# Patient Record
Sex: FEMALE | Race: OTHER | HISPANIC OR LATINO | Employment: FULL TIME | URBAN - METROPOLITAN AREA
[De-identification: names, ages, dates, MRNs, and addresses within clinical notes are randomized per-mention and may not be internally consistent; named-entity substitution may affect disease eponyms.]

---

## 2017-05-19 ENCOUNTER — LAB REQUISITION (OUTPATIENT)
Dept: LAB | Facility: HOSPITAL | Age: 22
End: 2017-05-19
Payer: COMMERCIAL

## 2017-05-19 ENCOUNTER — ALLSCRIPTS OFFICE VISIT (OUTPATIENT)
Dept: OTHER | Facility: OTHER | Age: 22
End: 2017-05-19

## 2017-05-19 DIAGNOSIS — Z11.3 ENCOUNTER FOR SCREENING FOR INFECTIONS WITH PREDOMINANTLY SEXUAL MODE OF TRANSMISSION: ICD-10-CM

## 2017-05-19 DIAGNOSIS — R30.9 PAINFUL MICTURITION: ICD-10-CM

## 2017-05-19 LAB
BILIRUB UR QL STRIP: NORMAL
CHLAMYDIA DNA CVX QL NAA+PROBE: NORMAL
CLARITY UR: NORMAL
COLOR UR: YELLOW
GLUCOSE (HISTORICAL): NORMAL
HGB UR QL STRIP.AUTO: NORMAL
KETONES UR STRIP-MCNC: NORMAL MG/DL
LEUKOCYTE ESTERASE UR QL STRIP: NORMAL
N GONORRHOEA DNA GENITAL QL NAA+PROBE: NORMAL
NITRITE UR QL STRIP: NORMAL
PH UR STRIP.AUTO: 6.5 [PH]
PROT UR STRIP-MCNC: NORMAL MG/DL
SP GR UR STRIP.AUTO: 1.01
UROBILINOGEN UR QL STRIP.AUTO: 0.2

## 2017-05-19 PROCEDURE — 87591 N.GONORRHOEAE DNA AMP PROB: CPT | Performed by: OBSTETRICS & GYNECOLOGY

## 2017-05-19 PROCEDURE — 87086 URINE CULTURE/COLONY COUNT: CPT | Performed by: OBSTETRICS & GYNECOLOGY

## 2017-05-19 PROCEDURE — 87491 CHLMYD TRACH DNA AMP PROBE: CPT | Performed by: OBSTETRICS & GYNECOLOGY

## 2017-05-20 LAB — BACTERIA UR CULT: NORMAL

## 2017-09-22 ENCOUNTER — HOSPITAL ENCOUNTER (EMERGENCY)
Facility: HOSPITAL | Age: 22
Discharge: HOME | End: 2017-09-22
Payer: COMMERCIAL

## 2017-09-22 VITALS
WEIGHT: 220 LBS | RESPIRATION RATE: 18 BRPM | SYSTOLIC BLOOD PRESSURE: 120 MMHG | OXYGEN SATURATION: 97 % | DIASTOLIC BLOOD PRESSURE: 83 MMHG | HEART RATE: 88 BPM | TEMPERATURE: 98.8 F | BODY MASS INDEX: 40.48 KG/M2 | HEIGHT: 62 IN

## 2017-09-22 DIAGNOSIS — R51.9 HEADACHE: Primary | ICD-10-CM

## 2017-09-22 PROCEDURE — 96372 THER/PROPH/DIAG INJ SC/IM: CPT

## 2017-09-22 PROCEDURE — 99283 EMERGENCY DEPT VISIT LOW MDM: CPT

## 2017-09-22 RX ORDER — DIPHENHYDRAMINE HCL 25 MG
25 TABLET ORAL ONCE
Status: COMPLETED | OUTPATIENT
Start: 2017-09-22 | End: 2017-09-22

## 2017-09-22 RX ORDER — KETOROLAC TROMETHAMINE 30 MG/ML
15 INJECTION, SOLUTION INTRAMUSCULAR; INTRAVENOUS ONCE
Status: COMPLETED | OUTPATIENT
Start: 2017-09-22 | End: 2017-09-22

## 2017-09-22 RX ORDER — METOCLOPRAMIDE 10 MG/1
10 TABLET ORAL ONCE
Status: COMPLETED | OUTPATIENT
Start: 2017-09-22 | End: 2017-09-22

## 2017-09-22 RX ORDER — ACETAMINOPHEN 325 MG/1
650 TABLET ORAL ONCE
Status: COMPLETED | OUTPATIENT
Start: 2017-09-22 | End: 2017-09-22

## 2017-09-22 RX ADMIN — METOCLOPRAMIDE 10 MG: 10 TABLET ORAL at 21:16

## 2017-09-22 RX ADMIN — ACETAMINOPHEN 650 MG: 325 TABLET, FILM COATED ORAL at 19:29

## 2017-09-22 RX ADMIN — KETOROLAC TROMETHAMINE 15 MG: 30 INJECTION, SOLUTION INTRAMUSCULAR at 21:17

## 2017-09-22 RX ADMIN — DIPHENHYDRAMINE HCL 25 MG: 25 TABLET ORAL at 21:16

## 2017-09-23 ENCOUNTER — HOSPITAL ENCOUNTER (EMERGENCY)
Facility: HOSPITAL | Age: 22
Discharge: HOME | End: 2017-09-23
Attending: EMERGENCY MEDICINE | Admitting: EMERGENCY MEDICINE
Payer: COMMERCIAL

## 2017-09-23 VITALS
OXYGEN SATURATION: 98 % | SYSTOLIC BLOOD PRESSURE: 132 MMHG | HEART RATE: 80 BPM | HEIGHT: 62 IN | WEIGHT: 220.02 LBS | RESPIRATION RATE: 18 BRPM | TEMPERATURE: 98.8 F | BODY MASS INDEX: 40.49 KG/M2 | DIASTOLIC BLOOD PRESSURE: 79 MMHG

## 2017-09-23 DIAGNOSIS — G43.909 MIGRAINE HEADACHE: Primary | ICD-10-CM

## 2017-09-23 LAB
BACTERIA UR QL AUTO: ABNORMAL /HPF
BILIRUB UR QL STRIP: NEGATIVE
CLARITY UR: CLEAR
COLOR UR: YELLOW
GLUCOSE UR STRIP-MCNC: NEGATIVE MG/DL
HGB UR QL STRIP.AUTO: NEGATIVE
HYALINE CASTS #/AREA URNS LPF: ABNORMAL /LPF
KETONES UR STRIP-MCNC: ABNORMAL MG/DL
LEUKOCYTE ESTERASE UR QL STRIP: ABNORMAL
NITRITE UR QL STRIP: NEGATIVE
NON-SQ EPI CELLS URNS QL MICRO: ABNORMAL /HPF
PH UR STRIP.AUTO: 6 [PH] (ref 4.5–8)
PROT UR STRIP-MCNC: ABNORMAL MG/DL
RBC #/AREA URNS AUTO: ABNORMAL /HPF
SP GR UR STRIP.AUTO: >=1.03 (ref 1–1.03)
UROBILINOGEN UR QL STRIP.AUTO: 0.2 E.U./DL
WBC #/AREA URNS AUTO: ABNORMAL /HPF

## 2017-09-23 PROCEDURE — 81001 URINALYSIS AUTO W/SCOPE: CPT

## 2017-09-23 PROCEDURE — 96375 TX/PRO/DX INJ NEW DRUG ADDON: CPT

## 2017-09-23 PROCEDURE — 99283 EMERGENCY DEPT VISIT LOW MDM: CPT

## 2017-09-23 PROCEDURE — 96365 THER/PROPH/DIAG IV INF INIT: CPT

## 2017-09-23 RX ORDER — KETOROLAC TROMETHAMINE 30 MG/ML
15 INJECTION, SOLUTION INTRAMUSCULAR; INTRAVENOUS ONCE
Status: COMPLETED | OUTPATIENT
Start: 2017-09-23 | End: 2017-09-23

## 2017-09-23 RX ORDER — MAGNESIUM SULFATE HEPTAHYDRATE 40 MG/ML
2 INJECTION, SOLUTION INTRAVENOUS ONCE
Status: COMPLETED | OUTPATIENT
Start: 2017-09-23 | End: 2017-09-23

## 2017-09-23 RX ORDER — IBUPROFEN 800 MG/1
800 TABLET ORAL 3 TIMES DAILY
Qty: 21 TABLET | Refills: 0 | Status: SHIPPED | OUTPATIENT
Start: 2017-09-23 | End: 2018-06-05 | Stop reason: ALTCHOICE

## 2017-09-23 RX ORDER — METOCLOPRAMIDE HYDROCHLORIDE 5 MG/ML
10 INJECTION INTRAMUSCULAR; INTRAVENOUS ONCE
Status: COMPLETED | OUTPATIENT
Start: 2017-09-23 | End: 2017-09-23

## 2017-09-23 RX ORDER — DIPHENHYDRAMINE HYDROCHLORIDE 50 MG/ML
25 INJECTION INTRAMUSCULAR; INTRAVENOUS ONCE
Status: COMPLETED | OUTPATIENT
Start: 2017-09-23 | End: 2017-09-23

## 2017-09-23 RX ADMIN — SODIUM CHLORIDE 1000 ML: 0.9 INJECTION, SOLUTION INTRAVENOUS at 18:37

## 2017-09-23 RX ADMIN — KETOROLAC TROMETHAMINE 15 MG: 30 INJECTION, SOLUTION INTRAMUSCULAR at 18:41

## 2017-09-23 RX ADMIN — METOCLOPRAMIDE 10 MG: 5 INJECTION, SOLUTION INTRAMUSCULAR; INTRAVENOUS at 18:42

## 2017-09-23 RX ADMIN — MAGNESIUM SULFATE HEPTAHYDRATE 2 G: 40 INJECTION, SOLUTION INTRAVENOUS at 18:46

## 2017-09-23 RX ADMIN — DIPHENHYDRAMINE HYDROCHLORIDE 25 MG: 50 INJECTION, SOLUTION INTRAMUSCULAR; INTRAVENOUS at 18:45

## 2017-09-29 ENCOUNTER — GENERIC CONVERSION - ENCOUNTER (OUTPATIENT)
Dept: OTHER | Facility: OTHER | Age: 22
End: 2017-09-29

## 2017-10-13 ENCOUNTER — GENERIC CONVERSION - ENCOUNTER (OUTPATIENT)
Dept: OTHER | Facility: OTHER | Age: 22
End: 2017-10-13

## 2018-01-11 NOTE — PROGRESS NOTES
Assessment    1  Encounter for preventive health examination (V70 0) (Z00 00)    Plan  Health Maintenance    · Follow-up PRN Evaluation and Treatment  Follow-up  Status: Complete  Done:  99EVJ7193   · Always use a seat belt and shoulder strap when riding or driving a motor vehicle ;  Status:Complete;   Done: 26DZQ7759   · Brush your teeth 3 times a day and floss at least once a day ; Status:Complete;   Done:  22NGT5548   · There are many ways to reduce your risk of catching or spreading a sexually transmitted  Infection ; Status:Complete;   Done: 34WXT0729   · There are ways to decrease your stress and improve your sense of well-being  We  encourage you to keep active and exercise regularly  Make time to take care of yourself  and participate in activities that you enjoy  Stay connected to friends and family that can  support and comfort you  If at any time you have thoughts of harming yourself or  someone else, contact us immediately ; Status:Active; Requested for:54Dak5719;    · We encourage all of our patients to exercise regularly  30 minutes of exercise or physical  activity five or more days a week is recommended for children and adults ;  Status:Complete;   Done: 44GQH0414   · We recommend regular contraceptive use to prevent an unplanned pregnancy ;  Status:Complete;   Done: 45JYK5241   · We recommend routine visits to a dentist ; Status:Complete;   Done: 46FLG2840   · We recommend that you follow these rules for gun safety ; Status:Complete;   Done:  44GHT1064    Discussion/Summary    Not seen by provider today  AHA completed with RN  Recently had baby and is connected to [facility] program  Has insurance - list of PCP providers given and encouraged to make appointment with PCP  Making good lifestyle choices at this time  Graduating in June  No further follow-up  Chief Complaint  Pt  presents for AHA completion and review   KRM,RN      History of Present Illness  Adolescent Health Assessment   Nutrition and Exercise   1  She eats breakfast 6-7 times during the week  2  She drinks 4-7 glasses of water daily  3  She does not drink sweetened beverages daily  rarely  4  She eats 1-2 servings of fruits and vegetables daily  5  She does not participate in physical activity daily  6  She has more than two hours of screen time daily  Mental Health   7  No  Did not experience high levels of stress AT SCHOOL in the past 30 days  8  No  Did not experience high levels of stress AT HOME in the past 30 days  10  Yes  In the past 12 months, she has been bullied on school property  boyfriend  11  No  She is not being bullied electronically  12  Yes  She is using social media  fb    13  No  In the past 12 months, she has not seriously considered suicide  14  No  In the past 12 months she has not made a suicide attempt  15  No  The patient has not ever intentionally hurt themselves  16  No  She has never been physically, sexually, or emotionally abused  Unintentional Injury   17  Yes  When she rides in a car, truck or [place], she always wears a seat belt  18  Yes  During the past 30 days, she always wore a helmet when she rode in a bike, motorcycle, minibike or ATV  19  No  During the past 30 days, she did not ride in a car or other vehicle driven by someone who had been drinking alcohol  20  No  She has not used alcohol and then driven a car/truck/van/motorcycle at any time during the past 30 days  Violence   21  No  She has not carried a weapon - such as a gun, knife or club - on at least one day within the past 30 days  - not on school property  22  No  She or someone she lives with does not have a gun, rifle or other firearm  23  No  She has not been in a physical fight one or more times within the past 12 months  24  No  She has never been in trouble with the police  25  Yes  She feels safe at school     26  No  She has not been hit, slapped, or physically hurt on purpose by a boyfriend/girlfriend in the past 12 months  Substance Abuse   27  No  In the past 30 days, she has not smoked cigarettes of any kind  29  No  During the past 30 days, she has not used chewing tobacco    30  No  She has not used any tobacco product (including snuff, cigars, cigarettes, electronic cigarettes, chew, SNUS, Hookah, Vapor) in her lifetime  31  No  In the past 30 days, she has not had at least one alcoholic drink  33  No  During the past 30 days, she did not binge drink  27  No  The patient has not used prescription medication (pills such as Xanax or Ritalin) that was not prescribed for them  34  No  She has not used alcohol or any illegal substance in the past 30 days  35  No  She has not used marijuana in the past 30 days  36  No  The patient has not used any form of cocaine in their lifetime  37  No  During the past 12 months, no one has offered, sold, or given her illegal drug(s) on school property  Reproductive Health   45  Yes  She has had sex  She has had one sexual partner, male  No  She did not use condoms the last time she was sexually active  39  No  She has not been tested for STDs  She tested negative  40  She does not know if she has had the HPV vaccine  41  Yes  She has been pregnant  2 mos  girl  42  No  She has never felt pressured to have sex when she did not want to    37  No  She does not think she may be basurto, lesbian, bisexual, transgender or question her sexuality  Extracurricular Activities: none   Future Plans and Goals: college next year for cooking   School: [employer]   Strengths were reviewed  Active Problems    1  BP check (V81 1) (Z01 30)   2  High risk teen pregnancy in second trimester (V23 89) (O09 892)   3  High risk teen pregnancy in third trimester (V23 89) (O09 893)   4  Infection of urinary tract during pregnancy in third trimester (646 63,599 0) (O23 43)   5  Insertion of Nexplanon (V25 5) (Z30 49)   6   Nausea/vomiting in pregnancy (643 90) (O21 9)   7  Pregnancy (V22 2) (Z33 1)   8  Routine eye exam (V72 0) (Z01 00)   9  Ultrasound for  screening for fetal growth restriction (V28 4) (Z36)    Past Medical History    1  Denied: History of medical problems    Surgical History    1  History of Cholecystectomy Laparoscopic   2  Denied: History of Previous Surgery - During Childhood    Family History  Mother    1  No pertinent family history   2  No pertinent family history  Father    3  Family history of   Sister    3  No pertinent family history  Brother    5  No pertinent family history  Maternal Grandmother    6  Family history of malignant neoplasm (V16 9) (Z80 9)    Social History    · Always uses seat belt   · High school student   · Household: Younger sister   · Lives with mother (single parent)   · Never a smoker   · Never smoker   · No alcohol use   · No drug use   · No tobacco/smoke exposure   · Primary language is Sinhala    Current Meds   1  Prenatal Vitamins 28-0 8 MG Oral Tablet; TAKE 1 TABLET DAILY; Therapy: 07QSB7287 to (Evaluate:31Csc7250)  Requested for: 90WPD5847; Last   Rx:12Nrp3291 Ordered    Allergies    1  No Known Drug Allergies    2  No Known Environmental Allergies   3  No Known Food Allergies    End of Encounter Meds    1  Prenatal Vitamins 28-0 8 MG Oral Tablet; TAKE 1 TABLET DAILY;    Therapy: 10MWQ4581 to (Evaluate:32Jyr1406)  Requested for: 86ADQ7169; Last   Rx:80Gsc0828 Ordered    Signatures   Electronically signed by : ROXANNE Hsu; May 26 2016 10:04AM EST                       (Author)

## 2018-01-12 NOTE — MISCELLANEOUS
Message  Message Free Text Note Form: Last time seen on the [facility] President advice Estrellita Whitley to make an appointment with her primary care doctor for a physical  Patient verbally agreed        Signatures   Electronically signed by : Raymundo Garcia, ; Jun 9 2016  1:31PM EST                       (Author)

## 2018-01-13 NOTE — MISCELLANEOUS
Reason For Visit  Reason For Visit Free Text Note Form: MET WITH PATIENT FOR FOLLOW  PATIENT REPORTED SHE IS DOING WELL  ATTENDING SCHOOL REGULARLY  NO CONCERN A THIS TIME  ENCOURAGED TO CONTACT SW AT ANY TIME NEEDED  Active Problems    1  High risk teen pregnancy in second trimester (V23 89) (O09 892)   2  Nausea/vomiting in pregnancy (643 90) (O21 9)    Current Meds   1  Ondansetron HCl - 4 MG Oral Tablet (Zofran); 1 tablet every 6 hours as needed for   nausea; Therapy: 15KND7850 to (Evaluate:50Xvn9639)  Requested for: 99XVQ9312; Last   Rx:2015 Ordered   2  Prenatal Vitamins 28-0 8 MG Oral Tablet; TAKE 1 TABLET DAILY; Therapy: 93OVT7119 to (Lonita Vulcan)  Requested for: 50CSP4489; Last   Rx:2015; Status: ACTIVE - Retrospective By Protocol Authorization Ordered    Allergies    1  No Known Drug Allergies    2  No Known Environmental Allergies   3   No Known Food Allergies    Future Appointments    Date/Time Provider Specialty Site   2016 03:00 PM  [facility], [address]   Electronically signed by : BENJAMIN Millan; 2016  5:26PM EST                       (Author)

## 2018-01-14 VITALS
BODY MASS INDEX: 31.52 KG/M2 | DIASTOLIC BLOOD PRESSURE: 76 MMHG | WEIGHT: 208 LBS | HEIGHT: 68 IN | SYSTOLIC BLOOD PRESSURE: 119 MMHG

## 2018-01-15 NOTE — MISCELLANEOUS
Reason For Visit  Reason For Visit Free Text Note Form: MET WITH PATIENT AND PROVIDER TO ASSIST WITH INTERPRETATION  PATIENT HAS NO CONCERN AT THIS TIME  Active Problems    1  High risk teen pregnancy in second trimester (V23 89) (O09 892)   2  High risk teen pregnancy in third trimester (V23 89) (O09 893)   3  Infection of urinary tract during pregnancy in third trimester (646 63,599 0) (O23 43)   4  Nausea/vomiting in pregnancy (643 90) (O21 9)   5  Ultrasound for  screening for fetal growth restriction (V28 4) (Z36)    Current Meds   1  Nitrofurantoin Monohyd Macro 100 MG Oral Capsule; TAKE 1 CAPSULE EVERY 12   HOURS DAILY; Therapy: 14BXU6235 to (Evaluate:2016); Last Rx:2016 Ordered   2  Ondansetron HCl - 4 MG Oral Tablet (Zofran); 1 tablet every 6 hours as needed for   nausea; Therapy: 29SCN5062 to (Evaluate:62Umv5218)  Requested for: 78TQZ9591; Last   Rx:2015 Ordered   3  Prenatal Vitamins 28-0 8 MG Oral Tablet; TAKE 1 TABLET DAILY; Therapy: 91WYP5885 to (Evaluate:59Oig0952)  Requested for: 20SPN2983; Last   Rx:2016 Ordered   4  Tdap (Adacel) (Tdap (Adacel)); Pt is post-28 wks; Therapy: 45JYX1460 to (Last Rx:2016) Ordered    Allergies    1  No Known Drug Allergies    2  No Known Environmental Allergies   3   No Known Food Allergies    Future Appointments    Date/Time Provider Specialty Site   2016 03:15 PM [address] [facility], Physician Schedule  [facility]  [address]     Signatures   Electronically signed by : BENJAMIN Mcallister; Mar 17 2016 11:25AM EST                       (Author)

## 2018-01-16 NOTE — PROGRESS NOTES
Assessment    1  Routine eye exam (V72 0) (Z01 00)    Plan  Health Maintenance    · Follow-up visit in 1 week Evaluation and Treatment  Follow-up  Status: Hold For -  Scheduling  Requested for: 25NMM2562  Routine eye exam    · SNELLEN VISION- POC; Status:Complete;   Done: 56XGJ0035    Discussion/Summary    Not seen by provider today  Follow-up in 1 week for AHA and 's physical       Chief Complaint  Initial visit to the [facility]  Requesting a 's PE  Interpretor - eGorge Mckenna, Family Liaison  Moved here from [address] in September  7 months pregnant  Receiving prenatal care at [facility] - no complications  Babies father lives here in [place]  Taking Zofran for nausea but no sure of how many mg per tab  Ins - just received [facility]  PCP - needs to be connected  Dental - none - will ref  Vision - last seen 2015 in [place]  MH - none  PHQ-9, Teen Screen, completed  Negative, Score 0, No Depression  Denies any thoughts of wanting to hurt herself in anyway  Needs to return to class  Needs to make an appt for her next prenatal appt for 17  Due to visit taking so long today, Malu Barron is ok with getting her 's PE next week  Will see provider in 1 week for PE and for HM/AHA  Past Medical History    1  Denied: History of medical problems    The active problems and past medical history were reviewed and updated today  Surgical History    1  Denied: History of Previous Surgery - During Childhood    The surgical history was reviewed and updated today  Family History    1  No pertinent family history    2  Family history of     3  No pertinent family history    4  No pertinent family history    The family history was reviewed and updated today  Social History    · Always uses seat belt   · High school student   · Household:  Younger sister   · Lives with mother (single parent)   · Never smoker   · No alcohol use   · No drug use   · No tobacco/smoke exposure   · Primary language is Indonesian  The social history was reviewed and updated today  Allergies    1  No Known Drug Allergies    2  No Known Environmental Allergies   3  No Known Food Allergies    Vitals  Signs [Data Includes: Current Encounter]   Recorded: 31FRU4779 99:36EV   Systolic: 213, LUE, Sitting  Diastolic: 68, LUE, Sitting  Height: 5 ft 8 in  2-20 Stature Percentile: 92 %  Weight: 179 lb 12 8 oz  2-20 Weight Percentile: 94 %  BMI Calculated: 27 34  BMI Percentile: 88 %  BSA Calculated: 1 95  LMP: Approx 43VYE7470  Pain Scale: 0    Results/Data  Encounter Results   PHQ-9 Adult Depression Screening 92KGS0599 10:19AM User, Ahs     Test Name Result Flag Reference   PHQ-9 Adult Depression Score 0     Q1: 0, Q2: 0, Q3: 0, Q4: 0, Q5: 0, Q6: 0, Q7: 0, Q8: 0, Q9: 0   PHQ-9 Adult Depression Screening Negative     PHQ-9 Difficulty Level Not difficult at all     PHQ-9 Severity No Depression         Procedure    Procedure: Visual Acuity Test    Indication: routine screening  Inforrmation supplied by Amanda henderson Snellen chart  Results: 20/20 in both eyes with corrective device, 20/20 in the right eye with corrective device, 20/20 in the left eye with corrective device normal in both eyes  Saw an eye dr  in [facility] 4/2015  Will need to find a local one for her annual exam when it is due  Color vision was reported by Amanda Belcher and the results were normal    Identified colors on Snellen chart  The patient was cooperative, but tolerated the procedure well  There were no complications        Future Appointments    Date/Time Provider Specialty Site   02/18/2016 08:30 AM [facility], [address]     Signatures   Electronically signed by : Rubi Josue RN; Feb 11 2016 10:21AM EST                       (Author)    Electronically signed by : ROXANNE Frye; Feb 11 2016 10:48AM EST                       (Author)

## 2018-01-18 NOTE — PROGRESS NOTES
Assessment    1  Encounter for preventive health examination (V70 0) (Z00 00)   2  Pregnancy (V22 2) (Z33 1)    Plan  Health Maintenance    · Follow-up visit in 1 month Evaluation and Treatment  Follow-up  Status: Hold For -  Scheduling  Requested for: 55GAU3308   · Brush your teeth 3 times a day and floss at least once a day ; Status:Complete;   Done:  93OKE4153   · Drink plenty of fluids ; Status:Complete;   Done: 64TJH9463   · There are ways to decrease your stress and improve your sense of well-being  We  encourage you to keep active and exercise regularly  Make time to take care of yourself  and participate in activities that you enjoy  Stay connected to friends and family that can  support and comfort you  If at any time you have thoughts of harming yourself or  someone else, contact us immediately ; Status:Active; Requested for:14Ocd8192;    · Vitamins can help you get daily requirements that your diet may not be giving you ;  Status:Complete;   Done: 55MVH3987   · We encourage you to begin to make lifestyle changes to help control your blood  pressure  These may include losing weight, increasing your activity level, limiting salt in  your diet, decreasing alcohol intake, and eating a diet low in fat and rich in fruits  and vegetables ; Status:Complete;   Done: 44DOO1557   · We recommend routine visits to a dentist ; Status:Complete;   Done: 60ZDK8452   · *[address] - [address] Physician Referral  Consult  Status: Hold For - Scheduling   Requested for: 58AHM3958  Care Summary provided  : Yes    Discussion/Summary    Pleasant, well-appearing 24year old here today for 's physical  Danish-speaking - utlized Naeem Faulkner for interpretation  PE remarkable only for pregnancy  's form filled out  Referred for Dental van  Has appointment with OB clinic in March  Follow-up with [facility] in 1 month for AHA completion  Chief Complaint  No complaints or concerns today        History of Present Illness  Here for 's physical  Is approximately 7 months pregnant - followed by [facility]  Next appointment is in March  Recently got insurance, and will be connecting with a PCP  Pregnancy going well - continues on Zofran as needed for nausea  No history of any illnesses  Review of Systems    Constitutional: Pregnant, but as noted in HPI  Eyes: No complaints of eye pain, no red eyes, no eyesight problems, no discharge, no dry eyes, no itching of eyes  ENT: no complaints of earache, no loss of hearing, no nose bleeds, no nasal discharge, no sore throat, no hoarseness  Cardiovascular: No complaints of slow heart rate, no fast heart rate, no chest pain, no palpitations, no leg claudication, no lower extremity edema  Respiratory: No complaints of shortness of breath, no wheezing, no cough, no SOB on exertion, no orthopnea, no PND  Genitourinary: No complaints of dysuria, no incontinence, no pelvic pain, no dysmenorrhea, no vaginal discharge or bleeding  Musculoskeletal: No complaints of arthralgias, no myalgias, no joint swelling or stiffness, no limb pain or swelling  Neurological: No complaints of headache, no confusion, no convulsions, no numbness, no dizziness or fainting, no tingling, no limb weakness, no difficulty walking  Psychiatric: Not suicidal, no sleep disturbance, no anxiety or depression, no change in personality, no emotional problems  Endocrine: No complaints of proptosis, no hot flashes, no muscle weakness, no deepening of the voice, no feelings of weakness  Hematologic/Lymphatic: No complaints of swollen glands, no swollen glands in the neck, does not bleed easily, does not bruise easily  Active Problems    1  Pregnancy (V22 2) (Z33 1)   2  Routine eye exam (V72 0) (Z01 00)    Past Medical History    1  Denied: History of medical problems    Surgical History    1  Denied: History of Previous Surgery - During Childhood    Family History    1   No pertinent family history    2  Family history of     3  No pertinent family history    4  No pertinent family history    Social History    · Always uses seat belt   · High school student   · Household: Younger sister   · Lives with mother (single parent)   · Never smoker   · No alcohol use   · No drug use   · No tobacco/smoke exposure   · Primary language is New Zealander    Allergies    1  No Known Drug Allergies    2  No Known Environmental Allergies   3  No Known Food Allergies    Physical Exam    Constitutional   General appearance: No acute distress, well appearing and well nourished  Pregnant  Eyes   Conjunctiva and lids: No swelling, erythema or discharge  Pupils and irises: Equal, round and reactive to light  Ears, Nose, Mouth, and Throat   External inspection of ears and nose: Normal     Otoscopic examination: Tympanic membranes translucent with normal light reflex  Canals patent without erythema  Nasal mucosa, septum, and turbinates: Normal without edema or erythema  Oropharynx: Normal with no erythema, edema, exudate or lesions  Pulmonary   Respiratory effort: No increased work of breathing or signs of respiratory distress  Auscultation of lungs: Clear to auscultation  Cardiovascular   Palpation of heart: Normal PMI, no thrills  Auscultation of heart: Normal rate and rhythm, normal S1 and S2, without murmurs  Examination of extremities for edema and/or varicosities: Normal     Abdomen   Abdomen: Non-tender, no masses  pregnant  Liver and spleen: No hepatomegaly or splenomegaly  Lymphatic   Palpation of lymph nodes in neck: No lymphadenopathy  Musculoskeletal   Gait and station: Normal     Skin   Skin and subcutaneous tissue: Normal without rashes or lesions  Neurologic   Cranial nerves: Cranial nerves 2-12 intact      Psychiatric   Orientation to person, place, and time: Normal     Mood and affect: Normal          Future Appointments    Date/Time Provider Specialty Site 03/17/2016 08:30 AM [facility], [address]     Signatures   Electronically signed by : ROXANNE Guajardo; Feb 18 2016 11:04AM EST                       (Author)

## 2018-01-22 VITALS
SYSTOLIC BLOOD PRESSURE: 110 MMHG | WEIGHT: 212.96 LBS | HEIGHT: 68 IN | HEART RATE: 70 BPM | BODY MASS INDEX: 32.28 KG/M2 | TEMPERATURE: 98.1 F | DIASTOLIC BLOOD PRESSURE: 68 MMHG

## 2018-05-24 ENCOUNTER — HOSPITAL ENCOUNTER (EMERGENCY)
Facility: HOSPITAL | Age: 23
Discharge: HOME | End: 2018-05-24
Attending: EMERGENCY MEDICINE | Admitting: EMERGENCY MEDICINE

## 2018-05-24 VITALS
WEIGHT: 212.96 LBS | OXYGEN SATURATION: 100 % | SYSTOLIC BLOOD PRESSURE: 110 MMHG | DIASTOLIC BLOOD PRESSURE: 68 MMHG | HEART RATE: 60 BPM | BODY MASS INDEX: 32.38 KG/M2 | TEMPERATURE: 98.6 F | RESPIRATION RATE: 18 BRPM

## 2018-05-24 DIAGNOSIS — G44.209 ACUTE NON INTRACTABLE TENSION-TYPE HEADACHE: Primary | ICD-10-CM

## 2018-05-24 LAB — EXT PREG TEST URINE: NEGATIVE

## 2018-05-24 PROCEDURE — 96375 TX/PRO/DX INJ NEW DRUG ADDON: CPT

## 2018-05-24 PROCEDURE — 99283 EMERGENCY DEPT VISIT LOW MDM: CPT

## 2018-05-24 PROCEDURE — 96374 THER/PROPH/DIAG INJ IV PUSH: CPT

## 2018-05-24 PROCEDURE — 96361 HYDRATE IV INFUSION ADD-ON: CPT

## 2018-05-24 PROCEDURE — 81025 URINE PREGNANCY TEST: CPT | Performed by: EMERGENCY MEDICINE

## 2018-05-24 RX ORDER — KETOROLAC TROMETHAMINE 30 MG/ML
15 INJECTION, SOLUTION INTRAMUSCULAR; INTRAVENOUS ONCE
Status: COMPLETED | OUTPATIENT
Start: 2018-05-24 | End: 2018-05-24

## 2018-05-24 RX ORDER — METOCLOPRAMIDE HYDROCHLORIDE 5 MG/ML
10 INJECTION INTRAMUSCULAR; INTRAVENOUS ONCE
Status: COMPLETED | OUTPATIENT
Start: 2018-05-24 | End: 2018-05-24

## 2018-05-24 RX ORDER — DIPHENHYDRAMINE HYDROCHLORIDE 50 MG/ML
25 INJECTION INTRAMUSCULAR; INTRAVENOUS ONCE
Status: COMPLETED | OUTPATIENT
Start: 2018-05-24 | End: 2018-05-24

## 2018-05-24 RX ADMIN — KETOROLAC TROMETHAMINE 15 MG: 30 INJECTION, SOLUTION INTRAMUSCULAR at 08:45

## 2018-05-24 RX ADMIN — DIPHENHYDRAMINE HYDROCHLORIDE 25 MG: 50 INJECTION, SOLUTION INTRAMUSCULAR; INTRAVENOUS at 08:45

## 2018-05-24 RX ADMIN — SODIUM CHLORIDE 1000 ML: 0.9 INJECTION, SOLUTION INTRAVENOUS at 08:44

## 2018-05-24 RX ADMIN — METOCLOPRAMIDE 10 MG: 5 INJECTION, SOLUTION INTRAMUSCULAR; INTRAVENOUS at 08:44

## 2018-05-24 NOTE — ED PROVIDER NOTES
Emergency Department Note- Carlos A Marino 25 y o  female MRN: [id number]    Unit/Bed#: ED 05 Encounter: [id number]        History of Present Illness   HPI:  Carlos A Marino is a 25 y o  female who presents with headache  Patient states he has been having a migraine-like headache for the last 3 days  Patient has had headaches like this previously which of usually resolved  Patient is taking Advil with no relief  Patient with pounding bilateral headache that is persistent for the last 3 days with blurry vision as well as nausea  No vomiting no numbness tingling or weakness in her extremities  Patient states she has a history of headaches with preeclampsia but does not believe she is pregnant currently  Patient also mentioned she has had an LP in the past and did feel better after the LP  Patient with no fever no neck pain no chest pain or shortness of breath no abdominal pain  No urinary complaints  Patient had an eye exam which showed no acute findings  REVIEW OF SYSTEMS    Constitutional: Negative for chills, fatigue and fever  HENT: Negative for ear pain, sore throat and trouble swallowing  Eyes: Negative for photophobia, pain and positive visual disturbance  Respiratory: Negative for cough, chest tightness and shortness of breath  Cardiovascular: Negative for chest pain and palpitations  Gastrointestinal: Negative for abdominal pain, constipation, diarrhea, positive nausea and negative vomiting  Genitourinary: Negative for dysuria, flank pain, frequency and hematuria  Musculoskeletal: Negative for back pain and neck pain  Skin: Negative for color change and rash  Neurological: Negative for dizziness, weakness, light-headedness and positive headaches  Psychiatric/Behavioral: Negative for confusion  The patient is not nervous/anxious      All systems reviewed and negative except as noted above or in HPI         Historical Information   Past Medical History: Diagnosis Date    Hypertension     Varicella     pt unsure    Visual impairment     glasses     Past Surgical History:   Procedure Laterality Date    CHOLECYSTECTOMY LAPAROSCOPIC N/A 5/11/2016    Procedure: CHOLECYSTECTOMY LAPAROSCOPIC possible open;  Surgeon: Carli Elizalde MD;  Location: [facility];  Service:      Social History   History   Alcohol Use No     History   Drug Use No     History   Smoking Status    Never Smoker   Smokeless Tobacco    Never Used     Family History:   Family History   Problem Relation Age of Onset    Cancer Maternal Grandfather        Meds/Allergies     (Not in a hospital admission)  No Known Allergies    Objective   Vitals: Blood pressure (!) 142/109, pulse 76, temperature 98 6 °F (37 °C), temperature source Oral, resp  rate 16, last menstrual period 05/22/2018, SpO2 99 %, not currently breastfeeding  PHYSICAL EXAM     General Appearance: alert and oriented, nad, non toxic appearing  Skin:  Warm, dry, intact  HEENT: atraumatic, normocephalic, eomi, perll   Neck: Supple, no JVD, no lymphadenopathy, trachea midline, no bruit  Cardiac: rrr, no murmurs, rub, gallops  Pulmonary: lungs cta, no wheezes, rales, rhonchi  Gastrointestinal: abdomen soft nontender, good bs, no mass or bruits, no cva tenderness  Extremities: no pedal edema, good pulses, no calf tenderness, no clubbing, no cyanosis  Neuro:  no focal motor or sensory deficits, cn intact  Psych:  Normal mood and affect, normal judgement and insight patient tearful and      Lab Results: Lab Results: I have personally reviewed pertinent lab results  Assessment/Plan     ED Medical Decision Making:  Patient likely with migraine-like headache  There is also a possibility of pseudotumor cerebri due to the patient's history feeling better after LP  Will give the patient a migraine cocktail check urine preg  Will have  try to get patient appointment with Neurology      Portions of the record may have been created with voice recognition software  Occasional wrong word or "sound a like" substitutions may have occurred due to the inherent limitations of voice recognition software  Read the chart carefully and recognize, using context, where substitutions have occurred         Milvia Esteves MD  05/24/18 4047

## 2018-05-24 NOTE — DISCHARGE INSTRUCTIONS
Dolor de trevor alcon   LO QUE NECESITA SABER:   El dolor de [place] alcon es un dolor o molestia que comienza de repente y ISAURA rápidamente  Usted puede tener un dolor de trevor alcon sólo cuando siente estrés o come ciertos alimentos  Otro tipo dolor de trevor alcon puede producirse todos los días y a veces varias veces al día  INSTRUCCIONES SOBRE EL OFELIA HOSPITALARIA:   Busque atención médica de inmediato si:   · Usted tiene dolor intenso  · Usted tiene entumecimiento en un lado de bhardwaj krystin o cuerpo  · Usted tiene un dolor de trevor que ocurre después de un golpe en la trevor, lucy caída u otro trauma  · Tiene dolor de [place], está olvidadizo o confundido o tiene dificultad para hablar  · Tiene dolor de [place], rigidez en el adán y [place]  Pregúntele a bhardwaj Gabrielle Hue vitaminas y minerales son adecuados para usted  · Usted tiene un dolor de trevor arlene y está vomitando  · Usted tiene dolor de trevor todos los días y no se [place] aun después de tratarlo  · Ellie majo de [facility] u ocurren nuevos síntomas cuando tiene dolor de [place]  · Usted tiene preguntas o inquietudes acerca de bhardwaj condición o cuidado  Medicamentos:  Es posible que usted necesite alguno de los siguientes:  · Un medicamento con receta para el dolor  podrían ser [employer]  El medicamento que recomienda bhardwaj médico dependerá del tipo de dolor de trevor que tenga  Usted necesitará hodan medicamentos para el dolor de trevor según las indicaciones para evitar un problema llamado dolor de trevor de rebote  Estos majo de [place] ocurren con el uso regular de analgésicos para los trastornos de dolor de [place]  · AINEs (Analgésicos antiinflamatorios no esteroides) gagan el ibuprofeno, ayudan a disminuir la inflamación, el dolor y la [place]  [place] medicamento esta disponible con o sin lucy receta médica  Los AINEs pueden causar sangrado estomacal o problemas renales en ciertas personas   Si usted vincenzo un medicamento anticoagulante, siempre pregúntele a bhardwaj médico si los PERLA son seguros para usted  Siempre mely la etiqueta de ruslan medicamento y [place] instrucciones  · El acetaminofén  [place] el dolor y baja la fiebre  Está disponible sin receta médica  Pregunte la cantidad y la frecuencia con que debe tomarlos  [address]  Mely las etiquetas de todos los demás medicamentos que esté usando para saber si también contienen acetaminofén, o pregunte a bhardwaj médico o farmacéutico  El acetaminofén puede causar daño en el hígado cuando no se vincenzo de forma correcta  No use más de 3 gramos (3,000 miligramos) en total de acetaminofeno en un día  · Antidepresivos  se pueden administrar para algunos tipos de majo de [place]  · [place] tawny medicamentos gagan se le haya indicado  Consulte con bhardwaj médico si usted angelo que bhardwaj medicamento no le está ayudando o si presenta efectos secundarios  Infórmele si es alérgico a cualquier medicamento  Mantenga lucy lista actualizada de los [place], las vitaminas y los productos herbales que vincenzo  Incluya los siguientes datos de los medicamentos: cantidad, frecuencia y motivo de administración  Traiga con usted la lista o los envases de la píldoras a tawny citas de seguimiento  Lleve la lista de los medicamentos con usted en dorcas de lucy emergencia  El manejo de bhardwaj síntomas:   · Aplique hielo o calor  en la hany donde bhardwaj hijo siente el dolor de trevor  Utilice un paquete (compresa) de hielo o calor  Para un paquete de hielo, también puede colocar hielo molido en lucy bolsa plástica  Cubra el paquete de hielo o la bolsa con lucy toalla pequeña antes de aplicarla en la piel  Tanto el hielo gagan el calor ayudan a reducir el dolor, y el calor también contribuye a reducir los C [employer]  Aplique calor katie 20 a 30 minutos cada 2 horas  Aplique hielo katie 15 a 20 minutos cada hora  Aplique calor o hielo katie el tiempo y la cantidad de días que se le indique   Maribel Copa puede alternar el calor y el hielo  · Relaje tawny músculos  Acuéstese en lucy posición cómoda y cierre tawny ojos  Relaje tawny músculos lentamente  Comience por los dedos de los pies y avance hacia arriba al blas de bhardwaj cuerpo  · Registre en un diario tawny majo de [place]  Escriba cuándo comienzan y terminan tawny migrañas  Rc Courser y qué estaba haciendo cuando comenzó la migraña  Registre lo que comió y lo que tomó las 24 horas previas al comienzo de bhardwaj migraña  Michaelene Geralds dolor y dónde le duele: Lleve un registro de lo que hizo para tratar bhardwaj Bradley Jyothi y si obtuvo un resultado satisfactorio  Cómo prevenir un dolor de trevor alcon:   · Evite cualquier cosa que provoque un dolor de trevor alcon  Los ejemplos incluyen la exposición a sustancias químicas, las grandes altitudes o no dormir lo suficiente  Lynn lucy rutina para dormir  Acuéstese y [place] días a la misma hora  No utilice aparatos electrónicos antes de acostarse  Pueden provocarle un dolor de trevor o impedirle dormir magdiel  · No fume  La nicotina y otras sustancias químicas en los cigarrillos y puros pueden desencadenar un dolor de trevor alcon o [place]  Pida información a bhardwaj médico si usted actualmente fuma y necesita ayuda para dejar de fumar  Los cigarrillos electrónicos o tabaco sin humo todavía contienen nicotina  Consulte con bhardwaj médico antes de [employer]  · Limite el consumo de alcohol según le indicaron  El alcohol puede provocar un dolor de trevor alcon o empeorarlo  Si usted tiene majo de [place] de racimo, no kevin alcohol katie un episodio  Para otros tipos de majo de [place], pregúntele a bhardwaj proveedor de atención médica si es seguro para usted beber alcohol  Pregunte cuál es la cantidad joe que puede beber y con qué frecuencia  · Ejercítese según indicaciones  El ejercicio puede reducir la tensión y [place] a aliviar el dolor de [place]   Propóngase hacer 30 minutos de [place] física claribel todos los días de la [place]  Bhardwaj médico puede ayudarle a crear un plan de ejercicios  · Consuma alimentos saludables y variados  [address] frutas, verduras, productos lácteos bajos en grasa, cielo [place], pescado y frijoles cocidos  Bhardwaj médico o dietista puede ayudarle a crear planes de comidas si desea evitar los alimentos que provocan majo de [place]  Acuda a tawny consultas de control con bhardwaj médico según le indicaron  Traiga bhardwaj registro de majo de trevor con usted cuando visite a bhardwaj médico  Anote tawny preguntas para que se acuerde de hacerlas katie tawny visitas  © 2017 [address] Information is for End User's use only and may not be sold, redistributed or otherwise used for commercial purposes  All illustrations and images included in CareNotes® are the copyrighted property of A D A M , Inc  or Fede Guillen  Esta información es sólo para uso en educación  Bhardwaj intención no es darle un consejo médico sobre enfermedades o tratamientos  Colsulte con bhardwaj Makeda Cabot farmacéutico antes de seguir cualquier régimen médico para saber si es seguro y efectivo para usted

## 2018-05-24 NOTE — SOCIAL WORK
CM consulted to assist Pt who needs PCP and neurology appointment  Pt reported her insurance with [employer] is no longer active and she does not have insurance at this time  CM contacted [address] [facility] who was able to schedule Pt appointment with Nay Latif, who Pt has seen before, on June 5th at 2:40pm  CM provided Pt with appointment card  Per [address] [facility], they no longer have a neurologist and referred CM to [facility] [place] [phone]  CM spoke to [facility], who reported that since Pt does not have insurance, she would be responsible for a $250 down payment  CM spoke with Pt who reported she cannot afford the down payment and reported she will wait until she gets insurance  CM provided Pt with financial assistance programs to inquire if they could assist with medical coverage and MA information

## 2018-06-01 RX ORDER — SUMATRIPTAN 25 MG/1
1 TABLET, FILM COATED ORAL
COMMUNITY
Start: 2017-09-29 | End: 2018-06-05 | Stop reason: ALTCHOICE

## 2018-06-01 RX ORDER — TOPIRAMATE 25 MG/1
25 TABLET ORAL
COMMUNITY
Start: 2017-10-07 | End: 2018-06-05 | Stop reason: ALTCHOICE

## 2018-06-01 RX ORDER — PROPRANOLOL HYDROCHLORIDE 20 MG/1
1 TABLET ORAL
COMMUNITY
Start: 2016-09-26 | End: 2018-06-05 | Stop reason: ALTCHOICE

## 2018-06-01 RX ORDER — IBUPROFEN 200 MG
200 TABLET ORAL EVERY 6 HOURS
COMMUNITY
End: 2018-06-05 | Stop reason: ALTCHOICE

## 2018-06-04 PROBLEM — G93.2 PSEUDOTUMOR CEREBRI: Status: ACTIVE | Noted: 2017-10-25

## 2018-06-05 ENCOUNTER — OFFICE VISIT (OUTPATIENT)
Dept: INTERNAL MEDICINE CLINIC | Facility: CLINIC | Age: 23
End: 2018-06-05

## 2018-06-05 VITALS
HEART RATE: 84 BPM | HEIGHT: 67 IN | DIASTOLIC BLOOD PRESSURE: 54 MMHG | WEIGHT: 219.58 LBS | TEMPERATURE: 98.3 F | SYSTOLIC BLOOD PRESSURE: 118 MMHG | BODY MASS INDEX: 34.46 KG/M2

## 2018-06-05 DIAGNOSIS — G93.2 PSEUDOTUMOR CEREBRI: ICD-10-CM

## 2018-06-05 DIAGNOSIS — H47.10 PAPILLEDEMA: Primary | ICD-10-CM

## 2018-06-05 PROCEDURE — 99213 OFFICE O/P EST LOW 20 MIN: CPT | Performed by: PHYSICIAN ASSISTANT

## 2018-06-05 RX ORDER — ACETAZOLAMIDE 250 MG/1
250 TABLET ORAL
COMMUNITY
Start: 2018-05-31 | End: 2019-04-23

## 2018-06-05 RX ORDER — BUTALBITAL/ASPIRIN/CAFFEINE 50-325-40
1 CAPSULE ORAL EVERY 4 HOURS
COMMUNITY
Start: 2018-05-27 | End: 2018-06-05 | Stop reason: ALTCHOICE

## 2018-06-05 NOTE — PATIENT INSTRUCTIONS
Please keep all appointments as scheduled with the neurologist and the eye Dr Dennis Never are aware of importance of keeping appointments  Continue the medication Diamox twice a day, this is to reduce the pressure and reduce headaches  Take forms to neurologist for  limitation for working, if they cannot complete then bring forms here  Call Neurologist with any additional questions/ concerns

## 2018-06-05 NOTE — PROGRESS NOTES
Assessment/Plan:    No problem-specific Assessment & Plan notes found for this encounter  Diagnoses and all orders for this visit:    Papilledema    Pseudotumor cerebri    Other orders  -     Discontinue: propranolol (INDERAL) 20 mg tablet; Take 1 tablet by mouth  -     Discontinue: SUMAtriptan (IMITREX) 25 mg tablet; Take 1 tablet by mouth  -     Discontinue: topiramate (TOPAMAX) 25 mg tablet; Take 25 mg by mouth  -     Discontinue: ibuprofen (MOTRIN) 200 mg tablet; Take 200 mg by mouth every 6 (six) hours  -     acetaZOLAMIDE (DIAMOX) 250 mg tablet; Take 250 mg by mouth  -     Discontinue: butalbital-acetaminophen-caffeine-codeine (FIORICET WITH CODEINE) -08-30 MG per capsule; Take 1 capsule by mouth every 4 (four) hours          Subjective:      Patient ID: Nick Gann is a 25 y o  female  [facility]   multiple emergency room visits for headaches  Patient reports onset of headaches in childhood  Patient did have an MRI in October of 2017 after abnormality found with eye doctor and headaches  This was consistent with pseudotumor cerebri, increased intracranial pressure  patient confirms after the LP done in the emergency room she noted improvement to symptoms of headache and pressure behind eye  Is now with LVH and saw neurologist and ophthalmologist   Saw neurologist 5/31 and ophth on 6/1 but does not remember what the eye Dr told her  Was started on Diamox, twice a day  has not noticed changes but then admits headache is slightly less  NO changes in vision but pressure behind R eye but headache is entire head  Sometimes throbbing however most of the time it is just a constant titer pressure  Denies any dizziness, no LOC no syncopal episodes  Has f/u eye appt 7/13 and neurologist 8/10 both at [address]    Confirms pre eclampsia but states no meds   No gestational DM   Will not be able to get insurance until October   With open enrollment through [facility], works as a   Sometimes up to or more than 40 lbs just sometimes  Reports last GYN PAP May 2017  Dentist last year  Reports after last child  has Implanon        The following portions of the patient's history were reviewed and updated as appropriate: allergies, current medications, past family history, past medical history, past social history, past surgical history and problem list     Review of Systems   Constitutional: Negative  Negative for fatigue  HENT:        As noted in HPI   Eyes: Negative for redness and visual disturbance  R eye pressure / not pain   Respiratory: Negative  Negative for cough and shortness of breath  Cardiovascular: Negative  Negative for chest pain, palpitations and leg swelling  Gastrointestinal: Negative  Negative for abdominal pain  Endocrine: Negative  Negative for cold intolerance and heat intolerance  Musculoskeletal: Negative  Neurological: Positive for light-headedness and headaches  Negative for dizziness, syncope, weakness and numbness  Psychiatric/Behavioral: Negative  Objective:      /54 (BP Location: Left arm, Patient Position: Sitting, Cuff Size: Adult)   Pulse 84   Temp 98 3 °F (36 8 °C) (Oral)   Ht 5' 7" (1 702 m)   Wt 99 6 kg (219 lb 9 3 oz)   LMP 05/22/2018   BMI 34 39 kg/m²          Physical Exam   Constitutional: She is oriented to person, place, and time  She appears well-developed and well-nourished  HENT:   Head: Normocephalic and atraumatic  Eyes: Conjunctivae and EOM are normal    Neck: Normal range of motion  No thyromegaly present  Cardiovascular: Normal rate and regular rhythm  No murmur heard  Pulmonary/Chest: Effort normal and breath sounds normal    Musculoskeletal: Normal range of motion  She exhibits no tenderness or deformity  Neurological: She is alert and oriented to person, place, and time  She displays normal reflexes  She exhibits normal muscle tone  Coordination normal    CN intact, limited fundoscopic, pupil constricted with minimal light, could not appreciate papilledema  Skin: Skin is warm and dry  Psychiatric: She has a normal mood and affect   Her behavior is normal  Judgment and thought content normal

## 2018-09-07 ENCOUNTER — TELEPHONE (OUTPATIENT)
Dept: INTERNAL MEDICINE CLINIC | Facility: CLINIC | Age: 23
End: 2018-09-07

## 2019-04-23 ENCOUNTER — PATIENT OUTREACH (OUTPATIENT)
Dept: OBGYN CLINIC | Facility: CLINIC | Age: 24
End: 2019-04-23

## 2019-04-23 ENCOUNTER — PROCEDURE VISIT (OUTPATIENT)
Dept: OBGYN CLINIC | Facility: CLINIC | Age: 24
End: 2019-04-23

## 2019-04-23 VITALS
BODY MASS INDEX: 35.92 KG/M2 | DIASTOLIC BLOOD PRESSURE: 71 MMHG | SYSTOLIC BLOOD PRESSURE: 127 MMHG | HEIGHT: 68 IN | HEART RATE: 69 BPM | WEIGHT: 237 LBS

## 2019-04-23 DIAGNOSIS — Z30.46 NEXPLANON REMOVAL: Primary | ICD-10-CM

## 2019-04-23 DIAGNOSIS — Z78.9 NEED FOR FOLLOW-UP BY SOCIAL WORKER: Primary | ICD-10-CM

## 2019-04-23 PROCEDURE — 11982 REMOVE DRUG IMPLANT DEVICE: CPT | Performed by: NURSE PRACTITIONER

## 2019-04-23 RX ORDER — IBUPROFEN 200 MG
TABLET ORAL EVERY 6 HOURS PRN
COMMUNITY
End: 2019-11-07

## 2019-05-03 ENCOUNTER — PATIENT OUTREACH (OUTPATIENT)
Dept: OBGYN CLINIC | Facility: CLINIC | Age: 24
End: 2019-05-03

## 2019-05-03 ENCOUNTER — ANNUAL EXAM (OUTPATIENT)
Dept: OBGYN CLINIC | Facility: CLINIC | Age: 24
End: 2019-05-03

## 2019-05-03 VITALS
HEART RATE: 70 BPM | HEIGHT: 68 IN | WEIGHT: 237 LBS | BODY MASS INDEX: 35.92 KG/M2 | SYSTOLIC BLOOD PRESSURE: 119 MMHG | DIASTOLIC BLOOD PRESSURE: 77 MMHG

## 2019-05-03 DIAGNOSIS — Z01.419 WOMEN'S ANNUAL ROUTINE GYNECOLOGICAL EXAMINATION: Primary | ICD-10-CM

## 2019-05-03 DIAGNOSIS — G89.29 CHRONIC BILATERAL THORACIC BACK PAIN: ICD-10-CM

## 2019-05-03 DIAGNOSIS — Z11.3 ROUTINE SCREENING FOR STI (SEXUALLY TRANSMITTED INFECTION): ICD-10-CM

## 2019-05-03 DIAGNOSIS — N62 LARGE BREASTS: ICD-10-CM

## 2019-05-03 DIAGNOSIS — E66.01 CLASS 2 SEVERE OBESITY DUE TO EXCESS CALORIES WITH SERIOUS COMORBIDITY AND BODY MASS INDEX (BMI) OF 36.0 TO 36.9 IN ADULT (HCC): ICD-10-CM

## 2019-05-03 DIAGNOSIS — M54.6 CHRONIC BILATERAL THORACIC BACK PAIN: ICD-10-CM

## 2019-05-03 PROBLEM — E66.812 CLASS 2 SEVERE OBESITY DUE TO EXCESS CALORIES WITH SERIOUS COMORBIDITY AND BODY MASS INDEX (BMI) OF 36.0 TO 36.9 IN ADULT (HCC): Status: ACTIVE | Noted: 2019-05-03

## 2019-05-03 LAB
C TRACH DNA SPEC QL NAA+PROBE: NEGATIVE
N GONORRHOEA DNA SPEC QL NAA+PROBE: NEGATIVE

## 2019-05-03 PROCEDURE — 3725F SCREEN DEPRESSION PERFORMED: CPT | Performed by: NURSE PRACTITIONER

## 2019-05-03 PROCEDURE — G0145 SCR C/V CYTO,THINLAYER,RESCR: HCPCS | Performed by: NURSE PRACTITIONER

## 2019-05-03 PROCEDURE — 99395 PREV VISIT EST AGE 18-39: CPT | Performed by: NURSE PRACTITIONER

## 2019-05-03 PROCEDURE — 87591 N.GONORRHOEAE DNA AMP PROB: CPT | Performed by: NURSE PRACTITIONER

## 2019-05-03 PROCEDURE — 87491 CHLMYD TRACH DNA AMP PROBE: CPT | Performed by: NURSE PRACTITIONER

## 2019-05-08 LAB
LAB AP GYN PRIMARY INTERPRETATION: NORMAL
Lab: NORMAL

## 2019-05-16 PROBLEM — Z30.430 ENCOUNTER FOR IUD INSERTION: Status: ACTIVE | Noted: 2019-05-16

## 2019-05-16 PROCEDURE — 58300 INSERT INTRAUTERINE DEVICE: CPT | Performed by: NURSE PRACTITIONER

## 2019-05-17 ENCOUNTER — PROCEDURE VISIT (OUTPATIENT)
Dept: OBGYN CLINIC | Facility: CLINIC | Age: 24
End: 2019-05-17

## 2019-05-17 VITALS
DIASTOLIC BLOOD PRESSURE: 89 MMHG | SYSTOLIC BLOOD PRESSURE: 137 MMHG | BODY MASS INDEX: 35.77 KG/M2 | WEIGHT: 236 LBS | HEIGHT: 68 IN | HEART RATE: 67 BPM

## 2019-05-17 DIAGNOSIS — Z30.430 ENCOUNTER FOR IUD INSERTION: Primary | ICD-10-CM

## 2019-05-17 LAB — SL AMB POCT URINE HCG: NEGATIVE

## 2019-05-17 PROCEDURE — 81025 URINE PREGNANCY TEST: CPT | Performed by: NURSE PRACTITIONER

## 2019-07-26 ENCOUNTER — OFFICE VISIT (OUTPATIENT)
Dept: OBGYN CLINIC | Facility: CLINIC | Age: 24
End: 2019-07-26

## 2019-07-26 VITALS
BODY MASS INDEX: 35.77 KG/M2 | HEART RATE: 66 BPM | HEIGHT: 68 IN | DIASTOLIC BLOOD PRESSURE: 66 MMHG | WEIGHT: 236 LBS | SYSTOLIC BLOOD PRESSURE: 123 MMHG

## 2019-07-26 DIAGNOSIS — Z30.431 IUD CHECK UP: Primary | ICD-10-CM

## 2019-07-26 PROCEDURE — 99212 OFFICE O/P EST SF 10 MIN: CPT | Performed by: NURSE PRACTITIONER

## 2019-07-26 NOTE — PATIENT INSTRUCTIONS
Levonorgestrel (Into the uterus)   Levonorgestrel ([id number]-ytr-JOHN-trel)  Prevents pregnancy and treats heavy menstrual bleeding  This is an intrauterine device (IUD), which is a reversible form of birth control  This IUD slowly releases levonorgestrel, a hormone  Brand Name(s): Danis Pak, Mirena, [place]   There may be other brand names for this medicine  When This Medicine Should Not Be Used: This device is not right for everyone  Do not use it if you had an allergic reaction to levonorgestrel, or you are pregnant  How to Use This Medicine:   Device  · The IUD is usually inserted by your doctor during your monthly period  You will need to see your doctor 4 to 6 weeks after the IUD is placed and then once a year  · Your IUD has a string or "tail" that is made of plastic thread  About one or two inches of this string hangs into your vagina  You cannot see this string, and it will not cause problems when you have sex  Check your IUD after each monthly period  You may not be protected against pregnancy if you cannot feel the string or if you feel plastic  Do the following to check the placement of your IUD:  [facility] your hands with soap and warm water  Dry them with a clean towel  ¨ Bend your knees and squat low to the ground  ¨ Gently put your index finger high inside your vagina  The cervix is at the top of the vagina  Find the IUD string coming from your cervix  Never pull on the string  You should not be able to feel the plastic of the IUD itself  Wash your hands after you are done checking your IUD string  · Your doctor will need to replace your IUD after 3 years for [facility] or [place], or after 5 years for [facility] or [place] [address]  You will also need to have it replaced if it comes out of your uterus  Drugs and Foods to Avoid:   Ask your doctor or pharmacist before using any other medicine, including over-the-counter medicines, vitamins, and herbal products    · Some medicines can affect how this device works  Tell your doctor if you are using a blood thinner (including warfarin)  Warnings While Using This Medicine:   · Tell your doctor if you are breastfeeding, or you have had a baby, miscarriage, or  in the past 3 months  Tell your doctor if you have liver disease (including tumor or cancer), breast cancer, heart or blood circulation problems, including a history of heart valve problems, heart disease, blood clotting problems, stroke, heart attack, or high blood pressure  Tell your doctor if you have problems with your immune system or have had surgery on your female organs (especially fallopian tubes)  · Tell your doctor if you have had any problems, infections, or other conditions that affected your reproductive system  There are many problems that could make an IUD a bad choice for you, including if you have fibroids, unexplained bleeding, a uterus that has an unusual shape, a recent infection, pelvic inflammatory disease, abnormal Pap test, ectopic pregnancy, cancer or suspected cancer, or an existing IUD  · There is a small chance that you could get pregnant when using an IUD, just as there is with any birth control  If you get pregnant, your doctor may remove your IUD to lower the risk of miscarriage or other problems  · This medicine may cause the following problems:  ¨ Increased risk of ectopic pregnancy (pregnancy outside the uterus)  ¨ Increased risk of a serious infection called pelvic inflammatory disease (PID)  ¨ Increased risk for ovarian cysts  ¨ Perforation (hole in the wall of your uterus), which can damage other organs  · You might have some spotting and cramping during the first weeks after the IUD has been inserted  These symptoms should decrease or go away within a few weeks up to 6 months  · You could have less bleeding or even stop having periods by the end of the first year   Call your doctor if you have a change from the regular bleeding pattern after you have had your IUD for awhile, such as more bleeding or if you miss a period (and you were having periods even with your IUD)  · An IUD can slip partly or all of the way out of your uterus  If this happens, use condoms or another form of birth control, and call your doctor right away  · This IUD will not protect you from HIV/AIDS, herpes, or other sexually transmitted diseases  · If you have the Lynne Keanee or Conor Cruz, tell your healthcare provider before you have an MRI test   Possible Side Effects While Using This Medicine:   Call your doctor right away if you notice any of these side effects:  · Allergic reaction: Itching or hives, swelling in your face or hands, swelling or tingling in your mouth or throat, chest tightness, trouble breathing  · Chest pain, problems with speech or walking, numbness or weakness in your arm or leg or on one side of your body  · Heavy bleeding from your vagina  · Pain during sex, or if your partner feels the hard plastic of the IUD during sex  · Severe headache, vision changes  · Stomach or pelvic pain, tenderness, or cramping that is sudden or severe  · Vaginal discharge has a bad smell, fever, chills, sores on your genitals  · Yellow skin or eyes  If you notice these less serious side effects, talk with your doctor:   · Acne or other skin changes  · Breast pain  · Change in bleeding pattern after the first few months  · Dizziness or lightheadedness after IUD is placed  · Mild itching around your vagina and genitals  If you notice other side effects that you think are caused by this medicine, tell your doctor  Call your doctor for medical advice about side effects  You may report side effects to FDA at 3-225-FDA-6103  © 2017 [address] Information is for End User's use only and may not be sold, redistributed or otherwise used for commercial purposes  The above information is an  only  It is not intended as medical advice for individual conditions or treatments  Talk to your doctor, nurse or pharmacist before following any medical regimen to see if it is safe and effective for you

## 2019-07-26 NOTE — PROGRESS NOTES
Assessment/Plan:      Diagnoses and all orders for this visit:    IUD check up    Other orders  -     levonorgestrel (MIRENA) 20 MCG/24HR IUD; 1 each by Intrauterine route once      -reviewed with patient Mirena IUD is effective for 5 years  Normal to have irregular vaginal bleeding  Reviewed with patient Mirena IUD protect from pregnancy but does not protect from sexually transmitted infections      RTO 2020 for annual exam or sooner as needed    Subjective:     Patient ID: Mitch Rascon is a 26 y o  female  HPI  here for Mirena IUD check  Mirena IUD was placed 19 is effective through 2024  Patient is satisfied and desires to continue  Has had intercourse without partner complaint  Denies abnormal uterine bleeding  No questions or concerns at today's visit  Last Pap smear 5/3/2019- NILM  Last chlamydia/gonorrhea 5/3/2019-negative    Review of Systems   Constitutional: Negative for chills and fever  Respiratory: Negative  Cardiovascular: Negative  Gastrointestinal: Negative  Genitourinary: Negative  Objective:     Physical Exam   Constitutional: She appears well-developed and well-nourished  Cardiovascular: Normal rate, regular rhythm and normal heart sounds  Pulmonary/Chest: Effort normal and breath sounds normal    Genitourinary: Vagina normal    Genitourinary Comments: IUD strings easily visualized on speculum exam approximately 2-3 cm   Skin: Skin is warm and dry  Psychiatric: She has a normal mood and affect   Her behavior is normal  Thought content normal

## 2019-08-02 ENCOUNTER — TELEPHONE (OUTPATIENT)
Dept: NEUROLOGY | Facility: CLINIC | Age: 24
End: 2019-08-02

## 2019-08-02 ENCOUNTER — OFFICE VISIT (OUTPATIENT)
Dept: INTERNAL MEDICINE CLINIC | Facility: CLINIC | Age: 24
End: 2019-08-02

## 2019-08-02 VITALS
TEMPERATURE: 98.2 F | BODY MASS INDEX: 35.78 KG/M2 | HEIGHT: 67 IN | HEART RATE: 60 BPM | DIASTOLIC BLOOD PRESSURE: 80 MMHG | WEIGHT: 227.96 LBS | SYSTOLIC BLOOD PRESSURE: 116 MMHG

## 2019-08-02 DIAGNOSIS — M54.2 CHRONIC NECK AND BACK PAIN: Primary | Chronic | ICD-10-CM

## 2019-08-02 DIAGNOSIS — M54.9 CHRONIC NECK AND BACK PAIN: Primary | Chronic | ICD-10-CM

## 2019-08-02 DIAGNOSIS — H47.10 PAPILLEDEMA: ICD-10-CM

## 2019-08-02 DIAGNOSIS — G93.5 CHIARI MALFORMATION TYPE I (HCC): ICD-10-CM

## 2019-08-02 DIAGNOSIS — G89.29 CHRONIC NECK AND BACK PAIN: Primary | Chronic | ICD-10-CM

## 2019-08-02 PROCEDURE — 99213 OFFICE O/P EST LOW 20 MIN: CPT | Performed by: PHYSICIAN ASSISTANT

## 2019-08-02 RX ORDER — METHOCARBAMOL 750 MG/1
750 TABLET, FILM COATED ORAL EVERY 6 HOURS PRN
Qty: 20 TABLET | Refills: 0 | Status: SHIPPED | OUTPATIENT
Start: 2019-08-02 | End: 2019-11-07

## 2019-08-02 NOTE — PROGRESS NOTES
Assessment/Plan:    Please continue with her therapy sessions as scheduled through your provider at [place] [address]  I have added a script so they will include therapy for your lower back as well  As you have had this pain for a significant amount of time with no improvement I have also provided you with a script for x-rays you do not need an appointment to get these completed  We also reviewed that you have not seen your neurologist since May of 2018 and secondary to the swelling in her eyes you were recommended to see an eye doctor very frequently  I have also placed an order for you to schedule with your neurologist for Chiari malformation for follow-up and to please continue following with your eye specialist     No problem-specific Assessment & Plan notes found for this encounter  Diagnoses and all orders for this visit:    Chronic neck and back pain  -     XR spine cervical complete 4 or 5 vw non injury; Future  -     XR spine lumbar minimum 4 views non injury; Future  -     methocarbamol (ROBAXIN) 750 mg tablet; Take 1 tablet (750 mg total) by mouth every 6 (six) hours as needed for muscle spasms for up to 30 days  -     Ambulatory referral to Physical Therapy; Future  -     Ambulatory referral to Pain Management; Future    Chiari malformation type I ([facility] )    Papilledema  -     Ambulatory referral to Neurology; Future          Subjective:      Patient ID: Bruce Linod is a 26 y o  female  Here for episodic regarding back pain  LV 6/2018    States has had a lot of pain and was having some neck pain to mid back pain and felt was due to breast so went to a breast surgeon at [facility] for reduction but told had to do therapy first  Has been going to PT for 1 month  States told had to complete 24 therapy sessions  Here today because getting more pain and her lower back is now sometimes painful    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  Has been taking motrin and admits does help but not completely  When asked more history patient admits that the back pain even the lower is not new but that the physical therapy she is doing currently is only for her upper back and neck for possible breast reduction surgery and they are doing some therapy for her lower back at this time as well  Patient states that sometimes her pain is so bad that she is either later missing work and that she is at risk for losing her job  Discussed with patient that I cannot take her out of work but she may want to consider changing jobs to 1 that is not physical   When asked patient admits that she works Monday through Thursday 10 hour days and she has Friday Saturday and Sundays off and after 3 days of rest her pain is significantly less  Reviewed with patient that this indicates that the cause of her pain is likely the repetitive bending lifting and twisting that is required for warehouse work  That even if we gave her a few days off which she actually is offer the next 3 days the pain would likely resume  Lab and order for physical therapy to be included with the neck and upper back therapy that she is already receiving  Patient has had this pain for a significant amount of time and we will get some imaging completed and that she may require referral to Spine and Pain Management  As patient what specifically she would want me to do regarding her pain in her employment as I advised her that I cannot take her out of work continuously at this time  We reviewed that she admits her pain improves when she is not on the job a gets worse when she returns to work  We reviewed that she is already attending physical therapy and the plan is that if she has no improvement then we will provide referral to specialist and that she is also under evaluation for breast reduction for same pain    I agree to patient that her job is very physical and demanding and she may want to consider switching her job     Also known chiari 1 malformation last saw neuro at [facility] 5/2018 no surgery but has papilledema and needed to follow with eye Dr Naina Phillips has not had anymore  headaches  That note from May 2018 also reported that patient was being started on Diamox  So she did not follow-up  Patient reports the last time that she saw an eye doctor she was told her vision was 20/20 so she did not follow-up  Patient does not remember the doctor but based on her description it sounds like that she saw an optometrist and not the ophthalmologist     Reviewed with patient that while I am very happy she is no longer having the headaches and the vision issues this is something that we do not want her to get lost to follow-up for  As it is she has not seen the neurologist in over 1 year  Patient provided with referral to Neurology and advised that she may schedule with her previous neurologist who she is already established with or she is welcome to now establish with Nolvia Adame  Patient did not have any worrisome signs in history or examination today  The following portions of the patient's history were reviewed and updated as appropriate: allergies, current medications, past family history, past medical history, past social history, past surgical history and problem list     Review of Systems   Constitutional: Negative  Negative for appetite change, chills, fever and unexpected weight change  Eyes: Negative  Negative for photophobia, pain and visual disturbance  Respiratory: Negative  Negative for cough and shortness of breath  Cardiovascular: Negative  Negative for chest pain, palpitations and leg swelling  Gastrointestinal: Negative  Negative for abdominal pain, constipation and diarrhea  Genitourinary: Negative  Musculoskeletal: Positive for arthralgias, back pain, myalgias and neck pain  Skin: Negative      Neurological: Negative for dizziness, seizures, syncope, facial asymmetry, light-headedness and headaches  Psychiatric/Behavioral: Negative  Objective:      /80 (BP Location: Right arm, Patient Position: Sitting, Cuff Size: Large)   Pulse 60   Temp 98 2 °F (36 8 °C) (Oral)   Ht 5' 7 25" (1 708 m)   Wt 103 kg (227 lb 15 3 oz)   LMP  (LMP Unknown)   BMI 35 44 kg/m²          Physical Exam   Constitutional: She is oriented to person, place, and time  She appears well-developed and well-nourished  HENT:   Head: Normocephalic and atraumatic  Eyes: Pupils are equal, round, and reactive to light  Conjunctivae and EOM are normal    Neck: Normal range of motion  Cardiovascular: Normal rate, regular rhythm and normal heart sounds  No murmur heard  Pulmonary/Chest: Effort normal and breath sounds normal    Musculoskeletal: She exhibits tenderness  Patient reporting tenderness to palpation bilateral trapezius muscles  Less so to cervical paraspinal muscle and lumbar paraspinal muscle  Patient has no vertebral body tenderness  Patient's posture is sitting slightly forward as she reports this feels slightly better than sitting straight up with the weight pulling forward causing her to have more neck pain  Patient has 5/5 strength bilateral upper extremities and lower extremities equally  DTRs 2+   Neurological: She is alert and oriented to person, place, and time  She displays normal reflexes  No cranial nerve deficit or sensory deficit  She exhibits normal muscle tone  Skin: No rash noted  Psychiatric: She has a normal mood and affect  Her behavior is normal    Nursing note and vitals reviewed

## 2019-08-02 NOTE — PATIENT INSTRUCTIONS
Please continue with her therapy sessions as scheduled through your provider at [facility]  I have added a script so they will include therapy for your lower back as well  As you have had this pain for a significant amount of time with no improvement I have also provided you with a script for x-rays you do not need an appointment to get these completed  We also reviewed that you have not seen your neurologist since May of 2018 and secondary to the swelling in her eyes you were recommended to see an eye doctor very frequently    I have also placed an order for you to schedule with your neurologist for Chiari malformation for follow-up and to please continue following with your eye specialist

## 2019-08-09 ENCOUNTER — HOSPITAL ENCOUNTER (OUTPATIENT)
Dept: RADIOLOGY | Facility: HOSPITAL | Age: 24
Discharge: HOME | End: 2019-08-09
Payer: COMMERCIAL

## 2019-08-09 ENCOUNTER — TELEPHONE (OUTPATIENT)
Dept: INTERNAL MEDICINE CLINIC | Facility: CLINIC | Age: 24
End: 2019-08-09

## 2019-08-09 ENCOUNTER — TRANSCRIBE ORDERS (OUTPATIENT)
Dept: RADIOLOGY | Facility: HOSPITAL | Age: 24
End: 2019-08-09

## 2019-08-09 DIAGNOSIS — G89.29 CHRONIC NECK AND BACK PAIN: Chronic | ICD-10-CM

## 2019-08-09 DIAGNOSIS — M54.9 CHRONIC NECK AND BACK PAIN: Chronic | ICD-10-CM

## 2019-08-09 DIAGNOSIS — M54.2 CHRONIC NECK AND BACK PAIN: Chronic | ICD-10-CM

## 2019-08-09 PROCEDURE — 72110 X-RAY EXAM L-2 SPINE 4/>VWS: CPT

## 2019-08-09 PROCEDURE — 72050 X-RAY EXAM NECK SPINE 4/5VWS: CPT

## 2019-08-09 NOTE — TELEPHONE ENCOUNTER
Called pharmacy  [facility] on [address] has script ready to be picked up  Called patient  No answer  Left VM to call office

## 2019-08-09 NOTE — TELEPHONE ENCOUNTER
Patients chart states she should have methocarbamol (ROBAXIN) at [facility] but they state they never received this prescription  They need a call back please

## 2019-09-11 NOTE — PROGRESS NOTES
[address] [facility] Consult  PATIENT:  Marcy Mei  MRN:  [id number]  :  1995  DATE OF SERVICE:  2019  REFERRED BY: Krzysztof Rutherford PA-C  PMD: Suraj Pryor PA-C    Assessment/Plan:     Marcy Mei is a very pleasant  26 y o  female with a past medical history of preeclampsia, chronic neck and back pain (recently referred to pain management), chiari malformation type 1, idiopathic intracranial hypertension, papilledema referred here for evaluation of headache  Chronic daily headache  Idiopathic intracranial hypertension  Papilledema - per report, unable to fully assess due to pupillary constriction today  Chiari malformation type I ([facility] [address] ) - per report, I do not have MRI results except for as documented   She reports headaches started around age 17, but did not become bad until age 22  Patient is a very poor historian therefore history as obtained primarily through extensive medical record review  In 2017 she presented to ED multiple times for diffuse headache, photophobia  In 10/06/2017 she was evaluated by Neurology for documented results that I do not see in chart: "An outside MRI of the brain was completed which demonstrated questionable protrusion of the left optic disc into posterior aspect of left orbit correlating with reported history of papilledema, transverse dural venous sinus stenosis is present with findings consistent with idiopathic intracranial hypertension  In the ED she had a lumbar puncture with opening pressure 55 cm H20 and closing pressure 22 5 cm H20 with relief of her symptoms after  "Per record review she was started on multiple medications including at 1 point acetazolamide in the past, but due to no insurance she stop taking any these medications  - as of 2019:  Chronic daily headache for the past approximately 6 months  Prior to that on and off  Has about 3 headache-free days per month    She describes bifrontal, bilateral retro-orbital, apex pulsating, pressure, stabbing  She denies aura  She does have associated photophobia, phonophobia  We discussed likely multifactorial etiology to headaches although from history sounds like primarily related to idiopathic intracranial hypertension, could be component of medication overuse headache, or per review of previous notes migraines have been suspected as well in the past - however she does not report the typical features of this today, do not sound like the classic headaches that may accompany Chiari 1 malformation  Workup:  - 10/2017:  Per [facility] resident note - I do not have the official results, but requested patient obtain  "An outside MRI of the brain was completed which demonstrated questionable protrusion of the left optic disc into posterior aspect of left orbit correlating with reported history of papilledema, transverse dural venous sinus stenosis is present with findings consistent with idiopathic intracranial hypertension " (no mention of Chiari)  - 10/2017: at  ED she had a lumbar puncture with opening pressure 55 cm H20 and closing pressure 22 5 cm H20 with relief of her symptoms after    -  Ambulatory referral to Ophthalmology; Future  -  Ambulatory referral to Nutrition Services; Future  - will hold off on repeat imaging at this time due to no new symptoms, no red flags, no significant worsening, no visual changes, but would have low threshold for repeat MRI brain if needed in the future for any new or concerning features  - if needed could consider repeat LP    Preventative:  - we discussed headache hygiene and lifestyle factors that may improve headaches  -     acetaZOLAMIDE (DIAMOX) 250 mg tablet; 250 mg BID for 1 week and then increase to 250 mg in am and 500 mg in pm for 1 week and then 500 mg BID  Discussed proper use, possible side effects and risks    - past:  Propranolol, topiramate, acetazolamide-patient does not recall how long she was on any of these or what affect had  - future options:  Topiramate    Abortive:  - discussed not taking over-the-counter or prescription pain medications more than 3 days per week to prevent medication overuse/rebound headache  - trial of indomethacin 50 mg as needed with Carafate prior  Discussed proper use, possible side effects and risks  - past:  Sumatriptan, Fioricet, methocarbamol-patient does not recall how long she was on any of these or what affect had  - future options:  Could try Triptan    BMI 35 0-35 9,adult  -     Ambulatory referral to Nutrition Services; Future        Patient instructions     Obtain labs/blood work sometime in the next week    [id number] [facility] if they have the MRI of her brain and if they do please get on CD so I can review the images and we can upload them to our system    If any worsening or new or concerning symptoms especially if any vision loss, go to the emergency department where I recommend a consider a lumbar puncture/spinal tap like he had before    *Most importantly, follow-up with Ophthalmology for dilated fundus exam to see if you have any signs of papilledema and he will have to continue to follow with them closely if you do - please call me or right me on my chart after you see them and let me know what they said and also try to have them fax the results to us    Referral to Nutrition to help with weight loss    Headache/migraine treatment:   Abortive medications (for immediate treatment of a headache): It is ok to take ibuprofen, acetaminophen or naproxen (Advil, Tylenol,  Aleve, Excedrin) if they help your headaches you should limit these to No more than 3 times a week to avoid medication overuse/rebound headaches       I will also prescribe this medication to take as needed for headaches but I recommend no more than 3 days per week and do not take with any other similar medications such as ibuprofen  -     indomethacin (INDOCIN) 50 mg capsule; Take 1 capsule (50 mg total) by mouth 3 (three) times a day as needed for mild pain (migraine) With meals, carafate prior  Max 3 days/week  -     sucralfate (CARAFATE) 1 g tablet; 1 tab 30 mins prior to the indomethacin to protect your stomach    Prescription preventive medications for headaches/migraines   (to take every day to help prevent headaches - not to take at the time of headache):  [x] acetazolamide start 250 mg in a m  And 250 mg in p m  For 1 week then as tolerated increase to 250 mg in a m  And 500 mg in p m  For 1 week then if tolerated increase to 500 mg in a m  And 500 mg in p m  This medication often causes side effects that gradually get better as her body gets used to the medication and that is why we gradually increase it  Most common side effects include tingling of the fingertips or toes or face  It is important to try and eat potassium rich foods while taking this medication  Potassium rich foods include:  - bananas, yogurt, sweet potatoes, tomato sauce is, beat greens, weight being, kidney and lima beans, lentils, split peas, soybeans, prunes, carrot or Orange juice, fish, milk    *Typically these types of medications take time untill you see the benefit, although some may see improvement in days, often it may take weeks, especially if the medication is being titrated up to a beneficial level  Please contact us if there are any concerns or questions regarding the medication  Sleep:   [x] Melatonin - you may take 3 mg nightly for sleep  You should take this 1 hour prior to bedtime consistently every night for it to work  It works by gradually helping to adjust your sleep time over days to weeks, rather than immediately making you feel sleepy  Self-Monitoring:  [x] Headache calendar  Each day andre a number from 0-10 indicating if there was a headache and how bad it was  This can be used to monitor gradual improvement and is helpful to make medication adjustments   You can do this on paper or there is an TK for a smart phone called "Migraine e Diary"  Lifestyle Recommendations:  [x] SLEEP - Maintain a regular sleep schedule: Adults need at least 7-8 hours of uninterrupted a night  Maintain good sleep hygiene:  Going to bed and waking up at consistent times, avoiding excessive daytime naps, avoiding caffeinated beverages in the evening, avoid excessive stimulation in the evening and generally using bed primarily for sleeping  One hour before bedtime would recommend turning lights down lower, decreasing your activity (may read quietly, listen to music at a low volume)  When you get into bed, should eliminate all technology (no texting, emailing, playing with your phone, iPad or tablet in bed)  [x] HYDRATION - Maintain good hydration  Drink  2L of fluid a day (4 typical small water bottles)  [x] DIET - Maintain good nutrition  In particular don't skip meals and try and eat healthy balanced meals regularly  [x] TRIGGERS - Look for other triggers and avoid them: Limit caffeine to 1-2 cups a day or less  Avoid dietary triggers that you have noticed bring on your headaches (this could include aged cheese, peanuts, MSG, aspartame and nitrates)  [x] EXERCISE - physical exercise as we all know is good for you in many ways, and not only is good for your heart, but also is beneficial for your mental health, cognitive health and  chronic pain/headaches  I would encourage at the least 5 days of physical exercise weekly for at least 30 minutes  Education and Follow-up  [x] Please call with any questions or concerns  Of course if any new concerning symptoms go to the emergency department  [x] Follow up 4-6 weeks         CC: We had the pleasure of evaluating Al Vera in neurological consultation today  Al Vera is a 26 y o    right handed female who presents today for evaluation of headaches  History obtained from patient as well as available medical record review  Patient is a very poor historian therefore history as obtained primarily through extensive medical record review    History of Present Illness:   Past medical history includes chronic neck and back pain (referred to pain med), chiari malformation type 1, papilledema, preeclampsia    Headaches started around 9/2017  - was evaluated at  ED 09/22/2017 and 09/23/2017 - diffuse headache, photophobia, no vision changes  - ED 10/06/2017 where she was evaluated by Neurology for headache, eye pain for months  An outside MRI of the brain was completed which demonstrated questionable protrusion of the left optic disc into posterior aspect of left orbit correlating with reported history of papilledema, transverse dural venous sinus stenosis is present with findings consistent with idiopathic intracranial hypertension  In the ED she had a lumbar puncture with opening pressure 55 cm H20 and closing pressure 22 5 cm H20 with relief of her symptoms after   - she was started on topiramate 25 mg b i d  And recommended outpatient Neurology follow-up  - 05/24/2018 return to ED with possible migraine  - ED 05/27/2018  - 06/05/2018 follow up with primary care prescribed acetazolamide and topiramate  Previously followed with [facility]  Last visit 05/2018  Previously on acetazolamide - only took 1 month and then stop feeling due to did not have insurance    Last visit with eye doctor was around 09/2018 - per patient vision was perfect and she did not have to come back  - she reports they did not mention papilledema then (and she does recall that they dilated her eyes), that it was the neurologist previously who had     *Today denies vision symptoms       What is your current pain level - 3  Headaches started at what age?  Started age 17 and came bad 22years old  How often do the headaches occur?   - as of 9/12/2019: daily for about 6 months  3 headache free days a month only   What time of the day do the headaches start? Sometimes wakes with it, Afternoon   How long do the headaches last? 1-2  hours  Are you ever headache free? rarely    Aura? without aura    Last eye exam: around 9/2018 she thinks     Where is your headache located and pain quality?   - bifrontal and apex, bilateral retro-orbital - pulsating, pressure, stabbing   What is the intensity of pain? Average: 7/10, worst 9/10  Associated symptoms:   [] Nausea       [] Vomiting        [] Diarrhea  [x] Insomnia    [] Stiff or sore neck   [x] Problems with concentration  [x] Photophobia     [x]Phonophobia      [] Osmophobia  [] Blurred vision   [x] Prefer quiet, dark room  [x] Light-headed or dizzy     [] Tinnitus   [] Hands or feet tingle or feel numb/paresthesias      [] Red ear      [] Ptosis   [] Facial droop  [] Lacrimation  [] Nasal congestion/rhinorrhea   [] Flushing of face  [] Change in pupil size    Number of days missed per month because of headaches:  Work (or school) days: 3    Headache triggers:  Exercise, softball, standing up quickly, wakes with it    Have you seen someone else for headaches or pain? Yes, [employer]  Have you had trigger point injection performed and how often? No  Have you had Botox injection performed and how often? No   Have you had epidural injections or transforaminal injections performed? No  Are you current pregnant or planning on getting pregnant? No, IUD  Have you ever had any Brain imaging? yes    What medications do you take or have you taken for your headaches?    ABORTIVE:    OTC medications have been ineffective   Ibuprofen -   - as of 9/12/19: 3 times day - 5 days a week, helps a little     Past Per chart  - pt does not remember  sumatriptan 25 mg - she does not remember   fioricet 6/2018  Methocarbamol     PREVENTIVE:   Nothing currently       Past per chart - pt does not remember  - propranolol 20 mg 9/2017  - topiramate 25 mg BID 9/2017  - acetazolamide 250 mg - 6/2018    Alternative therapies used in the past for headaches? no other headache interventions have been tried    Neck pain?: chronic    LIFESTYLE  Sleep   - averages: 7 hours  - does not think she snores  - wakes 2 times a night  Problems falling asleep?:   Yes  Problems staying asleep?:  Yes      Physical activity: softball in summer, gym 3 days a week     Water: 2 bottles  per day  Caffeine: none per day  Diet:  Do you ever skip meals?   No    Mood:   Denies history of anxiety or depression or other diagnosed mood disorder    The following portions of the patient's history were reviewed and updated as appropriate: allergies, current medications, past family history, past medical history, past social history, past surgical history and problem list     Pertinent family history:  Family history of headaches:  no known family members with significant headaches  Any family history of aneurysms - No    Pertinent social history:  Work:  In a [email or web address] for [employer]  Education: 12th  Lives with  Mom, sister, daughter (3) Meg    Illicit Drugs: denies  Alcohol/tobacco: Denies tobacco use, alcohol intake: social drinker    Past Medical History:     Past Medical History:   Diagnosis Date    Chronic fatigue     last assessed 9/26/16    Hyperlipidemia     resolved 9/29/17    Hypertension     Varicella     pt unsure    Visual impairment     glasses       Patient Active Problem List   Diagnosis    Pseudotumor cerebri    Papilledema    Class 2 severe obesity due to excess calories with serious comorbidity and body mass index (BMI) of 36 0 to 36 9 in adult [facility])    IUD check up    Chronic neck and back pain    Chiari malformation type I ([facility] [address] )       Medications:      Current Outpatient Medications   Medication Sig Dispense Refill    levonorgestrel (MIRENA) 20 MCG/24HR IUD 1 each by Intrauterine route once      ibuprofen (MOTRIN) 200 mg tablet Take by mouth every 6 (six) hours as needed for mild pain      methocarbamol (ROBAXIN) 750 mg tablet Take 1 tablet (750 mg total) by mouth every 6 (six) hours as needed for muscle spasms for up to 30 days 20 tablet 0     No current facility-administered medications for this visit           Allergies:    No Known Allergies    Family History:     Family History   Problem Relation Age of Onset    Leukemia Maternal Grandfather     No Known Problems Mother     Other Father         MVA    No Known Problems Sister     Hypertension Maternal Grandmother     No Known Problems Daughter     No Known Problems Sister        Social History:       Social History     Socioeconomic History    Marital status: Single     Spouse name: Not on file    Number of children: 1    Years of education: Not on file    Highest education level: Not on file   Occupational History    Not on file   Social Needs    Financial resource strain: Not on file    Food insecurity:     Worry: Not on file     Inability: Not on file    Transportation needs:     Medical: Not on file     Non-medical: Not on file   Tobacco Use    Smoking status: Never Smoker    Smokeless tobacco: Never Used   Substance and Sexual Activity    Alcohol use: No    Drug use: No    Sexual activity: Yes     Partners: Male     Birth control/protection: Condom Male, IUD   Lifestyle    Physical activity:     Days per week: Not on file     Minutes per session: Not on file    Stress: Not on file   Relationships    Social connections:     Talks on phone: Not on file     Gets together: Not on file     Attends Religion service: Not on file     Active member of club or organization: Not on file     Attends meetings of clubs or organizations: Not on file     Relationship status: Not on file    Intimate partner violence:     Fear of current or ex partner: Not on file     Emotionally abused: Not on file     Physically abused: Not on file     Forced sexual activity: Not on file   Other Topics Concern    Not on file   Social History Narrative    Always uses seatbelt    High school student    Younger sister    Lives with mother (single parent)         Objective:     Physical Exam:                                                                 Vitals:            Constitutional:    /84 (BP Location: Right arm, Patient Position: Sitting, Cuff Size: Adult)   Pulse 73   Resp 16   Ht 5' 7 25" (1 708 m)   Wt 104 kg (230 lb 3 2 oz)   BMI 35 79 kg/m²   BP Readings from Last 3 Encounters:   09/12/19 138/84   08/02/19 116/80   07/26/19 123/66     Pulse Readings from Last 3 Encounters:   09/12/19 73   08/02/19 60   07/26/19 66         Well developed, well nourished, well groomed  No dysmorphic features  HEENT:  Normocephalic atraumatic  No meningismus  Oropharynx is clear and moist  No oral mucosal lesions  Chest:  Respirations regular and unlabored  Cardiovascular:  Regular rate, intact distal pulses  Distal extremities warm without palpable edema or tenderness, no observed significant swelling  Musculoskeletal:  Full range of motion  (see below under neurologic exam for evaluation of motor function and gait)   Skin:  warm and dry, not diaphoretic  No apparent birthmarks or stigmata of neurocutaneous disease  Psychiatric:  Normal behavior and flat affect        Neurological Examination:     Mental status/cognitive function:   Orientated to time, place and person  Recent and remote memory intact  Attention span and concentration as well as fund of knowledge are appropriate for age  Normal language and spontaneous speech  Cranial Nerves:  II-visual fields full  Fundi poorly visualized due to pupillary constriction  III, IV, VI-Pupils were equal, round, and reactive to light and accomodation  Extraocular movements were full and conjugate without nystagmus  convergence 5 cm, conjugate gaze, normal smooth pursuits, normal saccades   V-facial sensation symmetric      VII-facial expression symmetric, intact forehead wrinkle, strong eye closure, symmetric smile    VIII-hearing grossly intact bilaterally   IX, X-palate elevation symmetric, no dysarthria  XI-shoulder shrug strength intact    XII-tongue protrusion midline  Motor Exam: symmetric bulk and tone throughout, no pronator drift  Power/strength 5/5 bilateral upper and lower extremities, no atrophy, fasciculations or abnormal movements noted  Sensory: grossly intact light touch in all extremities  Reflexes: brachioradialis 2+, biceps 2+, knee 1+, ankle 2+ bilaterally  No ankle clonus  Coordination: Finger nose finger intact bilaterally, no apparent dysmetria, ataxia or tremor noted  Gait: steady casual and tandem gait  Romberg Negative  Pertinent lab results:   Per our system  09/29/2016:  CMP and CBC unremarkable   TSH 3 3    Imaging:   No head imaging in our system but she has had an MRI brain per report at outside hospital  - her [facility] Neurology resident [facility]:    - ED 10/06/2017 where she was evaluated by Neurology for headache, eye pain for months  An outside MRI of the brain was completed which demonstrated questionable protrusion of the left optic disc into posterior aspect of left orbit correlating with reported history of papilledema, transverse dural venous sinus stenosis is present with findings consistent with idiopathic intracranial hypertension  In the ED she had a lumbar puncture with opening pressure 55 cm H20 and closing pressure 22 5 cm H20 with relief of her symptoms after  Review of Systems:   ROS obtained by medical assistant Personally reviewed and updated if indicated  Review of Systems   Constitutional: Negative  Negative for appetite change and fever  HENT: Negative  Negative for hearing loss, tinnitus, trouble swallowing and voice change  Eyes: Negative  Negative for photophobia and pain  Respiratory: Negative  Negative for shortness of breath  Cardiovascular: Negative  Negative for palpitations  Gastrointestinal: Negative  Negative for nausea and vomiting     Endocrine: Negative  Negative for cold intolerance and heat intolerance  Genitourinary: Negative  Negative for dysuria, frequency and urgency  Musculoskeletal: Negative  Negative for myalgias and neck pain  Skin: Negative  Negative for rash  Allergic/Immunologic: Negative  Neurological: Positive for headaches (Pressure)  Negative for dizziness, tremors, seizures, syncope, facial asymmetry, speech difficulty, weakness and numbness  Hematological: Negative  Does not bruise/bleed easily  Psychiatric/Behavioral: Negative  Negative for confusion, hallucinations and sleep disturbance  I have spent 62 minutes with Patient  today in which greater than 50% of this time was spent in counseling/coordination of care regarding Diagnostic results, Prognosis, Risks and benefits of tx options, Intructions for management, Importance of tx compliance, Risk factor reductions and Impressions        Author:  Milan Ortiz MD 9/12/2019 11:08 AM

## 2019-09-12 ENCOUNTER — CONSULT (OUTPATIENT)
Dept: NEUROLOGY | Facility: CLINIC | Age: 24
End: 2019-09-12
Payer: COMMERCIAL

## 2019-09-12 VITALS
HEART RATE: 73 BPM | SYSTOLIC BLOOD PRESSURE: 138 MMHG | HEIGHT: 67 IN | DIASTOLIC BLOOD PRESSURE: 84 MMHG | RESPIRATION RATE: 16 BRPM | WEIGHT: 230.2 LBS | BODY MASS INDEX: 36.13 KG/M2

## 2019-09-12 DIAGNOSIS — H47.10 PAPILLEDEMA: ICD-10-CM

## 2019-09-12 DIAGNOSIS — G93.2 IDIOPATHIC INTRACRANIAL HYPERTENSION: Primary | ICD-10-CM

## 2019-09-12 DIAGNOSIS — R51.9 CHRONIC DAILY HEADACHE: ICD-10-CM

## 2019-09-12 DIAGNOSIS — G93.5 CHIARI MALFORMATION TYPE I (HCC): ICD-10-CM

## 2019-09-12 PROCEDURE — 99245 OFF/OP CONSLTJ NEW/EST HI 55: CPT | Performed by: PSYCHIATRY & NEUROLOGY

## 2019-09-12 RX ORDER — SUCRALFATE 1 G/1
TABLET ORAL
Qty: 12 TABLET | Refills: 0 | Status: SHIPPED | OUTPATIENT
Start: 2019-09-12 | End: 2019-11-07 | Stop reason: SDUPTHER

## 2019-09-12 RX ORDER — INDOMETHACIN 50 MG/1
50 CAPSULE ORAL 3 TIMES DAILY PRN
Qty: 12 CAPSULE | Refills: 0 | Status: SHIPPED | OUTPATIENT
Start: 2019-09-12 | End: 2019-11-07 | Stop reason: SDUPTHER

## 2019-09-12 RX ORDER — ACETAZOLAMIDE 250 MG/1
TABLET ORAL
Qty: 120 TABLET | Refills: 6 | Status: SHIPPED | OUTPATIENT
Start: 2019-09-12 | End: 2019-11-07

## 2019-09-12 NOTE — PROGRESS NOTES
Review of Systems   Constitutional: Negative  Negative for appetite change and fever  HENT: Negative  Negative for hearing loss, tinnitus, trouble swallowing and voice change  Eyes: Negative  Negative for photophobia and pain  Respiratory: Negative  Negative for shortness of breath  Cardiovascular: Negative  Negative for palpitations  Gastrointestinal: Negative  Negative for nausea and vomiting  Endocrine: Negative  Negative for cold intolerance and heat intolerance  Genitourinary: Negative  Negative for dysuria, frequency and urgency  Musculoskeletal: Negative  Negative for myalgias and neck pain  Skin: Negative  Negative for rash  Allergic/Immunologic: Negative  Neurological: Positive for headaches (Pressure)  Negative for dizziness, tremors, seizures, syncope, facial asymmetry, speech difficulty, weakness and numbness  Hematological: Negative  Does not bruise/bleed easily  Psychiatric/Behavioral: Negative  Negative for confusion, hallucinations and sleep disturbance

## 2019-09-12 NOTE — PATIENT INSTRUCTIONS
Obtain labs/blood work sometime in the next week    [facility] medical records if they have the MRI of her brain and if they do please get on CD so I can review the images and we can upload them to our system    If any worsening or new or concerning symptoms especially if any vision loss, go to the emergency department where I recommend a consider a lumbar puncture/spinal tap like he had before    *Most importantly, follow-up with Ophthalmology for dilated fundus exam to see if you have any signs of papilledema and he will have to continue to follow with them closely if you do - please call me or right me on my chart after you see them and let me know what they said and also try to have them fax the results to us    Referral to Nutrition to help with weight loss    Headache/migraine treatment:   Abortive medications (for immediate treatment of a headache): It is ok to take ibuprofen, acetaminophen or naproxen (Advil, Tylenol,  Aleve, Excedrin) if they help your headaches you should limit these to No more than 3 times a week to avoid medication overuse/rebound headaches  I will also prescribe this medication to take as needed for headaches but I recommend no more than 3 days per week and do not take with any other similar medications such as ibuprofen  -     indomethacin (INDOCIN) 50 mg capsule; Take 1 capsule (50 mg total) by mouth 3 (three) times a day as needed for mild pain (migraine) With meals, carafate prior  Max 3 days/week  (contact me when you run out of this medication I will fill after 1 month)  -     sucralfate (CARAFATE) 1 g tablet; 1 tab 30 mins prior to the indomethacin to protect your stomach    Prescription preventive medications for headaches/migraines   (to take every day to help prevent headaches - not to take at the time of headache):  [x] acetazolamide start 250 mg in a m  And 250 mg in p m  For 1 week then as tolerated increase to 250 mg in a m  And 500 mg in p m   For 1 week then if tolerated increase to 500 mg in a m  And 500 mg in p m  This medication often causes side effects that gradually get better as her body gets used to the medication and that is why we gradually increase it  Most common side effects include tingling of the fingertips or toes or face  It is important to try and eat potassium rich foods while taking this medication  Potassium rich foods include:  - bananas, yogurt, sweet potatoes, tomato sauce is, beat greens, weight being, kidney and lima beans, lentils, split peas, soybeans, prunes, carrot or Orange juice, fish, milk    *Typically these types of medications take time untill you see the benefit, although some may see improvement in days, often it may take weeks, especially if the medication is being titrated up to a beneficial level  Please contact us if there are any concerns or questions regarding the medication  Sleep:   [x] Melatonin - you may take 3 mg nightly for sleep  You should take this 1 hour prior to bedtime consistently every night for it to work  It works by gradually helping to adjust your sleep time over days to weeks, rather than immediately making you feel sleepy  Self-Monitoring:  [x] Headache calendar  Each day andre a number from 0-10 indicating if there was a headache and how bad it was  This can be used to monitor gradual improvement and is helpful to make medication adjustments  You can do this on paper or there is an TK for a smart phone called "Migraine e Diary"  Lifestyle Recommendations:  [x] SLEEP - Maintain a regular sleep schedule: Adults need at least 7-8 hours of uninterrupted a night  Maintain good sleep hygiene:  Going to bed and waking up at consistent times, avoiding excessive daytime naps, avoiding caffeinated beverages in the evening, avoid excessive stimulation in the evening and generally using bed primarily for sleeping    One hour before bedtime would recommend turning lights down lower, decreasing your activity (may read quietly, listen to music at a low volume)  When you get into bed, should eliminate all technology (no texting, emailing, playing with your phone, iPad or tablet in bed)  [x] HYDRATION - Maintain good hydration  Drink  2L of fluid a day (4 typical small water bottles)  [x] DIET - Maintain good nutrition  In particular don't skip meals and try and eat healthy balanced meals regularly  [x] TRIGGERS - Look for other triggers and avoid them: Limit caffeine to 1-2 cups a day or less  Avoid dietary triggers that you have noticed bring on your headaches (this could include aged cheese, peanuts, MSG, aspartame and nitrates)  [x] EXERCISE - physical exercise as we all know is good for you in many ways, and not only is good for your heart, but also is beneficial for your mental health, cognitive health and  chronic pain/headaches  I would encourage at the least 5 days of physical exercise weekly for at least 30 minutes  Education and Follow-up  [x] Please call with any questions or concerns  Of course if any new concerning symptoms go to the emergency department    [x] Follow up 4-6 weeks

## 2019-09-12 NOTE — LETTER
September 12, 2019     Patient: Kong Morgan   YOB: 1995   Date of Visit: 9/12/2019       To Whom it May Concern:    Kong Morgan is under my professional care  She was seen in my office on 9/12/2019  She has a medical condition that may require her to call off work or leave early  If you have any questions or concerns, please don't hesitate to call           Sincerely,          Wally Zavala MD        CC: No Recipients

## 2019-09-18 ENCOUNTER — TELEPHONE (OUTPATIENT)
Dept: NEUROLOGY | Facility: CLINIC | Age: 24
End: 2019-09-18

## 2019-09-18 NOTE — TELEPHONE ENCOUNTER
LMOM for pt to call back to see if interested in being seen sooner  Dr Rashid Stroud has availability 11/7 or 11/8 at [facility]  Dr Rashid Stroud also has availability in  for the month of October

## 2019-09-20 ENCOUNTER — DOCUMENTATION (OUTPATIENT)
Dept: NEUROLOGY | Facility: CLINIC | Age: 24
End: 2019-09-20

## 2019-09-20 NOTE — PROGRESS NOTES
Received [employer] paper work through fax and also received payment  Called patient and made aware of payment needed of 30 dollars in order to process papers she agreed and as stated in the beginning we received payment   Patient will like to  form when completed

## 2019-09-20 NOTE — PROGRESS NOTES
Can you please contact patient again and ask her the questions on the front page? Are you currently working     Are you looking for intermittent or continuous leave     Return to work date    Dr Elif Sainz are you agreeable to completing FMLA for patient?

## 2019-09-23 NOTE — PROGRESS NOTES
[facility]  Please scan into chart when signed and contact patient so she can pick this up  Thank you!

## 2019-09-23 NOTE — PROGRESS NOTES
Forms scanned into patient's chart  Called patient to let her know they are ready to be picked up  Confirmed that we already collected payment  Patient will stop by [facility] office on Wednesday to  forms

## 2019-09-23 NOTE — PROGRESS NOTES
Dr Helio Gonzalez- please advise if you are agreeable to intermittent FMLA for this patient  Would you be agreeable to 3 times per month, 1 day per episode? Thanks!

## 2019-09-26 ENCOUNTER — DOCUMENTATION (OUTPATIENT)
Dept: NEUROLOGY | Facility: CLINIC | Age: 24
End: 2019-09-26

## 2019-11-06 NOTE — PROGRESS NOTES
[address] [facility] Consult - Follow up   PATIENT:  Norbert Walsh  MRN:  [id number]  :  1995  DATE OF SERVICE:  2019  REFERRED BY: Carmen Alvarado PA-C  PMD: Santi Arredondo PA-C    Assessment/Plan:   Norbert Walsh is a very pleasant  26 y o  female with a past medical history of preeclampsia, chronic neck and back pain (recently referred to pain management), chiari malformation type 1, idiopathic intracranial hypertension, papilledema referred here for evaluation of headache  Initial evaluation 2019  Follow-up 2019     Chronic daily headache  Idiopathic intracranial hypertension  Papilledema - per report, unable to fully assess due to pupillary constriction today  Chiari malformation type I ([facility] [address] ) - per report, I do not have MRI results except for as documented   She reports headaches started around age 17, but did not become bad until age 22  Patient is a very poor historian therefore history as obtained primarily through extensive medical record review  In 2017 she presented to ED multiple times for diffuse headache, photophobia  In 10/06/2017 she was evaluated by Neurology for documented results that I do not see in chart: "An outside MRI of the brain was completed which demonstrated questionable protrusion of the left optic disc into posterior aspect of left orbit correlating with reported history of papilledema, transverse dural venous sinus stenosis is present with findings consistent with idiopathic intracranial hypertension   In the ED she had a lumbar puncture with opening pressure 55 cm H20 and closing pressure 22 5 cm H20 with relief of her symptoms after  "Per record review she was started on multiple medications including at 1 point acetazolamide in the past, but due to no insurance she stop taking any these medications  - as of 2019:  Chronic daily headache for the past approximately 6 months  Prior to that on and off    Has about 3 headache-free days per month  She describes bifrontal, bilateral retro-orbital, apex pulsating, pressure, stabbing  She denies aura  She does have associated photophobia, phonophobia  - as of 11/07/2019: Shanice Hale returns reporting daily mild headaches, worse 4 times a week  Unfortunately she has been noncompliant with my recommendations, has only been taking acetazolamide 2-3 days a week, took indomethacin 50 mg daily for 2 weeks despite explicit instructions  Did not follow up with Ophthalmology, did not get blood work and did not get an MRI brain  Had her  come in and re-explained everything as well as the severe possible complications including vision loss if she is not compliant  We will try to make it easier acetazolamide 500 mg b i d , dexamethasone for the next 5 days as she is very worried about current headache indomethacin as abortive  She is very focused on trouble sleeping when she has about headache and asking for an option to treat pain and sleep  We will try cyclobenzaprine      We discussed likely multifactorial etiology to headaches although from history sounds like primarily related to idiopathic intracranial hypertension, or per review of previous notes migraines have been suspected as well in the past - however she does not report the typical features of this today, do not sound like the classic headaches that may accompany Chiari 1 malformation       Workup:  - 10/2017:  Per [facility] Neurology resident note - I do not have the official results, but requested patient obtain   "An outside MRI of the brain was completed which demonstrated questionable protrusion of the left optic disc into posterior aspect of left orbit correlating with reported history of papilledema, transverse dural venous sinus stenosis is present with findings consistent with idiopathic intracranial hypertension " (no mention of Chiari)  - 10/2017: at  ED she had a lumbar puncture with opening pressure 55 cm H20 and closing pressure 22 5 cm H20 with relief of her symptoms after    -  Ambulatory referral to Ophthalmology; Future  -  Ambulatory referral to Nutrition Services; Future  - will hold off on repeat imaging at this time due to no new symptoms, no red flags, no significant worsening, no visual changes, but would have low threshold for repeat MRI brain if needed in the future for any new or concerning features        Preventative:  - we discussed headache hygiene and lifestyle factors that may improve headaches  -     acetaZOLAMIDE (DIAMOX) 500 mg BID  Discussed proper use, possible side effects and risks  - past:  Propranolol, topiramate, acetazolamide-patient does not recall how long she was on any of these or what affect had  - future options:  Topiramate     Abortive:  - discussed not taking over-the-counter or prescription pain medications more than 3 days per week to prevent medication overuse/rebound headache  - continue indomethacin 50 mg as needed with Carafate prior  Discussed proper use, possible side effects and risks  - trial of dexamethasone 2 mg daily for the next 5 days  Discussed proper use, possible side effects and risks  - trial of cyclobenzaprine 5-10 mg as needed for muscle spasm/headache and may also help with sleep  Discussed proper use, possible side effects and risks  - past:  Sumatriptan, Fioricet, methocarbamol-patient does not recall how long she was on any of these or what affect had  - future options:  Could try Triptan     BMI 35 0-35 9,adult  -     Ambulatory referral to Nutrition Services;  Future           Patient instructions      Obtain labs/blood work sometime in the next week      Ask [facility] medical records if they have the MRI of her brain and if they do please get on CD so I can review the images and we can upload them to our system     If any worsening or new or concerning symptoms especially if any vision loss, go to the emergency department where I recommend a consider a lumbar puncture/spinal tap like he had before     *Most importantly, follow-up with Ophthalmology for dilated fundus exam to see if you have any signs of papilledema and he will have to continue to follow with them closely if you do - please call me or right me on my chart after you see them and let me know what they said and also try to have them fax the results to us     Referral to Nutrition to help with weight loss     Headache/migraine treatment:   Abortive medications (for immediate treatment of a headache): It is ok to take ibuprofen, acetaminophen or naproxen (Advil, Tylenol,  Aleve, Excedrin) if they help your headaches you should limit these to No more than 3 times a week to avoid medication overuse/rebound headaches       For the next 5 days take dexamethasone (steroid) and no indomethacin      I will also prescribe this medication to take as needed for headaches but I recommend no more than 3 days per week and do not take with any other similar medications such as ibuprofen  -     indomethacin (INDOCIN) 50 mg capsule; Take 1 capsule (50 mg total) by mouth 3 (three) times a day as needed for mild pain (migraine) With meals, carafate prior  Max 3 days/week  -     sucralfate (CARAFATE) 1 g tablet; 1 tab 30 mins prior to the indomethacin to protect your stomach     * as an alternative you can take cyclobenzaprine 5-10 mg at night-this is a muscle relaxant and may be cause you to be sleepy, so do not drive after taking    Prescription preventive medications for headaches/migraines   (to take every day to help prevent headaches - not to take at the time of headache):  [x] acetazolamide 500 mg in a m  And 500 mg in p m      - This medication often causes side effects that gradually get better as her body gets used to the medication and that is why we gradually increase it    Most common side effects include tingling of the fingertips or toes or face       It is important to try and eat potassium rich foods while taking this medication  Potassium rich foods include:  - bananas, yogurt, sweet potatoes, tomato sauce is, beat greens, weight being, kidney and lima beans, lentils, split peas, soybeans, prunes, carrot or Orange juice, fish, milk     *Typically these types of medications take time untill you see the benefit, although some may see improvement in days, often it may take weeks, especially if the medication is being titrated up to a beneficial level  Please contact us if there are any concerns or questions regarding the medication       Self-Monitoring:  [x] Headache calendar  Each day andre a number from 0-10 indicating if there was a headache and how bad it was  This can be used to monitor gradual improvement and is helpful to make medication adjustments  You can do this on paper or there is an TK for a smart phone called "Migraine e Diary"       Lifestyle Recommendations:  [x] SLEEP - Maintain a regular sleep schedule: Adults need at least 7-8 hours of uninterrupted a night  Maintain good sleep hygiene:  Going to bed and waking up at consistent times, avoiding excessive daytime naps, avoiding caffeinated beverages in the evening, avoid excessive stimulation in the evening and generally using bed primarily for sleeping  One hour before bedtime would recommend turning lights down lower, decreasing your activity (may read quietly, listen to music at a low volume)  When you get into bed, should eliminate all technology (no texting, emailing, playing with your phone, iPad or tablet in bed)  [x] HYDRATION - Maintain good hydration  Drink  2L of fluid a day (4 typical small water bottles)  [x] DIET - Maintain good nutrition  In particular don't skip meals and try and eat healthy balanced meals regularly  [x] TRIGGERS - Look for other triggers and avoid them: Limit caffeine to 1-2 cups a day or less   Avoid dietary triggers that you have noticed bring on your headaches (this could include aged cheese, peanuts, MSG, aspartame and nitrates)  [x] EXERCISE - physical exercise as we all know is good for you in many ways, and not only is good for your heart, but also is beneficial for your mental health, cognitive health and  chronic pain/headaches  I would encourage at the least 5 days of physical exercise weekly for at least 30 minutes       Education and Follow-up  [x] Please call with any questions or concerns  Of course if any new concerning symptoms go to the emergency department  [x] Follow up 4-6 weeks             CC: We had the pleasure of evaluating Mike Jones in neurological consultation today  Mike Jones is a 26 y o    right handed female who presents today for evaluation of headaches       History obtained from patient as well as available medical record review     Patient is a very poor historian therefore history as obtained primarily through extensive medical record review  Today  is present who helps with history     History of Present Illness:   Past medical history includes chronic neck and back pain (referred to pain med), chiari malformation type 1, papilledema, preeclampsia    Interval history as of 11/07/2019  - She has not followed up with nutrition as recommended the setting of IIH - although she has upcoming appointment  - She has not followed up with Ophthalmology as recommended for IIH   - she did not obtain blood work as ordered  - she did not get MRI brain on CD for my review    She reports no improvement in headaches  - daily headaches, worse 4 times a week   - acetazolamide 500 mg b i d  - forgets most of the time, only taking 2-3 days a week 250 mg TID for some reason   - trial of indomethacin 50 mg - took daily for 2 weeks     History of initial visit 09/12/2019  Headaches started around 9/2017  - was evaluated at  ED 09/22/2017 and 09/23/2017 - diffuse headache, photophobia, no vision changes  - ED 10/06/2017 where she was evaluated by Neurology for headache, eye pain for months  An outside MRI of the brain was completed which demonstrated questionable protrusion of the left optic disc into posterior aspect of left orbit correlating with reported history of papilledema, transverse dural venous sinus stenosis is present with findings consistent with idiopathic intracranial hypertension  In the ED she had a lumbar puncture with opening pressure 55 cm H20 and closing pressure 22 5 cm H20 with relief of her symptoms after   - she was started on topiramate 25 mg b i d  And recommended outpatient Neurology follow-up  - 05/24/2018 return to ED with possible migraine  - ED 05/27/2018  - 06/05/2018 follow up with primary care prescribed acetazolamide and topiramate      Previously followed with [facility] Neurology  Last visit 05/2018  Previously on acetazolamide - only took 1 month and then stop feeling due to did not have insurance     Last visit with eye doctor was around 09/2018 - per patient vision was perfect and she did not have to come back  - she reports they did not mention papilledema then (and she does recall that they dilated her eyes), that it was the neurologist previously who had      *Today denies vision symptoms           Headaches started at what age? Started age 17 and came bad 22years old  How often do the headaches occur?   - as of 9/12/2019: daily for about 6 months  3 headache free days a month only   What time of the day do the headaches start? Sometimes wakes with it, Afternoon   How long do the headaches last? 1-2  hours  Are you ever headache free? rarely     Aura? without aura     Last eye exam: around 9/2018 she thinks      Where is your headache located and pain quality?   - bifrontal and apex, bilateral retro-orbital - pulsating, pressure, stabbing   What is the intensity of pain?  Average: 7/10, worst 9/10  Associated symptoms:   [] Nausea       [] Vomiting        [] Diarrhea  [x] Insomnia    [] Stiff or sore neck   [x] Problems with concentration  [x] Photophobia     [x]Phonophobia      [] Osmophobia  [] Blurred vision   [x] Prefer quiet, dark room  [x] Light-headed or dizzy     [] Tinnitus   [] Hands or feet tingle or feel numb/paresthesias       [] Red ear      [] Ptosis   [] Facial droop  [] Lacrimation  [] Nasal congestion/rhinorrhea   [] Flushing of face  [] Change in pupil size     Number of days missed per month because of headaches:  Work (or school) days: 3     Headache triggers:  Exercise, softball, standing up quickly, wakes with it     Have you seen someone else for headaches or pain? Yes, [facility]  Have you had trigger point injection performed and how often? No  Have you had Botox injection performed and how often? No   Have you had epidural injections or transforaminal injections performed? No  Are you current pregnant or planning on getting pregnant? No, IUD  Have you ever had any Brain imaging? yes     What medications do you take or have you taken for your headaches? ABORTIVE:    OTC medications have been ineffective   Ibuprofen -   - as of 9/12/19: 3 times day - 5 days a week, helps a little      Past Per chart  - pt does not remember  sumatriptan 25 mg - she does not remember   fioricet 6/2018  Methocarbamol      PREVENTIVE:   Nothing currently         Past per chart - pt does not remember  - propranolol 20 mg 9/2017  - topiramate 25 mg BID 9/2017  - acetazolamide 250 mg - 6/2018     Alternative therapies used in the past for headaches? no other headache interventions have been tried     Neck pain?: chronic     LIFESTYLE  Sleep   - averages: 7 hours  - does not think she snores  - wakes 2 times a night  Problems falling asleep?:   Yes  Problems staying asleep?:  Yes        Physical activity: softball in summer, gym 3 days a week      Water: 2 bottles  per day  Caffeine: none per day  Diet:  Do you ever skip meals?   No     Mood:   Denies history of anxiety or depression or other diagnosed mood disorder     The following portions of the patient's history were reviewed and updated as appropriate: allergies, current medications, past family history, past medical history, past social history, past surgical history and problem list      Pertinent family history:  Family history of headaches:  no known family members with significant headaches  Any family history of aneurysms - No     Pertinent social history:  Work:  In a [employer] for [employer]  Education: 12th  Lives with  Mom, sister, daughter (3) Aditya Luis     Illicit Drugs: denies  Alcohol/tobacco: Denies tobacco use, alcohol intake: social drinker      Past Medical History:     Past Medical History:   Diagnosis Date    Chronic fatigue     last assessed 9/26/16    Hyperlipidemia     resolved 9/29/17    Hypertension     Varicella     pt unsure    Visual impairment     glasses       Patient Active Problem List   Diagnosis    Pseudotumor cerebri    Papilledema    Class 2 severe obesity due to excess calories with serious comorbidity and body mass index (BMI) of 36 0 to 36 9 in adult [facility])    IUD check up    Chronic neck and back pain    [facility]       Medications:      Current Outpatient Medications   Medication Sig Dispense Refill    acetaZOLAMIDE (DIAMOX) 250 mg tablet 250 mg BID for 1 week and then increase to 250 mg in am and 500 mg in pm for 1 week and then 500 mg  tablet 6    ibuprofen (MOTRIN) 200 mg tablet Take by mouth every 6 (six) hours as needed for mild pain      indomethacin (INDOCIN) 50 mg capsule Take 1 capsule (50 mg total) by mouth 3 (three) times a day as needed for mild pain (migraine) With meals, carafate prior  Max 3 days/week   12 capsule 0    levonorgestrel (MIRENA) 20 MCG/24HR IUD 1 each by Intrauterine route once      methocarbamol (ROBAXIN) 750 mg tablet Take 1 tablet (750 mg total) by mouth every 6 (six) hours as needed for muscle spasms for up to 30 days 20 tablet 0    sucralfate (CARAFATE) 1 g tablet 1 tab PO 30 mins prior to the indomethacin to protect your stomach 12 tablet 0     No current facility-administered medications for this visit           Allergies:    No Known Allergies    Family History:     Family History   Problem Relation Age of Onset    Leukemia Maternal Grandfather     No Known Problems Mother     Other Father         MVA    No Known Problems Sister     Hypertension Maternal Grandmother     No Known Problems Daughter     No Known Problems Sister        Social History:     Social History     Socioeconomic History    Marital status: Single     Spouse name: Not on file    Number of children: 1    Years of education: Not on file    Highest education level: Not on file   Occupational History    Not on file   Social Needs    Financial resource strain: Not on file    Food insecurity:     Worry: Not on file     Inability: Not on file    Transportation needs:     Medical: Not on file     Non-medical: Not on file   Tobacco Use    Smoking status: Never Smoker    Smokeless tobacco: Never Used   Substance and Sexual Activity    Alcohol use: No    Drug use: No    Sexual activity: Yes     Partners: Male     Birth control/protection: Condom Male, IUD   Lifestyle    Physical activity:     Days per week: Not on file     Minutes per session: Not on file    Stress: Not on file   Relationships    Social connections:     Talks on phone: Not on file     Gets together: Not on file     Attends Congregation service: Not on file     Active member of club or organization: Not on file     Attends meetings of clubs or organizations: Not on file     Relationship status: Not on file    Intimate partner violence:     Fear of current or ex partner: Not on file     Emotionally abused: Not on file     Physically abused: Not on file     Forced sexual activity: Not on file   Other Topics Concern    Not on file   Social History Narrative    Always uses seatbelt    High school student    Younger sister Lives with mother (single parent)         Objective:     Physical Exam:                                                                 Vitals:            Constitutional:    /67 (BP Location: Right arm, Patient Position: Sitting, Cuff Size: Adult)   Pulse 58   Resp 14   Ht 5' 7 25" (1 708 m)   Wt 105 kg (231 lb 3 2 oz)   BMI 35 94 kg/m²   BP Readings from Last 3 Encounters:   11/07/19 112/67   09/12/19 138/84   08/02/19 116/80     Pulse Readings from Last 3 Encounters:   11/07/19 58   09/12/19 73   08/02/19 60         Well developed, well nourished, well groomed  No dysmorphic features  HEENT:  Normocephalic atraumatic  No meningismus  Oropharynx is clear and moist  No oral mucosal lesions  Chest:  Respirations regular and unlabored  Cardiovascular:  Regular rate, intact distal pulses  Distal extremities warm without palpable edema or tenderness, no observed significant swelling  Musculoskeletal:  Full range of motion  (see below under neurologic exam for evaluation of motor function and gait)   Skin:  warm and dry, not diaphoretic  No apparent birthmarks or stigmata of neurocutaneous disease  Psychiatric:  Normal behavior and appropriate affect        Neurological Examination:     Mental status/cognitive function:   Orientated to time, place and person  Recent and remote memory intact  Attention span and concentration as well as fund of knowledge are appropriate for age  Normal language and spontaneous speech  Cranial Nerves:  Unable to appreciate fundi due to pupillary constriction  III, IV, VI-Pupils were equal, round, and reactive to light  Extraocular movements were full and conjugate without nystagmus  conjugate gaze, normal smooth pursuits, normal saccades   VII-facial expression symmetric, intact forehead wrinkle, strong eye closure, symmetric smile    VIII-hearing grossly intact bilaterally   Motor Exam: symmetric bulk throughout   no atrophy, fasciculations or abnormal movements noted  Coordination:  no apparent dysmetria, ataxia or tremor noted  Gait: steady casual gait         Pertinent lab results:   Per our system  09/29/2016:  CMP and CBC unremarkable   TSH 3 3     Imaging:   No head imaging in our system but she has had an MRI brain per report at outside hospital  - her [facility] Neurology resident note:     - ED 10/06/2017 where she was evaluated by Neurology for headache, eye pain for months  An outside MRI of the brain was completed which demonstrated questionable protrusion of the left optic disc into posterior aspect of left orbit correlating with reported history of papilledema, transverse dural venous sinus stenosis is present with findings consistent with idiopathic intracranial hypertension  In the ED she had a lumbar puncture with opening pressure 55 cm H20 and closing pressure 22 5 cm H20 with relief of her symptoms after  Review of Systems:   ROS Obtained by MA and Personally reviewed and corrected if needed  Review of Systems   Constitutional: Negative  Negative for appetite change and fever  HENT: Positive for tinnitus  Negative for hearing loss, trouble swallowing and voice change  Eyes: Positive for visual disturbance (Sensitivity to light)  Negative for photophobia and pain  Respiratory: Negative  Negative for shortness of breath  Cardiovascular: Negative  Negative for palpitations  Gastrointestinal: Negative  Negative for nausea and vomiting  Endocrine: Negative  Negative for cold intolerance and heat intolerance  Genitourinary: Negative  Negative for dysuria, frequency and urgency  Musculoskeletal: Negative  Negative for myalgias and neck pain  Skin: Negative  Negative for rash  Allergic/Immunologic: Negative  Neurological: Positive for light-headedness and headaches (throbbing)  Negative for dizziness, tremors, seizures, syncope, facial asymmetry, speech difficulty, weakness and numbness     Hematological: Negative  Does not bruise/bleed easily  Psychiatric/Behavioral: Negative  Negative for confusion, hallucinations and sleep disturbance  I have spent 40 minutes with Patient and family today in which greater than 50% of this time was spent in counseling/coordination of care regarding Diagnostic results, Prognosis, Risks and benefits of tx options, Intructions for management, Patient and family education, Importance of tx compliance, Risk factor reductions and Impressions        Author:  Ronit Bravo MD 11/7/2019 10:35 AM

## 2019-11-07 ENCOUNTER — TELEPHONE (OUTPATIENT)
Dept: NEUROLOGY | Facility: CLINIC | Age: 24
End: 2019-11-07

## 2019-11-07 ENCOUNTER — OFFICE VISIT (OUTPATIENT)
Dept: NEUROLOGY | Facility: CLINIC | Age: 24
End: 2019-11-07
Payer: COMMERCIAL

## 2019-11-07 VITALS
HEART RATE: 58 BPM | SYSTOLIC BLOOD PRESSURE: 112 MMHG | RESPIRATION RATE: 14 BRPM | HEIGHT: 67 IN | BODY MASS INDEX: 36.29 KG/M2 | DIASTOLIC BLOOD PRESSURE: 67 MMHG | WEIGHT: 231.2 LBS

## 2019-11-07 DIAGNOSIS — G93.2 IDIOPATHIC INTRACRANIAL HYPERTENSION: ICD-10-CM

## 2019-11-07 DIAGNOSIS — G93.2 IIH (IDIOPATHIC INTRACRANIAL HYPERTENSION): Primary | ICD-10-CM

## 2019-11-07 PROCEDURE — 99215 OFFICE O/P EST HI 40 MIN: CPT | Performed by: PSYCHIATRY & NEUROLOGY

## 2019-11-07 RX ORDER — SUCRALFATE 1 G/1
TABLET ORAL
Qty: 30 TABLET | Refills: 0 | Status: SHIPPED | OUTPATIENT
Start: 2019-11-07 | End: 2020-04-29

## 2019-11-07 RX ORDER — INDOMETHACIN 50 MG/1
50 CAPSULE ORAL 3 TIMES DAILY PRN
Qty: 12 CAPSULE | Refills: 0 | Status: SHIPPED | OUTPATIENT
Start: 2019-11-07 | End: 2020-04-29

## 2019-11-07 RX ORDER — CYCLOBENZAPRINE HCL 5 MG
5 TABLET ORAL 3 TIMES DAILY PRN
Qty: 15 TABLET | Refills: 0 | Status: SHIPPED | OUTPATIENT
Start: 2019-11-07 | End: 2020-04-29

## 2019-11-07 RX ORDER — DEXAMETHASONE 2 MG/1
2 TABLET ORAL
Qty: 5 TABLET | Refills: 0 | Status: SHIPPED | OUTPATIENT
Start: 2019-11-07 | End: 2019-11-12

## 2019-11-07 RX ORDER — ACETAZOLAMIDE 500 MG/1
500 CAPSULE, EXTENDED RELEASE ORAL 2 TIMES DAILY
Qty: 60 CAPSULE | Refills: 3 | Status: SHIPPED | OUTPATIENT
Start: 2019-11-07 | End: 2020-04-29

## 2019-11-07 NOTE — LETTER
2019     Kate Frederick PA-C  [address] [facility]    Patient: Angelika Fierro   YOB: 1995   Date of Visit: 2019       Dear Dr Liseth Rojas: Thank you for referring Angelika Fierro to me for evaluation  Below are my notes for this consultation  Unfortunately she was not compliant with treatment for idiopathic intracranial hypertension  I brought her  in the room to help him also understand the importance of treatment and we explained all my recommendations again and hopefully this time will be successful  Discussed possible vision loss of she is not able to follow through  Just wanted to update you see can help reinforce when you see her  If you have questions, please do not hesitate to call me  I look forward to following your patient along with you  Sincerely,        Ronit Bravo MD        CC: No Recipients  Ronit Bravo MD  2019  1:49 PM  Sign at close encounter  [facility] Consult - Follow up   PATIENT:  Angelika Fierro  MRN:  [id number]  :  1995  DATE OF SERVICE:  2019  REFERRED BY: Joana Duarte PA-C  PMD: Kate Frederick PA-C    Assessment/Plan:   Angelika Fierro is a very pleasant  26 y o  female with a past medical history of preeclampsia, chronic neck and back pain (recently referred to pain management), chiari malformation type 1, idiopathic intracranial hypertension, papilledema referred here for evaluation of headache  Initial evaluation 2019  Follow-up 2019     Chronic daily headache  Idiopathic intracranial hypertension  Papilledema - per report, unable to fully assess due to pupillary constriction today  Chiari malformation type I ([facility] [address] ) - per report, I do not have MRI results except for as documented   She reports headaches started around age 17, but did not become bad until age 22   Patient is a very poor historian therefore history as obtained primarily through extensive medical record review  In 09/2017 she presented to ED multiple times for diffuse headache, photophobia  In 10/06/2017 she was evaluated by Neurology for documented results that I do not see in chart: "An outside MRI of the brain was completed which demonstrated questionable protrusion of the left optic disc into posterior aspect of left orbit correlating with reported history of papilledema, transverse dural venous sinus stenosis is present with findings consistent with idiopathic intracranial hypertension   In the ED she had a lumbar puncture with opening pressure 55 cm H20 and closing pressure 22 5 cm H20 with relief of her symptoms after  "Per record review she was started on multiple medications including at 1 point acetazolamide in the past, but due to no insurance she stop taking any these medications  - as of 09/12/2019:  Chronic daily headache for the past approximately 6 months  Prior to that on and off  Has about 3 headache-free days per month  She describes bifrontal, bilateral retro-orbital, apex pulsating, pressure, stabbing  She denies aura  She does have associated photophobia, phonophobia  - as of 11/07/2019: [place] returns reporting daily mild headaches, worse 4 times a week  Unfortunately she has been noncompliant with my recommendations, has only been taking acetazolamide 2-3 days a week, took indomethacin 50 mg daily for 2 weeks despite explicit instructions  Did not follow up with Ophthalmology, did not get blood work and did not get an MRI brain  Had her  come in and re-explained everything as well as the severe possible complications including vision loss if she is not compliant  We will try to make it easier acetazolamide 500 mg b i d , dexamethasone for the next 5 days as she is very worried about current headache indomethacin as abortive    She is very focused on trouble sleeping when she has about headache and asking for an option to treat pain and sleep  We will try cyclobenzaprine      We discussed likely multifactorial etiology to headaches although from history sounds like primarily related to idiopathic intracranial hypertension, or per review of previous notes migraines have been suspected as well in the past - however she does not report the typical features of this today, do not sound like the classic headaches that may accompany Chiari 1 malformation       Workup:  - 10/2017:  Per [facility] Neurology resident note - I do not have the official results, but requested patient obtain  "An outside MRI of the brain was completed which demonstrated questionable protrusion of the left optic disc into posterior aspect of left orbit correlating with reported history of papilledema, transverse dural venous sinus stenosis is present with findings consistent with idiopathic intracranial hypertension " (no mention of Chiari)  - 10/2017: at  ED she had a lumbar puncture with opening pressure 55 cm H20 and closing pressure 22 5 cm H20 with relief of her symptoms after    -  Ambulatory referral to Ophthalmology; Future  -  Ambulatory referral to Nutrition Services; Future  - will hold off on repeat imaging at this time due to no new symptoms, no red flags, no significant worsening, no visual changes, but would have low threshold for repeat MRI brain if needed in the future for any new or concerning features        Preventative:  - we discussed headache hygiene and lifestyle factors that may improve headaches  -     acetaZOLAMIDE (DIAMOX) 500 mg BID  Discussed proper use, possible side effects and risks    - past:  Propranolol, topiramate, acetazolamide-patient does not recall how long she was on any of these or what affect had  - future options:  Topiramate     Abortive:  - discussed not taking over-the-counter or prescription pain medications more than 3 days per week to prevent medication overuse/rebound headache  -  continue indomethacin 50 mg as needed with Carafate prior  Discussed proper use, possible side effects and risks  - trial of dexamethasone 2 mg daily for the next 5 days  Discussed proper use, possible side effects and risks  - trial of cyclobenzaprine 5-10 mg as needed for muscle spasm/headache and may also help with sleep  Discussed proper use, possible side effects and risks  - past:  Sumatriptan, Fioricet, methocarbamol-patient does not recall how long she was on any of these or what affect had  - future options:  Could try Triptan     BMI 35 0-35 9,adult  -     Ambulatory referral to Nutrition Services; Future           Patient instructions      Obtain labs/blood work sometime in the next week      Ask [facility] medical records if they have the MRI of her brain and if they do please get on CD so I can review the images and we can upload them to our system     If any worsening or new or concerning symptoms especially if any vision loss, go to the emergency department where I recommend a consider a lumbar puncture/spinal tap like he had before     *Most importantly, follow-up with Ophthalmology for dilated fundus exam to see if you have any signs of papilledema and he will have to continue to follow with them closely if you do - please call me or right me on my chart after you see them and let me know what they said and also try to have them fax the results to us     Referral to Nutrition to help with weight loss     Headache/migraine treatment:   Abortive medications (for immediate treatment of a headache):    It is ok to take ibuprofen, acetaminophen or naproxen (Advil, Tylenol,  Aleve, Excedrin) if they help your headaches you should limit these to No more than 3 times a week to avoid medication overuse/rebound headaches       For the next 5 days take dexamethasone (steroid) and no indomethacin      I will also prescribe this medication to take as needed for headaches but I recommend no more than 3 days per week and do not take with any other similar medications such as ibuprofen  -     indomethacin (INDOCIN) 50 mg capsule; Take 1 capsule (50 mg total) by mouth 3 (three) times a day as needed for mild pain (migraine) With meals, carafate prior  Max 3 days/week  -     sucralfate (CARAFATE) 1 g tablet; 1 tab 30 mins prior to the indomethacin to protect your stomach     * as an alternative you can take cyclobenzaprine 5-10 mg at night-this is a muscle relaxant and may be cause you to be sleepy, so do not drive after taking    Prescription preventive medications for headaches/migraines   (to take every day to help prevent headaches - not to take at the time of headache):  [x] acetazolamide 500 mg in a m  And 500 mg in p m      - This medication often causes side effects that gradually get better as her body gets used to the medication and that is why we gradually increase it  Most common side effects include tingling of the fingertips or toes or face       It is important to try and eat potassium rich foods while taking this medication  Potassium rich foods include:  - bananas, yogurt, sweet potatoes, tomato sauce is, beat greens, weight being, kidney and lima beans, lentils, split peas, soybeans, prunes, carrot or Orange juice, fish, milk     *Typically these types of medications take time untill you see the benefit, although some may see improvement in days, often it may take weeks, especially if the medication is being titrated up to a beneficial level  Please contact us if there are any concerns or questions regarding the medication       Self-Monitoring:  [x] Headache calendar  Each day andre a number from 0-10 indicating if there was a headache and how bad it was  This can be used to monitor gradual improvement and is helpful to make medication adjustments   You can do this on paper or there is an TK for a smart phone called "Migraine e Diary"       Lifestyle Recommendations:  [x] SLEEP - Maintain a regular sleep schedule: Adults need at least 7-8 hours of uninterrupted a night  Maintain good sleep hygiene:  Going to bed and waking up at consistent times, avoiding excessive daytime naps, avoiding caffeinated beverages in the evening, avoid excessive stimulation in the evening and generally using bed primarily for sleeping  One hour before bedtime would recommend turning lights down lower, decreasing your activity (may read quietly, listen to music at a low volume)  When you get into bed, should eliminate all technology (no texting, emailing, playing with your phone, iPad or tablet in bed)  [x] HYDRATION - Maintain good hydration  Drink  2L of fluid a day (4 typical small water bottles)  [x] DIET - Maintain good nutrition  In particular don't skip meals and try and eat healthy balanced meals regularly  [x] TRIGGERS - Look for other triggers and avoid them: Limit caffeine to 1-2 cups a day or less  Avoid dietary triggers that you have noticed bring on your headaches (this could include aged cheese, peanuts, MSG, aspartame and nitrates)  [x] EXERCISE - physical exercise as we all know is good for you in many ways, and not only is good for your heart, but also is beneficial for your mental health, cognitive health and  chronic pain/headaches  I would encourage at the least 5 days of physical exercise weekly for at least 30 minutes       Education and Follow-up  [x] Please call with any questions or concerns  Of course if any new concerning symptoms go to the emergency department  [x] Follow up 4-6 weeks             CC: We had the pleasure of evaluating Ld Mauricio in neurological consultation today  Ld Mauricio is a 26 y o    right handed female who presents today for evaluation of headaches       History obtained from patient as well as available medical record review     Patient is a very poor historian therefore history as obtained primarily through extensive medical record review  Today  is present who helps with history     History of Present Illness:   Past medical history includes chronic neck and back pain (referred to pain med), chiari malformation type 1, papilledema, preeclampsia    Interval history as of 11/07/2019  - She has not followed up with nutrition as recommended the setting of IIH - although she has upcoming appointment  - She has not followed up with Ophthalmology as recommended for II   - she did not obtain blood work as ordered  - she did not get MRI brain on CD for my review    She reports no improvement in headaches  - daily headaches, worse 4 times a week   - acetazolamide 500 mg b i d  - forgets most of the time, only taking 2-3 days a week 250 mg TID for some reason   - trial of indomethacin 50 mg - took daily for 2 weeks     History of initial visit 09/12/2019  Headaches started around 9/2017  - was evaluated at  ED 09/22/2017 and 09/23/2017 - diffuse headache, photophobia, no vision changes  - ED 10/06/2017 where she was evaluated by Neurology for headache, eye pain for months  An outside MRI of the brain was completed which demonstrated questionable protrusion of the left optic disc into posterior aspect of left orbit correlating with reported history of papilledema, transverse dural venous sinus stenosis is present with findings consistent with idiopathic intracranial hypertension  In the ED she had a lumbar puncture with opening pressure 55 cm H20 and closing pressure 22 5 cm H20 with relief of her symptoms after   - she was started on topiramate 25 mg b i d   And recommended outpatient Neurology follow-up  - 05/24/2018 return to ED with possible migraine  - ED 05/27/2018  - 06/05/2018 follow up with primary care prescribed acetazolamide and topiramate      Previously [facility] with [facility]rology  Last visit 05/2018  Previously on acetazolamide - only took 1 month and then stop feeling due to did not have insurance     Last visit with eye doctor was around 09/2018 - per patient vision was perfect and she did not have to come back  - she reports they did not mention papilledema then (and she does recall that they dilated her eyes), that it was the neurologist previously who had      *Today denies vision symptoms           Headaches started at what age? Started age 17 and came bad 22years old  How often do the headaches occur?   - as of 9/12/2019: daily for about 6 months  3 headache free days a month only   What time of the day do the headaches start? Sometimes wakes with it, Afternoon   How long do the headaches last? 1-2  hours  Are you ever headache free? rarely     Aura? without aura     Last eye exam: around 9/2018 she thinks      Where is your headache located and pain quality?   - bifrontal and apex, bilateral retro-orbital - pulsating, pressure, stabbing   What is the intensity of pain? Average: 7/10, worst 9/10  Associated symptoms:   [] Nausea       [] Vomiting        [] Diarrhea  [x] Insomnia    [] Stiff or sore neck   [x] Problems with concentration  [x] Photophobia     [x]Phonophobia      [] Osmophobia  [] Blurred vision   [x] Prefer quiet, dark room  [x] Light-headed or dizzy     [] Tinnitus   [] Hands or feet tingle or feel numb/paresthesias       [] Red ear      [] Ptosis   [] Facial droop  [] Lacrimation  [] Nasal congestion/rhinorrhea   [] Flushing of face  [] Change in pupil size     Number of days missed per month because of headaches:  Work (or school) days: 3     Headache triggers:  Exercise, softball, standing up quickly, wakes with it     Have you seen someone else for headaches or pain? Yes, [facility]  Have you had trigger point injection performed and how often? No  Have you had Botox injection performed and how often? No   Have you had epidural injections or transforaminal injections performed? No  Are you current pregnant or planning on getting pregnant? No, IUD  Have you ever had any Brain imaging?  yes     What medications do you take or have you taken for your headaches? ABORTIVE:    OTC medications have been ineffective   Ibuprofen -   - as of 9/12/19: 3 times day - 5 days a week, helps a little      Past Per chart  - pt does not remember  sumatriptan 25 mg - she does not remember   fioricet 6/2018  Methocarbamol      PREVENTIVE:   Nothing currently         Past per chart - pt does not remember  - propranolol 20 mg 9/2017  - topiramate 25 mg BID 9/2017  - acetazolamide 250 mg - 6/2018     Alternative therapies used in the past for headaches? no other headache interventions have been tried     Neck pain?: chronic     LIFESTYLE  Sleep   - averages: 7 hours  - does not think she snores  - wakes 2 times a night  Problems falling asleep?:   Yes  Problems staying asleep?:  Yes        Physical activity: softball in summer, gym 3 days a week      Water: 2 bottles  per day  Caffeine: none per day  Diet:  Do you ever skip meals?   No     Mood:   Denies history of anxiety or depression or other diagnosed mood disorder     The following portions of the patient's history were reviewed and updated as appropriate: allergies, current medications, past family history, past medical history, past social history, past surgical history and problem list      Pertinent family history:  Family history of headaches:  no known family members with significant headaches  Any family history of aneurysms  No     Pertinent social history:  Work:  In a warehouse for [employer]  Education: 12th  Lives with  Mom, sister, daughter (3) David Pacheco     Illicit Drugs: denies  Alcohol/tobacco: Denies tobacco use, alcohol intake: social drinker      Past Medical History:     Past Medical History:   Diagnosis Date    Chronic fatigue     last assessed 9/26/16    Hyperlipidemia     resolved 9/29/17    Hypertension     Varicella     pt unsure    Visual impairment     glasses       Patient Active Problem List   Diagnosis    Pseudotumor cerebri    Papilledema    Class 2 severe obesity due to excess calories with serious comorbidity and body mass index (BMI) of 36 0 to 36 9 in adult [facility])    IUD check up    Chronic neck and back pain    Chiari malformation type I (HCC)       Medications:      Current Outpatient Medications   Medication Sig Dispense Refill    acetaZOLAMIDE (DIAMOX) 250 mg tablet 250 mg BID for 1 week and then increase to 250 mg in am and 500 mg in pm for 1 week and then 500 mg  tablet 6    ibuprofen (MOTRIN) 200 mg tablet Take by mouth every 6 (six) hours as needed for mild pain      indomethacin (INDOCIN) 50 mg capsule Take 1 capsule (50 mg total) by mouth 3 (three) times a day as needed for mild pain (migraine) With meals, carafate prior  Max 3 days/week  12 capsule 0    levonorgestrel (MIRENA) 20 MCG/24HR IUD 1 each by Intrauterine route once      methocarbamol (ROBAXIN) 750 mg tablet Take 1 tablet (750 mg total) by mouth every 6 (six) hours as needed for muscle spasms for up to 30 days 20 tablet 0    sucralfate (CARAFATE) 1 g tablet 1 tab PO 30 mins prior to the indomethacin to protect your stomach 12 tablet 0     No current facility-administered medications for this visit           Allergies:    No Known Allergies    Family History:     Family History   Problem Relation Age of Onset    Leukemia Maternal Grandfather     No Known Problems Mother     Other Father         MVA    No Known Problems Sister     Hypertension Maternal Grandmother     No Known Problems Daughter     No Known Problems Sister        Social History:     Social History     Socioeconomic History    Marital status: Single     Spouse name: Not on file    Number of children: 1    Years of education: Not on file    Highest education level: Not on file   Occupational History    Not on file   Social Needs    Financial resource strain: Not on file    Food insecurity:     Worry: Not on file     Inability: Not on file    Transportation needs:     Medical: Not on file     Non-medical: Not on file   Tobacco Use    Smoking status: Never Smoker    Smokeless tobacco: Never Used   Substance and Sexual Activity    Alcohol use: No    Drug use: No    Sexual activity: Yes     Partners: Male     Birth control/protection: Condom Male, IUD   Lifestyle    Physical activity:     Days per week: Not on file     Minutes per session: Not on file    Stress: Not on file   Relationships    Social connections:     Talks on phone: Not on file     Gets together: Not on file     Attends Yarsanism service: Not on file     Active member of club or organization: Not on file     Attends meetings of clubs or organizations: Not on file     Relationship status: Not on file    Intimate partner violence:     Fear of current or ex partner: Not on file     Emotionally abused: Not on file     Physically abused: Not on file     Forced sexual activity: Not on file   Other Topics Concern    Not on file   Social History Narrative    Always uses seatbelt    High school student    Younger sister    Lives with mother (single parent)         Objective:     Physical Exam:                                                                 Vitals:            Constitutional:    /67 (BP Location: Right arm, Patient Position: Sitting, Cuff Size: Adult)   Pulse 58   Resp 14   Ht 5' 7 25" (1 708 m)   Wt 105 kg (231 lb 3 2 oz)   BMI 35 94 kg/m²    BP Readings from Last 3 Encounters:   11/07/19 112/67   09/12/19 138/84   08/02/19 116/80     Pulse Readings from Last 3 Encounters:   11/07/19 58   09/12/19 73   08/02/19 60         Well developed, well nourished, well groomed  No dysmorphic features  HEENT:  Normocephalic atraumatic  No meningismus  Oropharynx is clear and moist  No oral mucosal lesions  Chest:  Respirations regular and unlabored  Cardiovascular:  Regular rate, intact distal pulses  Distal extremities warm without palpable edema or tenderness, no observed significant swelling      Musculoskeletal:  Full range of motion  (see below under neurologic exam for evaluation of motor function and gait)   Skin:  warm and dry, not diaphoretic  No apparent birthmarks or stigmata of neurocutaneous disease  Psychiatric:  Normal behavior and appropriate affect        Neurological Examination:     Mental status/cognitive function:   Orientated to time, place and person  Recent and remote memory intact  Attention span and concentration as well as fund of knowledge are appropriate for age  Normal language and spontaneous speech  Cranial Nerves:  Unable to appreciate fundi due to pupillary constriction  III, IV, VI-Pupils were equal, round, and reactive to light  Extraocular movements were full and conjugate without nystagmus  conjugate gaze, normal smooth pursuits, normal saccades   VII-facial expression symmetric, intact forehead wrinkle, strong eye closure, symmetric smile    VIII-hearing grossly intact bilaterally   Motor Exam: symmetric bulk throughout  no atrophy, fasciculations or abnormal movements noted  Coordination:  no apparent dysmetria, ataxia or tremor noted  Gait: steady casual gait         Pertinent lab results:   Per our system  09/29/2016:  CMP and CBC unremarkable   TSH 3 3     Imaging:   No head imaging in our system but she has had an MRI brain per report at outside hospital  - her [facility]:     - ED 10/06/2017 where she was evaluated by Neurology for headache, eye pain for months  An outside MRI of the brain was completed which demonstrated questionable protrusion of the left optic disc into posterior aspect of left orbit correlating with reported history of papilledema, transverse dural venous sinus stenosis is present with findings consistent with idiopathic intracranial hypertension  In the ED she had a lumbar puncture with opening pressure 55 cm H20 and closing pressure 22 5 cm H20 with relief of her symptoms after       Review of Systems:   ROS Obtained by MA and Personally reviewed and corrected if needed  Review of Systems   Constitutional: Negative  Negative for appetite change and fever  HENT: Positive for tinnitus  Negative for hearing loss, trouble swallowing and voice change  Eyes: Positive for visual disturbance (Sensitivity to light)  Negative for photophobia and pain  Respiratory: Negative  Negative for shortness of breath  Cardiovascular: Negative  Negative for palpitations  Gastrointestinal: Negative  Negative for nausea and vomiting  Endocrine: Negative  Negative for cold intolerance and heat intolerance  Genitourinary: Negative  Negative for dysuria, frequency and urgency  Musculoskeletal: Negative  Negative for myalgias and neck pain  Skin: Negative  Negative for rash  Allergic/Immunologic: Negative  Neurological: Positive for light-headedness and headaches (throbbing)  Negative for dizziness, tremors, seizures, syncope, facial asymmetry, speech difficulty, weakness and numbness  Hematological: Negative  Does not bruise/bleed easily  Psychiatric/Behavioral: Negative  Negative for confusion, hallucinations and sleep disturbance  I have spent 40 minutes with Patient and family today in which greater than 50% of this time was spent in counseling/coordination of care regarding Diagnostic results, Prognosis, Risks and benefits of tx options, Intructions for management, Patient and family education, Importance of tx compliance, Risk factor reductions and Impressions        Author:  Yoselin Allen MD 11/7/2019 10:35 AM

## 2019-11-07 NOTE — PROGRESS NOTES
Review of Systems   Constitutional: Negative  Negative for appetite change and fever  HENT: Positive for tinnitus  Negative for hearing loss, trouble swallowing and voice change  Eyes: Positive for visual disturbance (Sensitivity to light)  Negative for photophobia and pain  Respiratory: Negative  Negative for shortness of breath  Cardiovascular: Negative  Negative for palpitations  Gastrointestinal: Negative  Negative for nausea and vomiting  Endocrine: Negative  Negative for cold intolerance and heat intolerance  Genitourinary: Negative  Negative for dysuria, frequency and urgency  Musculoskeletal: Negative  Negative for myalgias and neck pain  Skin: Negative  Negative for rash  Allergic/Immunologic: Negative  Neurological: Positive for light-headedness and headaches (throbbing)  Negative for dizziness, tremors, seizures, syncope, facial asymmetry, speech difficulty, weakness and numbness  Hematological: Negative  Does not bruise/bleed easily  Psychiatric/Behavioral: Negative  Negative for confusion, hallucinations and sleep disturbance

## 2019-11-07 NOTE — TELEPHONE ENCOUNTER
MSW faxed referral to Dr Tamara Corbin' office at [phone]  MSW faxed demographic information, 11/7/19 neuro office visit note, and script  Successful fax notice received  MSW spoke with Judit Menard at Dr Blaire Holt' office at [phone]  As soon as they receive referral, they will call patient to schedule appt  Nazia aware that patient is in need of appt ASAP  Judit Menard will notify this office of patient's appt date once scheduled

## 2019-11-07 NOTE — TELEPHONE ENCOUNTER
MSW phoned patient using the [employer],  # [id number], to explain that this writer had located a 3 ophthalmologists, but one can offer a much sooner appt  MSW offered choice of the 3 providers  Patient was agreeable to Dr Enciso Anchors  MSW will request updated script from Dr Sundar Sahni  Once script is available, MSW will fax referral to Dr Genet Frazier' office

## 2019-11-07 NOTE — TELEPHONE ENCOUNTER
MSW obtained a list of in-network ophthalmologists from www  [employer]'s Find A Doctor tool  The search using "ophthalmologists" and patient's zip code ([id number]) yielded 49 results  MSW phoned the office and confirmed that the following office accept patient's insurance and treat adults:    Dr Jeanne Pereira  [address] [facility]  , [address]  [phone] Fax  *soonest new patient appt is 11/14    [facility]  [address] [facility], [address] [facility] [address] [facility]  [phone] Fax  *soonest new patient appt is end of December/beginning of January    [facility]  [address], [address] [facility], [address]  [address] [address] Fax  *MD appts available end of December

## 2019-11-07 NOTE — PATIENT INSTRUCTIONS
Obtain labs/blood work sometime in the next week   [facility] records if they have the MRI of her brain and if they do please get on CD so I can review the images and we can upload them to our system     If any worsening or new or concerning symptoms especially if any vision loss, go to the emergency department where I recommend a consider a lumbar puncture/spinal tap like he had before     *Most importantly, follow-up with Ophthalmology for dilated fundus exam to see if you have any signs of papilledema and he will have to continue to follow with them closely if you do - please call me or right me on my chart after you see them and let me know what they said and also try to have them fax the results to us     Referral to Nutrition to help with weight loss     Headache/migraine treatment:   Abortive medications (for immediate treatment of a headache): It is ok to take ibuprofen, acetaminophen or naproxen (Advil, Tylenol,  Aleve, Excedrin) if they help your headaches you should limit these to No more than 3 times a week to avoid medication overuse/rebound headaches       For the next 5 days take dexamethasone (steroid) and no indomethacin     I will also prescribe this medication to take as needed for headaches but I recommend no more than 3 days per week and do not take with any other similar medications such as ibuprofen  -     indomethacin (INDOCIN) 50 mg capsule; Take 1 capsule (50 mg total) by mouth 3 (three) times a day as needed for mild pain (migraine) With meals, carafate prior  Max 3 days/week    -     sucralfate (CARAFATE) 1 g tablet; 1 tab 30 mins prior to the indomethacin to protect your stomach  (when you need refills I will refill after a month)     * as an alternative you can take cyclobenzaprine 5-10 mg at night-this is a muscle relaxant and may be cause you to be sleepy, so do not drive after taking    Prescription preventive medications for headaches/migraines   (to take every day to help prevent headaches - not to take at the time of headache):  [x] acetazolamide 500 mg in a m  And 500 mg in p m      - This medication often causes side effects that gradually get better as her body gets used to the medication and that is why we gradually increase it  Most common side effects include tingling of the fingertips or toes or face       It is important to try and eat potassium rich foods while taking this medication  Potassium rich foods include:  - bananas, yogurt, sweet potatoes, tomato sauce is, beat greens, weight being, kidney and lima beans, lentils, split peas, soybeans, prunes, carrot or Orange juice, fish, milk     *Typically these types of medications take time untill you see the benefit, although some may see improvement in days, often it may take weeks, especially if the medication is being titrated up to a beneficial level  Please contact us if there are any concerns or questions regarding the medication       Self-Monitoring:  [x] Headache calendar  Each day andre a number from 0-10 indicating if there was a headache and how bad it was  This can be used to monitor gradual improvement and is helpful to make medication adjustments  You can do this on paper or there is an TK for a smart phone called "Migraine e Diary"       Lifestyle Recommendations:  [x] SLEEP - Maintain a regular sleep schedule: Adults need at least 7-8 hours of uninterrupted a night  Maintain good sleep hygiene:  Going to bed and waking up at consistent times, avoiding excessive daytime naps, avoiding caffeinated beverages in the evening, avoid excessive stimulation in the evening and generally using bed primarily for sleeping  One hour before bedtime would recommend turning lights down lower, decreasing your activity (may read quietly, listen to music at a low volume)  When you get into bed, should eliminate all technology (no texting, emailing, playing with your phone, iPad or tablet in bed)    [x] HYDRATION - Maintain good hydration  Drink  2L of fluid a day (4 typical small water bottles)  [x] DIET - Maintain good nutrition  In particular don't skip meals and try and eat healthy balanced meals regularly  [x] TRIGGERS - Look for other triggers and avoid them: Limit caffeine to 1-2 cups a day or less  Avoid dietary triggers that you have noticed bring on your headaches (this could include aged cheese, peanuts, MSG, aspartame and nitrates)  [x] EXERCISE - physical exercise as we all know is good for you in many ways, and not only is good for your heart, but also is beneficial for your mental health, cognitive health and  chronic pain/headaches  I would encourage at the least 5 days of physical exercise weekly for at least 30 minutes       Education and Follow-up  [x] Please call with any questions or concerns  Of course if any new concerning symptoms go to the emergency department    [x] Follow up 4-6 weeks

## 2019-11-07 NOTE — TELEPHONE ENCOUNTER
----- Message from Tete Johnston MD sent at 11/7/2019 10:53 AM EST -----  Could you help her find an ophthalmologist that takes her insurance? Not sure if this is something you can help with, but she is struggling and needs an evaluation badly  I put in a referral last visit, but the place I put does not take her insurance

## 2019-11-08 NOTE — TELEPHONE ENCOUNTER
MSW attempted to reach Dr Jesus Manuel Marino' office to confirm if they received the referral via fax and to see if patient was scheduled  No answer at [phone]  MSW left message requesting callback  Awaiting same

## 2019-11-13 NOTE — TELEPHONE ENCOUNTER
LATE ENTRY from 11/12/19:    MSW had yet to hear back from Dr Hiro Henson' office, so MSW called them back at [phone]  MSW spoke with Shauna Barnett who stated that they tried to reach patient at the number was but there was no answer

## 2019-11-13 NOTE — TELEPHONE ENCOUNTER
MSW attempted to reach patient via the [employer],  # [id number]  No answer at [phone]  The  left a message requesting callback  Awaiting same

## 2019-11-15 NOTE — TELEPHONE ENCOUNTER
MSW phoned patient via the [employer],  # [phone]  Patient stated that she been playing phone tag with Dr Izabel Burnham' office  MSW contacted Dr Izabel Burnham' office at [phone] and spoke with [facility] was able to connect with patient and ophthalmology appt was scheduled for 11/27/19 at 330PM

## 2020-04-29 ENCOUNTER — OFFICE VISIT (OUTPATIENT)
Dept: OBGYN CLINIC | Facility: CLINIC | Age: 25
End: 2020-04-29

## 2020-04-29 VITALS
WEIGHT: 223 LBS | SYSTOLIC BLOOD PRESSURE: 120 MMHG | BODY MASS INDEX: 33.8 KG/M2 | HEART RATE: 66 BPM | TEMPERATURE: 96 F | HEIGHT: 68 IN | DIASTOLIC BLOOD PRESSURE: 84 MMHG

## 2020-04-29 DIAGNOSIS — R10.2 PELVIC PAIN: ICD-10-CM

## 2020-04-29 DIAGNOSIS — Z30.431 IUD CHECK UP: Primary | ICD-10-CM

## 2020-04-29 PROCEDURE — 1036F TOBACCO NON-USER: CPT | Performed by: NURSE PRACTITIONER

## 2020-04-29 PROCEDURE — 87491 CHLMYD TRACH DNA AMP PROBE: CPT | Performed by: NURSE PRACTITIONER

## 2020-04-29 PROCEDURE — T1015 CLINIC SERVICE: HCPCS | Performed by: NURSE PRACTITIONER

## 2020-04-29 PROCEDURE — 87591 N.GONORRHOEAE DNA AMP PROB: CPT | Performed by: NURSE PRACTITIONER

## 2020-04-29 PROCEDURE — 99213 OFFICE O/P EST LOW 20 MIN: CPT | Performed by: NURSE PRACTITIONER

## 2020-04-30 LAB
C TRACH DNA SPEC QL NAA+PROBE: NEGATIVE
N GONORRHOEA DNA SPEC QL NAA+PROBE: NEGATIVE

## 2020-05-02 ENCOUNTER — HOSPITAL ENCOUNTER (OUTPATIENT)
Dept: ULTRASOUND IMAGING | Facility: HOSPITAL | Age: 25
Discharge: HOME | End: 2020-05-02
Payer: COMMERCIAL

## 2020-05-02 DIAGNOSIS — R10.2 PELVIC PAIN: ICD-10-CM

## 2020-05-02 DIAGNOSIS — Z30.431 IUD CHECK UP: ICD-10-CM

## 2020-05-02 PROCEDURE — 76856 US EXAM PELVIC COMPLETE: CPT

## 2020-05-02 PROCEDURE — 76830 TRANSVAGINAL US NON-OB: CPT

## 2020-05-20 ENCOUNTER — TELEPHONE (OUTPATIENT)
Dept: OBGYN CLINIC | Facility: CLINIC | Age: 25
End: 2020-05-20

## 2020-05-21 ENCOUNTER — OFFICE VISIT (OUTPATIENT)
Dept: OBGYN CLINIC | Facility: CLINIC | Age: 25
End: 2020-05-21

## 2020-05-21 VITALS
RESPIRATION RATE: 16 BRPM | TEMPERATURE: 97.8 F | BODY MASS INDEX: 34.88 KG/M2 | HEART RATE: 76 BPM | DIASTOLIC BLOOD PRESSURE: 80 MMHG | WEIGHT: 229.4 LBS | SYSTOLIC BLOOD PRESSURE: 118 MMHG

## 2020-05-21 DIAGNOSIS — Z30.013 ENCOUNTER FOR INITIAL PRESCRIPTION OF INJECTABLE CONTRACEPTIVE: ICD-10-CM

## 2020-05-21 DIAGNOSIS — R10.2 PELVIC PAIN: Primary | ICD-10-CM

## 2020-05-21 PROCEDURE — 1036F TOBACCO NON-USER: CPT | Performed by: OBSTETRICS & GYNECOLOGY

## 2020-05-21 PROCEDURE — 99213 OFFICE O/P EST LOW 20 MIN: CPT | Performed by: OBSTETRICS & GYNECOLOGY

## 2020-05-21 PROCEDURE — T1015 CLINIC SERVICE: HCPCS | Performed by: OBSTETRICS & GYNECOLOGY

## 2020-05-21 RX ORDER — MEDROXYPROGESTERONE ACETATE 150 MG/ML
150 INJECTION, SUSPENSION INTRAMUSCULAR
Qty: 1 ML | Refills: 2 | Status: SHIPPED | OUTPATIENT
Start: 2020-05-21 | End: 2020-07-21 | Stop reason: ALTCHOICE

## 2020-06-03 ENCOUNTER — TELEPHONE (OUTPATIENT)
Dept: INTERNAL MEDICINE CLINIC | Facility: CLINIC | Age: 25
End: 2020-06-03

## 2020-06-04 ENCOUNTER — OFFICE VISIT (OUTPATIENT)
Dept: INTERNAL MEDICINE CLINIC | Facility: CLINIC | Age: 25
End: 2020-06-04

## 2020-06-04 VITALS
SYSTOLIC BLOOD PRESSURE: 98 MMHG | DIASTOLIC BLOOD PRESSURE: 70 MMHG | WEIGHT: 231.48 LBS | BODY MASS INDEX: 35.08 KG/M2 | TEMPERATURE: 97.9 F | HEIGHT: 68 IN | OXYGEN SATURATION: 97 % | HEART RATE: 78 BPM

## 2020-06-04 DIAGNOSIS — N63.13 BREAST LUMP ON RIGHT SIDE AT 8 O'CLOCK POSITION: Primary | ICD-10-CM

## 2020-06-04 PROCEDURE — 1036F TOBACCO NON-USER: CPT | Performed by: PHYSICIAN ASSISTANT

## 2020-06-04 PROCEDURE — 99213 OFFICE O/P EST LOW 20 MIN: CPT | Performed by: PHYSICIAN ASSISTANT

## 2020-06-18 ENCOUNTER — HOSPITAL ENCOUNTER (OUTPATIENT)
Dept: MAMMOGRAPHY | Facility: CLINIC | Age: 25
Discharge: HOME | End: 2020-06-18
Payer: COMMERCIAL

## 2020-06-18 DIAGNOSIS — N63.13 BREAST LUMP ON RIGHT SIDE AT 8 O'CLOCK POSITION: ICD-10-CM

## 2020-06-18 PROCEDURE — 76642 ULTRASOUND BREAST LIMITED: CPT

## 2020-07-21 ENCOUNTER — HOSPITAL ENCOUNTER (OUTPATIENT)
Dept: ULTRASOUND IMAGING | Facility: HOSPITAL | Age: 25
Discharge: HOME | End: 2020-07-21
Payer: COMMERCIAL

## 2020-07-21 ENCOUNTER — TELEPHONE (OUTPATIENT)
Dept: OBGYN CLINIC | Facility: CLINIC | Age: 25
End: 2020-07-21

## 2020-07-21 ENCOUNTER — APPOINTMENT (OUTPATIENT)
Dept: LAB | Facility: HOSPITAL | Age: 25
End: 2020-07-21
Payer: COMMERCIAL

## 2020-07-21 ENCOUNTER — ULTRASOUND (OUTPATIENT)
Dept: OBGYN CLINIC | Facility: CLINIC | Age: 25
End: 2020-07-21

## 2020-07-21 VITALS
HEART RATE: 96 BPM | SYSTOLIC BLOOD PRESSURE: 115 MMHG | HEIGHT: 68 IN | RESPIRATION RATE: 16 BRPM | BODY MASS INDEX: 34.68 KG/M2 | TEMPERATURE: 97.6 F | DIASTOLIC BLOOD PRESSURE: 76 MMHG | WEIGHT: 228.8 LBS

## 2020-07-21 DIAGNOSIS — O21.9 NAUSEA AND VOMITING DURING PREGNANCY: Primary | ICD-10-CM

## 2020-07-21 DIAGNOSIS — O36.80X0 PREGNANCY OF UNKNOWN ANATOMIC LOCATION: Primary | ICD-10-CM

## 2020-07-21 DIAGNOSIS — O36.80X0 PREGNANCY OF UNKNOWN ANATOMIC LOCATION: ICD-10-CM

## 2020-07-21 PROBLEM — R10.2 PELVIC PAIN: Status: RESOLVED | Noted: 2020-05-21 | Resolved: 2020-07-21

## 2020-07-21 PROBLEM — Z30.431 IUD CHECK UP: Status: RESOLVED | Noted: 2019-05-16 | Resolved: 2020-07-21

## 2020-07-21 LAB — B-HCG SERPL-ACNC: ABNORMAL MIU/ML

## 2020-07-21 PROCEDURE — 76815 OB US LIMITED FETUS(S): CPT

## 2020-07-21 PROCEDURE — 36415 COLL VENOUS BLD VENIPUNCTURE: CPT | Performed by: OBSTETRICS & GYNECOLOGY

## 2020-07-21 PROCEDURE — 99213 OFFICE O/P EST LOW 20 MIN: CPT | Performed by: OBSTETRICS & GYNECOLOGY

## 2020-07-21 PROCEDURE — 84702 CHORIONIC GONADOTROPIN TEST: CPT | Performed by: OBSTETRICS & GYNECOLOGY

## 2020-07-21 PROCEDURE — 1036F TOBACCO NON-USER: CPT | Performed by: OBSTETRICS & GYNECOLOGY

## 2020-07-21 RX ORDER — ONDANSETRON 4 MG/1
4 TABLET, FILM COATED ORAL EVERY 8 HOURS PRN
Qty: 20 TABLET | Refills: 0 | Status: SHIPPED | OUTPATIENT
Start: 2020-07-21 | End: 2020-12-08 | Stop reason: ALTCHOICE

## 2020-07-21 NOTE — TELEPHONE ENCOUNTER
Called patient to discuss ultrasound findings  Stat ultrasound was completed today for full pelvic evaluation, given bHCG 44,138 and nothing visualized in the uterus during my ultrasound in clinic this morning  US showed intrauterine sac 2 6 x 2 x 2 9cm with average dimension of 2 5cm  No fetal pole, yolk sac, or fetal cardiac activity is demonstrated  Based on sac size, the gestation would be 7w1d  Right corpus luteal cyst visualized  No left adnexal mass  Discussed with patient that this is likely an anembryonic pregnancy, although ectopic pregnancy still cannot be excluded  She will obtain bHCG in 48 hours to trend  She will likely need an appointment early next week to discuss cytotec for medical management if she continues to be asymptomatic and bHCG continues to rise inappropriately  Precautions given to patient      John Wang MD  [facility], PGY-4  7/21/2020  5:00 PM

## 2020-07-21 NOTE — TELEPHONE ENCOUNTER
Called patient regarding [facility] 44,138  Patient to go for formal ultrasound stat  She is currently asymptomatic, denies cramping, vaginal bleeding, fever, chills, myalgia  Patient to call and schedule US      D/w Dr King Deleon MD  OBGYN, PGY-4  7/21/2020  1:01 PM

## 2020-07-21 NOTE — PROGRESS NOTES
OB/GYN VISIT  Thea Fermin  2020  10:04 AM    Subjective:     Thea Fermin is a 22 y o   female who presents with amenorrhea here for viability scan  Patient had Mirena IUD removed 20 per her request  She was scheduled to return for depo provera injection the next morning but was not able to  medication  She since had unprotected intercourse, with attempt for pregnancy  She started experiencing morning sickness around the beginning of July and took 2 home pregnancy tests which were both positive  LMP unknown, as she has not had menses since Mirena removal     Upon chart review, her last pregnancy in  was complicated by preeclampsia with severe features requiring delivery at 35w3d  She was able to deliver via spontaneous vaginal delivery with no complications  Objective:    Vitals: Blood pressure 115/76, pulse 96, temperature 97 6 °F (36 4 °C), resp  rate 16, height 5' 8" (1 727 m), weight 104 kg (228 lb 12 8 oz), last menstrual period 2020, not currently breastfeeding  Body mass index is 34 79 kg/m²  Physical Exam   Constitutional: She is oriented to person, place, and time  She appears well-developed and well-nourished  No distress  Pulmonary/Chest: Effort normal    Abdominal: Soft  She exhibits no distension  There is no tenderness  There is no rebound and no guarding  No abdominal tenderness elicited    Neurological: She is alert and oriented to person, place, and time  Skin: She is not diaphoretic  TVUS:  No intrauterine pregnancy visualized  No yolk sac or gestational sac visualized  No adnexal abnormalities    Assessment/Plan:    Pregnancy of unknown location  Discussed with patient possibility of early intrauterine pregnancy, ectopic pregnancy, or spontaneous miscarriage  bHCG ordered for Tuesday (), Thursday (), and Saturday () to trend quants   If trending appropriately, she likely has an early IUP and should return in 4 weeks for viability scan  If not trending appropriately, she will need to RTC for further evaluation and discussion  She is currently doing well, denies cramping, vaginal bleeding, abdominopelvic pain  Discussed precautions for when she should present to ED for evaluation, including painful cramping, heavy vaginal bleeding, lightheadedness, SOB, chest pain, fever, chills       D/w Dr Nidhi Sebastian MD  7/21/2020  10:04 AM

## 2020-07-24 ENCOUNTER — APPOINTMENT (OUTPATIENT)
Dept: LAB | Facility: HOSPITAL | Age: 25
End: 2020-07-24
Payer: COMMERCIAL

## 2020-07-24 DIAGNOSIS — O36.80X0 PREGNANCY OF UNKNOWN ANATOMIC LOCATION: ICD-10-CM

## 2020-07-24 LAB — B-HCG SERPL-ACNC: ABNORMAL MIU/ML

## 2020-07-24 PROCEDURE — 84702 CHORIONIC GONADOTROPIN TEST: CPT

## 2020-07-24 PROCEDURE — 36415 COLL VENOUS BLD VENIPUNCTURE: CPT

## 2020-07-25 ENCOUNTER — APPOINTMENT (EMERGENCY)
Dept: ULTRASOUND IMAGING | Facility: HOSPITAL | Age: 25
End: 2020-07-25
Payer: COMMERCIAL

## 2020-07-25 ENCOUNTER — HOSPITAL ENCOUNTER (EMERGENCY)
Facility: HOSPITAL | Age: 25
Discharge: HOME | End: 2020-07-25
Attending: EMERGENCY MEDICINE | Admitting: EMERGENCY MEDICINE
Payer: COMMERCIAL

## 2020-07-25 ENCOUNTER — NURSE TRIAGE (OUTPATIENT)
Dept: OTHER | Facility: OTHER | Age: 25
End: 2020-07-25

## 2020-07-25 ENCOUNTER — TELEPHONE (OUTPATIENT)
Dept: LABOR AND DELIVERY | Facility: HOSPITAL | Age: 25
End: 2020-07-25

## 2020-07-25 VITALS
HEART RATE: 91 BPM | OXYGEN SATURATION: 100 % | TEMPERATURE: 100.1 F | DIASTOLIC BLOOD PRESSURE: 84 MMHG | WEIGHT: 226.8 LBS | RESPIRATION RATE: 20 BRPM | SYSTOLIC BLOOD PRESSURE: 123 MMHG | BODY MASS INDEX: 34.48 KG/M2

## 2020-07-25 DIAGNOSIS — O36.80X0 PREGNANCY OF UNKNOWN ANATOMIC LOCATION: Primary | ICD-10-CM

## 2020-07-25 DIAGNOSIS — U07.1 COVID-19 VIRUS INFECTION: ICD-10-CM

## 2020-07-25 DIAGNOSIS — O02.1 MISSED ABORTION: Primary | ICD-10-CM

## 2020-07-25 PROBLEM — R50.9 FEVER AND CHILLS: Status: ACTIVE | Noted: 2020-07-25

## 2020-07-25 LAB
ABO GROUP BLD: NORMAL
B-HCG SERPL-ACNC: ABNORMAL MIU/ML
BASOPHILS # BLD AUTO: 0.02 THOUSANDS/ΜL (ref 0–0.1)
BASOPHILS NFR BLD AUTO: 0 % (ref 0–1)
EOSINOPHIL # BLD AUTO: 0.03 THOUSAND/ΜL (ref 0–0.61)
EOSINOPHIL NFR BLD AUTO: 1 % (ref 0–6)
ERYTHROCYTE [DISTWIDTH] IN BLOOD BY AUTOMATED COUNT: 12.2 % (ref 11.6–15.1)
HCT VFR BLD AUTO: 37.8 % (ref 34.8–46.1)
HGB BLD-MCNC: 12.9 G/DL (ref 11.5–15.4)
IMM GRANULOCYTES # BLD AUTO: 0.01 THOUSAND/UL (ref 0–0.2)
IMM GRANULOCYTES NFR BLD AUTO: 0 % (ref 0–2)
LYMPHOCYTES # BLD AUTO: 0.74 THOUSANDS/ΜL (ref 0.6–4.47)
LYMPHOCYTES NFR BLD AUTO: 13 % (ref 14–44)
MCH RBC QN AUTO: 30.3 PG (ref 26.8–34.3)
MCHC RBC AUTO-ENTMCNC: 34.1 G/DL (ref 31.4–37.4)
MCV RBC AUTO: 89 FL (ref 82–98)
MONOCYTES # BLD AUTO: 0.78 THOUSAND/ΜL (ref 0.17–1.22)
MONOCYTES NFR BLD AUTO: 14 % (ref 4–12)
NEUTROPHILS # BLD AUTO: 3.99 THOUSANDS/ΜL (ref 1.85–7.62)
NEUTS SEG NFR BLD AUTO: 72 % (ref 43–75)
NRBC BLD AUTO-RTO: 0 /100 WBCS
PLATELET # BLD AUTO: 220 THOUSANDS/UL (ref 149–390)
PMV BLD AUTO: 9.7 FL (ref 8.9–12.7)
RBC # BLD AUTO: 4.26 MILLION/UL (ref 3.81–5.12)
RH BLD: POSITIVE
SARS-COV-2 RNA RESP QL NAA+PROBE: POSITIVE
WBC # BLD AUTO: 5.57 THOUSAND/UL (ref 4.31–10.16)

## 2020-07-25 PROCEDURE — 99284 EMERGENCY DEPT VISIT MOD MDM: CPT | Performed by: EMERGENCY MEDICINE

## 2020-07-25 PROCEDURE — 86900 BLOOD TYPING SEROLOGIC ABO: CPT | Performed by: EMERGENCY MEDICINE

## 2020-07-25 PROCEDURE — 85025 COMPLETE CBC W/AUTO DIFF WBC: CPT | Performed by: OBSTETRICS & GYNECOLOGY

## 2020-07-25 PROCEDURE — 36415 COLL VENOUS BLD VENIPUNCTURE: CPT | Performed by: EMERGENCY MEDICINE

## 2020-07-25 PROCEDURE — 87635 SARS-COV-2 COVID-19 AMP PRB: CPT | Performed by: OBSTETRICS & GYNECOLOGY

## 2020-07-25 PROCEDURE — NC001 PR NO CHARGE: Performed by: OBSTETRICS & GYNECOLOGY

## 2020-07-25 PROCEDURE — 76830 TRANSVAGINAL US NON-OB: CPT

## 2020-07-25 PROCEDURE — 84702 CHORIONIC GONADOTROPIN TEST: CPT | Performed by: EMERGENCY MEDICINE

## 2020-07-25 PROCEDURE — 76856 US EXAM PELVIC COMPLETE: CPT

## 2020-07-25 PROCEDURE — 86901 BLOOD TYPING SEROLOGIC RH(D): CPT | Performed by: EMERGENCY MEDICINE

## 2020-07-25 PROCEDURE — 99284 EMERGENCY DEPT VISIT MOD MDM: CPT

## 2020-07-25 RX ORDER — MISOPROSTOL 200 UG/1
800 TABLET ORAL ONCE
Qty: 4 TABLET | Refills: 0 | Status: SHIPPED | OUTPATIENT
Start: 2020-07-25 | End: 2020-12-08 | Stop reason: ALTCHOICE

## 2020-07-25 RX ORDER — MISOPROSTOL 200 UG/1
800 TABLET ORAL ONCE
Status: COMPLETED | OUTPATIENT
Start: 2020-07-25 | End: 2020-07-25

## 2020-07-25 RX ADMIN — MISOPROSTOL 800 MCG: 200 TABLET ORAL at 18:21

## 2020-07-25 NOTE — ED PROVIDER NOTES
History  Chief Complaint   Patient presents with    Pregnancy Problem     7 weeks pregnant , had a confirmed tubal pregnancy and doctor told her would most likely miscarry, started with cramping yesterday, no bleeding       History provided by:  Patient   used: No    56-year-old female at approximately 7 weeks gestation presented for evaluation lower abdominal cramping  She has been following with OB due to abnormal hCG levels and abnormal ultrasound  Had ultrasound few days ago showing an empty gestational sac was appropriately sized  Notes reviewed with concern for anembryonic pregnancy  Possibility of nonvisualized ectopic as well  Patient denies any vaginal bleeding, lightheadedness, dizziness, nausea, vomiting, urinary symptoms  She did developed a subjective fever today  She had taken Tylenol earlier this morning  Afebrile on arrival here  On exam she has some slight suprapubic tenderness  No rebound or guarding  Plan repeat hCG level, pelvic ultrasound and type and screen and will re-evaluate  Prior to Admission Medications   Prescriptions Last Dose Informant Patient Reported?  Taking?   ondansetron (ZOFRAN) 4 mg tablet   No No   Sig: Take 1 tablet (4 mg total) by mouth every 8 (eight) hours as needed for nausea or vomiting      Facility-Administered Medications: None       Past Medical History:   Diagnosis Date    Chronic fatigue     last assessed 9/26/16    Fever and chills 7/25/2020    Hyperlipidemia     resolved 9/29/17    Hypertension     Varicella     pt unsure    Visual impairment     glasses       Past Surgical History:   Procedure Laterality Date    CHOLECYSTECTOMY LAPAROSCOPIC N/A 5/11/2016    Procedure: CHOLECYSTECTOMY LAPAROSCOPIC possible open;  Surgeon: Jessica Jacobs MD;  [facility];  Service:        Family History   Problem Relation Age of Onset    Leukemia Maternal Grandfather     No Known Problems Mother     Other Father         MVA    No Known Problems Sister     Hypertension Maternal Grandmother     No Known Problems Daughter     No Known Problems Sister      I have reviewed and agree with the history as documented  E-Cigarette/Vaping    E-Cigarette Use Never User      E-Cigarette/Vaping Substances    Nicotine No     THC No     CBD No     Flavoring No     Other No     Unknown No      Social History     Tobacco Use    Smoking status: Never Smoker    Smokeless tobacco: Never Used   Substance Use Topics    Alcohol use: Not Currently     Frequency: 2-4 times a month     Drinks per session: 5 or 6     Binge frequency: Never     Comment: Beer    Drug use: Never       Review of Systems   Constitutional: Positive for fever  Negative for activity change and appetite change  Genitourinary: Positive for pelvic pain  Negative for difficulty urinating, dysuria, enuresis, flank pain, vaginal bleeding, vaginal discharge and vaginal pain  Musculoskeletal: Negative for back pain and neck pain  Skin: Negative for pallor and rash  Neurological: Negative for dizziness and weakness  All other systems reviewed and are negative  Physical Exam  Physical Exam   Constitutional: She is oriented to person, place, and time  She appears well-developed and well-nourished  No distress  HENT:   Head: Normocephalic  Cardiovascular: Normal rate and regular rhythm  Pulmonary/Chest: Effort normal  No respiratory distress  Abdominal: Soft  She exhibits no distension  Slight suprapubic tenderness  No rebound or guarding  Musculoskeletal: Normal range of motion  She exhibits no edema  Neurological: She is alert and oriented to person, place, and time  Skin: Skin is warm and dry  Psychiatric: She has a normal mood and affect  Her behavior is normal    Nursing note and vitals reviewed        Vital Signs  ED Triage Vitals   Temperature Pulse Respirations Blood Pressure SpO2   07/25/20 1411 07/25/20 1411 07/25/20 1411 07/25/20 1411 07/25/20 1411   99 3 °F (37 4 °C) 100 20 141/68 99 %      Temp Source Heart Rate Source Patient Position - Orthostatic VS BP Location FiO2 (%)   07/25/20 1411 07/25/20 1705 -- 07/25/20 1705 --   Oral Monitor  Right arm       Pain Score       --                  Vitals:    07/25/20 1411 07/25/20 1500 07/25/20 1705   BP: 141/68 125/72 123/84   Pulse: 100 86 91         Visual Acuity      ED Medications  Medications   misoprostol (CYTOTEC) tablet 800 mcg (800 mcg Oral Given 7/25/20 1821)       Diagnostic Studies  Results Reviewed     Procedure Component Value Units Date/Time    Novel Coronavirus Ez Jarquin [[id number]]  (Abnormal) Collected:  07/25/20 1710    Lab Status:  Final result Specimen:  Nares from Nose Updated:  07/25/20 1832     SARS-CoV-2 Positive    Narrative: The specimen collection materials, transport medium, and/or testing methodology utilized in the production of these test results have been proven to be reliable in a limited validation with an abbreviated program under the Emergency Utilization Authorization provided by the FDA  Testing reported as "Presumptive positive" will be confirmed with secondary testing with a reference laboratory to ensure result accuracy  Clinical caution and judgement should be used with the interpretation of these results with consideration of the clinical impression and other laboratory testing  Testing reported as "Positive" or "Negative" has been proven to be accurate according to standard laboratory validation requirements  All testing is performed with control materials showing appropriate reactivity at standard intervals        Quantitative hCG [[id number]]  (Abnormal) Collected:  07/25/20 1449    Lab Status:  Final result Specimen:  Blood from Arm, Left Updated:  07/25/20 1543     HCG, Quant 81,632 mIU/mL     Narrative:        Expected Ranges:     Approximate               Approximate HCG  Gestation age          Concentration ( mIU/mL)  _____________ ______________________   Voss Ladd                      HCG values  0 2-1                       5-50  1-2                           2-3                         100-5000  3-4                         500-74581  4-5                         1000-43748  5-6                         [id number]  6-8                         [id number]  8-12                        [id number]      CBC and differential [[id number]]  (Abnormal) Collected:  07/25/20 1449    Lab Status:  Final result Specimen:  Blood from Arm, Left Updated:  07/25/20 1507     WBC 5 57 Thousand/uL      RBC 4 26 Million/uL      Hemoglobin 12 9 g/dL      Hematocrit 37 8 %      MCV 89 fL      MCH 30 3 pg      MCHC 34 1 g/dL      RDW 12 2 %      MPV 9 7 fL      Platelets 857 Thousands/uL      nRBC 0 /100 WBCs      Neutrophils Relative 72 %      Immat GRANS % 0 %      Lymphocytes Relative 13 %      Monocytes Relative 14 %      Eosinophils Relative 1 %      Basophils Relative 0 %      Neutrophils Absolute 3 99 Thousands/µL      Immature Grans Absolute 0 01 Thousand/uL      Lymphocytes Absolute 0 74 Thousands/µL      Monocytes Absolute 0 78 Thousand/µL      Eosinophils Absolute 0 03 Thousand/µL      Basophils Absolute 0 02 Thousands/µL                  US pelvis complete w transvaginal   Final Result by Alyssa Esteban MD (07/25 1541)       Saclike structure in the uterine fundus appears intact at 7 weeks 2 days  No yolk sac or fetal pole identified  Continued follow-up with serial beta-hCG is recommended  No sonographic finding of ectopic  Workstation performed: [id number]                    Procedures  Procedures         ED Course  ED Course as of Jul 27 0049   Sat Jul 25, 2020   1633 Discussed lab work and ultrasound findings with the patient  Will need to continue outpatient OBGYN follow-up  Advised to return to the ED for any unbearable pain or significant vaginal bleeding  [address] [facility] team evaluated patient    They plan vaginal Cytotec and office follow-up  US AUDIT      Most Recent Value   Initial Alcohol Screen: US AUDIT-C    1  How often do you have a drink containing alcohol?  0 Filed at: 07/25/2020 1501   2  How many drinks containing alcohol do you have on a typical day you are drinking? 0 Filed at: 07/25/2020 1501   3a  Male UNDER 65: How often do you have five or more drinks on one occasion? 0 Filed at: 07/25/2020 1501   3b  FEMALE Any Age, or MALE 65+: How often do you have 4 or more drinks on one occassion? 0 Filed at: 07/25/2020 1501   Audit-C Score  0 Filed at: 07/25/2020 1501                  BLANCO/DAST-10      Most Recent Value   How many times in the past year have you    Used an illegal drug or used a prescription medication for non-medical reasons? Never Filed at: 07/25/2020 1501                                MDM  Number of Diagnoses or Management Options  Pregnancy of unknown anatomic location: new and requires workup  Diagnosis management comments: 52-year-old female followed by OBGYN for abnormal serum HCG increase and abnormal ultrasound showing empty gestational sac without sign of ectopic  Patient's hCG continued to rise today  Rh positive  No vaginal bleeding  Seen by OBGYN in the ED  Pending COVID test due to low-grade temperature, they will place intravaginal Cytotec for anembryonic pregnancy         Amount and/or Complexity of Data Reviewed  Clinical lab tests: ordered and reviewed  Tests in the radiology section of CPT®: ordered and reviewed  Discuss the patient with other providers: yes    Patient Progress  Patient progress: stable        Disposition  Final diagnoses:   Pregnancy of unknown anatomic location   COVID-19 virus infection     Time reflects when diagnosis was documented in both MDM as applicable and the Disposition within this note     Time User Action Codes Description Comment    7/25/2020  4:37 PM [address] Pregnancy of unknown anatomic location     7/25/2020  6:35 PM Alexa MALONE Add [U07 1] COVID-19 virus infection       ED Disposition     ED Disposition Condition Date/Time Comment    Discharge Stable Sat Jul 25, 2020  6:51 PM [facility] discharge to home/self care  Follow-up Information     Follow up With Specialties Details Why Contact Info Additional [address] [facility] Follow up in 3 day(s)  [address] [phone]  [address] [place] [address], [address] [place] [address]          Discharge Medication List as of 7/25/2020  6:51 PM      START taking these medications    Details   misoprostol (CYTOTEC) 200 mcg tablet Take 4 tablets (800 mcg total) by mouth once for 1 dose, Starting Sat 7/25/2020, Normal         CONTINUE these medications which have NOT CHANGED    Details   ondansetron (ZOFRAN) 4 mg tablet Take 1 tablet (4 mg total) by mouth every 8 (eight) hours as needed for nausea or vomiting, Starting Tue 7/21/2020, Normal           No discharge procedures on file      PDMP Review     None          ED Provider  Electronically Signed by           Maxwell Card MD  07/27/20 1281

## 2020-07-25 NOTE — TELEPHONE ENCOUNTER
Reason for Disposition   Pregnancy and effects of fever, questions about   Fever with no signs of serious infection AND no localizing symptoms  (all other triage questions negative)    Answer Assessment - Initial Assessment Questions  1  TEMPERATURE: "What is the most recent temperature?"  "How was it measured?"       Unknown patient does not have a thermometer     2  ONSET: "When did the fever start?"       Yesterday     3  SYMPTOMS: "Do you have any other symptoms besides the fever?"   (e g , cough, cold symptoms, sore throat, rash, diarrhea, vomiting, abdominal pain, urine problems)      Nausea and vomiting, but states is normal due to pregnancy     4  CAUSE: If there are no symptoms, ask: "What do you think is causing the fever?"       Unsure     5  CONTACTS: "Does anyone else in the family have an infection?"      Denies     6  TREATMENT: "What have you done so far to treat this fever?" (e g , medications)      Tylenol 325mg, about 2 hours ago     7  IMMUNOCOMPROMISE: "Do you have of the following: diabetes, HIV positive, splenectomy, chronic steroid treatment, transplant patient, etc "      Denies     8  OTHER SYMPTOMS: "Do you have any other symptoms?" (e g , abdominal pain, fever, vaginal   bleeding, decreased fetal movement)      Denies     9  AZALEA: "What date are you expecting to deliver? Unknown    10   TRAVEL: "Have you traveled out of the country in the last month?" (e g , travel history, exposures)        Denies    Protocols used: PREGNANCY - FEVER-ADULT-

## 2020-07-25 NOTE — PROGRESS NOTES
Consult - OB/GYN   Sebas Michael 22 y o  female MRN: [id number]  Unit/Bed#: ED 22 Encounter: [id number]    22 y o   sexually active reproductive aged female     She is a patient of [facility]    Chief complaint: fevers    HPI: Sebas Michael is a 25y/o [id number] with known failed pregnancy presents with subjective fevers at home  Patient reports that she was having subjective fevers and chills at home overnight as well as nausea  Regarding her pregnancy, patient was found to have gestational sac with no fetal pole and yolk sac on   She had an Hcg level done on 2020 that had an abnormal rise  Given these findings, this pregnancy was deemed to be a failed pregnancy  In the setting of her subjective fevers and failed pregnancy, the patient was asked to be evaluated in the ED  Active Problems:  Patient Active Problem List   Diagnosis    IIH (idiopathic intracranial hypertension)    Papilledema    Class 2 severe obesity due to excess calories with serious comorbidity and body mass index (BMI) of 36 0 to 36 9 in [facility])    Chronic neck and back pain    Chiari malformation type I ([address] )    Breast lump on right side at 8 o'clock position    Pregnancy of unknown anatomic location       PMH:  Past Medical History:   Diagnosis Date    Chronic fatigue     last assessed 16    Hyperlipidemia     resolved 17    Hypertension     Varicella     pt unsure    Visual impairment     glasses       PSH:  Past Surgical History:   Procedure Laterality Date    CHOLECYSTECTOMY LAPAROSCOPIC N/A 2016    Procedure: CHOLECYSTECTOMY LAPAROSCOPIC possible open;  Surgeon: Sulma Gutierrez MD;  [facility];  Service:        Meds:  No current facility-administered medications on file prior to encounter        Current Outpatient Medications on File Prior to Encounter   Medication Sig Dispense Refill    ondansetron (ZOFRAN) 4 mg tablet Take 1 tablet (4 mg total) by mouth every 8 (eight) hours as needed for nausea or vomiting 20 tablet 0       Allergies:  No Known Allergies    Physical Exam:  /84 (BP Location: Right arm)   Pulse 91   Temp 100 1 °F (37 8 °C) (Oral)   Resp 20   Wt 103 kg (226 lb 12 8 oz)   LMP 2020   SpO2 100%   BMI 34 48 kg/m²     Physical Exam   Constitutional: She is oriented to person, place, and time  She appears well-nourished  No distress  HENT:   Head: Normocephalic and atraumatic  Cardiovascular: Normal rate, regular rhythm and normal heart sounds  Pulmonary/Chest: Breath sounds normal  No respiratory distress  She has no wheezes  Abdominal: Soft  Bowel sounds are normal  She exhibits no distension and no mass  There is no tenderness  There is no guarding  Genitourinary: Vagina normal and uterus normal    Genitourinary Comments: Speculum: cervix looks normal, no purulent discharge noted, no bleeding noted  Bimanual: no CMT, no fundal tenderness, no adnexal masses or tenderness     Neurological: She is alert and oriented to person, place, and time  Skin: Skin is warm and dry  She is not diaphoretic  Assessment:   22 y o    sexually active reproductive aged female with failed pregnancy who presents with subjective fevers and chills  Tmax in ED was 100 1F, however with no WBC, no fundal tenderness and no CMT, unlikely that this is a septic   No evidence of ectopic pregnancy  Discussed management options for the pregnancy including surgical, medical and expectant management  Patient would like to proceed with cytotec      Plan:   - COVID test now  - Cytotec 800mcg to be administered vaginally  - Patient to return if she has excessive bleeding, dizziness, headache, etc  - Patient to follow up in office early in the week, information sent to office staff    Patient seen and examined with Dr Naomi French MD, PGY-3  2020  6:20 PM

## 2020-07-25 NOTE — TELEPHONE ENCOUNTER
Regarding: Fever and chills  ----- Message from Bonny Hayden sent at 7/25/2020 10:13 AM EDT -----  "I am 7 weeks pregnant and I have the chills and a high fever  I don't have a thermometer to check my fever, but I know I have one    My obgyn told me to call if I have any fevers because my pregnancy considered ectopic "

## 2020-07-25 NOTE — TELEPHONE ENCOUNTER
Notified by [facility] regarding patient    Patient reporting that she is having fevers overnight  Patient was being evaluated for early pregnancy  On 7/21/2020, ultrasound was conducted which was concerning for anembryonic pregnancy  Hcg had inappropriate rise from 44,138 to 50,322  With patient's new subjective fevers, she was recommended to come to ED for evaluation      ADT order entered    D/w Dr Loredo Records

## 2020-07-25 NOTE — TELEPHONE ENCOUNTER
Spoke with Dr Yarelis Bee and discussed patient's s/s  States patient should be evaluated at [facility]  Patient informed and verbalized understanding

## 2020-07-25 NOTE — DISCHARGE INSTRUCTIONS
[address]    Your healthcare provider and/or public health staff have evaluated you and have determined that you do not need to remain in the hospital at this time  At this time you can be isolated at home where you will be monitored by staff from your local or state health department  You should carefully follow the prevention and isolation steps below until a healthcare provider or local or state health department says that you can return to your normal activities  Stay home except to get medical care    People who are mildly ill with COVID-19 are able to isolate at home during their illness  You should restrict activities outside your home, except for getting medical care  Do not go to work, school, or public areas  Avoid using public transportation, ride-sharing, or taxis  Separate yourself from other people and animals in your home    People: As much as possible, you should stay in a specific room and away from other people in your home  Also, you should use a separate bathroom, if available  Animals: You should restrict contact with pets and other animals while you are sick with COVID-19, just like you would around other people  Although there have not been reports of pets or other animals becoming sick with COVID-19, it is still recommended that people sick with COVID-19 limit contact with animals until more information is known about the virus  When possible, have another member of your household care for your animals while you are sick  If you are sick with COVID-19, avoid contact with your pet, including petting, snuggling, being kissed or licked, and sharing food  If you must care for your pet or be around animals while you are sick, wash your hands before and after you interact with pets and wear a facemask  See COVID-19 and Animals for more information      Call ahead before visiting your doctor    If you have a medical appointment, call the healthcare provider and tell them that you have or may have COVID-19  This will help the healthcare providers office take steps to keep other people from getting infected or exposed  Wear a facemask    You should wear a facemask when you are around other people (e g , sharing a room or vehicle) or pets and before you enter a healthcare providers office  If you are not able to wear a facemask (for example, because it causes trouble breathing), then people who live with you should not stay in the same room with you, or they should wear a facemask if they enter your room  Cover your coughs and sneezes    Cover your mouth and nose with a tissue when you cough or sneeze  Throw used tissues in a lined trash can  Immediately wash your hands with soap and water for at least 20 seconds or, if soap and water are not available, clean your hands with an alcohol-based hand  that contains at least 60% alcohol  Clean your hands often    Wash your hands often with soap and water for at least 20 seconds, especially after blowing your nose, coughing, or sneezing; going to the bathroom; and before eating or preparing food  If soap and water are not readily available, use an alcohol-based hand  with at least 60% alcohol, covering all surfaces of your hands and rubbing them together until they feel dry  Soap and water are the best option if hands are visibly dirty  Avoid touching your eyes, nose, and mouth with unwashed hands  Avoid sharing personal household items    You should not share dishes, drinking glasses, cups, eating utensils, towels, or bedding with other people or pets in your home  After using these items, they should be washed thoroughly with soap and water  Clean all high-touch surfaces everyday    High touch surfaces include counters, tabletops, doorknobs, bathroom fixtures, toilets, phones, keyboards, tablets, and bedside tables  Also, clean any surfaces that may have blood, stool, or body fluids on them   Use a household cleaning spray or wipe, according to the label instructions  Labels contain instructions for safe and effective use of the cleaning product including precautions you should take when applying the product, such as wearing gloves and making sure you have good ventilation during use of the product  Monitor your symptoms    Seek prompt medical attention if your illness is worsening (e g , difficulty breathing)  Before seeking care, call your healthcare provider and tell them that you have, or are being evaluated for, COVID-19  Put on a facemask before you enter the facility  These steps will help the healthcare providers office to keep other people in the office or waiting room from getting infected or exposed  Ask your healthcare provider to call the local or [facility] health department  Persons who are placed under active monitoring or facilitated self-monitoring should follow instructions provided by their local health department or occupational health professionals, as appropriate  If you have a medical emergency and need to call 911, notify the dispatch personnel that you have, or are being evaluated for COVID-19  If possible, put on a facemask before emergency medical services arrive  Discontinuing home isolation    Patients with confirmed COVID-19 should remain under home isolation precautions until the following conditions are met: They have had no fever for at least 72 hours (that is three full days of no fever without the use medicine that reduces fevers)  AND  other symptoms have improved (for example, when their cough or shortness of breath have improved)  AND  at least 10 days have passed since their symptoms first appeared  Patients with confirmed COVID-19 should also notify close contacts (including their workplace) and ask that they self-quarantine   Currently, close contact is defined as being within 6 feet for 10 minutes or more from the period 48 hours before symptom onset to the time at which the patient went into isolation  Close contacts of patients diagnosed with COVID-19 should be instructed by the patient to self-quarantine for 14 days from the last time of their last contact with the patient  Source: [employer]   Aborto natural   LO QUE NECESITA SABER:   Un aborto natural es la pérdida del feto antes de la 20ª semana de [facility]  Un aborto natural también se conoce gagan aborto espóntaneo o lucy pérdida temprana del [facility]  INSTRUCCIONES SOBRE EL OFELIA HOSPITALARIA:   Busque atención médica de inmediato si:   Garcia Littler o pus malolientes saliendo de bhardwaj vagina  Usted tiene sangrado vaginal abundante y en el transcurso de lucy hora empapa más de 1 toalla [place]  Usted tiene dolor abdominal intenso  Usted siente que bhardwaj corazón está latiendo más rápido de lo normal      Usted se siente sumamente mareado o débil  Pregúntele a bhardwaj Claybon Fowler vitaminas y minerales son adecuados para usted  Usted tiene lucy temperatura superior a los 100 4°F o escalofrios  Usted tiene lucy enorme tristeza, medellin o siente que no puede lidiar con lo que le ha pasado  Usted tiene preguntas o inquietudes acerca de bhardwaj condición o cuidado  Cuidados personales:   No se ponga nada dentro de bhardwaj vagina por 2 semanas o según las indicaciones  No use tampones, duchas vaginales ni lleve acabo el acto sexual  Estas acciones pueden causar lucy infección y dolor  Use toallas sanitarias según las necesidades  Es posible que usted tenga sangrado o manchado leve por 2 semanas  No tome caitlin de matheus ni vaya a nadar por 2 semanas o según las indicaciones  Estas acciones pueden aumentar bhardwaj riesgo de contraer lucy infección  Sólo tome duchas  Descanse tanto gagan sea necesario  Empiece a hacer un poco más día a día  Regrese a tawny actividades diarias gagan se le haya indicado       Consulte con bhardwaj [employer] anticonceptivos  En dorcas que le interese prevenir otro Veronica, pregunte a lott médico cuál método [employer] recomienda  Unirse a un soheila de apoyo o la terapia emocional puede ser beneficioso para afrontar tawny sentimientos  Un aborto natural puede ser muy dificil para usted, lott luis y otros miembros de lott aretha  Después de Daryle Fernández pérdida del embarazo se pueden sentir muchos sentimientos  Usted podría sentir lucy medellin intensa u otros sentimientos  Podría ser beneficioso hablar con Bruce Landeros, con un familiar o con un consejero acerca de tawny sentimientos  Usted también podría estar preocupada por la posibilidad de sufrir otro aborto natural  Consulte con lott médico acerca de tawny preocupaciones  El médico podría ayudarla a disminuir el riesgo de otro aborto  También le podría ayudar a encontrar formas para sobrellevar la medellin y aflicción que siente  Para más información:   [employer]  [address] [place] [address]  Phone: [phone]  Phone: [phone]  Web Address: http://[employer]/  org  March of [facility] [address] , [address]  Web Address: [employer]   com  Acuda a tawny consultas de control con lott médico según le indicaron  Usted podría necesitar acudir con lott médico para que le ordene exámenes de hailey o lucy ecografía  Anote tawny preguntas para que se acuerde de hacerlas katie tawny visitas  © 2017 [address] Information is for End User's use only and may not be sold, redistributed or otherwise used for commercial purposes  All illustrations and images included in CareNotes® are the copyrighted property of A D A M , Inc  or Fede Guillen  Esta información es sólo para uso en educación  Lott intención no es darle un consejo médico sobre enfermedades o tratamientos   Colsulte con lott Chana Zelaya farmacéutico antes de seguir cualquier Benjamin Hinojosa médico para saber si es seguro y efectivo para usted

## 2020-07-25 NOTE — DISCHARGE INSTR - AVS FIRST PAGE
Second dose of cytotec was sent to your pharmacy  Please have someone who does not live with you, get the medication from the pharmacy  If you do not pass any tissue after the first dose, please do another dose in the vagina on 7/26/2020  We will make a telehealth appointment for you early next week

## 2020-07-27 ENCOUNTER — TELEMEDICINE (OUTPATIENT)
Dept: OBGYN CLINIC | Facility: CLINIC | Age: 25
End: 2020-07-27

## 2020-07-27 ENCOUNTER — TELEPHONE (OUTPATIENT)
Dept: OBGYN CLINIC | Facility: CLINIC | Age: 25
End: 2020-07-27

## 2020-07-27 DIAGNOSIS — O03.4 INCOMPLETE ABORTION: Primary | ICD-10-CM

## 2020-07-27 PROCEDURE — 1036F TOBACCO NON-USER: CPT | Performed by: OBSTETRICS & GYNECOLOGY

## 2020-07-27 PROCEDURE — 99213 OFFICE O/P EST LOW 20 MIN: CPT | Performed by: OBSTETRICS & GYNECOLOGY

## 2020-07-27 NOTE — TELEPHONE ENCOUNTER
Called patient to follow up after administration of cytotec over the weekend  No answer, will try again later  Patient is to have a telehealth visit secondary to positive COVID testing in ED    Sulma Wheeler MD, PGY-3  7/27/2020  10:05 AM

## 2020-07-27 NOTE — PROGRESS NOTES
Virtual Regular Visit    Reason for visit is   Chief Complaint   Patient presents with    Virtual Regular Visit        Encounter provider Trent Rapp MD    Provider located at [address] [place] [id number] [phone]      Recent Visits  Date Type Provider Dept   07/25/20 Telephone Trent Rapp MD [facility]   07/21/20 Telephone Pastor Olivia MD [facility]   07/21/20 Telephone Pastor Olivia MD [address] recent visits within past 7 days and meeting all other requirements     Today's Visits  Date Type Provider Dept   07/27/20 Telephone Trent Rapp MD [address] [place] [address] today's visits and meeting all other requirements     Future Appointments  No visits were found meeting these conditions  Showing future appointments within next 150 days and meeting all other requirements        The patient was identified by name and date of birth  Pravin Velazco was informed that this is a telemedicine visit and that the visit is being conducted through [employer]  My office door was closed  No one else was in the room  She acknowledged consent and understanding of privacy and security of the video platform  The patient has agreed to participate and understands they can discontinue the visit at any time  Patient is aware this is a billable service  Jamarcus Alvarez is a 22 y o  female with a failed pregnancy diagnosed on  7/25/2020  She also tested positive for COVID during that ED visit  Patient was prescribed 800mcg cytotec that was placed vaginally prior to discharge from the ED  She was also prescribed another dose of 800mcg cytotec if needed  Patient reports that she took the second dose yesterday and had heavy bleeding overnight that has now resolved  She reports some dizziness while she was bleeding but feels well now   Regarding her COVID diagnosis, patient reports that she does not have a sense of smell and she has been having diarrhea as well  Past Medical History:   Diagnosis Date    Chronic fatigue     last assessed 9/26/16    Fever and chills 7/25/2020    Hyperlipidemia     resolved 9/29/17    Hypertension     Varicella     pt unsure    Visual impairment     glasses       Past Surgical History:   Procedure Laterality Date    CHOLECYSTECTOMY LAPAROSCOPIC N/A 5/11/2016    Procedure: CHOLECYSTECTOMY LAPAROSCOPIC possible open;  Surgeon: Samuel Whaley MD;  Location: [facility];  Service:        Current Outpatient Medications   Medication Sig Dispense Refill    misoprostol (CYTOTEC) 200 mcg tablet Take 4 tablets (800 mcg total) by mouth once for 1 dose 4 tablet 0    ondansetron (ZOFRAN) 4 mg tablet Take 1 tablet (4 mg total) by mouth every 8 (eight) hours as needed for nausea or vomiting 20 tablet 0     No current facility-administered medications for this visit  No Known Allergies    Review of Systems   Constitutional: Negative for diaphoresis and fever  HENT: Negative for hearing loss, nosebleeds, tinnitus and trouble swallowing  Eyes: Negative for photophobia and visual disturbance  Respiratory: Negative for apnea, shortness of breath and wheezing  Cardiovascular: Negative for chest pain and palpitations  Gastrointestinal: Negative for blood in stool and vomiting  Endocrine: Negative for polydipsia and polyphagia  Genitourinary: Negative for dysuria and hematuria  Musculoskeletal: Negative for arthralgias, gait problem and myalgias  Skin: Negative for color change and pallor  Neurological: Negative for dizziness, seizures, facial asymmetry, speech difficulty, numbness and headaches  Psychiatric/Behavioral: Negative for agitation, behavioral problems and confusion  Video Exam    There were no vitals filed for this visit      Physical Exam   General: No acute distress    A/P: 23y/o COVID+ s/p cytotec x2 doses for failed pregnancy  Patient is feeling well at this time  Given that COVID test is positive, unable to bring patient to clinic for repeat ultrasound due to the fact that there are pregnant women here  - Plan to follow hcg quant to zero, first quant to be done on Friday  - Patient informed that she should notify the lab of her COVID positive status prior to presenting for lab draw  - Encouraged patient to PO hydrate with gatorade as needed  - Patient to present to ED for evaluation if she has difficult breathing    D/w Dr Vel Campbell acknowledges that she has consented to an online visit or consultation  She understands that the online visit is based solely on information provided by her, and that, in the absence of a face-to-face physical evaluation by the physician, the diagnosis she receives is both limited and provisional in terms of accuracy and completeness  This is not intended to replace a full medical face-to-face evaluation by the physician  [facility] understands and accepts these terms        Evelyn Edwards MD, PGY-3  7/27/2020  5:01 PM

## 2020-08-05 ENCOUNTER — TELEPHONE (OUTPATIENT)
Dept: OBGYN CLINIC | Facility: CLINIC | Age: 25
End: 2020-08-05

## 2020-08-26 ENCOUNTER — TELEPHONE (OUTPATIENT)
Dept: INTERNAL MEDICINE CLINIC | Facility: CLINIC | Age: 25
End: 2020-08-26

## 2020-08-27 ENCOUNTER — TELEMEDICINE (OUTPATIENT)
Dept: INTERNAL MEDICINE CLINIC | Facility: CLINIC | Age: 25
End: 2020-08-27

## 2020-08-27 ENCOUNTER — TELEPHONE (OUTPATIENT)
Dept: INTERNAL MEDICINE CLINIC | Facility: CLINIC | Age: 25
End: 2020-08-27

## 2020-08-27 VITALS — WEIGHT: 230 LBS | HEIGHT: 68 IN | BODY MASS INDEX: 34.86 KG/M2

## 2020-08-27 DIAGNOSIS — R19.7 DIARRHEA, UNSPECIFIED TYPE: ICD-10-CM

## 2020-08-27 DIAGNOSIS — R50.9 FEVER, UNSPECIFIED FEVER CAUSE: Primary | ICD-10-CM

## 2020-08-27 DIAGNOSIS — R06.02 SHORTNESS OF BREATH: ICD-10-CM

## 2020-08-27 PROCEDURE — 99214 OFFICE O/P EST MOD 30 MIN: CPT | Performed by: PHYSICIAN ASSISTANT

## 2020-08-27 PROCEDURE — 1036F TOBACCO NON-USER: CPT | Performed by: PHYSICIAN ASSISTANT

## 2020-08-27 PROCEDURE — 3008F BODY MASS INDEX DOCD: CPT | Performed by: PHYSICIAN ASSISTANT

## 2020-08-27 NOTE — TELEPHONE ENCOUNTER
Cody Landry, I saw pt for virtual visit today for ongoing covid 19 symptom complaints  I noticed that you called pt on 8/5 to discuss getting f/u HCG quant levels checked but was advised to call the lab if she could get it done and pt states she was told if she had covid she couldn't come in  Unfortunately, she never reached out to us about her covid dx until today  I am sending her for another covid test, if negative, I may send her for BW and chest xray  I do not see any active orders from the GYN in lab tab  If I order any BW for her symptoms (if covid negative), is there anything you need rechecked?  HCG quant etc?

## 2020-08-27 NOTE — TELEPHONE ENCOUNTER
I saw patient for virtual visit  She is requiring a letter excusing her from work  I have a letter in her chart  Please e-mail this to her at [email or web address]      Thank you

## 2020-08-27 NOTE — PROGRESS NOTES
COVID-19 Virtual Visit     Assessment/Plan:    Problem List Items Addressed This Visit     None      Visit Diagnoses     Fever, unspecified fever cause    -  Primary    Relevant Orders    Novel Coronavirus (COVID-19), PCR [facility] - Collected at [facility] or [facility]    Shortness of breath        Relevant Orders    Novel Coronavirus (COVID-19), PCR [facility] - Collected at [facility]    Diarrhea, unspecified type        Relevant Orders    Novel Coronavirus (COVID-19), PCR [facility] - Collected at [facility] or [facility]        This virtual check-in was done via [employer] and patient was informed that this is a secure, HIPAA-compliant platform  She agrees to proceed         Disposition:      PATIENT IS A 22YEAR-OLD FEMALE PRESENTING TODAY FOR VIRTUAL VISIT FOR DISCUSSION OF ONGOING SYMPTOMS SINCE HER COVID-19 DIAGNOSIS ABOUT 1 MONTH AGO ON 07/25  SHE REPORTS THAT SHE WAS SEEN IN THE ED FOR LOWER ABDOMINAL CRAMPING COMPLICATIONS OF AN EMPTY GESTATIONAL SAC STATING 7 WEEKS AND 2 DAYS  HOWEVER COVID TESTING WAS POSITIVE  SHE HAD NO FOLLOW-UP VIRTUALLY HERE AT OUR CLINIC FOR THIS  PATIENT CLAIMS THAT HER SYMPTOMS HAVE NOT GOTTEN ANY BETTER SINCE ONSET, AS SYMPTOMS STARTED 3 DAYS AFTER HER COVID TESTING  SHE DID COMPLETE 14 DAY QUARANTINE BUT HAS NOT RETURNED BACK TO WORK SINCE SHE HAS NOT BEEN FEELING WELL AND REPORTS THAT HER EMPLOYER NEEDS A LETTER  CASE DISCUSSED WITH ATTENDING AS I DO BELIEVE SHE NEEDS A FURTHER WORKUP AS I WOULD EXPECT UNCOMPLICATED [id number] INFECTION SYMPTOMS TO SHOW SOME IMPROVEMENT SINCE SHE WAS DIAGNOSED A MONTH AGO  I HAVE ADVISED THAT WE RETEST PATIENT TO CONFIRM NO REINFECTION  SHE WAS ADVISED TO GO ASAP TODAY AND WE WILL CALL HER WITH RESULTS  SHE WAS ADVISED TO QUARANTINE AND ALSO NOT GO TO WORK UNTIL WE GET HER TEST RESULTS BACK  WE WILL PROVIDE HER WITH A LETTER (EMAIL: [email or web address])      If her COVID-19 testing is positive we will handle this as a new infection for continued quarantine and symptomatic treatment  If her testing is negative, she most likely will require further workup with blood work, possibly mononucleosis, CBC and CMP as well as chest x-ray  I did reach out to her gynecologist as well as they did treat her with Cytotec and had follow-up in early August via phone  It appears they left a message informing her she needed to get repeat hCG quantitative levels but patient states that she was told she could not get the blood work done due to her COVID-19  Therefore she has had no follow-up since early August   I have reached out to the registered nurse from the [facility] office to see if there is any additional testing they wanted as well as make sure patient is followed up with appropriately  Patient advised to call with any concerns or worsening symptoms, otherwise we will contact patient with test results to determine further treatment plan  She is aware she should go to [employer] before 5:00 p m  today [place]  She was advised that she does need form of ID      Chief Complaint   Patient presents with    Virtual Brief Visit     Chest thigthness,sob,lost of smell and taste,diarrhea,headache    COVID-19       I referred Mayra Weaver to one of our centralized sites for a COVID-19 swab    I spent 20 minutes directly with the patient during this visit    Encounter provider Janene Salgado PA-C    Provider located at [place] [id number] [phone]    Recent Visits  Date Type Provider Dept   08/26/20 Telephone Marianne Ortiz, [facility] recent visits within past 7 days and meeting all other requirements     Today's Visits  Date Type Provider Dept   08/27/20 Telephone Janene Salgado [address] [place] [address]   08/27/20 [address] [place] [address] Showing today's visits and meeting all other requirements     Future Appointments  No visits were found meeting these conditions  Showing future appointments within next 150 days and meeting all other requirements        Patient agrees to participate in a virtual check in via telephone or video visit instead of presenting to the office to address urgent/immediate medical needs  Patient is aware this is a billable service  After connecting through G[employer], the patient was identified by name and date of birth  Cynthia Whelan was informed that this was a telemedicine visit and that the exam was being conducted confidentially over secure lines  My office door was closed  No one else was in the room  Cynthia Whelan acknowledged consent and understanding of privacy and security of the telemedicine visit  I informed the patient that I have reviewed her record in Epic and presented the opportunity for her to ask any questions regarding the visit today  The patient agreed to participate  Zeinab Iqbal is a 22 y o  female who is concerned about COVID-19  She reports fever, chills, shortness of breath, fatigue, anosmia and diarrhea  She has not experienced cough, sore throat, myalgias, abdominal pain, nausea and vomiting She has not had contact with a person who is under investigation for or who is positive for COVID-19 within the last 14 days  She has not been hospitalized recently for fever and/or lower respiratory symptoms  Patient was seen in ED at [facility] 7/25/20 for lower abdominal cramping, approx 7 weeks gestation at that time, positive covid 19 testing and unfortunately US a few days prior showing an empty gestational sac/concern for anembryonic pregnancy  Pt reports quarantine x 10 days, d/c'd about aug 5th  States started with sxs about 3 days after covid dx  States the symptoms never went away since having covid 19 last month   She does not feel they have improved  Reports headaches every day, having trouble breathing mainly with movement and exertion, feels like chest tightness  She reports continued loss of taste and smell  Checking temperatures axillary, and sometimes has been having intermittent fever of 101 mainly at night with chills  Continues with intermittent diarrhea, watery stool, but sometimes has normal BM  The abdominal cramping has resolved  Denies upper abdominal pain  Denies N/V  She was supposed to get F/U HCG levels and BW for GYN earlier this month but never did  Past Medical History:   Diagnosis Date    Chronic fatigue     last assessed 9/26/16    Fever and chills 7/25/2020    Hyperlipidemia     resolved 9/29/17    Hypertension     Varicella     pt unsure    Visual impairment     glasses       Past Surgical History:   Procedure Laterality Date    CHOLECYSTECTOMY LAPAROSCOPIC N/A 5/11/2016    Procedure: CHOLECYSTECTOMY LAPAROSCOPIC possible open;  Surgeon: Jam Álvarez MD;  Location: [facility];  Service:        Current Outpatient Medications   Medication Sig Dispense Refill    misoprostol (CYTOTEC) 200 mcg tablet Take 4 tablets (800 mcg total) by mouth once for 1 dose 4 tablet 0    ondansetron (ZOFRAN) 4 mg tablet Take 1 tablet (4 mg total) by mouth every 8 (eight) hours as needed for nausea or vomiting (Patient not taking: Reported on 8/27/2020) 20 tablet 0     No current facility-administered medications for this visit  No Known Allergies    Review of Systems   Constitutional: Positive for chills and fever  HENT: Negative  Respiratory: Positive for chest tightness and shortness of breath  Cardiovascular: Negative  Negative for chest pain and palpitations  Gastrointestinal: Positive for diarrhea  Negative for abdominal pain, nausea and vomiting  Genitourinary: Negative  Musculoskeletal: Negative  Skin: Negative  Neurological: Positive for headaches   Negative for dizziness, syncope and light-headedness  Psychiatric/Behavioral: Negative  Video Exam    Vitals:    08/27/20 1035   Weight: 104 kg (230 lb)   Height: 5' 8" (1 727 m)         Elida He appears healthy, alert, no distress, cooperative  Physical Exam  Constitutional:       General: She is not in acute distress  Appearance: Normal appearance  She is not ill-appearing, toxic-appearing or diaphoretic  Comments: Pt talking comfortably during exam and does not see uncomfortable  HENT:      Head: Normocephalic and atraumatic  Right Ear: External ear normal       Left Ear: External ear normal    Eyes:      Conjunctiva/sclera: Conjunctivae normal    Cardiovascular:      Comments: Pt denying rapid heart rate  Pulmonary:      Effort: Pulmonary effort is normal  No tachypnea, bradypnea, accessory muscle usage, prolonged expiration or respiratory distress  Comments: Unable to auscultate during this virtual visit  Abdominal:      Tenderness: There is no abdominal tenderness (no tenderness when patient palpates over abdomen)  Neurological:      Mental Status: She is alert and oriented to person, place, and time  Psychiatric:         Mood and Affect: Mood normal          Speech: Speech normal          Behavior: Behavior normal  Behavior is cooperative  EXAM SIGNIFICANTLY LIMITED TODAY DUE TO BEING VIRTUAL VISIT  VIRTUAL VISIT Barotlo Mendes acknowledges that she has consented to an online visit or consultation  She understands that the online visit is based solely on information provided by her, and that, in the absence of a face-to-face physical evaluation by the physician, the diagnosis she receives is both limited and provisional in terms of accuracy and completeness  This is not intended to replace a full medical face-to-face evaluation by the physician  [facility] understands and accepts these terms

## 2020-08-27 NOTE — TELEPHONE ENCOUNTER
Patient will need a repeat hcg quant because she is s/p cytotec x2 doses for failed pregnancy  Need to make sure her quant is <2  Thank you so much

## 2020-09-09 ENCOUNTER — TRANSCRIBE ORDERS (OUTPATIENT)
Dept: LAB | Facility: HOSPITAL | Age: 25
End: 2020-09-09

## 2020-09-11 DIAGNOSIS — R19.7 DIARRHEA, UNSPECIFIED TYPE: ICD-10-CM

## 2020-09-11 DIAGNOSIS — R06.02 SHORTNESS OF BREATH: ICD-10-CM

## 2020-09-11 DIAGNOSIS — R50.9 FEVER, UNSPECIFIED FEVER CAUSE: ICD-10-CM

## 2020-09-11 PROCEDURE — U0003 INFECTIOUS AGENT DETECTION BY NUCLEIC ACID (DNA OR RNA); SEVERE ACUTE RESPIRATORY SYNDROME CORONAVIRUS 2 (SARS-COV-2) (CORONAVIRUS DISEASE [COVID-19]), AMPLIFIED PROBE TECHNIQUE, MAKING USE OF HIGH THROUGHPUT TECHNOLOGIES AS DESCRIBED BY CMS-2020-01-R: HCPCS | Performed by: PHYSICIAN ASSISTANT

## 2020-09-13 LAB — SARS-COV-2 RNA SPEC QL NAA+PROBE: NOT DETECTED

## 2020-09-14 ENCOUNTER — TELEPHONE (OUTPATIENT)
Dept: INTERNAL MEDICINE CLINIC | Facility: CLINIC | Age: 25
End: 2020-09-14

## 2020-09-14 NOTE — TELEPHONE ENCOUNTER
Folder Color- Blue    Name of Peyton Staley [place]    Form to be filled out by- Costa Boyle    Form to be Faxed to [phone]    Patient made aware of 10 business day policy  Form is from the time patient was in the Emergency room to Oct 6, 2020, which was the return to work date given to her by her employer due to issues with her daughter's childcare

## 2020-09-16 NOTE — TELEPHONE ENCOUNTER
Spoke to the patient and give her the results ,I ask the patient why she wait so long to get the test done and the patient said the she doesn't have transportation to get to the test site because her  work every day and she doesn't have a licenses  Per patient she is being out of work since the positive COVID test back in July  I explain to the patient the Michelle Fletcher is only complete the FMLA Paper to v=cover her from July 27 to 08/27  Per she doesn't have day care to go back to work  I explain to the patient is not medical related the she needs to talk to her employer about that  Patient agree with the decision  I offer the patient another appt with her PCP and patient is going to call back

## 2020-09-18 DIAGNOSIS — G93.2 IIH (IDIOPATHIC INTRACRANIAL HYPERTENSION): Primary | ICD-10-CM

## 2020-09-18 DIAGNOSIS — O02.1 MISSED ABORTION: ICD-10-CM

## 2020-09-18 NOTE — TELEPHONE ENCOUNTER
Please call patient and let her know that I completed her FMLA paperwork  Please let her know that as discussed during her telemedicine visit with me if her COVID testing was negative I would recommend getting some blood work  Please let her know that I blood work orders in the computer including checking her red and white blood cells, kidney and liver function, her electrolytes as well as thyroid  It looks like a provider tried ordering these tests last year for her idiopathic intracranial hypertension but was never done  I also entered orders to recheck in HCG quantitative level following her miscarriage, as I reached out to her gyn office to see if they needed any testing since patient was lost to follow-up since having the COVID infection  all the orders are in the computer and I encouraged her to go ASAP

## 2020-09-21 NOTE — TELEPHONE ENCOUNTER
Call the patient and give her the instructions to go to the lab to do the blood work  Per patient she did a pregnancy test form the pharmacy in the weekend and is positive  Explain the patient the she needs to go I soon she can to get the blood test done  Also I inform the patient the the we already fax the FMLA form  Per patient she is trying to go today

## 2020-09-23 ENCOUNTER — TELEPHONE (OUTPATIENT)
Dept: OTHER | Facility: HOSPITAL | Age: 25
End: 2020-09-23

## 2020-09-23 DIAGNOSIS — O36.80X0 PREGNANCY OF UNKNOWN ANATOMIC LOCATION: Primary | ICD-10-CM

## 2020-09-23 NOTE — TELEPHONE ENCOUNTER
Patient has been called as follow up of failed pregnancy s/p 2 cycles of cytotec past 7/25/20  Sheexperience heavy vaginal bleeding after Cytotec treatment, subsequently she had a virtual follow up 7/27/20 due to Covid 19 positive status, in that follow up patient was feeling good  Last BHCG 7/25/20: 23,117 without subsequent follow up  She was called several times to remember her get the lab work but she disclose had transportation issues  Patient was called  9/21 to remember her about get done her labs, in this call she states took a pregnancy test, which was positive  Today I have explain clearly to patient in [place] possible retained products of pregnancy vs new pregnancy  She discussed risk related as infection and I advise her to consult is she develop fever, abdominal pain or vaginal bleeding   she states will get done a new BHCG this Friday, patient is scheduled for visit next 10/2/2020 with Dr Saran Crespo MD

## 2020-09-25 ENCOUNTER — APPOINTMENT (OUTPATIENT)
Dept: LAB | Facility: CLINIC | Age: 25
End: 2020-09-25
Payer: COMMERCIAL

## 2020-09-25 DIAGNOSIS — O36.80X0 PREGNANCY OF UNKNOWN ANATOMIC LOCATION: ICD-10-CM

## 2020-09-25 DIAGNOSIS — O02.1 MISSED ABORTION: ICD-10-CM

## 2020-09-25 DIAGNOSIS — G93.2 IIH (IDIOPATHIC INTRACRANIAL HYPERTENSION): ICD-10-CM

## 2020-09-25 LAB
ALBUMIN SERPL BCP-MCNC: 3.7 G/DL (ref 3.5–5)
ALP SERPL-CCNC: 52 U/L (ref 46–116)
ALT SERPL W P-5'-P-CCNC: 41 U/L (ref 12–78)
ANION GAP SERPL CALCULATED.3IONS-SCNC: 6 MMOL/L (ref 4–13)
AST SERPL W P-5'-P-CCNC: 18 U/L (ref 5–45)
B-HCG SERPL-ACNC: <2 MIU/ML
BASOPHILS # BLD AUTO: 0.01 THOUSANDS/ΜL (ref 0–0.1)
BASOPHILS NFR BLD AUTO: 0 % (ref 0–1)
BILIRUB SERPL-MCNC: 0.29 MG/DL (ref 0.2–1)
BUN SERPL-MCNC: 10 MG/DL (ref 5–25)
CALCIUM SERPL-MCNC: 9.6 MG/DL (ref 8.3–10.1)
CHLORIDE SERPL-SCNC: 107 MMOL/L (ref 100–108)
CO2 SERPL-SCNC: 27 MMOL/L (ref 21–32)
CREAT SERPL-MCNC: 0.61 MG/DL (ref 0.6–1.3)
EOSINOPHIL # BLD AUTO: 0.15 THOUSAND/ΜL (ref 0–0.61)
EOSINOPHIL NFR BLD AUTO: 2 % (ref 0–6)
ERYTHROCYTE [DISTWIDTH] IN BLOOD BY AUTOMATED COUNT: 12.4 % (ref 11.6–15.1)
GFR SERPL CREATININE-BSD FRML MDRD: 128 ML/MIN/1.73SQ M
GLUCOSE SERPL-MCNC: 95 MG/DL (ref 65–140)
HCT VFR BLD AUTO: 39.2 % (ref 34.8–46.1)
HGB BLD-MCNC: 13.3 G/DL (ref 11.5–15.4)
IMM GRANULOCYTES # BLD AUTO: 0.02 THOUSAND/UL (ref 0–0.2)
IMM GRANULOCYTES NFR BLD AUTO: 0 % (ref 0–2)
LYMPHOCYTES # BLD AUTO: 2.18 THOUSANDS/ΜL (ref 0.6–4.47)
LYMPHOCYTES NFR BLD AUTO: 28 % (ref 14–44)
MCH RBC QN AUTO: 29.9 PG (ref 26.8–34.3)
MCHC RBC AUTO-ENTMCNC: 33.9 G/DL (ref 31.4–37.4)
MCV RBC AUTO: 88 FL (ref 82–98)
MONOCYTES # BLD AUTO: 0.47 THOUSAND/ΜL (ref 0.17–1.22)
MONOCYTES NFR BLD AUTO: 6 % (ref 4–12)
NEUTROPHILS # BLD AUTO: 4.9 THOUSANDS/ΜL (ref 1.85–7.62)
NEUTS SEG NFR BLD AUTO: 64 % (ref 43–75)
NRBC BLD AUTO-RTO: 0 /100 WBCS
PLATELET # BLD AUTO: 273 THOUSANDS/UL (ref 149–390)
PMV BLD AUTO: 10.3 FL (ref 8.9–12.7)
POTASSIUM SERPL-SCNC: 3.9 MMOL/L (ref 3.5–5.3)
PROT SERPL-MCNC: 7.7 G/DL (ref 6.4–8.2)
RBC # BLD AUTO: 4.45 MILLION/UL (ref 3.81–5.12)
SODIUM SERPL-SCNC: 140 MMOL/L (ref 136–145)
TSH SERPL DL<=0.05 MIU/L-ACNC: 1.56 UIU/ML (ref 0.36–3.74)
WBC # BLD AUTO: 7.73 THOUSAND/UL (ref 4.31–10.16)

## 2020-09-25 PROCEDURE — 84443 ASSAY THYROID STIM HORMONE: CPT

## 2020-09-25 PROCEDURE — 36415 COLL VENOUS BLD VENIPUNCTURE: CPT

## 2020-09-25 PROCEDURE — 84702 CHORIONIC GONADOTROPIN TEST: CPT

## 2020-09-25 PROCEDURE — 80053 COMPREHEN METABOLIC PANEL: CPT

## 2020-09-25 PROCEDURE — 85025 COMPLETE CBC W/AUTO DIFF WBC: CPT

## 2020-09-28 ENCOUNTER — TELEPHONE (OUTPATIENT)
Dept: INTERNAL MEDICINE CLINIC | Facility: CLINIC | Age: 25
End: 2020-09-28

## 2020-09-29 ENCOUNTER — TELEPHONE (OUTPATIENT)
Dept: OTHER | Facility: HOSPITAL | Age: 25
End: 2020-09-29

## 2020-09-29 NOTE — TELEPHONE ENCOUNTER
We have called patient in regard of blood results, her BHCG is <2 (negative) at this moment patient without recent pregnancy and previous  without suspicions of retained products  At this time patient states she is looking for pregnancy and declines contraception discussion, she was advised to continue preconceptional prenatal vitamins  She understood findings and plan, al her question were answered to her satisfaction       Ana Mahmood MD

## 2020-12-02 ENCOUNTER — TELEMEDICINE (OUTPATIENT)
Dept: INTERNAL MEDICINE CLINIC | Facility: CLINIC | Age: 25
End: 2020-12-02

## 2020-12-02 DIAGNOSIS — R19.7 DIARRHEA IN ADULT PATIENT: Primary | ICD-10-CM

## 2020-12-02 DIAGNOSIS — G44.209 ACUTE NON INTRACTABLE TENSION-TYPE HEADACHE: ICD-10-CM

## 2020-12-02 PROCEDURE — 99213 OFFICE O/P EST LOW 20 MIN: CPT | Performed by: PHYSICIAN ASSISTANT

## 2020-12-02 RX ORDER — LOPERAMIDE HYDROCHLORIDE 2 MG/1
2 CAPSULE ORAL 4 TIMES DAILY PRN
Qty: 12 CAPSULE | Refills: 0 | Status: SHIPPED | OUTPATIENT
Start: 2020-12-02 | End: 2020-12-08 | Stop reason: ALTCHOICE

## 2020-12-03 ENCOUNTER — TELEPHONE (OUTPATIENT)
Dept: INTERNAL MEDICINE CLINIC | Facility: CLINIC | Age: 25
End: 2020-12-03

## 2020-12-08 ENCOUNTER — TELEPHONE (OUTPATIENT)
Dept: MULTI SPECIALTY CLINIC | Facility: CLINIC | Age: 25
End: 2020-12-08

## 2020-12-08 ENCOUNTER — TELEMEDICINE (OUTPATIENT)
Dept: INTERNAL MEDICINE CLINIC | Facility: CLINIC | Age: 25
End: 2020-12-08

## 2020-12-08 DIAGNOSIS — K58.0 IRRITABLE BOWEL SYNDROME WITH DIARRHEA: Primary | ICD-10-CM

## 2020-12-08 PROBLEM — Z86.16 HISTORY OF 2019 NOVEL CORONAVIRUS DISEASE (COVID-19): Status: ACTIVE | Noted: 2020-12-08

## 2020-12-08 PROBLEM — U07.1 REAL TIME REVERSE TRANSCRIPTASE PCR POSITIVE FOR COVID-19 VIRUS: Status: RESOLVED | Noted: 2020-07-25 | Resolved: 2020-12-08

## 2020-12-08 PROBLEM — R50.9 FEVER AND CHILLS: Status: RESOLVED | Noted: 2020-07-25 | Resolved: 2020-12-08

## 2020-12-08 PROBLEM — O36.80X0 PREGNANCY OF UNKNOWN ANATOMIC LOCATION: Status: RESOLVED | Noted: 2020-07-21 | Resolved: 2020-12-08

## 2020-12-08 PROCEDURE — 99213 OFFICE O/P EST LOW 20 MIN: CPT | Performed by: PHYSICIAN ASSISTANT

## 2020-12-08 PROCEDURE — 1036F TOBACCO NON-USER: CPT | Performed by: PHYSICIAN ASSISTANT

## 2020-12-08 RX ORDER — DICYCLOMINE HCL 20 MG
20 TABLET ORAL
Qty: 30 TABLET | Refills: 0 | Status: SHIPPED | OUTPATIENT
Start: 2020-12-08 | End: 2021-01-03

## 2020-12-10 DIAGNOSIS — R09.81 NASAL CONGESTION: ICD-10-CM

## 2020-12-10 DIAGNOSIS — R19.7 DIARRHEA IN ADULT PATIENT: ICD-10-CM

## 2020-12-10 DIAGNOSIS — J02.9 SORE THROAT: ICD-10-CM

## 2020-12-10 DIAGNOSIS — R05.9 COUGH IN ADULT: ICD-10-CM

## 2020-12-10 DIAGNOSIS — R05.9 COUGH IN ADULT: Primary | ICD-10-CM

## 2020-12-10 PROCEDURE — U0003 INFECTIOUS AGENT DETECTION BY NUCLEIC ACID (DNA OR RNA); SEVERE ACUTE RESPIRATORY SYNDROME CORONAVIRUS 2 (SARS-COV-2) (CORONAVIRUS DISEASE [COVID-19]), AMPLIFIED PROBE TECHNIQUE, MAKING USE OF HIGH THROUGHPUT TECHNOLOGIES AS DESCRIBED BY CMS-2020-01-R: HCPCS | Performed by: PHYSICIAN ASSISTANT

## 2020-12-11 LAB — SARS-COV-2 RNA SPEC QL NAA+PROBE: NOT DETECTED

## 2020-12-22 ENCOUNTER — TELEPHONE (OUTPATIENT)
Dept: INTERNAL MEDICINE CLINIC | Facility: CLINIC | Age: 25
End: 2020-12-22

## 2021-01-03 ENCOUNTER — HOSPITAL ENCOUNTER (EMERGENCY)
Facility: HOSPITAL | Age: 26
Discharge: HOME | End: 2021-01-03
Attending: EMERGENCY MEDICINE | Admitting: EMERGENCY MEDICINE
Payer: COMMERCIAL

## 2021-01-03 VITALS
TEMPERATURE: 97.9 F | DIASTOLIC BLOOD PRESSURE: 75 MMHG | BODY MASS INDEX: 34.86 KG/M2 | WEIGHT: 230 LBS | HEART RATE: 70 BPM | OXYGEN SATURATION: 98 % | SYSTOLIC BLOOD PRESSURE: 120 MMHG | RESPIRATION RATE: 18 BRPM | HEIGHT: 68 IN

## 2021-01-03 DIAGNOSIS — O21.9 NAUSEA AND VOMITING DURING PREGNANCY: Primary | ICD-10-CM

## 2021-01-03 LAB
ALBUMIN SERPL BCP-MCNC: 3.7 G/DL (ref 3.5–5)
ALP SERPL-CCNC: 49 U/L (ref 46–116)
ALT SERPL W P-5'-P-CCNC: 40 U/L (ref 12–78)
ANION GAP SERPL CALCULATED.3IONS-SCNC: 6 MMOL/L (ref 4–13)
AST SERPL W P-5'-P-CCNC: 44 U/L (ref 5–45)
B-HCG SERPL-ACNC: ABNORMAL MIU/ML
BACTERIA UR QL AUTO: ABNORMAL /HPF
BASOPHILS # BLD AUTO: 0.02 THOUSANDS/ΜL (ref 0–0.1)
BASOPHILS NFR BLD AUTO: 0 % (ref 0–1)
BILIRUB SERPL-MCNC: 0.5 MG/DL (ref 0.2–1)
BILIRUB UR QL STRIP: ABNORMAL
BUN SERPL-MCNC: 9 MG/DL (ref 5–25)
CALCIUM SERPL-MCNC: 9.6 MG/DL (ref 8.3–10.1)
CHLORIDE SERPL-SCNC: 103 MMOL/L (ref 100–108)
CLARITY UR: ABNORMAL
CO2 SERPL-SCNC: 25 MMOL/L (ref 21–32)
COLOR UR: ABNORMAL
COLOR, POC: YELLOW
CREAT SERPL-MCNC: 0.64 MG/DL (ref 0.6–1.3)
EOSINOPHIL # BLD AUTO: 0.01 THOUSAND/ΜL (ref 0–0.61)
EOSINOPHIL NFR BLD AUTO: 0 % (ref 0–6)
ERYTHROCYTE [DISTWIDTH] IN BLOOD BY AUTOMATED COUNT: 12.2 % (ref 11.6–15.1)
FINE GRAN CASTS URNS QL MICRO: ABNORMAL /LPF
GFR SERPL CREATININE-BSD FRML MDRD: 125 ML/MIN/1.73SQ M
GLUCOSE SERPL-MCNC: 73 MG/DL (ref 65–140)
GLUCOSE UR STRIP-MCNC: NEGATIVE MG/DL
HCT VFR BLD AUTO: 39.2 % (ref 34.8–46.1)
HGB BLD-MCNC: 13.2 G/DL (ref 11.5–15.4)
HGB UR QL STRIP.AUTO: NEGATIVE
IMM GRANULOCYTES # BLD AUTO: 0.02 THOUSAND/UL (ref 0–0.2)
IMM GRANULOCYTES NFR BLD AUTO: 0 % (ref 0–2)
KETONES UR STRIP-MCNC: ABNORMAL MG/DL
LEUKOCYTE ESTERASE UR QL STRIP: ABNORMAL
LYMPHOCYTES # BLD AUTO: 2.06 THOUSANDS/ΜL (ref 0.6–4.47)
LYMPHOCYTES NFR BLD AUTO: 16 % (ref 14–44)
MCH RBC QN AUTO: 29.5 PG (ref 26.8–34.3)
MCHC RBC AUTO-ENTMCNC: 33.7 G/DL (ref 31.4–37.4)
MCV RBC AUTO: 88 FL (ref 82–98)
MONOCYTES # BLD AUTO: 0.6 THOUSAND/ΜL (ref 0.17–1.22)
MONOCYTES NFR BLD AUTO: 5 % (ref 4–12)
NEUTROPHILS # BLD AUTO: 10.2 THOUSANDS/ΜL (ref 1.85–7.62)
NEUTS SEG NFR BLD AUTO: 79 % (ref 43–75)
NITRITE UR QL STRIP: NEGATIVE
NON-SQ EPI CELLS URNS QL MICRO: ABNORMAL /HPF
NRBC BLD AUTO-RTO: 0 /100 WBCS
PH UR STRIP.AUTO: 6.5 [PH]
PLATELET # BLD AUTO: 294 THOUSANDS/UL (ref 149–390)
PMV BLD AUTO: 9.7 FL (ref 8.9–12.7)
POTASSIUM SERPL-SCNC: 4.7 MMOL/L (ref 3.5–5.3)
PROT SERPL-MCNC: 8.3 G/DL (ref 6.4–8.2)
PROT UR STRIP-MCNC: ABNORMAL MG/DL
RBC # BLD AUTO: 4.48 MILLION/UL (ref 3.81–5.12)
RBC #/AREA URNS AUTO: ABNORMAL /HPF
SODIUM SERPL-SCNC: 134 MMOL/L (ref 136–145)
SP GR UR STRIP.AUTO: 1.03 (ref 1–1.03)
UROBILINOGEN UR QL STRIP.AUTO: 2 E.U./DL
WBC # BLD AUTO: 12.91 THOUSAND/UL (ref 4.31–10.16)
WBC #/AREA URNS AUTO: ABNORMAL /HPF

## 2021-01-03 PROCEDURE — 85025 COMPLETE CBC W/AUTO DIFF WBC: CPT | Performed by: STUDENT IN AN ORGANIZED HEALTH CARE EDUCATION/TRAINING PROGRAM

## 2021-01-03 PROCEDURE — 99284 EMERGENCY DEPT VISIT MOD MDM: CPT | Performed by: EMERGENCY MEDICINE

## 2021-01-03 PROCEDURE — 96361 HYDRATE IV INFUSION ADD-ON: CPT

## 2021-01-03 PROCEDURE — 87147 CULTURE TYPE IMMUNOLOGIC: CPT | Performed by: STUDENT IN AN ORGANIZED HEALTH CARE EDUCATION/TRAINING PROGRAM

## 2021-01-03 PROCEDURE — 84702 CHORIONIC GONADOTROPIN TEST: CPT | Performed by: STUDENT IN AN ORGANIZED HEALTH CARE EDUCATION/TRAINING PROGRAM

## 2021-01-03 PROCEDURE — 99284 EMERGENCY DEPT VISIT MOD MDM: CPT

## 2021-01-03 PROCEDURE — 81001 URINALYSIS AUTO W/SCOPE: CPT | Performed by: STUDENT IN AN ORGANIZED HEALTH CARE EDUCATION/TRAINING PROGRAM

## 2021-01-03 PROCEDURE — 87086 URINE CULTURE/COLONY COUNT: CPT | Performed by: STUDENT IN AN ORGANIZED HEALTH CARE EDUCATION/TRAINING PROGRAM

## 2021-01-03 PROCEDURE — 36415 COLL VENOUS BLD VENIPUNCTURE: CPT | Performed by: STUDENT IN AN ORGANIZED HEALTH CARE EDUCATION/TRAINING PROGRAM

## 2021-01-03 PROCEDURE — 80053 COMPREHEN METABOLIC PANEL: CPT | Performed by: STUDENT IN AN ORGANIZED HEALTH CARE EDUCATION/TRAINING PROGRAM

## 2021-01-03 PROCEDURE — 96374 THER/PROPH/DIAG INJ IV PUSH: CPT

## 2021-01-03 PROCEDURE — 76815 OB US LIMITED FETUS(S): CPT | Performed by: EMERGENCY MEDICINE

## 2021-01-03 PROCEDURE — 96375 TX/PRO/DX INJ NEW DRUG ADDON: CPT

## 2021-01-03 RX ORDER — METOCLOPRAMIDE HYDROCHLORIDE 5 MG/ML
10 INJECTION INTRAMUSCULAR; INTRAVENOUS ONCE
Status: COMPLETED | OUTPATIENT
Start: 2021-01-03 | End: 2021-01-03

## 2021-01-03 RX ORDER — DIPHENHYDRAMINE HYDROCHLORIDE 25 MG/1
25 CAPSULE ORAL 3 TIMES DAILY
Qty: 90 TABLET | Refills: 0 | Status: SHIPPED | OUTPATIENT
Start: 2021-01-03 | End: 2021-04-02 | Stop reason: ALTCHOICE

## 2021-01-03 RX ORDER — METOCLOPRAMIDE 10 MG/1
10 TABLET ORAL EVERY 6 HOURS
Qty: 120 TABLET | Refills: 0 | Status: ON HOLD | OUTPATIENT
Start: 2021-01-03 | End: 2021-02-01 | Stop reason: SDUPTHER

## 2021-01-03 RX ORDER — ONDANSETRON 2 MG/ML
4 INJECTION INTRAMUSCULAR; INTRAVENOUS ONCE
Status: COMPLETED | OUTPATIENT
Start: 2021-01-03 | End: 2021-01-03

## 2021-01-03 RX ADMIN — SODIUM CHLORIDE 1000 ML: 0.9 INJECTION, SOLUTION INTRAVENOUS at 18:54

## 2021-01-03 RX ADMIN — ONDANSETRON 4 MG: 2 INJECTION INTRAMUSCULAR; INTRAVENOUS at 21:29

## 2021-01-03 RX ADMIN — METOCLOPRAMIDE 10 MG: 5 INJECTION, SOLUTION INTRAMUSCULAR; INTRAVENOUS at 18:54

## 2021-01-03 NOTE — ED ATTENDING ATTESTATION
1/3/2021  IKeny MD, saw and evaluated the patient  I have discussed the patient with the resident and agree with the resident's findings, Plan of Care, and MDM as documented in the resident's note, unless otherwise documented below  All available laboratory and imaging studies were reviewed by myself  I was present for key portions of any procedure(s) performed by the resident and I was immediately available to provide assistance  I agree with the current assessment done in the Emergency Department  I have conducted an independent evaluation of this patient  Berenice Rinne is a 26year old  female at 8 weeks EGA presenting with increasing nausea and vomiting since   Patient's last menstrual period was 11/10/2020  She has had a positive home pregnancy test   She is scheduled to see OBGYN for current pregnancy although has not yet seen them due to early dates  Since before , she has had persistent nausea and intermittent vomiting  Over the past several days and has been getting progressively worse  Patient reports not really eating anything over the past 3 days  She has tried Zofran without much relief  She had 8 episodes of vomiting of yellow emesis within the past day  No abdominal pain  No diarrhea  No fevers or chills  No chest pain  No shortness of breath  No vaginal discharge or bleeding  No dysuria  Patient had preeclampsia with her pregnancy for years ago  She recently had a spontaneous miscarriage in summer of 2020       Constitutional: denies fevers, chills  Cardiac: no chest pain, no lower extremity edema  Respiratory: no shortness of breath, no cough  GI: no abdominal pain, as above otherwise  :  As above, no urinary frequency, urgency, or burning with urination  Heme/Onc: no easy bruising  Endocrine: no diabetes  Neuro: no weakness or numbness, no headaches    Ten systems reviewed and negative unless otherwise noted in HPI and above      Physical Exam  Vitals:    01/03/21 1730   BP: 133/90   TempSrc: Oral   Pulse: 67   Resp: 18   Patient Position - Orthostatic VS: Lying   Temp: 97 9 °F (36 6 °C)     SPO2 RA Rest      ED from 1/3/2021 in [address] [place] [address] Emergency Department   SpO2  98 %   SpO2 Activity  At Rest   O2 Device  None (Room air)   O2 Flow Rate          Constitutional:  Awake, alert, oriented  No acute distress  HEENT:  Normocephalic, atraumatic  Sclera anicteric, conjunctiva not injected  Moist oral mucosa  Cardiac:  Regular rate and rhythm, no murmurs, rubs, or gallops  2+ radial pulses  Respiratory:  Lungs are clear to auscultation bilaterally, no wheezes or rales  Abdomen:  Nondistended  Bowel sounds present  No tenderness to palpation  No rebound or guarding  Unable to appreciate gravid uterus at this time  Pelvic:  Deferred  Extremities:  No deformities, no edema  Integument:  No rashes over exposed areas, cap refill less than 2 seconds  Neurologic:  Awake, alert, and oriented x3  Nonfocal exam   Psychiatric:  Normal affect    Labs  Labs Reviewed   COMPREHENSIVE METABOLIC PANEL - Abnormal       Result Value Ref Range Status    Sodium 134 (*) 136 - 145 mmol/L Final    Potassium 4 7  3 5 - 5 3 mmol/L Final    Comment: Slightly Hemolyzed; Results May be Affected    Chloride 103  100 - 108 mmol/L Final    CO2 25  21 - 32 mmol/L Final    ANION GAP 6  4 - 13 mmol/L Final    BUN 9  5 - 25 mg/dL Final    Creatinine 0 64  0 60 - 1 30 mg/dL Final    Comment: Standardized to IDMS reference method    Glucose 73  65 - 140 mg/dL Final    Comment: If the patient is fasting, the ADA then defines impaired fasting glucose as > 100 mg/dL and diabetes as > or equal to 123 mg/dL  Specimen collection should occur prior to Sulfasalazine administration due to the potential for falsely depressed results  Specimen collection should occur prior to Sulfapyridine administration due to the potential for falsely elevated results      Calcium 9 6  8 3 - 10 1 mg/dL Final    AST 44  5 - 45 U/L Final    Comment: Slightly Hemolyzed; Results May be Affected  Specimen collection should occur prior to Sulfasalazine administration due to the potential for falsely depressed results  ALT 40  12 - 78 U/L Final    Comment: Specimen collection should occur prior to Sulfasalazine and/or Sulfapyridine administration due to the potential for falsely depressed results  Alkaline Phosphatase 49  46 - 116 U/L Final    Total Protein 8 3 (*) 6 4 - 8 2 g/dL Final    Albumin 3 7  3 5 - 5 0 g/dL Final    Total Bilirubin 0 50  0 20 - 1 00 mg/dL Final    Comment: Use of this assay is not recommended for patients undergoing treatment with eltrombopag due to the potential for falsely elevated results      eGFR 125  ml/min/1 73sq m Final    Narrative:     [employer] guidelines for Chronic Kidney Disease (CKD):     Stage 1 with normal or high GFR (GFR > 90 mL/min/1 73 square meters)    Stage 2 Mild CKD (GFR = 60-89 mL/min/1 73 square meters)    Stage 3A Moderate CKD (GFR = 45-59 mL/min/1 73 square meters)    Stage 3B Moderate CKD (GFR = 30-44 mL/min/1 73 square meters)    Stage 4 Severe CKD (GFR = 15-29 mL/min/1 73 square meters)    Stage 5 End Stage CKD (GFR <15 mL/min/1 73 square meters)  Note: GFR calculation is accurate only with a steady state creatinine   CBC AND DIFFERENTIAL - Abnormal    WBC 12 91 (*) 4 31 - 10 16 Thousand/uL Final    RBC 4 48  3 81 - 5 12 Million/uL Final    Hemoglobin 13 2  11 5 - 15 4 g/dL Final    Hematocrit 39 2  34 8 - 46 1 % Final    MCV 88  82 - 98 fL Final    MCH 29 5  26 8 - 34 3 pg Final    MCHC 33 7  31 4 - 37 4 g/dL Final    RDW 12 2  11 6 - 15 1 % Final    MPV 9 7  8 9 - 12 7 fL Final    Platelets 190  291 - 390 Thousands/uL Final    nRBC 0  /100 WBCs Final    Neutrophils Relative 79 (*) 43 - 75 % Final    Immat GRANS % 0  0 - 2 % Final    Lymphocytes Relative 16  14 - 44 % Final    Monocytes Relative 5  4 - 12 % Final    Eosinophils Relative 0  0 - 6 % Final    Basophils Relative 0  0 - 1 % Final    Neutrophils Absolute 10 20 (*) 1 85 - 7 62 Thousands/µL Final    Immature Grans Absolute 0 02  0 00 - 0 20 Thousand/uL Final    Lymphocytes Absolute 2 06  0 60 - 4 47 Thousands/µL Final    Monocytes Absolute 0 60  0 17 - 1 22 Thousand/µL Final    Eosinophils Absolute 0 01  0 00 - 0 61 Thousand/µL Final    Basophils Absolute 0 02  0 00 - 0 10 Thousands/µL Final   HCG, QUANTITATIVE - Abnormal    HCG, Quant 58,373 (*) <=6 mIU/mL Final    Narrative:      Expected Ranges:     Approximate               Approximate HCG  Gestation age          Concentration ( mIU/mL)  _____________          ______________________   Edilson Son                      HCG values  0 2-1                       5-50  1-2                           2-3                         100-5000  3-4                         500-84249  4-5                         1000-42560  5-6                         [id number]  6-8                         [id number]  8-12                        50307-810692     UA W REFLEX TO MICROSCOPIC WITH REFLEX TO CULTURE - Abnormal    Color, UA Dk Yellow   Final    Clarity, UA Cloudy   Final    Specific [place], UA 1 035 (*) 1 003 - 1 030 Final    pH, UA 6 5  4 5, 5 0, 5 5, 6 0, 6 5, 7 0, 7 5, 8 0 Final    Leukocytes, UA Small (*) Negative Final    Nitrite, UA Negative  Negative Final    Protein, UA 30 (1+) (*) Negative mg/dl Final    Glucose, UA Negative  Negative mg/dl Final    Ketones, UA >=80 (3+) (*) Negative mg/dl Final    Urobilinogen, UA 2 0 (*) 0 2, 1 0 E U /dl E U /dl Final    Bilirubin, UA Interference- unable to analyze (*) Negative Final    Comment: The dipstick result may be falsely positive due to interfering substances  We recommend reliance upon serum bilirubin, liver & kidney function tests to guide patient care if clinically indicated      Blood, UA Negative  Negative Final    URINE COMMENT     Final    Comment: Pt is pregnant  Urine Culture ordered  URINE MICROSCOPIC - Abnormal    RBC, UA None Seen  None Seen, 2-4 /hpf Final    WBC, UA 4-10 (*) None Seen, 2-4 /hpf Final    Epithelial Cells Moderate (*) None Seen, Occasional /hpf Final    Bacteria, UA Occasional  None Seen, Occasional /hpf Final    Fine granular casts 0-3  /lpf Final    URINE COMMENT     Final    Comment: Pt is pregnant  Urine Culture ordered  POCT URINALYSIS DIPSTICK - Normal    Color, UA yellow   Final   URINE CULTURE     POC Pelvic US    Date/Time: 1/3/2021 2:31 PM  Performed by: Benjy Herrera MD  Authorized by: Benjy Herrera MD       Please see separately dictated procedure note by resident physician  ED Course  Medications   metoclopramide (REGLAN) injection 10 mg (10 mg Intravenous Given 1/3/21 1854)   sodium chloride 0 9 % bolus 1,000 mL (0 mL Intravenous Stopped 1/3/21 2130)   ondansetron (ZOFRAN) injection 4 mg (4 mg Intravenous Given 1/3/21 249)     43-year-old female in 1st trimester pregnancy presenting with nausea and vomiting  Vital signs reviewed, afebrile, not tachycardic, not hypotensive, not hypoxic  Differential diagnosis includes nausea and vomiting in first-trimester pregnancy, hyperemesis gravidarum, cholecystitis (unlikely as patient has had cholecystectomy), cholestasis, pancreatitis, infection such as urinary tract infection, molar pregnancy, versus another etiology of symptoms  Labs reveal CMP with mild hyponatremia, without transaminitis or hyperbilirubinemia  CBC is with white blood cell count of 12 91 with neutrophil predominance, likely in setting of neutrophil demargination due to stress of emesis  UA with 3+ ketones, 4-10 leuks but in setting of moderate epithelial cells suggesting inappropriately collected sample with contamination more likely than UTI/asympomatic bacteriuria  Will send for culture, if culture consistent with UTI, patient will require antibiotic treatment     Reglan administered for nausea, 1 L NS started  Limited bedside ultrasound performed by resident physician under my direct supervision, revealing a viable pulliam intrauterine pregnancy with fetal heart rate of 148  Due to persistent nausea, a dose of Zofran administered  On re-evaluation, patient is feeling improved  Patient discharged home with prescription for vitamin B6, Unisom, and Reglan  Recommend following up with OBGYN as previously scheduled  Return to emergency department with new or worsening symptoms        Clinical Impression  Final diagnoses:   Nausea and vomiting during pregnancy       Discharge Medication List as of 1/3/2021  9:44 PM      START taking these medications    Details   metoclopramide (REGLAN) 10 mg tablet Take 1 tablet (10 mg total) by mouth every 6 (six) hours, Starting Sun 1/3/2021, Until Tue 2/2/2021, Print      Pyridoxine HCl (vitamin B-6) 25 MG tablet Take 1 tablet (25 mg total) by mouth 3 (three) times a day, Starting Sun 1/3/2021, Until Tue 2/2/2021, Print      doxylamine (UNISON) 25 MG tablet Take 0 5 tablets (12 5 mg total) by mouth 3 (three) times a day, Starting Sun 1/3/2021, Until Tue 2/2/2021, Print

## 2021-01-03 NOTE — Clinical Note
Pearle Castleman was seen and treated in our emergency department on 1/3/2021  Other - See Comments        Diagnosis: Pt is pregnant    Sabrina Castellanos    She may return on this date: If you have any questions or concerns, please don't hesitate to call        Osborn Gilford, DO    ______________________________           _______________          _______________  Hospital Representative                              Date                                Time

## 2021-01-04 LAB
BACTERIA UR CULT: ABNORMAL
BACTERIA UR CULT: ABNORMAL

## 2021-01-04 NOTE — ED PROCEDURE NOTE
Procedure  POC Pelvic US    Date/Time: 1/3/2021 8:36 PM  Performed by: Alida Liao DO  Authorized by: Alida Liao DO     Patient location:  ED  Other Assisting Provider: Yes (comment) Zaheer castellano    Procedure details:     Exam Type:  Diagnostic and educational    Indications: evaluate for IUP      Assessment for: confirm intrauterine pregnancy and evaluate for molar pregnancy      Technique:  Transabdominal obstetric (HCG+) exam    Views obtained: uterus (transverse and sagittal)      Image quality: diagnostic      Image availability:  Images available in PACS  Uterine findings:     Intrauterine pregnancy: identified      Single gestation: identified      Gestational sac: identified      Yolk sac: identified      Fetal pole: identified      Fetal heart rate: identified      Fetal heart rate (bpm):  148  Interpretation:     Ultrasound impressions: normal      Pregnancy findings: intrauterine pregnancy (IUP)                       Alida Liao DO  01/04/21 5962

## 2021-01-04 NOTE — ED PROVIDER NOTES
History  Chief Complaint   Patient presents with    Vomiting During Pregnancy     Pt reports vomiting since  - starting today, unabel to keep PO fluids down  Pt is estimated 8 weeks pregnant - LMP 11/10, no OB appointment yet  Pt also reporting dizziness, denies CP/SOB  HPI  18yo female , 8 weeks pregnant presents with nausea vomiting since 2020  Patient has had 1 full-term child complicated with preeclampsia and resulting early delivery, 2nd pregnancy ended in miscarriage  Patient's symptoms of nausea and vomiting started on  and the patient was able to drink liquids and consume a liquid foods  This morning nausea vomiting progressed the patient has been unable to keep down liquids  Patient has approximately vomited 8 times today  Vomitus is yellow  Patient denies fever, chills, diarrhea, abdominal pain, vaginal discharge, shortness of breath, chest pain  Patient has tried Zofran for her nausea but it has not helped  None       Past Medical History:   Diagnosis Date    Chronic fatigue     last assessed 16    Fever and chills 2020    Hyperlipidemia     resolved 17    Hypertension     Varicella     pt unsure    Visual impairment     glasses       Past Surgical History:   Procedure Laterality Date    CHOLECYSTECTOMY LAPAROSCOPIC N/A 2016    Procedure: CHOLECYSTECTOMY LAPAROSCOPIC possible open;  Surgeon: Edilson Salmeron MD;  Location: [facility];  Service:        Family History   Problem Relation Age of Onset    Leukemia Maternal Grandfather     No Known Problems Mother     Other Father         MVA    No Known Problems Sister     Hypertension Maternal Grandmother     No Known Problems Daughter     No Known Problems Sister      I have reviewed and agree with the history as documented      E-Cigarette/Vaping    E-Cigarette Use Never User      E-Cigarette/Vaping Substances    Nicotine No     THC No     CBD No     Flavoring No     Other No     Unknown No      Social History     Tobacco Use    Smoking status: Never Smoker    Smokeless tobacco: Never Used   Substance Use Topics    Alcohol use: Not Currently     Frequency: 2-4 times a month     Drinks per session: 5 or 6     Binge frequency: Never     Comment: Beer    Drug use: Never        Review of Systems   Constitutional: Negative for chills and fever  HENT: Negative for ear pain and sore throat  Eyes: Negative for pain and visual disturbance  Respiratory: Negative for cough and shortness of breath  Cardiovascular: Negative for chest pain and palpitations  Gastrointestinal: Positive for nausea and vomiting  Negative for abdominal pain  Genitourinary: Negative for dysuria, hematuria, vaginal bleeding and vaginal discharge  Musculoskeletal: Negative for arthralgias and back pain  Skin: Negative for color change and rash  Neurological: Negative for seizures and syncope  All other systems reviewed and are negative  Physical Exam  ED Triage Vitals [01/03/21 1730]   Temperature Pulse Respirations Blood Pressure SpO2   97 9 °F (36 6 °C) 67 18 133/90 98 %      Temp Source Heart Rate Source Patient Position - Orthostatic VS BP Location FiO2 (%)   Oral Monitor Lying Left arm --      Pain Score       --             Orthostatic Vital Signs  Vitals:    01/03/21 1730 01/03/21 2004   BP: 133/90 120/75   Pulse: 67 70   Patient Position - Orthostatic VS: Lying Lying       Physical Exam  Vitals signs reviewed  Constitutional:       General: She is not in acute distress  Appearance: She is obese  She is not toxic-appearing  HENT:      Head: Normocephalic and atraumatic  Right Ear: External ear normal       Left Ear: External ear normal       Nose: Nose normal    Eyes:      Extraocular Movements: Extraocular movements intact  Neck:      Musculoskeletal: Normal range of motion and neck supple  Cardiovascular:      Rate and Rhythm: Normal rate and regular rhythm        Pulses: Normal pulses  Heart sounds: Normal heart sounds  Pulmonary:      Effort: Pulmonary effort is normal       Breath sounds: Normal breath sounds  Abdominal:      Palpations: Abdomen is soft  Tenderness: There is no abdominal tenderness  There is no guarding  Skin:     Capillary Refill: Capillary refill takes less than 2 seconds  Neurological:      General: No focal deficit present  Mental Status: She is alert           ED Medications  Medications   metoclopramide (REGLAN) injection 10 mg (10 mg Intravenous Given 1/3/21 1854)   sodium chloride 0 9 % bolus 1,000 mL (0 mL Intravenous Stopped 1/3/21 2130)   ondansetron (ZOFRAN) injection 4 mg (4 mg Intravenous Given 1/3/21 2129)       Diagnostic Studies  Results Reviewed     Procedure Component Value Units Date/Time    hCG, quantitative [[id number]]  (Abnormal) Collected: 01/03/21 1853    Lab Status: Final result Specimen: Blood from Hand, Right Updated: 01/03/21 1939     HCG, Quant 03,738 mIU/mL     Narrative:       Expected Ranges:     Approximate               Approximate HCG  Gestation age          Concentration ( mIU/mL)  _____________          ______________________   Lissette Hall                      HCG values  0 2-1                       5-50  1-2                           2-3                         100-5000  3-4                         500-56370  4-5                         1000-95902  5-6                         27414-411691  6-8                         52272-905982  8-12                        12216-450474      Comprehensive metabolic panel [[id number]]  (Abnormal) Collected: 01/03/21 1853    Lab Status: Final result Specimen: Blood from Hand, Right Updated: 01/03/21 1922     Sodium 134 mmol/L      Potassium 4 7 mmol/L      Chloride 103 mmol/L      CO2 25 mmol/L      ANION GAP 6 mmol/L      BUN 9 mg/dL      Creatinine 0 64 mg/dL      Glucose 73 mg/dL      Calcium 9 6 mg/dL      AST 44 U/L      ALT 40 U/L      Alkaline Phosphatase 49 U/L Total Protein 8 3 g/dL      Albumin 3 7 g/dL      Total Bilirubin 0 50 mg/dL      eGFR 125 ml/min/1 73sq m     Narrative:      [place] guidelines for Chronic Kidney Disease (CKD):     Stage 1 with normal or high GFR (GFR > 90 mL/min/1 73 square meters)    Stage 2 Mild CKD (GFR = 60-89 mL/min/1 73 square meters)    Stage 3A Moderate CKD (GFR = 45-59 mL/min/1 73 square meters)    Stage 3B Moderate CKD (GFR = 30-44 mL/min/1 73 square meters)    Stage 4 Severe CKD (GFR = 15-29 mL/min/1 73 square meters)    Stage 5 End Stage CKD (GFR <15 mL/min/1 73 square meters)  Note: GFR calculation is accurate only with a steady state creatinine    CBC and differential [[id number]]  (Abnormal) Collected: 01/03/21 1853    Lab Status: Final result Specimen: Blood from Hand, Right Updated: 01/03/21 1904     WBC 12 91 Thousand/uL      RBC 4 48 Million/uL      Hemoglobin 13 2 g/dL      Hematocrit 39 2 %      MCV 88 fL      MCH 29 5 pg      MCHC 33 7 g/dL      RDW 12 2 %      MPV 9 7 fL      Platelets 715 Thousands/uL      nRBC 0 /100 WBCs      Neutrophils Relative 79 %      Immat GRANS % 0 %      Lymphocytes Relative 16 %      Monocytes Relative 5 %      Eosinophils Relative 0 %      Basophils Relative 0 %      Neutrophils Absolute 10 20 Thousands/µL      Immature Grans Absolute 0 02 Thousand/uL      Lymphocytes Absolute 2 06 Thousands/µL      Monocytes Absolute 0 60 Thousand/µL      Eosinophils Absolute 0 01 Thousand/µL      Basophils Absolute 0 02 Thousands/µL     POCT urinalysis dipstick [[id number]]  (Normal) Resulted: 01/03/21 1845    Lab Status: Final result Updated: 01/03/21 1854     Color, UA yellow    Urine Microscopic [[id number]]  (Abnormal) Collected: 01/03/21 1803    Lab Status: Final result Specimen: Urine, Clean Catch Updated: 01/03/21 1852     RBC, UA None Seen /hpf      WBC, UA 4-10 /hpf      Epithelial Cells Moderate /hpf      Bacteria, UA Occasional /hpf      Fine granular casts 0-3 /lpf URINE COMMENT --    UA w Reflex to Microscopic w Reflex to Culture [[id number]]  (Abnormal) Collected: 21    Lab Status: Final result Specimen: Urine, Clean Catch Updated: 21     Color, UA Dk Yellow     Clarity, UA Cloudy     Specific Gravity, UA 1 035     pH, UA 6 5     Leukocytes, UA Small     Nitrite, UA Negative     Protein, UA 30 (1+) mg/dl      Glucose, UA Negative mg/dl      Ketones, UA >=80 (3+) mg/dl      Urobilinogen, UA 2 0 E U /dl      Bilirubin, UA Interference- unable to analyze     Blood, UA Negative     URINE COMMENT --    Urine culture [[id number]] Collected: 21    Lab Status: In process Specimen: Urine, Clean Catch Updated: 21                 No orders to display         Procedures  Procedures      ED Course  ED Course as of  010   Gene Murdock 2021   1939 HCG QUANTITATIVE(!): 51,868                             SBIRT 20yo+      Most Recent Value   SBIRT (23 yo +)   In order to provide better care to our patients, we are screening all of our patients for alcohol and drug use  Would it be okay to ask you these screening questions? Yes Filed at: 2021   Initial Alcohol Screen: US AUDIT-C    1  How often do you have a drink containing alcohol?  0 Filed at: 2021 175   2  How many drinks containing alcohol do you have on a typical day you are drinking? 0 Filed at: 2021 175   3a  Male UNDER 65: How often do you have five or more drinks on one occasion? 0 Filed at: 2021 175   3b  FEMALE Any Age, or MALE 65+: How often do you have 4 or more drinks on one occassion? 0 Filed at: 2021   Audit-C Score  0 Filed at: 2021   BLANCO: How many times in the past year have you    Used an illegal drug or used a prescription medication for non-medical reasons? Never Filed at: 2021 175                MDM  18yo female , 8 weeks pregnant presents with nausea vomiting since 2020       Differential: Nausea vomiting associated with pregnancy, hyperemesis gravidarum, molar pregnancy, gastritis, doubt ectopic pregnancy, doubt biliary pathology    Patient's nausea improves with Reglan and Zofran  Beta hCG level in normal range for 8 week pregnancy  Bedside ultrasound shows intrauterine pregnancy with fetal heart rate of 148  Patient tolerates p o  Of    Patient discharged with prescription of Reglan, B6, and Unisom  Patient advised to keep her OBGYN appointment for the 12th of month  Disposition  Final diagnoses:   Nausea and vomiting during pregnancy     Time reflects when diagnosis was documented in both MDM as applicable and the Disposition within this note     Time User Action Codes Description Comment    1/3/2021  9:36 PM Clinton Cecille Add [O21 9] Nausea and vomiting during pregnancy       ED Disposition     ED Disposition Condition Date/Time Comment    Discharge Stable Sun Lucas 3, 2021  9:36 PM [facility] discharge to home/self care  Follow-up Information    None         Discharge Medication List as of 1/3/2021  9:44 PM      START taking these medications    Details   doxylamine (UNISON) 25 MG tablet Take 0 5 tablets (12 5 mg total) by mouth 3 (three) times a day, Starting Sun 1/3/2021, Until Tue 2/2/2021, Print      metoclopramide (REGLAN) 10 mg tablet Take 1 tablet (10 mg total) by mouth every 6 (six) hours, Starting Sun 1/3/2021, Until Tue 2/2/2021, Print      Pyridoxine HCl (vitamin B-6) 25 MG tablet Take 1 tablet (25 mg total) by mouth 3 (three) times a day, Starting Sun 1/3/2021, Until Tue 2/2/2021, Print           No discharge procedures on file  PDMP Review     None           ED Provider  Attending physically available and evaluated [facility]  I managed the patient along with the ED Attending      Electronically Signed by         Josey Charles DO  01/04/21 3127

## 2021-01-04 NOTE — DISCHARGE INSTRUCTIONS
Please take Reglan as needed for nausea  Please take  vitamin B6 3 times a day  Unisom can make you sleepy  You can take Unisom 1 time a day at nighttime  If you get used to the Unisom you can take it 3 times a day  Please return to the emergency department you experience nausea and vomiting that does not get better with these medications and you cannot tolerate eating food

## 2021-01-05 ENCOUNTER — OFFICE VISIT (OUTPATIENT)
Dept: OBGYN CLINIC | Facility: CLINIC | Age: 26
End: 2021-01-05

## 2021-01-05 VITALS
BODY MASS INDEX: 35.61 KG/M2 | WEIGHT: 235 LBS | SYSTOLIC BLOOD PRESSURE: 133 MMHG | DIASTOLIC BLOOD PRESSURE: 98 MMHG | HEIGHT: 68 IN | HEART RATE: 57 BPM

## 2021-01-05 DIAGNOSIS — G93.5 CHIARI MALFORMATION TYPE I (HCC): ICD-10-CM

## 2021-01-05 DIAGNOSIS — Z3A.08 8 WEEKS GESTATION OF PREGNANCY: Primary | ICD-10-CM

## 2021-01-05 DIAGNOSIS — O21.9 NAUSEA AND VOMITING DURING PREGNANCY: ICD-10-CM

## 2021-01-05 DIAGNOSIS — G93.2 IIH (IDIOPATHIC INTRACRANIAL HYPERTENSION): ICD-10-CM

## 2021-01-05 PROCEDURE — 99213 OFFICE O/P EST LOW 20 MIN: CPT | Performed by: OBSTETRICS & GYNECOLOGY

## 2021-01-05 PROCEDURE — 3725F SCREEN DEPRESSION PERFORMED: CPT | Performed by: OBSTETRICS & GYNECOLOGY

## 2021-01-05 NOTE — PROGRESS NOTES
Yas Diaz 1995 female MRN: [id number]      ASSESSMENT/PLAN  Diagnoses and all orders for this visit:    8 weeks gestation of pregnancy    Nausea and vomiting during pregnancy  -     doxylamine (UNISON) 25 MG tablet; Take 1 tablet (25 mg total) by mouth daily at bedtime as needed for sleep    IIH (idiopathic intracranial hypertension)    Chiari malformation type I ([address] )    Transabdominal US performed in the office today, show pulliam IUP  CRL 1 45 cm = 7w6d   bpm     Encouraged small and frequent meals with water, bland food  Increased Unison dose from 12 5 to 25 mg daily at bedtime as needed  Continue Reglan and vitamin B6 for N/V  Letter to excuse from work printed for patient  Will follow up with nurse visit and prenatal H&P  Future Appointments   Date Time Provider Yu Uribe   1/13/2021 10:30 AM [address] [place] [address] [facility] Brynn Marine          SUBJECTIVE  CC: Pregnancy Ultrasound, Vomiting During Pregnancy, and Nausea      HPI:  Yas Diaz is a 26 y o  female [id number] who presents for nausea/vomiting and dizziness  PMHx Chiari malformation type I, idiopathic intracranial hypertension, and obesity  Hx of preeclampsia with previous pregancy  LMP 11/10/20  GA 8w0d  AZALEA 08/17/21  Patient has a 4-yo girl  Patient has had N/V since 12/23/20  Patient has tried Zofran for her nausea with no improvement  She went to ED 1/3/21 for her N/V, POC transabdominal pelvic US showed IUP  Quantitative hCG 30,910 (1/3/21)  Patient was prescribed Unison, Reglan, and vitB6  She still has nausea  She vomited twice this morning  Review of Systems   Constitutional: Negative for chills and fever  HENT: Negative for congestion, rhinorrhea, sore throat and trouble swallowing  Eyes: Negative for visual disturbance  Respiratory: Negative for cough  Cardiovascular: Negative for chest pain and palpitations     Gastrointestinal: Positive for nausea and vomiting  Negative for abdominal pain and diarrhea  Genitourinary: Negative for dysuria, vaginal bleeding, vaginal discharge and vaginal pain  Musculoskeletal: Negative for myalgias  Neurological: Negative for headaches  Psychiatric/Behavioral: Negative for behavioral problems  All other systems reviewed and are negative        Historical Information   The patient history was reviewed as follows:  Past Medical History:   Diagnosis Date    Chronic fatigue     last assessed 9/26/16    Fever and chills 7/25/2020    Hyperlipidemia     resolved 9/29/17    Hypertension     Varicella     pt unsure    Visual impairment     glasses         Past Surgical History:   Procedure Laterality Date    CHOLECYSTECTOMY LAPAROSCOPIC N/A 5/11/2016    Procedure: CHOLECYSTECTOMY LAPAROSCOPIC possible open;  Surgeon: Isaac Stock MD;  Location: [facility];  Service:      Family History   Problem Relation Age of Onset    Leukemia Maternal Grandfather     No Known Problems Mother     Other Father         MVA    No Known Problems Sister     Hypertension Maternal Grandmother     No Known Problems Daughter     No Known Problems Sister       Social History   Social History     Substance and Sexual Activity   Alcohol Use Not Currently    Frequency: 2-4 times a month    Drinks per session: 5 or 6    Binge frequency: Never    Comment: Beer     Social History     Substance and Sexual Activity   Drug Use Never     Social History     Tobacco Use   Smoking Status Never Smoker   Smokeless Tobacco Never Used       Medications:     Current Outpatient Medications:     doxylamine (UNISON) 25 MG tablet, Take 1 tablet (25 mg total) by mouth daily at bedtime as needed for sleep, Disp: 45 tablet, Rfl: 0    metoclopramide (REGLAN) 10 mg tablet, Take 1 tablet (10 mg total) by mouth every 6 (six) hours, Disp: 120 tablet, Rfl: 0    Pyridoxine HCl (vitamin B-6) 25 MG tablet, Take 1 tablet (25 mg total) by mouth 3 (three) times a day, Disp: 90 tablet, Rfl: 0    No Known Allergies    OBJECTIVE  Vitals:   Vitals:    01/05/21 1010   BP: 133/98   Pulse: 57   Weight: 107 kg (235 lb)   Height: 5' 8" (1 727 m)         Physical Exam  Vitals signs reviewed  Constitutional:       General: She is not in acute distress  Appearance: She is obese  HENT:      Head: Normocephalic and atraumatic  Eyes:      General:         Right eye: No discharge  Left eye: No discharge  Conjunctiva/sclera: Conjunctivae normal    Neck:      Musculoskeletal: Neck supple  Cardiovascular:      Rate and Rhythm: Normal rate and regular rhythm  Pulses: Normal pulses  Heart sounds: No murmur  Pulmonary:      Effort: Pulmonary effort is normal    Abdominal:      Tenderness: There is no abdominal tenderness  Musculoskeletal:      Right lower leg: No edema  Left lower leg: No edema  Skin:     General: Skin is warm  Neurological:      Mental Status: She is alert and oriented to person, place, and time     Psychiatric:         Mood and Affect: Mood normal          Behavior: Behavior normal                     Bindu Wilde MD, PGY-2  [facility]   1/5/2021

## 2021-01-05 NOTE — LETTER
Work Letter    [employer]  1995  [place] [facility] [id number]    Dear [employer],      01/05/21        Your employee is a patient at [facility]  She was seen in the office on 1/5/21  Please excuse her from work today  We recommend that all pregnant women:    1  Have a well-ventilated workspace  2  Wear low-heeled shoes  3  Work no more than 40 hours per week  4  Have a 15 minute break every 2 hours and at least 30 minutes for water and meal break  5  Use good body mechanics by bending at your knees to avoid back strain and lift no more than 20 pounds without assistance  Will need assistance with lifting over 20 lbs  6  Have ready access to bathrooms and water  She may continue to work until her due date unless medical complications arise       Sincerely,    [facility] [place]

## 2021-01-05 NOTE — PATIENT INSTRUCTIONS
Pregnancy   AMBULATORY CARE:   What you need to know about pregnancy:  A normal pregnancy lasts about 40 weeks  The first trimester lasts from your last period through the 12th week of pregnancy  The second trimester lasts from the 13th week through the 23rd week  The third trimester lasts from the 24th week until your baby is born  If you know the date of your last period, your healthcare provider can estimate your due date  You may give birth to your baby any time from 37 weeks to 2 weeks after your due date  Seek care immediately if:   · You develop a severe headache that does not go away  · You have new or increased vision changes, such as blurred or spotted vision  · You have new or increased swelling in your face or hands  · You have pain or cramping in your abdomen or low back  · You have vaginal bleeding  Call your doctor or obstetrician if:   · You have abdominal cramps, pressure, or tightening  · You have a change in vaginal discharge  · You cannot keep food or drinks down, and you are losing weight  · You have chills or a fever  · You have vaginal itching, burning, or pain  · You have yellow, green, white, or foul-smelling vaginal discharge  · You have pain or burning when you urinate, less urine than usual, or pink or bloody urine  · You have questions or concerns about your condition or care  Prenatal care:  Prenatal care is a series of visits with your healthcare provider throughout your pregnancy  Prenatal care can help prevent problems during pregnancy and childbirth  At each prenatal visit, your provider will weigh you and check your blood pressure  He or she will also check your baby's heartbeat and growth  You may also need the following at some visits:  · A pelvic exam  allows your healthcare provider to see your cervix (the bottom part of your uterus)  Your healthcare provider will use a speculum to open your vagina   He or she will check the size and shape of your uterus  At your first prenatal visit, you may also have a Pap smear  This is a test to check your cervix for abnormal cells  · Blood tests  may be done to check for any of the following:     ? Gestational diabetes or anemia (low iron level)    ? Blood type or Rh factor, or certain birth defects    ? Immunity to certain diseases, such as chickenpox or rubella    ? An infection, such as a sexually transmitted infection, HIV, or hepatitis B    · Hepatitis B  may need to be prevented or treated  Hepatitis B is inflammation of the liver caused by the hepatitis B virus (HBV)  HBV can spread from a mother to her baby during delivery  You will be checked for HBV as early as possible in the first trimester of each pregnancy  You need the test even if you received the hepatitis B vaccine or were tested before  You may need to have an HBV infection treated before you give birth  · Urine tests  may also be done to check for sugar and protein  These can be signs of gestational diabetes or preeclampsia  Urine tests may also be done to check for signs of infection  · A fetal ultrasound  shows pictures of your baby inside your uterus  The pictures are used to check your baby's development, movement, and position  · Genetic disorder screening tests  may be offered to you  These tests check your baby's risk for genetic disorders such as Down syndrome  A screening test includes a blood test and ultrasound  Body changes that may occur during your pregnancy:   · Breast changes  you will experience include tenderness and tingling during the early part of your pregnancy  Your breasts will become larger  You may need to use a support bra  You may see a thin, yellow fluid, called colostrum, leak from your nipples during the second trimester  Colostrum is a liquid that changes to milk about 3 days after you give birth  · Skin changes and stretch marks  may occur during your pregnancy   You may have red marks, called stretch marks, on your skin  Stretch marks will usually fade after pregnancy  Use lotion if your skin is dry and itchy  The skin on your face, around your nipples, and below your belly button may darken  Most of the time, your skin will return to its normal color after your baby is born  · Morning sickness  is nausea and vomiting that can happen at any time of day  Avoid fatty and spicy foods  Eat small meals throughout the day instead of large meals  Kiki may help to decrease nausea  Ask your healthcare provider about other ways of decreasing nausea and vomiting  · Heartburn  may be caused by changes in your hormones during pregnancy  Your growing uterus may also push your stomach upward and force stomach acid to back up into your esophagus  Eat 4 or 5 small meals each day instead of large meals  Avoid spicy foods  Avoid eating right before bedtime  · Constipation  may develop during your pregnancy  To treat constipation, eat foods high in fiber such as fiber cereals, beans, fruits, vegetables, whole-grain breads, and prune juice  Get regular exercise and drink plenty of water  Your healthcare provider may also suggest a fiber supplement to soften your bowel movements  Talk to your healthcare provider before you use any medicines to decrease constipation  · Hemorrhoids  are enlarged veins in the rectal area  They may cause pain, itching, and bright red bleeding from your rectum  To decrease your risk for hemorrhoids, prevent constipation and do not strain to have a bowel movement  If you have hemorrhoids, soak in a tub of warm water to ease discomfort  Ask your healthcare provider how you can treat hemorrhoids  · Leg cramps and swelling  may be caused by low calcium levels or the added weight of pregnancy  Raise your legs above the level of your heart to decrease swelling  During a leg cramp, stretch or massage the muscle that has the cramp  Heat may help decrease pain and muscle spasms   Apply heat on your muscle for 20 to 30 minutes every 2 hours for as many days as directed  · Back pain  may occur as your baby grows  Do not stand for long periods of time or lift heavy items  Use good posture while you stand, squat, or bend  Wear low-heeled shoes with good support  Rest may also help to relieve back pain  Ask your healthcare provider about exercises you can do to strengthen your back muscles  Stay healthy during your pregnancy:   · Eat a variety of healthy foods  Healthy foods include fruits, vegetables, whole-grain breads, low-fat dairy foods, beans, lean meats, and fish  Drink liquids as directed  Ask how much liquid to drink each day and which liquids are best for you  Limit caffeine to less than 200 milligrams each day  Limit your intake of fish to 2 servings each week  Choose fish low in mercury such as canned light tuna, shrimp, crab, salmon, cod, or tilapia  Do not  eat fish high in mercury such as swordfish, tilefish, clari mackerel, and shark  · Take prenatal vitamins as directed  Your need for certain vitamins and minerals, such as folic acid, increases during pregnancy  Prenatal vitamins provide some of the extra vitamins and minerals you need  Prenatal vitamins may also help to decrease the risk for certain birth defects  · Ask how much weight you should gain during your pregnancy  Too much or too little weight gain can be unhealthy for you and your baby  · Talk to your healthcare provider about exercise  Moderate exercise can help you stay fit  Your healthcare provider will help you plan an exercise program that is safe for you during pregnancy  · Do not smoke  Smoking increases your risk for a miscarriage and heart and blood vessel problems  Smoking can cause your baby to be born too early or weigh less at birth  Quit smoking as soon as you think you might be pregnant  Ask your healthcare provider for information if you need help quitting      · Do not drink alcohol  Alcohol passes from your body to your baby through the placenta  It can affect your baby's brain development and cause fetal alcohol syndrome (FAS)  FAS is a group of conditions that causes mental, behavior, and growth problems  · Talk to your healthcare provider before you take any medicines  Many medicines may harm your baby if you take them when you are pregnant  Do not take any medicines, vitamins, herbs, or supplements without first talking to your healthcare provider  Never use illegal or street drugs (such as marijuana or cocaine) while you are pregnant  Safety tips:   · Avoid hot tubs and saunas  Do not use a hot tub or sauna while you are pregnant, especially during your first trimester  Hot tubs and saunas may raise your baby's temperature and increase the risk for birth defects  · Avoid toxoplasmosis  This is an infection caused by eating raw meat or being around infected cat feces  It can cause birth defects, miscarriages, and other problems  Wash your hands after you touch raw meat  Make sure any meat is well-cooked before you eat it  Avoid raw eggs and unpasteurized milk  Use gloves or ask someone else to clean your cat's litter box while you are pregnant  · Ask your healthcare provider about travel  The most comfortable time to travel is during the second trimester  Ask your healthcare provider if you can travel after 36 weeks  You may not be able to travel in an airplane after 36 weeks  He may also recommend that you avoid long road trips  Follow up with your doctor or obstetrician as directed:  Go to all of your prenatal visits during your pregnancy  Write down your questions so you remember to ask them during your visits  © Copyright [address] Information is for End User's use only and may not be sold, redistributed or otherwise used for commercial purposes   All illustrations and images included in CareNotes® are the copyrighted property of [employer]  or [address]  The above information is an  only  It is not intended as medical advice for individual conditions or treatments  Talk to your doctor, nurse or pharmacist before following any medical regimen to see if it is safe and effective for you

## 2021-01-10 ENCOUNTER — HOSPITAL ENCOUNTER (EMERGENCY)
Facility: HOSPITAL | Age: 26
Discharge: HOME | End: 2021-01-10
Attending: EMERGENCY MEDICINE | Admitting: EMERGENCY MEDICINE
Payer: COMMERCIAL

## 2021-01-10 VITALS
HEART RATE: 68 BPM | TEMPERATURE: 98.9 F | OXYGEN SATURATION: 97 % | BODY MASS INDEX: 35.58 KG/M2 | DIASTOLIC BLOOD PRESSURE: 61 MMHG | SYSTOLIC BLOOD PRESSURE: 116 MMHG | RESPIRATION RATE: 20 BRPM | WEIGHT: 234 LBS

## 2021-01-10 DIAGNOSIS — E86.0 ACUTE DEHYDRATION: ICD-10-CM

## 2021-01-10 DIAGNOSIS — O21.9 NAUSEA/VOMITING IN PREGNANCY: Primary | ICD-10-CM

## 2021-01-10 LAB
ANION GAP SERPL CALCULATED.3IONS-SCNC: 4 MMOL/L (ref 4–13)
BUN SERPL-MCNC: 7 MG/DL (ref 5–25)
CALCIUM SERPL-MCNC: 10.6 MG/DL (ref 8.3–10.1)
CHLORIDE SERPL-SCNC: 104 MMOL/L (ref 100–108)
CO2 SERPL-SCNC: 30 MMOL/L (ref 21–32)
CREAT SERPL-MCNC: 0.57 MG/DL (ref 0.6–1.3)
GFR SERPL CREATININE-BSD FRML MDRD: 130 ML/MIN/1.73SQ M
GLUCOSE SERPL-MCNC: 70 MG/DL (ref 65–140)
POTASSIUM SERPL-SCNC: 4 MMOL/L (ref 3.5–5.3)
SODIUM SERPL-SCNC: 138 MMOL/L (ref 136–145)

## 2021-01-10 PROCEDURE — 96361 HYDRATE IV INFUSION ADD-ON: CPT

## 2021-01-10 PROCEDURE — 96375 TX/PRO/DX INJ NEW DRUG ADDON: CPT

## 2021-01-10 PROCEDURE — 96374 THER/PROPH/DIAG INJ IV PUSH: CPT

## 2021-01-10 PROCEDURE — 96366 THER/PROPH/DIAG IV INF ADDON: CPT

## 2021-01-10 PROCEDURE — 99283 EMERGENCY DEPT VISIT LOW MDM: CPT

## 2021-01-10 PROCEDURE — 36415 COLL VENOUS BLD VENIPUNCTURE: CPT | Performed by: EMERGENCY MEDICINE

## 2021-01-10 PROCEDURE — 96365 THER/PROPH/DIAG IV INF INIT: CPT

## 2021-01-10 PROCEDURE — 80048 BASIC METABOLIC PNL TOTAL CA: CPT | Performed by: EMERGENCY MEDICINE

## 2021-01-10 PROCEDURE — 76815 OB US LIMITED FETUS(S): CPT | Performed by: EMERGENCY MEDICINE

## 2021-01-10 PROCEDURE — 99284 EMERGENCY DEPT VISIT MOD MDM: CPT | Performed by: EMERGENCY MEDICINE

## 2021-01-10 RX ORDER — METOCLOPRAMIDE HYDROCHLORIDE 5 MG/ML
10 INJECTION INTRAMUSCULAR; INTRAVENOUS ONCE
Status: COMPLETED | OUTPATIENT
Start: 2021-01-10 | End: 2021-01-10

## 2021-01-10 RX ORDER — ONDANSETRON 4 MG/1
4 TABLET, ORALLY DISINTEGRATING ORAL EVERY 6 HOURS PRN
Qty: 20 TABLET | Refills: 0 | Status: SHIPPED | OUTPATIENT
Start: 2021-01-10 | End: 2021-02-01 | Stop reason: HOSPADM

## 2021-01-10 RX ORDER — ONDANSETRON 2 MG/ML
4 INJECTION INTRAMUSCULAR; INTRAVENOUS ONCE
Status: COMPLETED | OUTPATIENT
Start: 2021-01-10 | End: 2021-01-10

## 2021-01-10 RX ADMIN — ONDANSETRON 4 MG: 2 INJECTION INTRAMUSCULAR; INTRAVENOUS at 12:50

## 2021-01-10 RX ADMIN — DEXTROSE AND SODIUM CHLORIDE 500 ML: 5; .9 INJECTION, SOLUTION INTRAVENOUS at 10:26

## 2021-01-10 RX ADMIN — SODIUM CHLORIDE 1000 ML: 0.9 INJECTION, SOLUTION INTRAVENOUS at 10:25

## 2021-01-10 RX ADMIN — METOCLOPRAMIDE 10 MG: 5 INJECTION, SOLUTION INTRAMUSCULAR; INTRAVENOUS at 10:25

## 2021-01-10 NOTE — Clinical Note
Kye Barcenas was seen and treated in our emergency department on 1/10/2021  Diagnosis:     Bella    She may return on this date: 01/15/2021    May return to work once symptoms resolve     If you have any questions or concerns, please don't hesitate to call        Romy Rodriguez MD    ______________________________           _______________          _______________  Hospital Representative                              Date                                Time

## 2021-01-10 NOTE — ED NOTES
Resident at the bedside, pt is clear for d/c when they are feeling better       Maite Bonner RN  01/10/21 2370

## 2021-01-10 NOTE — ED PROVIDER NOTES
History  Chief Complaint   Patient presents with    Vomiting       Vomiting  Severity:  Moderate  Timing:  Intermittent  Quality:  Stomach contents (Small amount of pink tinged blood after a mg of)  Progression:  Unchanged  Chronicity:  Recurrent  Associated symptoms: abdominal pain (Mild abdominal cramping yesterday, pain,)    Associated symptoms: no chills, no cough, no diarrhea, no fever and no sore throat        Prior to Admission Medications   Prescriptions Last Dose Informant Patient Reported? Taking? Pyridoxine HCl (vitamin B-6) 25 MG tablet   No No   Sig: Take 1 tablet (25 mg total) by mouth 3 (three) times a day   doxylamine (UNISON) 25 MG tablet   No No   Sig: Take 1 tablet (25 mg total) by mouth daily at bedtime as needed for sleep   metoclopramide (REGLAN) 10 mg tablet   No No   Sig: Take 1 tablet (10 mg total) by mouth every 6 (six) hours      Facility-Administered Medications: None       Past Medical History:   Diagnosis Date    Chronic fatigue     last assessed 9/26/16    Fever and chills 7/25/2020    Hyperlipidemia     resolved 9/29/17    Hypertension     Varicella     pt unsure    Visual impairment     glasses       Past Surgical History:   Procedure Laterality Date    CHOLECYSTECTOMY LAPAROSCOPIC N/A 5/11/2016    Procedure: CHOLECYSTECTOMY LAPAROSCOPIC possible open;  Surgeon: Randy Olsen MD;  Location: [facility];  Service:        Family History   Problem Relation Age of Onset    Leukemia Maternal Grandfather     No Known Problems Mother     Other Father         MVA    No Known Problems Sister     Hypertension Maternal Grandmother     No Known Problems Daughter     No Known Problems Sister      I have reviewed and agree with the history as documented      E-Cigarette/Vaping    E-Cigarette Use Never User      E-Cigarette/Vaping Substances    Nicotine No     THC No     CBD No     Flavoring No     Other No     Unknown No      Social History     Tobacco Use    Smoking status: Never Smoker    Smokeless tobacco: Never Used   Substance Use Topics    Alcohol use: Not Currently     Frequency: 2-4 times a month     Drinks per session: 5 or 6     Binge frequency: Never     Comment: Beer    Drug use: Never        Review of Systems   Constitutional: Negative for chills and fever  HENT: Negative for rhinorrhea and sore throat  Eyes: Negative for photophobia and visual disturbance  Respiratory: Negative for cough and shortness of breath  Cardiovascular: Negative for chest pain and palpitations  Gastrointestinal: Positive for abdominal pain (Mild abdominal cramping yesterday, pain,), nausea and vomiting  Negative for blood in stool, constipation and diarrhea  Genitourinary: Negative for dysuria and hematuria  Musculoskeletal: Negative for neck pain and neck stiffness  Skin: Negative for rash and wound  Neurological: Positive for light-headedness  Negative for weakness and numbness  Psychiatric/Behavioral: Negative for agitation and confusion  All other systems reviewed and are negative  Physical Exam  ED Triage Vitals   Temperature Pulse Respirations Blood Pressure SpO2   01/10/21 0939 01/10/21 0939 01/10/21 0939 01/10/21 0939 01/10/21 0939   98 9 °F (37 2 °C) 74 18 132/70 98 %      Temp Source Heart Rate Source Patient Position - Orthostatic VS BP Location FiO2 (%)   01/10/21 0939 01/10/21 0939 01/10/21 1130 01/10/21 1253 --   Oral Monitor Lying Right arm       Pain Score       --                    Orthostatic Vital Signs  Vitals:    01/10/21 0939 01/10/21 1130 01/10/21 1253   BP: 132/70 135/77 116/61   Pulse: 74 68 68   Patient Position - Orthostatic VS:  Lying Lying       Physical Exam  Vitals signs and nursing note reviewed  Constitutional:       General: She is not in acute distress  Appearance: She is well-developed  She is not diaphoretic  HENT:      Head: Normocephalic        Right Ear: External ear normal       Left Ear: External ear normal  Nose: Nose normal    Eyes:      Conjunctiva/sclera: Conjunctivae normal    Neck:      Trachea: No tracheal deviation  Cardiovascular:      Rate and Rhythm: Normal rate  Heart sounds: Normal heart sounds  Pulmonary:      Effort: Pulmonary effort is normal  No respiratory distress  Breath sounds: No stridor  Abdominal:      General: There is no distension  Palpations: Abdomen is soft  Tenderness: There is no abdominal tenderness  There is no guarding  Musculoskeletal:         General: No tenderness or deformity  Skin:     General: Skin is warm and dry  Neurological:      Mental Status: She is alert  Cranial Nerves: Cranial nerves are intact  Sensory: Sensation is intact  Motor: Motor function is intact     Psychiatric:         Behavior: Behavior normal          ED Medications  Medications   sodium chloride 0 9 % bolus 1,000 mL (0 mL Intravenous Stopped 1/10/21 1336)   metoclopramide (REGLAN) injection 10 mg (10 mg Intravenous Given 1/10/21 1025)   dextrose 5 % and sodium chloride 0 9 % bolus 500 mL (0 mL Intravenous Stopped 1/10/21 1250)   ondansetron (ZOFRAN) injection 4 mg (4 mg Intravenous Given 1/10/21 1250)       Diagnostic Studies  Results Reviewed     Procedure Component Value Units Date/Time    Basic metabolic panel [[id number]]  (Abnormal) Collected: 01/10/21 1207    Lab Status: Final result Specimen: Blood from Arm, Left Updated: 01/10/21 1231     Sodium 138 mmol/L      Potassium 4 0 mmol/L      Chloride 104 mmol/L      CO2 30 mmol/L      ANION GAP 4 mmol/L      BUN 7 mg/dL      Creatinine 0 57 mg/dL      Glucose 70 mg/dL      Calcium 10 6 mg/dL      eGFR 130 ml/min/1 73sq m     Narrative:      [place] guidelines for Chronic Kidney Disease (CKD):     Stage 1 with normal or high GFR (GFR > 90 mL/min/1 73 square meters)    Stage 2 Mild CKD (GFR = 60-89 mL/min/1 73 square meters)    Stage 3A Moderate CKD (GFR = 45-59 mL/min/1 73 square meters)    Stage 3B Moderate CKD (GFR = 30-44 mL/min/1 73 square meters)    Stage 4 Severe CKD (GFR = 15-29 mL/min/1 73 square meters)    Stage 5 End Stage CKD (GFR <15 mL/min/1 73 square meters)  Note: GFR calculation is accurate only with a steady state creatinine                 No orders to display         Procedures  POC Pelvic US    Date/Time: 1/10/2021 11:28 AM  Performed by: Hardik Weber MD  Authorized by: Hardik Weber MD     Patient location:  ED  Procedure details:     Exam Type:  Diagnostic    Indications: pregnant with abdominal pain      Assessment for: evaluate fetal viability      Technique:  Transabdominal obstetric (HCG+) exam    Views obtained: uterus (transverse and sagittal) and pouch of Tushar      Image quality: diagnostic      Image availability:  Images available in PACS  Uterine findings:     Intrauterine pregnancy: identified      Single gestation: identified      Fetal heart rate: identified      Fetal heart rate (bpm):  164  Other findings:     Free pelvic fluid: not identified    Interpretation:     Ultrasound impressions: normal      Pregnancy findings: intrauterine pregnancy (IUP)            ED Course  ED Course as of Lucas 10 1414   Sun Lucas 10, 2021   1231 Creatinine(!): 0 57                                       MDM  Number of Diagnoses or Management Options  Acute dehydration:   Nausea/vomiting in pregnancy:   Diagnosis management comments: This is a 18-year-old female who presents for evaluation of nausea vomiting  Patient reports that she is approximately 8 weeks pregnant, she states the she has had ongoing issues with nausea vomiting during pregnancy, reports that she was seen in ER 1 week ago for nausea vomiting and was discharged but was unable to keep down the medications she was prescribed    She states that she is having intermittent episodes of vomiting of stomach contents, she also states that today she had a small amount of bright red blood after the episode of vomiting  Denies any fevers or chills, no sick contacts  Denies other complications with this pregnancy, she states that she has 1 living child has had 1 previous miscarriage  Denies other medical problems  Patient states that she has had difficulty keeping down fluids due to persistent symptoms  She does state that she had some mild abdominal cramping yesterday of the this is resolved, she denies any vaginal bleeding or discharge  I made the decision to obtain, review, and summarize prior medical records of the patient  Pertinent summary of chart review: she was seen approximately 1 week ago for nausea vomiting possibly associated with pregnancy, she was discharged with prescriptions for doxylamine and Reglan  She follows with the [facility]  Patient on exam has stable vital signs, no acute distress, nontoxic appearance although does appear somewhat pale  She has a benign abdominal exam   Her exam is otherwise unremarkable  Overall her symptoms are consistent with recurrent nausea vomiting of pregnancy  Duration it she has a benign abdominal exam is not having any active abdominal pain, she was noted to have an IUP on previous bedside ultrasound, I repeated a bedside ultrasound today which revealed an IUP with normal fetal heart rate  Ulceration she has normal vital signs  Based off of the history she does appear to be hydrated likely due to vomiting inability to keep down her medications  Today will plan to treat with IV fluids and will also give a 500 cc bolus of D5 normal saline  Will give Reglan for nausea in the emergency department  Also check CMP to rule out BEE  Patient on re-evaluation continued to have some mild nausea and so was given a dose of Zofran with improvement of her symptoms  No BEE found    Given failure of other medications for her nausea vomiting will prescribe ODT Zofran, counseled patient day if she continues to be unable to keep down fluids at home to return to the emergency department  Counseled patient to schedule an appointment with the [facility] for close follow-up  Amount and/or Complexity of Data Reviewed  Clinical lab tests: ordered and reviewed  Decide to obtain previous medical records or to obtain history from someone other than the patient: yes  Review and summarize past medical records: yes    Risk of Complications, Morbidity, and/or Mortality  Presenting problems: high        Disposition  Final diagnoses:   Nausea/vomiting in pregnancy   Acute dehydration     Time reflects when diagnosis was documented in both MDM as applicable and the Disposition within this note     Time User Action Codes Description Comment    1/10/2021 12:32 PM Kathleen Lewis Add [O21 9] Nausea/vomiting in pregnancy     1/10/2021 12:32 PM Kathleen Lewsi Add [E86 0] Acute dehydration       ED Disposition     ED Disposition Condition Date/Time Comment    Discharge Stable Sun Lucas 10, 2021 12:32 PM [facility] discharge to home/self care              Follow-up Information     Follow up With Specialties Details Why Contact Info Additional [address] [facility] Schedule an appointment as soon as possible for a visit   Edward [address] [phone]  [facility], [address] [place] [address], [place], [place], [address]          Discharge Medication List as of 1/10/2021 12:33 PM      START taking these medications    Details   ondansetron (ZOFRAN-ODT) 4 mg disintegrating tablet Take 1 tablet (4 mg total) by mouth every 6 (six) hours as needed for nausea or vomiting, Starting Sun 1/10/2021, Normal         CONTINUE these medications which have NOT CHANGED    Details   doxylamine (UNISON) 25 MG tablet Take 1 tablet (25 mg total) by mouth daily at bedtime as needed for sleep, Starting Tue 1/5/2021, Until Thu 2/4/2021, No Print      metoclopramide (REGLAN) 10 mg tablet Take 1 tablet (10 mg total) by mouth every 6 (six) hours, Starting Sun 1/3/2021, Until Tue 2/2/2021, Print      Pyridoxine HCl (vitamin B-6) 25 MG tablet Take 1 tablet (25 mg total) by mouth 3 (three) times a day, Starting Sun 1/3/2021, Until Tue 2/2/2021, Print           No discharge procedures on file  PDMP Review     None           ED Provider  Attending physically available and evaluated [facility]  I managed the patient along with the ED Attending      Electronically Signed by         Mike Bhandari MD  01/10/21 2733

## 2021-01-10 NOTE — ED ATTENDING ATTESTATION
1/10/2021  IAdry MD, saw and evaluated the patient  I have discussed the patient with the resident/non-physician practitioner and agree with the resident's/non-physician practitioner's findings, Plan of Care, and MDM as documented in the resident's/non-physician practitioner's note, except where noted  All available labs and Radiology studies were reviewed  I was present for key portions of any procedure(s) performed by the resident/non-physician practitioner and I was immediately available to provide assistance  At this point I agree with the current assessment done in the Emergency Department  I have conducted an independent evaluation of this patient a history and physical is as follows:    ED Course     81-year-old female presenting to the emergency department for evaluation of nausea and vomiting  Patient is 1st trimester pregnant  Patient was seen in the emergency department a week ago, and underwent treatment for emesis  Patient denies any blood in the vomitus  No diarrhea  No chest pain  Mild intermittent lower pelvic cramping  Ten systems reviewed negative except as noted in the history of present illness  The patient is resting comfortably on a stretcher in no acute respiratory distress  The patient appears nontoxic  Head is normocephalic and atraumatic  Conjunctiva and sclera are normal   Lungs are clear to auscultation bilaterally without any wheezes, rales or rhonchi  Heart is regular rate and rhythm without any murmurs, rubs or gallops  Abdomen is soft and nontender without any rebound or guarding  Extremities appear grossly normal without any significant arthropathy  Patient is awake, alert, and oriented x3  The patient has normal interaction  Motor is 5 out of 5  Patient presenting with first-trimester pregnancy nausea and vomiting  Patient will be treated for emesis      Labs Reviewed   BASIC METABOLIC PANEL - Abnormal       Result Value Ref Range Status Sodium 138  136 - 145 mmol/L Final    Potassium 4 0  3 5 - 5 3 mmol/L Final    Chloride 104  100 - 108 mmol/L Final    CO2 30  21 - 32 mmol/L Final    ANION GAP 4  4 - 13 mmol/L Final    BUN 7  5 - 25 mg/dL Final    Creatinine 0 57 (*) 0 60 - 1 30 mg/dL Final    Comment: Standardized to IDMS reference method    Glucose 70  65 - 140 mg/dL Final    Comment: If the patient is fasting, the ADA then defines impaired fasting glucose as > 100 mg/dL and diabetes as > or equal to 123 mg/dL  Specimen collection should occur prior to Sulfasalazine administration due to the potential for falsely depressed results  Specimen collection should occur prior to Sulfapyridine administration due to the potential for falsely elevated results      Calcium 10 6 (*) 8 3 - 10 1 mg/dL Final    eGFR 130  ml/min/1 73sq m Final    Narrative:     [place] guidelines for Chronic Kidney Disease (CKD):     Stage 1 with normal or high GFR (GFR > 90 mL/min/1 73 square meters)    Stage 2 Mild CKD (GFR = 60-89 mL/min/1 73 square meters)    Stage 3A Moderate CKD (GFR = 45-59 mL/min/1 73 square meters)    Stage 3B Moderate CKD (GFR = 30-44 mL/min/1 73 square meters)    Stage 4 Severe CKD (GFR = 15-29 mL/min/1 73 square meters)    Stage 5 End Stage CKD (GFR <15 mL/min/1 73 square meters)  Note: GFR calculation is accurate only with a steady state creatinine       No orders to display           Critical Care Time  Procedures

## 2021-01-13 ENCOUNTER — INITIAL PRENATAL (OUTPATIENT)
Dept: OBGYN CLINIC | Facility: CLINIC | Age: 26
End: 2021-01-13

## 2021-01-13 ENCOUNTER — APPOINTMENT (OUTPATIENT)
Dept: LAB | Facility: HOSPITAL | Age: 26
End: 2021-01-13
Payer: COMMERCIAL

## 2021-01-13 VITALS
WEIGHT: 224 LBS | SYSTOLIC BLOOD PRESSURE: 116 MMHG | DIASTOLIC BLOOD PRESSURE: 73 MMHG | HEIGHT: 68 IN | HEART RATE: 78 BPM | BODY MASS INDEX: 33.95 KG/M2

## 2021-01-13 DIAGNOSIS — Z34.91 PREGNANT AND NOT YET DELIVERED IN FIRST TRIMESTER: ICD-10-CM

## 2021-01-13 DIAGNOSIS — Z3A.09 9 WEEKS GESTATION OF PREGNANCY: ICD-10-CM

## 2021-01-13 DIAGNOSIS — Z86.16 HISTORY OF COVID-19: ICD-10-CM

## 2021-01-13 DIAGNOSIS — O09.299 HX OF PREECLAMPSIA, PRIOR PREGNANCY, CURRENTLY PREGNANT: Primary | ICD-10-CM

## 2021-01-13 LAB
ABO GROUP BLD: NORMAL
BACTERIA UR QL AUTO: ABNORMAL /HPF
BASOPHILS # BLD AUTO: 0.02 THOUSANDS/ΜL (ref 0–0.1)
BASOPHILS NFR BLD AUTO: 0 % (ref 0–1)
BILIRUB UR QL STRIP: ABNORMAL
BLD GP AB SCN SERPL QL: NEGATIVE
CAOX CRY URNS QL MICRO: ABNORMAL /HPF
CLARITY UR: ABNORMAL
COLOR UR: ABNORMAL
CREAT UR-MCNC: 656 MG/DL
EOSINOPHIL # BLD AUTO: 0.06 THOUSAND/ΜL (ref 0–0.61)
EOSINOPHIL NFR BLD AUTO: 1 % (ref 0–6)
ERYTHROCYTE [DISTWIDTH] IN BLOOD BY AUTOMATED COUNT: 12.3 % (ref 11.6–15.1)
GLUCOSE UR STRIP-MCNC: NEGATIVE MG/DL
HBV SURFACE AG SER QL: NORMAL
HCT VFR BLD AUTO: 42.8 % (ref 34.8–46.1)
HGB BLD-MCNC: 14.6 G/DL (ref 11.5–15.4)
HGB UR QL STRIP.AUTO: NEGATIVE
IMM GRANULOCYTES # BLD AUTO: 0.04 THOUSAND/UL (ref 0–0.2)
IMM GRANULOCYTES NFR BLD AUTO: 0 % (ref 0–2)
KETONES UR STRIP-MCNC: ABNORMAL MG/DL
LEUKOCYTE ESTERASE UR QL STRIP: ABNORMAL
LYMPHOCYTES # BLD AUTO: 1.76 THOUSANDS/ΜL (ref 0.6–4.47)
LYMPHOCYTES NFR BLD AUTO: 16 % (ref 14–44)
MCH RBC QN AUTO: 29.4 PG (ref 26.8–34.3)
MCHC RBC AUTO-ENTMCNC: 34.1 G/DL (ref 31.4–37.4)
MCV RBC AUTO: 86 FL (ref 82–98)
MONOCYTES # BLD AUTO: 0.57 THOUSAND/ΜL (ref 0.17–1.22)
MONOCYTES NFR BLD AUTO: 5 % (ref 4–12)
NEUTROPHILS # BLD AUTO: 8.57 THOUSANDS/ΜL (ref 1.85–7.62)
NEUTS SEG NFR BLD AUTO: 78 % (ref 43–75)
NITRITE UR QL STRIP: NEGATIVE
NON-SQ EPI CELLS URNS QL MICRO: ABNORMAL /HPF
NRBC BLD AUTO-RTO: 0 /100 WBCS
PH UR STRIP.AUTO: 6.5 [PH]
PLATELET # BLD AUTO: 310 THOUSANDS/UL (ref 149–390)
PMV BLD AUTO: 9.7 FL (ref 8.9–12.7)
PROT UR STRIP-MCNC: ABNORMAL MG/DL
PROT UR-MCNC: 112 MG/DL
PROT/CREAT UR: 0.17 MG/G{CREAT} (ref 0–0.1)
RBC # BLD AUTO: 4.97 MILLION/UL (ref 3.81–5.12)
RBC #/AREA URNS AUTO: ABNORMAL /HPF
RH BLD: POSITIVE
RUBV IGG SERPL IA-ACNC: >175 IU/ML
SP GR UR STRIP.AUTO: 1.04 (ref 1–1.03)
SPECIMEN EXPIRATION DATE: NORMAL
UROBILINOGEN UR QL STRIP.AUTO: 1 E.U./DL
WBC # BLD AUTO: 11.02 THOUSAND/UL (ref 4.31–10.16)
WBC #/AREA URNS AUTO: ABNORMAL /HPF

## 2021-01-13 PROCEDURE — 87086 URINE CULTURE/COLONY COUNT: CPT

## 2021-01-13 PROCEDURE — 3008F BODY MASS INDEX DOCD: CPT | Performed by: PHYSICIAN ASSISTANT

## 2021-01-13 PROCEDURE — 87147 CULTURE TYPE IMMUNOLOGIC: CPT

## 2021-01-13 PROCEDURE — 36415 COLL VENOUS BLD VENIPUNCTURE: CPT

## 2021-01-13 PROCEDURE — 84156 ASSAY OF PROTEIN URINE: CPT

## 2021-01-13 PROCEDURE — T1001 NURSING ASSESSMENT/EVALUATN: HCPCS

## 2021-01-13 PROCEDURE — 80081 OBSTETRIC PANEL INC HIV TSTG: CPT

## 2021-01-13 PROCEDURE — 82570 ASSAY OF URINE CREATININE: CPT

## 2021-01-13 PROCEDURE — 81001 URINALYSIS AUTO W/SCOPE: CPT

## 2021-01-13 NOTE — LETTER
January 13, 2021     Patient: Kye Barcenas   YOB: 1995   Date of Visit: 1/13/2021       To Whom it May Concern:    Kye Barcenas is under my professional care  She was seen in my office on 1/13/2021  She may return to work with limitations 01/13/2021  Kye Barcenas should not elevate more than 5 feet off the floor using machinery       If you have any questions or concerns, please don't hesitate to call           Sincerely,        ROXANNE Quach       CC: No Recipients

## 2021-01-13 NOTE — LETTER
Dentist Letter    Arabella León  1995  [address] [facility] [id number]          01/13/21    We have had several requests from local dentist requesting permission to perform procedures on our patients who are pregnant  We wish to respond with this letter regarding some of the more routine procedures that we have been asked about  The following procedures may be performed on our obstetric patients:   1  Administration of local anesthesia   2  Administration of antibiotics such as PCN, Ampicillin, and Erythromycin  3  Administration of pain medications such as Tylenol, Tylenol with codeine, and if needed Percocet  4  Shielded X-rays    Should you have any questions, please do not hesitate to contact at [phone]          Sincerely,    [facility]  [phone]

## 2021-01-13 NOTE — PATIENT INSTRUCTIONS
Coronavirus (COVID-19) pregnancy FAQs: Medical experts answer your questions  From [employer] com cy  [id number]  bc (courtesy of [facility])  As of 3/18/20  By Janeen Patel 40  Medically reviewed by Society for Maternal-Fetal Medicine       We asked parents-to-be to send us their most pressing questions about coronavirus (COVID-19)  Among them: Is it still safe to deliver in a hospital? What if my ob-gyn has the virus? We sent those questions to the top docs at the Society for Maternal-Fetal Medicine  Here are their answers  The coronavirus (COVID-19) pandemic has been declared a national emergency in the [facility] by [employer]  Moms-to-be like you are concerned about everything from getting medicines to managing disruptions at work  But above and beyond any worry about lifestyle changes is a focus on your health and the impact of COVID-19 on your pregnancy  We asked obstetrics doctors who handle the most complicated pregnancy issues to answer your questions about the coronavirus  Here are their responses, provided by Dr Lorenzo Cheek and her colleagues at the Society for Pravin 250  Am I at more risk for COVID-19 if I'm pregnant? We don't know  Pregnancy does change your immune system in ways that might make you more susceptible to viral respiratory infections like COVID-19  If you become infected, you might also be at higher risk for more severe illness compared to the general population  Although this does not appear to be the case with COVID-19, based on limited data so far, a higher risk has been true for pregnant women with other coronavirus infections (SARS-CoV and MERS-CoV) and other viral respiratory infections, such as flu  It's important to take preventive actions to avoid infection, such as washing your hands often and avoiding people who are sick      How might coronavirus affect my pregnancy? Again, we don't know  Women with other coronavirus infections (such as SARS-CoV) did not have miscarriage or stillbirth at higher rates than the general population  We know that having other respiratory viral infections during pregnancy, such as flu, has been associated with problems like low birth weight and  birth  Also, having a high fever early in pregnancy may increase the risk of certain birth defects  Could I transmit coronavirus to my baby during pregnancy or delivery? We don't know whether you could transmit COVID-19 to your baby before or during delivery  However, among the few case studies of infants born to mothers with COVID-23 published in peer-reviewed literature, none of the infants tested positive for the virus  Also, there was no virus detected in samples of amniotic fluid or breast milk  There have been a few reports of newborns as young as a few days old with infection, suggesting that a mother can transmit the infection to her infant through close contact after delivery  There have been no reports of mother-to-baby transmission for other coronaviruses (MERS-CoV and SARS-CoV), although only limited information is available  Is it safe for me to deliver at a hospital where there have been COVID-19 cases? Yes  We know that COVID-19 is a very scary virus  The good news is that hospitals are taking great precautions to keep patients and healthcare providers safe  When a patient is even suspected to have COVID-19, they are placed in a negative pressure room  (Think of these rooms as vacuums that suck and filter the air so it's safe for the other people in the hospital)  This makes it possible for you to deliver at the hospital without putting you or your baby at risk  Hospitals are also implementing stricter visiting policies to keep patients safe  It's worth calling your hospital to check if there are any new regulations to be aware of      What plans should I make now in case the hospital system is overwhelmed when it's time for me to deliver? This is a great question to talk with your doctor or midwife about when you're 34 to 36 weeks pregnant  Every hospital is making different plans for dealing with this scenario  I work in healthcare  Should I ask my doctor to excuse me from work until the baby is born? What if I work in a school, the travel [place], or some other high-risk setting? Healthcare facilities should take care to limit the exposure of pregnant employees to patients with confirmed or suspected COVID-19, just as they would with other infectious cases  If you continue working, be sure to follow the CDC's risk assessment and infection control guidelines  If you work in a school, travel [place], or other high-risk setting, talk with your employer about what it's doing to protect employees and minimize infection risks  Wash your hands often  What if my OB gets COVID-19? If your doctor or midwife tests positive for COVID-19, they will need to be quarantined until they recover and are no longer at risk of transmitting the virus  In this case, you'll be assigned to another OB in your doctor's practice (or you may choose another practitioner yourself)  Ask your new OB or your doctor's office if you should self-quarantine or be tested for the virus  (It will depend on when you last saw your provider and when that person tested positive )    Should we hold off on trying to conceive because of COVID-19? At this time, there's no reason to hold off on trying to get pregnant, but the data we have is really limited  For example, we don't think the virus causes birth defects or increases your risk of miscarriage  But we don't know whether you could transmit COVID-19 to your baby before or during delivery  We also don't know if the virus lives in semen or can be sexually transmitted      We have a babymoon scheduled in the next few months - should we cancel? If you're planning to travel internationally or on a cruise, you should strongly consider canceling  At this time, the virus has reached more than 140 countries, and there are travel bans to [place], most of [place], and [place]  Places where large numbers of people gather are at highest risk, especially airports and cruise ships  If you're planning travel in the [place] , note that any travel setting increases your risk of exposure, and there may be travel bans even in [place] by the time you're scheduled to go  Even if you're state is not blocked, you'll definitely want to avoid traveling to communities where the virus is spreading  To find out where these places are, check The [place] Times map based on CDC data  For the most current advice to help you avoid exposure, check the CDC's COVID-19 travel page  Will the hospital separate me from my  and keep the baby in quarantine? If you test positive for COVID-19 or have been exposed but have no symptoms, the CDC, [facility], and the Society for Hope 250 all recommend that you be  from your baby to decrease the risk of transmission to the baby  This would last until you are no longer at risk of transmitting the virus  If you do not have COVID-19 and have not been exposed to the virus, the hospital will not separate you from your   My hospital is restricting visitors and only allowing one support person  If my support person leaves after the delivery, will they be allowed to come back? Every hospital has different policies  Contact your hospital or labor and delivery unit a week or so before delivery to get the most up-to-date restrictions  In general, if your support person needs to leave, they would be allowed back unless they knew they were exposed to COVID-19 after leaving your company    [facility] understands that the coronavirus (COVID-19) pandemic is an evolving story and that your questions will change over time  We'll continue asking moms and dads in our Community what they want to know, and we'll get the answers from experts to keep them - and you - informed and supported  My mom was planning to fly here to help me care for my new baby after delivery  Should I tell her not to come? Yes  If your mom is over 61 or has any serious chronic medical conditions (such as heart disease, lung disease, or diabetes), she is at higher risk of serious illness from COVID-19 and should avoid air travel  And remember that any travel setting increases a person's risk of exposure  So, it may be risky to have her around the baby after she has been traveling  For the most current advice on traveling, check the CDC's COVID-19 travel page  [facility] understands that the coronavirus pandemic is an evolving story and that your questions will change over time  We'll continue asking moms and dads in our Community what they want to know, and we'll get the answers from experts to keep them - and you - informed and supported  [facility] desea decirle ¡Felicitaciones por Bhargavmaritza Null! Nos complace que nos haya elegido para ser bhardwaj proveedor de tawny servicios de alessandra médica katie bhardwaj Graceann Ax  Bhardwaj alessandra, la alessandra de bhardwaj hijo por nacer (niños) y bhardwaj aretha son importante para nosotros  Ahora, más que [place], bhardwaj alessandra será lo más importante para usted  El crecimiento y el progreso de bhardwaj bebé pueden verse afectados por la forma en que usted se cuide  Es [employer] buena idea planificar con anticipación  Es un hecho conocido que las mujeres que reciben atención mèdica desde temprano en  Macarena Anderson y katie todo el embarazo tienen bebés más saludables  Se programarán visitas para usted katie todo Macarena Anderson  Es bhardwaj deber cumplir con tawny citas y seguir las instrucciones para bhardwaj cuidado  El [place] de la [place] de visitas será decidido por bhardwaj médico  No tengas miedo de hacer preguntas   Hable con tawny enfermeras y proveedores sobre tawny planes de parto y cualquier inquietud que pueda tener  Lista de Verificación  ; Llame a Medicina Materno Fetal (MFM) al [phone] para programar bhardwaj carlotta   Cribado secuencial (opcional)   Hecho entre 12 y 15 semanas de gestación  - ultrasonido  - Riesgos para la trisomía 25 y 24   - La lucita bífida (segunda parte), después de 16 semanas, será explicada por la oficina de MFM   Pantalla QUAD, si corresponde    ; Exploración de anatomía  o ultrasonido de 20 semanas  o El género sexo, puede ser revelado, si lo desea  o Carlotta de 1 hora, solo se permite 1 persona de apoyo, NO niños    ; Análisis de hailey: complete antes de bhardwaj carlotta de H&P   NO tiene que ayunar para ningún análisis de hailey a menos que se lo indiquen  - CBC, Tipo de Dior y 200 Exempla [place] de anticuerpos, Análisis de [facility], Angely de glucosa al jared, VIH, sífilis, hepatitis B   SI corresponde: 1 hora de Glucola o Hemoglobina A1C   orina de 24 horas, CMP, proporción de creatinina proteica    ; Skip Presser y física: carlotta de H&P   examen físico   Examen pélvico: Rannie Damme y Clamidia   Si es necesario: Papanicolaou    Plan:  ; Visitas prenatales de rutina cada 4 semanas hasta las 28 semanas   En tawny visitas prenatales:  - Monitoreo de los el latidos del corazón del bebé y medir bhardwaj phong en crecimiento, Recolecte lucy [facility], y se tomara bhardwaj peso y presión arterial  ; 28 semanas: las visitas prenatales serán cada 2 semanas   vacuna TDaP   Conteo sanguíneo completo   RPR   tipo de Dior y anticuerpos   Se puede administrar [place] en ruslan momento, si es necesario   Diabetes gestacional, prueba de Glucola de 1 hora (sin ayuno)   Si krystal la Glucola de 1 hora, bhardwaj proveedor solicitará lucy Glucola de 3 horas  La prueba de Glucola de 3 horas es en ayunas     Si no pasa la prueba de Glucola de 3 horas, será derivada al equipo de educación para diabéticos de Medicina Materno Fetal  Kayla [place] aún más llamando al [phone]   ; Comience a realizar conteos diarios de las patadas fetales  ; Clases prenatales   Clases preparadas de educación sobre el parto, Cindy [address] Crist[id number], cuidado del recién LAZARA Xiong para bebés / Lakhwinder Mega asientos para automóviles y otros   Llame a [place] CLASSES [phone] para registrarse   Si el formulario de registro no [employer], reggie clase, muy probablemente, está llena y deberá elegir lucy fecha / hora diferente   Hemos tenido [employer] sorprendente a nuestras clases, así que regístrese temprano para garantizar bhardwaj clase deseada   Asegúrese de consultar con bhardwaj compañía de seguros, ya que algunas compañías de seguros reembolsarán parte o la totalidad de las tarifas asociadas a la clase  ; 36 semanas: las visitas prenatales comienzan a ser semanales   Se recolectará el soheila Beta Strep (GBS)   [place] se hace con un hisopo vaginal en la oficina   También se obtendrán pruebas de clamidia / Rogenia Eisenmenger, si corresponde   ; Prepárese para el parto   Prepare bhardwaj bolso y pertenencias para el hospital   Familiarícese con las pautas de visita   ; RELAJECE   Disfrute las últimas semanas de bhardwaj Vernoica Gleasonales de Alerta en el embarazo: Karon Buddy  [phone]    1  Sangrado vaginal  2  Dolor abdominal alcon que no desaparece  3  Fiebre (más de 100 4 y no se romy con Tylenol)  4  Vómitos persistentes que marvin más de 24 horas  5  dolor de pecho  6  Dolor o ardor al orinar  7  Dolor de trevor severo que no se resuelve con Tylenol  8  Visión borrosa o tisha puntos en bhardwaj visión  9  Hinchazón repentina de bhardwaj krystin o kasia  10  Enrojecimiento, hinchazón o dolor en lucy pierna  11  Un aumento de peso repentino en pocos días  12  Contar los movimientos fetales del bebé  (después de 28 semanas o el sexto mes de embarazo)  15  Lucy pérdida de líquido acuoso de la vagina: puede ser un chorro, un goteo o lucy humedad continua  14   Después de 20 semanas de embarazo, calambres rítmicos (más de 4 por hora) o menstruales gagan dolor [place] / [place] y Embarazo:  La siguiente lista de medicamentos de Wandra Croon generalmente se considera joe de hodan katie el [place]  Tenga cuidado de no duplicar los productos que contienen acetaminofén (Tylenol)  Resfriados / dolor de garganta   Robitussin DM - Simple (guaifenesina)   Aerosol nasal salino   Gárgaras de agua salada caliente  Cepacol pastillas para la garganta o enjuague bucal (cetilpiridinio)   Sucrets (hexylresoricinol)    Chio Rash LA D - o DESCONGESANTE   Claritin (loratadine)   Zrytec (cetirizina)   Allerga (fexofenadina)    Daxa de [place] / [place] y molestias:   Tylenol (paracetamol) (acetaminophen)    NO exceda más de 3000 mg de Tylenol en un período de 24 horas  Acidez   Mylanta (hidróxido de aluminio / simeticona, hidróxido de magnesio)   Maalaox (hidróxido de aluminio y [place], hidróxido de magnesio)   Tums (carbonato de calcio)   Riopan (magaldrate)    Estreñimiento   Colace (docusato de sodio)   Surfak (docusato de sodio)   MiraLAX   Supositorios de glicerina   Flota enema (fosfato de sodio y bifosfato de sodio)      Náuseas vómitos   Vitamina B6 (piridoxina): puede hodan 50 mg a la hora de WEDGECARRUP, 25 mg por la mañana, 25 mg por la tarde   Unisom (doxilamina): se puede usar para las náuseas / vómitos (jose c lucy tableta de 25 mg por la mitad)  Puede causar somnolencia  Dormir   Benadryl (difenhidramina): tome 1-2 tabletas según sea necesario antes de acostarse   Tableta Unisom (doxilamina) de 25 mg    Tableta de melatonina de 5 mg: según sea necesario antes de acostarse      En general, la forma genérica de la medicina suele ser más económica que la forma de bossman del [place]  CUIDADO AMBULATORIO:   Lo qué necesita saber sobre el Tremaine Canes: Un embarazo normal dura alrededor de 40 semanas   El primer trimestre dura desde bhardwaj último periodo hasta la semana 12 de [place]  El carlo trimestre se extiende desde la semana 13 de bhardwaj embarazo hasta la semana 23  El tercer trimestre se extiende desde la semana 24 de embarazo hasta que nazca bhardwaj bebé  Si usted conoce la fecha de bhardwaj último periodo, bhardwaj médico puede calcular la fecha de nacimiento de bhardwaj bebé  Es posible que usted de a amish a bhardwaj bebé en cualquier momento desde la semana 37 hasta 2 semanas después de la fecha calculada de [place]  Comuníquese con bhardwaj médico si:   · Usted tiene calambres, presión o tensión abdominal   · Usted tiene un cambio en la secreción vaginal   · Usted no puede retener alimentos ni líquidos y está perdiendo [place]  · Usted tiene escalofríos o fiebre  · Usted tiene comezón, ardor o dolor vaginal    · Usted tiene lucy secreción vaginal amarillenta, verdosa, tanya o de [employer]  · Usted tiene dolor o ardor al Rodolph Pretty, orina menos de lo habitual o tiene [place] rosada o sanguinolenta  · Usted tiene preguntas o inquietudes acerca de bhardwaj condición o cuidado  Cambios corporales que pueden ocurrir katie bhardwaj embarazo:   · Los cambios en los senos  que usted Daria Glass sensibilidad y cosquilleo katie la primera parte de bhardwaj [place]  Los senos se volverán más grandes  Es posible que necesite un sostén con soporte  Es posible que usted giovani Zorita Slack secreción delgada y MARITA, conocida gagan calostro, que sale de tawny pezones katie el carlo trimestre  El calostro es un líquido que se convertirá en [place] alrededor de 3 días después de usted michell dado a amish  · Cambios en la piel y estrías  podrían ocurrir katie bhardwaj embarazo  Es posible que usted tenga marcas perez, conocidas gagan estrías, en bhardwaj piel  Las [employer] se desvanecen después del [facility]  Utilice crema si bhardwaj piel está seca y con comezón  La piel de bhardwaj krystin, alrededor de los pezones y debajo de bhardwaj ombligo podría oscurecerse   La mayoría del [place], bhardwaj piel Lucrezia Crystal a bhardwaj color normal después del nacimiento de bhardwaj bebé  · El malestar matutino  consiste en náuseas y vómitos que pueden ocurrir en cualquier momento del día  Evite los alimentos grasosos y picantes  Coma comidas pequeñas katie el día en vez de porciones grandes  El jengibre puede ayudar a [employer]  Consulte con bhardwaj médico acerca de otras formas para disminuir las náuseas y el vómito  · Acidez estomacal  puede ser causada por los cambios hormonales katie bhardwaj [place]  El [employer] en crecimiento puede empujar bhardwaj estómago hacia arriba y forzar ácido estomacal a acumularse dentro de bhardwaj esófago  [place] o 5 comidas pequeñas cada día en vez de comidas grandes  Evite los alimentos picantes  Evite comer jose antes de irse a la cama  · Estreñimiento  puede desarrollarse katie bhardwaj embarazo  Para tratar el estreñimiento, coma alimentos altos en fibra gagan cereales con fibra, frijoles, frutas, verduras, panes integrales y [place]  Paula Curling de Meme regular y tome suficiente agua  Es posible que bhardwaj médico sugiera un suplemento con fibra para ablandar tawny evacuaciones intestinales  Consulte con bhardwaj médico antes de usar cualquier medicamento para disminuir el estreñimiento  · Las hemorroides  son Shankar Dicker grandes en el área rectal  Pueden causar dolor, comezón y sangrado de color de los santos vivo en bhardwaj recto  Para disminuir el riesgo de hemorroides, prevenga el estreñimiento y no se esfuerce cuando tenga lucy evacuación intestinal  Si usted tiene hemorroides, sumérjase en lucy bañera con agua tibia para aliviar la incomodidad  Consulte con bhardwaj médico cómo puede tratar las hemorroides  · Los calambres y la hinchazón en las piernas  pueden ser causados por niveles bajos de calcio o por el peso adicional del [place]  Eleve tawny piernas por encima del nivel de bhardwaj corazón para disminuir la hinchazón  Katie un calambre en la pierna, estire o de un masaje al [employer] que tiene el calambre   El calor puede ayudar a disminuir el Jr Villarreal musculares  Aplique calor sobre el músculo por 20 a 30 minutos cada 2 horas por la cantidad de días que se le indique  · Dolor en la espalda  puede ocurrir a medida que bhadrwaj bebé crece  No esté de pie por largos periodos de tiempo ni levante objetos pesados  Use lucy buena postura mientras esté de pie, se agache o se doble  Use zapatos de tacón bajo con un buen soporte  Descansar puede también ayudarla a aliviar el dolor de espalda  Pregunte a bhardwaj médico acerca de ejercicios que usted pueda hacer para fortalecer los músculos de bhardwaj espalda  Manténgase saludable katie bhardwaj embarazo:   · Consuma alimentos saludables y variados  Alimentos saludables incluyen frutas, verduras, panes de edil integral, alimentos lácteos bajos en grasa, frijoles, cielo magras y pescado  Petrey líquidos gagan se le haya indicado  Pregunte cuánto líquido debe hodan cada día y cuáles líquidos son los más adecuados para usted  o Cafeína: no está kristina cómo la cafeína afecta el embarazo  Limite bhardwaj consumo de cafeína para evitar posibles problemas de alessandra   - Limite la cafeína a menos de 200 miligramos por día  - La cafeína se puede encontrar en el café, el té, la cola, las bebidas deportivas y el chocolate  o  Alimentos que contienen ramon: el ramon se encuentra naturalmente en claribel todos los tipos de pescados y mariscos  Algunos tipos de peces absorben niveles más altos de ramon que pueden ser dañinos para un bebé sharon  Coma solo pescado y mariscos bajos en ramon  - Limite bhardwaj consumo de pescado a 2 porciones por semana  - Elija pescado con bajo contenido de ramon, gagan atún kristina enlatado, camarones, cangrejo, salmón, bacalao o tilapia   o No coma pescado con alto contenido de [employer] pez jaycee, el pez azulejo, la caballa real y el tiburón  - Cada semana, puede comer hasta 12 onzas de pescado o mariscos que tienen bajos niveles de [employer]   Estos incluyen camarones, atún kristina enlatado, salmón, abadejo y bagre   o Coma solo 6 onzas de atún horton (horton) por semana  El atún horton tiene más ramon que el atún [place]  · [id number] [place]  Bhardwaj necesidad de ciertas vitaminas y [facility], gagan el ácido fólico, aumenta katie el [facility]  Las vitaminas prenatales proporcionan algunas de las vitaminas y minerales adicionales que usted necesita  Las vitaminas prenatales también podrían ayudar a disminuir el riesgo de ciertos defectos de nacimiento  · Pregunte cuánto peso usted debe aumentar katie bhardwaj embarazo  Demasiado aumento de peso o muy poco puede ser poco saludable para usted y bhardwaj bebé  · Ejercicio: hable con bhardwaj proveedor de [employer] ejercicio  El ejercicio moderado puede ayudarlo a mantenerse en forma  Bhardwaj proveedor de [employer] ayudará a planificar un programa de ejercicios que sea seguro para usted katie el [facility]  · Love muchos líquidos mientras hace ejercicio para mantenerse hidratado  Tenga cuidado para evitar el sobrecalentamiento  o EVITE los ejercicios que pueden hacer que pierda el equilibrio   o Actividades que pueden poner a bhardwaj bebé en riesgo, es decir, montar a med, bucear, esquiar o hacer snowboard  o Después de bhardwaj primer trimestre, evite los ejercicios que requieren que se acueste boca arriba   o EVITE exceder lucy frecuencia cardíaca mayor de 140 latidos por minuto  Siempre que pueda mantener lucy conversación mientras hace ejercicio, es probable que bhardwaj ritmo cardíaco sea aceptable   ; Siempre use el cinturón de seguridad   El cinturón de hombro debe estar sobre el pecho (entre los senos) y lejos del adán   Asegure el cinturón de regazo debajo de bhardwaj vientre para que se ajuste cómodamente en tawny caderas y hueso pélvico   ; Realizar el ejercicio de [place]   Imagínese deteniendo el flujo de orina, contrayendo los músculos de bhardwaj piso pélvico  Mantenga reggie contracción katie 10 segundos y suelte     Se pueden repetir 10-20 veces por día  · No fume  Si usted fuma, nunca es demasiado tarde para dejar de hacerlo  Fumar aumenta el riesgo de aborto espontáneo y otros problemas de alessandra katie bhardwaj [place]  Fumar puede causar que bhardwaj bebé nazca antes de tiempo o que pese menos al nacer  Solicite información a bhardwaj médico si usted necesita ayuda para dejar de fumar  · No consuma alcohol  El alcohol pasa de bhardwaj cuerpo al bebé a través de la placenta  Puede afectar el desarrollo del cerebro de bhardwaj bebé y provocar el síndrome de alcoholismo fetal (SAF)  SAF es un soheila de condiciones que causan [address], de comportamiento y de crecimiento  · Consulte con bhardwaj médico antes de hodan cualquier medicamento  Muchos medicamentos pueden perjudicar a bhardwaj bebé si usted los vincenzo [address]  No tome ningún medicamento, vitaminas, hierbas o suplementos sin sharri consultar con bhardwaj médico    · Nunca  use drogas ilegales o de la hopson (gagan marihuana o cocaína) mientras está embarazada  Consejos de seguridad:   · Evite jacuzzis y saunas  No use un jacuzzi o un sauna mientras usted está embarazada, especialmente katie el primer trimestre  [facility] y los saunas aumentan la temperatura de bhardwaj bebé y el riesgo de defectos de nacimiento  · Evite la toxoplasmosis  [place] es lucy infección causada por comer carne cruda o estar cerca del excremento de un [place] infectado  [place] puede causar malformaciones congénitas, aborto espontáneo y Perico Schein  Lávviolette las kasia después de tocar carne cruda  Asegúrese de que la carne esté magdiel cocida antes de comerla  Evite los huevos crudos y la Tanika Menghini  Use guantes o pida que alguien la ayude a limpiar la caja de arena del [place] mientras usted Joshua Jan  · Consulte con bhardwaj médico acerca de viajar  El [address] cómodo para viajar es katie el carlo trimestre  Pregunte a bhardwaj médico si usted puede viajar después de las 36 semanas   Es posible que no pueda viajar en avión después de las 36 semanas  También le puede recomendar que evite largos viajes por carretera  Programe un control con bhardwaj médico u obstetra según lo indicado:  Vaya a todas ellie citas prenatales katie bhardwaj embarazo  Anote ellie preguntas para que se acuerde de hacerlas katie ellie visitas  [place] y vómito en el embarazo       CUIDADO AMBULATORIO:   La náusea y el vómito en el embarazo  pueden suceder a cualquier hora del día  Estos síntomas usualmente comienzan antes de la semana 9 del embarazo y terminan para la semana 14 (carlo trimestre)  Algunas mujeres pueden tener náusea o vómito por un tiempo prolongado  Estos síntomas pueden afectar a algunas mujeres katie el embarazo  La náusea y el vómito no dañan a bhardwaj bebé  Estos síntomas pueden dificultarle ellie actividades diarias  Pregúntele a bhardwaj [employer] vitaminas y minerales son adecuados para usted  · Usted vomita más de 4 veces en 1 día  · Usted no ha podido retener líquidos en el estómago por más de 1 día  · Usted pierde más de 2 libras  · Usted tiene fiebre  · Ellie náuseas y vómito continúan por más de 14 semanas  · Usted tiene preguntas o inquietudes acerca de bhardwaj condición o cuidado  El tratamiento  para la náusea y el vómito en el embarazo generalmente no es necesario  Usted puede [employer] alimentos que come y en ellie actividades para ayudar a controlar ellie síntomas  Es posible que usted necesite probar varias cosas para determinar qué funciona mejor para usted  Hable con bhardwaj médico si ellie síntomas no mejoran con los cambios que se recomiendan a continuación  Es posible que usted necesite vitamina B6 y medicamento si estos cambios no ayudan o si ellie síntomas se vuelven graves  Cambios de nutrición que usted puede realizar para controlar la náusea y el vómito:   · Coma porciones pequeñas katie el día en vez de 3 comidas con porciones grandes  Es más probable que usted tenga náusea y vómito cuando bhardwaj estómago está vacío   Consuma alimentos bajos en grasa y ricos en proteínas  Ejemplos son Kenntaliza Christcarmina, frijoles, pavo y dunia sin roshan Ramírez, gagan galletas saladas, cereal seco o un sandwich chico antes de [place]  · Coma galletas saladas o pan noah antes de levantarse de bhardwaj cama por la mañana  Levántese de la cama lentamente  Los movimientos repentinos podrían provocarle mareos y [place]  · Consuma alimentos blandos cuando se sienta con náuseas  Ejemplos de alimentos blandos son el pan noah, cereal seco, pasta sin Gerry Davila y mayfield  Otros alimentos blandos incluyen a las [employer], plátanos, gelatina y pretzels  Evite los alimentos condimentados, grasosos y fritos  Evite otros alimentos que le provoquen náuseas  · [place] líquidos que contengan gengibre  Searchlight refresco de gengibre hecho con gengibre real o té de gengibre hecho con gengibre fresco rallado  Las [employer] o dulces de gengibre también podrían ayudar a aliviar la náusea y el vómito  · [place] líquidos entre alimentos en vez de tomarlos con los alimentos  Espere al menos 30 minutos después de comer para hodan líquidos  Searchlight cantidades pequeñas de líquidos con frecuencia katie el día para evitar la deshidratación  Consulte cuál es la cantidad de líquido que usted debería consumir al día  Otros cambios que usted puede realizar para controlar la náusea y el vómito:   · Evite los olores que la [place]  Los olores fawad podrían provocar que [employer] náuseas y el vómito, o podrían empeorarlo  Camine un poco, prenda un ventilador o trate de dormir con la ventana abierta para respirar aire fresco  Cuando esté cocinando, felisha las ventanas para eliminar el olor que podría provocarle náuseas  · No se cepille tawny dientes inmediatamente después de comer  si eso le provoca náuseas  · Descanse cuando lo necesite  Comience lucy actividad lentamente y vuelva a bhardwaj rutina normal conforme se empiece a sentir mejor    · Hable con bhardwaj médico acerca de las vitaminas prenatales  Las vitaminas prenatales pueden provocar náuseas a algunas mujeres  Trate de tomárselas por la noche o con un bocadillo  Si ruslan cambio no le [facility], bhardwaj médico podría recomendarle un tipo de vitamina diferente  · No use ningún medicamento, vitamina o suplemento para controlar tawny síntomas sin antes consultarlo con bhardwaj médico   Varios medicamentos pueden dañar a bhardwaj bebé que no ha nacido  · El ejercicio de ligero a moderado  podría ayudar a aliviar tawny síntomas  También podría ayudarla a dormir mejor por la noche  Pregunte a bhardwaj médico acerca del mejor plan de ejercicio para usted  Beneficios de la lactancia materna   son menos propensos a desarrollar alergias   Tienen lucy mayor protección contra la meningitis bacteriana   Tienen menos infecciones del oído medio   Tienen menos dificultades de aprendizaje    Tienen menos dificultades de comportamiento   Tienen un coeficiente intelectual más alto   Tienen tasas más bajas de enfermedades respiratorias   Tienen sally obesidad    Son menos propensos a desarrollar diabetes juvenil y algunos cánceres infantiles   Tienen menos probabilidades de morir por Síndrome de erte [facility]   Un bebé más saludable significa menos visitas al médico y a la farmacia   La lactancia materna es ecológica  No hay nada que tirar   La lactancia materna es gratis; La fórmula puede costar más de $ 1000 katie el primer año de orlni   Hay menos sangrado vaginal inmediatamente después del parto y un retorno más rápido a bhardwaj tamaño anterior al [employer]  Las [place] que amamantan katie un total de 2 años tienen  ; Lucy disminución del 40% en el riesgo de cáncer de seno  ; Disminución del riesgo de cáncer de ovario   ; Tasas más bajas de osteoporosis, diabetes y enfermedades del corazón  Importancia de la lactancia materna exclusiva     Al proporcionar el alimento ideal para el crecimiento y desarrollo saludable de los bebés, solo se les alimenta con [address], esto es amamantamiento exclusivo   La lactancia materna [facility] primeros 6 meses es muy importante para mejorar la alessandra de un bebé y reducir las enfermedades infantiles  Lactancia materna exclusiva katie los primeros 6 meses  [facility] recomienda la lactancia materna exclusiva katie los primeros seis meses y la lactancia materna continua con suplementos de sólidos katie los próximos 6 meses  Inicio temprano de la lactancia materna   Las mujeres que alimentan a tawny bebés dentro de la primera hora de nacimiento se conocen gagan inicio temprano de la lactancia materna y aseguran que el recién nacido reciba calostro   El calostro es rico en anticuerpos y nutrientes esenciales  Compartiendo la habitación   Puede estabilizar la respiración y la frecuencia cardíaca del recién nacido   Reduce el llanto   Mejorar la capacidad del recién nacido para mantener la temperatura y los niveles de azúcar en la hailey   Estimulación temprana del sistema inmunitario del bebé   efectos calmantes   Enlace más fácil y rápido   El compartir la habitación promueve conocer a bhardwaj recién nacido   El alojamiento conjunto facilita la lactancia materna   Las mujeres que se alojan con tawny recién nacidos producen [place] y producen un buen suministro de [place]   Se hace más fácil reconocer las señales de alimentación del bebé   Los bebés tienen sesiones de lactancia más largas   Las mujeres que comparten habitación con tawny recién nacidos tienen más probabilidades de amamantar exclusivamente en comparación con las mujeres que están separadas de tawny recién nacidos  Piel con piel   El contacto piel con piel debe ocurrir independientemente de la preferencia de alimentación de Charles jolly o el modo de [place]     Después del parto de bhardwaj bebé, es importante que lucy madre maty y bhardwaj bebé tengan un contacto ininterrumpido de piel a piel    El contacto piel con piel debe ocurrir inmediatamente después del nacimiento katie al menos lucy o dos horas y [place] después de la primera alimentación para las madres que [place]   El contacto piel con piel significa colocar recién nacidos secos y sin ropa sobre el pecho desnudo de bhardwaj Grady, con mantas calientes cubriendo la espalda del bebé   Todos los procedimientos de rutina, gagan las evaluaciones de [place] y recién nacidos, pueden realizarse katie el contacto piel con piel o pueden retrasarse hasta después del período sensible inmediatamente después del nacimiento   Estamos orgullosos de ofrecer amplios servicios educativos y de apoyo para alentar a las madres a amamantar   Rosanna servicio ofrece información, educación y [de-identified] para mujeres que alexey amamantar  Las [place] pueden dejar un mensaje o lucy pregunta sobre la lactancia en línea en cualquier momento del día  Las enfermeras responderán dentro de las 72 horas   La línea de respuesta de lactancia materna es [phone] ([phone])  NO deje mensajes urgentes o emergentes en esta línea de mensaje  Comuníquese con bhardwaj médico Sumi Armando si tiene alguna pregunta o inquietud urgente   En dorcas de sospecha de lucy afección médica de emergencia, [facility]      Attaching your baby at the Breast (English): [email or web address]    Attaching your baby at the Breast (Lebanese):  [email or web address]

## 2021-01-13 NOTE — PROGRESS NOTES
OB INTAKE INTERVIEW  Pt presents for OB intake  [id number]  OB History    Para Term  AB Living   3 1   1 1 1   SAB TAB Ectopic Multiple Live Births   1     0 1      # Outcome Date GA Lbr Jani/2nd Weight Sex Delivery Anes PTL Lv   3 Current            2 SAB 2020 8w0d    SAB      1  16 35w3d  1745 g (3 lb 13 6 oz) F Vag-Spont EPI Y OSCAR     Hx of  delivery prior to 36 weeks 6 days:  Yes, Pre-eclampsia @ 35 weeks 3 days      Last Menstrual Period:    Patient's last menstrual period was 11/10/2020 (exact date)  Ultrasound date: 2021  8 weeks 0 days     Estimated Date of Delivery: 2021  by LMP  H&P visit scheduled  2021 @ 10 am  with Dr Hina Marquez      Last pap smear: May 3, 2019  Findings; lab pap smear results: no abnormalities    Current Issues:  Constipation :   No  Headaches :   Yes  Cramping:  No  Spotting :   No  PICA cravings :  No  FOB Involved:   Yes  Planned pregnancy:  No  I have these concerns about this prenatal patient:   1  Hx of preeclampsia, prior pregnancy, currently pregnant  /73  - Protein / creatinine ratio, urine  CMP and BMP resulted on 2021      2  9 weeks gestation of pregnancy  9 1 weeks as of 21  - Prenatal Panel; Future    Idiopathic intracranial hypertension  Chiari Malformation Type 1  Pt is requesting for a primary   3  Pregnant and not yet delivered in first trimester  - Ambulatory referral to social work care management program; Future  - Ambulatory Referral to Maternal Fetal Medicine; Future   Sequential Screen:  2021 @ 2:30 pm   Level 2:  2021 @ 8 am      4  History of COVID-19  2020  Pt is traveling on 2021 to [facility] to visit her grandmother  She will be returning on 2021  Pt was advised to wash hands, use hand , wear her face mask and keep 6 feet distance  Explained she would have to quarantine for 14 days after traveling   Therefore her PN H&P appt was changed to February 5th  Interview education   [facility] Pregnancy Essentials reviewed and discussed    [facility] Handout   [facility] Handout   Discussed genetic testing   Prenatal lab work: Scripts printed and given to pt   Influenza vaccine given today: No   Discussed Tdap vaccine  Immunizations:   Immunization History   Administered Date(s) Administered    DTaP 5 08/05/2013    HPV Quadrivalent 10/10/2013    Hep B, adult 08/05/2013, 10/10/2013, 03/29/2016    INFLUENZA 09/26/2016    IPV 08/05/2013, 03/29/2016    Influenza Quadrivalent Preservative Free 3 years and older IM 11/10/2015, 09/27/2016    Influenza Quadrivalent, 6-35 Months IM 10/24/2016, 09/29/2017    MMRV 08/05/2013, 10/10/2013    Meningococcal, Unknown Serogroups 08/05/2013    Tdap 03/23/2016, 03/23/2016     Depression Screening Follow-up Plan: Patient's depression screening was negative with an [place] score of  1  Clinically patient does not have depression  No treatment is required     Nurse/Family Partnership- referral placed:  No  BMI Counseling: Body mass index is 34 06 kg/m²  Tobacco Cessation Counseling: non-smoker  Infection Screening: Does the pt have a hx of MRSA? No  The patient was oriented to our practice and all questions were answered    Interviewed by: Haroldo Smallwood RN 01/13/21

## 2021-01-13 NOTE — LETTER
Work Letter    [employer]  1995  [place] [facility] [address]    Dear [employer],      01/13/21        Your employee is a patient at [facility]  We recommend that all pregnant women:    1  Have a well-ventilated workspace  2  Wear low-heeled shoes  3  Work no more than 40 hours per week  4  Have a 15 minute break every 2 hours and at least 30 minutes for a meal break  5  Use good body mechanics by bending at your knees to avoid back strain and lift no more than 20 pounds without assistance  Will need assistance with lifting over 20 lbs  6  Have ready access to bathrooms and water  She may continue to work until her due date unless medical complications arise  We anticipate she may return to work in 6-8 weeks after delivery       Sincerely,    [facility]

## 2021-01-13 NOTE — LETTER
[address] Letter    Amalia  1995  [address]       01/13/21          Amalia is a patient and under our care in our office  Bella Mott's Estimated Date of Delivery: 08/17/2021  Any questions or concerns feel free to contact our office       Thank you,    [address] [facility],[address]  [id number] [facility]/Ar[place]iar[place]ha Varghese [facility]  [facility] [place]  [address]  [phone]

## 2021-01-14 ENCOUNTER — PATIENT OUTREACH (OUTPATIENT)
Dept: OBGYN CLINIC | Facility: CLINIC | Age: 26
End: 2021-01-14

## 2021-01-14 LAB
HIV 1+2 AB+HIV1 P24 AG SERPL QL IA: NORMAL
RPR SER QL: NORMAL

## 2021-01-16 LAB
BACTERIA UR CULT: ABNORMAL

## 2021-01-18 ENCOUNTER — TELEPHONE (OUTPATIENT)
Dept: OBGYN CLINIC | Facility: CLINIC | Age: 26
End: 2021-01-18

## 2021-01-18 PROBLEM — R82.71 GBS BACTERIURIA: Status: ACTIVE | Noted: 2021-01-18

## 2021-01-27 ENCOUNTER — TELEMEDICINE (OUTPATIENT)
Dept: INTERNAL MEDICINE CLINIC | Facility: CLINIC | Age: 26
End: 2021-01-27

## 2021-01-27 ENCOUNTER — HOSPITAL ENCOUNTER (EMERGENCY)
Facility: HOSPITAL | Age: 26
Discharge: HOME | End: 2021-01-27
Attending: EMERGENCY MEDICINE | Admitting: EMERGENCY MEDICINE
Payer: COMMERCIAL

## 2021-01-27 VITALS
BODY MASS INDEX: 33.45 KG/M2 | WEIGHT: 220 LBS | SYSTOLIC BLOOD PRESSURE: 113 MMHG | RESPIRATION RATE: 16 BRPM | TEMPERATURE: 99.1 F | DIASTOLIC BLOOD PRESSURE: 62 MMHG | HEART RATE: 62 BPM | OXYGEN SATURATION: 100 %

## 2021-01-27 VITALS — SYSTOLIC BLOOD PRESSURE: 139 MMHG | DIASTOLIC BLOOD PRESSURE: 94 MMHG

## 2021-01-27 DIAGNOSIS — H53.149 PHOTOPHOBIA: ICD-10-CM

## 2021-01-27 DIAGNOSIS — O21.9 NAUSEA AND VOMITING DURING PREGNANCY: ICD-10-CM

## 2021-01-27 DIAGNOSIS — R51.9 ACUTE NONINTRACTABLE HEADACHE, UNSPECIFIED HEADACHE TYPE: Primary | ICD-10-CM

## 2021-01-27 DIAGNOSIS — G44.211 EPISODIC TENSION-TYPE HEADACHE, INTRACTABLE: Primary | ICD-10-CM

## 2021-01-27 DIAGNOSIS — G93.5 CHIARI MALFORMATION TYPE I (HCC): ICD-10-CM

## 2021-01-27 DIAGNOSIS — R42 DIZZINESS: ICD-10-CM

## 2021-01-27 DIAGNOSIS — G93.2 IIH (IDIOPATHIC INTRACRANIAL HYPERTENSION): ICD-10-CM

## 2021-01-27 PROCEDURE — 76801 OB US < 14 WKS SINGLE FETUS: CPT | Performed by: EMERGENCY MEDICINE

## 2021-01-27 PROCEDURE — 99213 OFFICE O/P EST LOW 20 MIN: CPT | Performed by: PHYSICIAN ASSISTANT

## 2021-01-27 PROCEDURE — 99283 EMERGENCY DEPT VISIT LOW MDM: CPT

## 2021-01-27 PROCEDURE — 76512 OPH US DX B-SCAN: CPT | Performed by: EMERGENCY MEDICINE

## 2021-01-27 PROCEDURE — 96375 TX/PRO/DX INJ NEW DRUG ADDON: CPT

## 2021-01-27 PROCEDURE — 99285 EMERGENCY DEPT VISIT HI MDM: CPT | Performed by: EMERGENCY MEDICINE

## 2021-01-27 PROCEDURE — 62270 DX LMBR SPI PNXR: CPT | Performed by: EMERGENCY MEDICINE

## 2021-01-27 PROCEDURE — 1036F TOBACCO NON-USER: CPT | Performed by: PHYSICIAN ASSISTANT

## 2021-01-27 PROCEDURE — 96367 TX/PROPH/DG ADDL SEQ IV INF: CPT

## 2021-01-27 PROCEDURE — 96365 THER/PROPH/DIAG IV INF INIT: CPT

## 2021-01-27 RX ORDER — ACETAMINOPHEN 325 MG/1
975 TABLET ORAL ONCE
Status: COMPLETED | OUTPATIENT
Start: 2021-01-27 | End: 2021-01-27

## 2021-01-27 RX ORDER — ONDANSETRON 4 MG/1
4 TABLET, ORALLY DISINTEGRATING ORAL ONCE
Status: COMPLETED | OUTPATIENT
Start: 2021-01-27 | End: 2021-01-27

## 2021-01-27 RX ORDER — MAGNESIUM SULFATE HEPTAHYDRATE 40 MG/ML
2 INJECTION, SOLUTION INTRAVENOUS ONCE
Status: COMPLETED | OUTPATIENT
Start: 2021-01-27 | End: 2021-01-27

## 2021-01-27 RX ORDER — SODIUM CHLORIDE, SODIUM GLUCONATE, SODIUM ACETATE, POTASSIUM CHLORIDE, MAGNESIUM CHLORIDE, SODIUM PHOSPHATE, DIBASIC, AND POTASSIUM PHOSPHATE .53; .5; .37; .037; .03; .012; .00082 G/100ML; G/100ML; G/100ML; G/100ML; G/100ML; G/100ML; G/100ML
1000 INJECTION, SOLUTION INTRAVENOUS ONCE
Status: COMPLETED | OUTPATIENT
Start: 2021-01-27 | End: 2021-01-27

## 2021-01-27 RX ORDER — DEXAMETHASONE SODIUM PHOSPHATE 10 MG/ML
10 INJECTION, SOLUTION INTRAMUSCULAR; INTRAVENOUS ONCE
Status: COMPLETED | OUTPATIENT
Start: 2021-01-27 | End: 2021-01-27

## 2021-01-27 RX ORDER — METOCLOPRAMIDE HYDROCHLORIDE 5 MG/ML
10 INJECTION INTRAMUSCULAR; INTRAVENOUS ONCE
Status: COMPLETED | OUTPATIENT
Start: 2021-01-27 | End: 2021-01-27

## 2021-01-27 RX ORDER — LIDOCAINE HYDROCHLORIDE 10 MG/ML
10 INJECTION, SOLUTION EPIDURAL; INFILTRATION; INTRACAUDAL; PERINEURAL ONCE
Status: COMPLETED | OUTPATIENT
Start: 2021-01-27 | End: 2021-01-27

## 2021-01-27 RX ORDER — DIPHENHYDRAMINE HYDROCHLORIDE 50 MG/ML
25 INJECTION INTRAMUSCULAR; INTRAVENOUS ONCE
Status: COMPLETED | OUTPATIENT
Start: 2021-01-27 | End: 2021-01-27

## 2021-01-27 RX ADMIN — LIDOCAINE HYDROCHLORIDE 10 ML: 10 INJECTION, SOLUTION EPIDURAL; INFILTRATION; INTRACAUDAL; PERINEURAL at 19:40

## 2021-01-27 RX ADMIN — DEXAMETHASONE SODIUM PHOSPHATE 10 MG: 10 INJECTION, SOLUTION INTRAMUSCULAR; INTRAVENOUS at 21:41

## 2021-01-27 RX ADMIN — ONDANSETRON 4 MG: 4 TABLET, ORALLY DISINTEGRATING ORAL at 18:41

## 2021-01-27 RX ADMIN — METOCLOPRAMIDE 10 MG: 5 INJECTION, SOLUTION INTRAMUSCULAR; INTRAVENOUS at 20:16

## 2021-01-27 RX ADMIN — DIPHENHYDRAMINE HYDROCHLORIDE 25 MG: 50 INJECTION INTRAMUSCULAR; INTRAVENOUS at 20:14

## 2021-01-27 RX ADMIN — SODIUM CHLORIDE, SODIUM GLUCONATE, SODIUM ACETATE, POTASSIUM CHLORIDE, MAGNESIUM CHLORIDE, SODIUM PHOSPHATE, DIBASIC, AND POTASSIUM PHOSPHATE 1000 ML: .53; .5; .37; .037; .03; .012; .00082 INJECTION, SOLUTION INTRAVENOUS at 20:22

## 2021-01-27 RX ADMIN — MAGNESIUM SULFATE HEPTAHYDRATE 2 G: 40 INJECTION, SOLUTION INTRAVENOUS at 21:20

## 2021-01-27 RX ADMIN — ACETAMINOPHEN 975 MG: 325 TABLET, FILM COATED ORAL at 18:41

## 2021-01-27 NOTE — ED PROVIDER NOTES
History  Chief Complaint   Patient presents with    Headache     reports hx of headaches, reports worse headache since shes been pregnant  reports "my headaches are always the worst when Im pregnant " 10wks pregnant     Dayami Crawford is an 26y o  year old female  at 8 weeks gestation with PMHx significant for Chiari 1 malformation, IIH, HTN, HLD, who presents to the ED today with a headache for one day  It has been constant  It feels similar to that HA she had a few years ago associated with IIH  HA pain is exacerbated by upright positions and relieved by nothing  The pain is pressure in quality, does not radiate, and currently rated severe in severity  Has tried nothing yet for pain  Patient also endorses nausea and vomiting  Denies vaginal bleeding or discharge  Has hx of preeclampsia  The patient denies fevers, chills, lightheadedness or syncope, vision changes, hearing changes, chest pain, shortness of breath, cough, abdominal pain, changes in usual bowel movements, changes with urination, back pain, numbness or tingling, pain anywhere else in body  The patient has no sick contacts, recent travel history, new or changing medications  Patient denies use of drugs or alcohol  History provided by:  Medical records and patient   used: No    Headache  Associated symptoms: nausea and vomiting    Associated symptoms: no abdominal pain, no cough, no diarrhea, no dizziness, no fatigue, no fever, no myalgias and no sore throat        Prior to Admission Medications   Prescriptions Last Dose Informant Patient Reported? Taking?    Pyridoxine HCl (vitamin B-6) 25 MG tablet   No No   Sig: Take 1 tablet (25 mg total) by mouth 3 (three) times a day   doxylamine (UNISON) 25 MG tablet   No No   Sig: Take 1 tablet (25 mg total) by mouth daily at bedtime as needed for sleep   Patient not taking: Reported on 2021   metoclopramide (REGLAN) 10 mg tablet   No No   Sig: Take 1 tablet (10 mg total) by mouth every 6 (six) hours   Patient not taking: Reported on 1/27/2021   ondansetron (ZOFRAN-ODT) 4 mg disintegrating tablet   No No   Sig: Take 1 tablet (4 mg total) by mouth every 6 (six) hours as needed for nausea or vomiting      Facility-Administered Medications: None       Past Medical History:   Diagnosis Date    Chiari malformation type I ([facility] [address] )     Chronic fatigue     last assessed 9/26/16    COVID-19 07/25/2020    Fever and chills 7/25/2020    Hyperlipidemia     resolved 9/29/17    Hypertension     pre-eclampsia 2016    Idiopathic intracranial hypertension     Varicella     pt unsure    Visual impairment     glasses       Past Surgical History:   Procedure Laterality Date    CHOLECYSTECTOMY LAPAROSCOPIC N/A 5/11/2016    Procedure: CHOLECYSTECTOMY LAPAROSCOPIC possible open;  Surgeon: Haider Hernandez MD;  Location: [facility] OR;  Service:    Burden REDUCTION MAMMAPLASTY         Family History   Problem Relation Age of Onset    Leukemia Maternal Grandfather     No Known Problems Mother     Other Father         MVA    No Known Problems Sister     Hypertension Maternal Grandmother     No Known Problems Daughter     No Known Problems Sister      I have reviewed and agree with the history as documented  E-Cigarette/Vaping    E-Cigarette Use Never User      E-Cigarette/Vaping Substances    Nicotine No     THC No     CBD No     Flavoring No     Other No     Unknown No      Social History     Tobacco Use    Smoking status: Never Smoker    Smokeless tobacco: Never Used   Substance Use Topics    Alcohol use: Not Currently     Frequency: Never     Binge frequency: Never    Drug use: Never        Review of Systems   Constitutional: Negative for chills, fatigue and fever  HENT: Negative for rhinorrhea and sore throat  Eyes: Negative for visual disturbance  Respiratory: Negative for cough and shortness of breath  Cardiovascular: Negative for chest pain     Gastrointestinal: Positive for nausea and vomiting  Negative for abdominal pain and diarrhea  Genitourinary: Negative for difficulty urinating  Musculoskeletal: Negative for arthralgias and myalgias  Neurological: Positive for headaches  Negative for dizziness and light-headedness  All other systems reviewed and are negative  Physical Exam  ED Triage Vitals   Temperature Pulse Respirations Blood Pressure SpO2   01/27/21 1728 01/27/21 1728 01/27/21 1728 01/27/21 1728 01/27/21 1728   99 1 °F (37 3 °C) 96 18 129/88 97 %      Temp src Heart Rate Source Patient Position - Orthostatic VS BP Location FiO2 (%)   -- -- 01/27/21 2021 01/27/21 2021 --     Lying Right arm       Pain Score       01/27/21 1728       8             Orthostatic Vital Signs  Vitals:    01/27/21 1728 01/27/21 2021   BP: 129/88 113/62   Pulse: 96 62   Patient Position - Orthostatic VS:  Lying       Physical Exam  Vitals signs and nursing note reviewed  Constitutional:       General: She is not in acute distress  Appearance: She is well-developed  She is not diaphoretic  HENT:      Head: Normocephalic and atraumatic  Eyes:      General: No scleral icterus  Conjunctiva/sclera: Conjunctivae normal    Neck:      Musculoskeletal: Neck supple  Vascular: No JVD  Trachea: No tracheal deviation  Cardiovascular:      Rate and Rhythm: Normal rate and regular rhythm  Pulmonary:      Effort: Pulmonary effort is normal  No respiratory distress  Breath sounds: Normal breath sounds  Abdominal:      Palpations: Abdomen is soft  Tenderness: There is no abdominal tenderness  There is no guarding  Musculoskeletal:         General: No deformity  Skin:     General: Skin is warm and dry  Findings: No rash  Neurological:      Mental Status: She is alert  Comments: Strength 5/5 and sensation intact to light touch in all extremities  PERRL  Cranial nerves 2-12 grossly intact         Psychiatric:         Behavior: Behavior normal          ED Medications  Medications   ondansetron (ZOFRAN-ODT) dispersible tablet 4 mg (4 mg Oral Given 1/27/21 1841)   acetaminophen (TYLENOL) tablet 975 mg (975 mg Oral Given 1/27/21 1841)   lidocaine (PF) (XYLOCAINE-MPF) 1 % injection 10 mL (10 mL Infiltration Given 1/27/21 1940)   diphenhydrAMINE (BENADRYL) injection 25 mg (25 mg Intravenous Given 1/27/21 2014)   multi-electrolyte (ISOLYTE-S PH 7 4) bolus 1,000 mL (0 mL Intravenous Stopped 1/27/21 2124)   metoclopramide (REGLAN) injection 10 mg (10 mg Intravenous Given 1/27/21 2016)   dexamethasone (PF) (DECADRON) injection 10 mg (10 mg Intravenous Given 1/27/21 2141)   magnesium sulfate 2 g/50 mL IVPB (premix) 2 g (0 g Intravenous Stopped 1/27/21 2141)       Diagnostic Studies  Results Reviewed     None                 No orders to display         Procedures  Procedures      ED Course  ED Course as of Jan 27 2226   Wed Jan 27, 2021   2105 Pt resting comfortably                                          MDM  Number of Diagnoses or Management Options  Diagnosis management comments: Will check optic discs with US for papiledema and do US of POC to identify fetal heart motion  HA has no associated sudden onset, worst ever, first ever, different from usual HA, meningeal signs, fever, worst in morning, syncope, trauma, neuro defecits  Patient is not anticoagulated and is not immunicompromised  Given hx IIH may need to perform LP            Amount and/or Complexity of Data Reviewed  Review and summarize past medical records: yes  Discuss the patient with other providers: yes    Risk of Complications, Morbidity, and/or Mortality  Presenting problems: moderate  Diagnostic procedures: low  Management options: low    Patient Progress  Patient progress: stable      Disposition  Final diagnoses:   Acute nonintractable headache, unspecified headache type     Time reflects when diagnosis was documented in both MDM as applicable and the Disposition within this note Time User Action Codes Description Comment    1/27/2021  9:10 PM Niharika Mary Add [R51 9] Acute nonintractable headache, unspecified headache type       ED Disposition     ED Disposition Condition Date/Time Comment    Discharge Stable Wed Jan 27, 2021  9:16 PM [facility] discharge to home/self care  Return precautions given and questions answered  Follow-up Information     Follow up With Specialties Details Why Contact Info    Sebas Naranjo PA-C Internal Medicine, Physician Assistant Call in 1 day To recheck symptoms and follow up on your ER visit  [address]  [address]      Van Arriaga MD Neurology Call in 1 day To recheck symptoms and follow up on your ER visit [facility] [address] [facility]  [phone]            Discharge Medication List as of 1/27/2021  9:16 PM      CONTINUE these medications which have NOT CHANGED    Details   doxylamine (UNISON) 25 MG tablet Take 1 tablet (25 mg total) by mouth daily at bedtime as needed for sleep, Starting Tue 1/5/2021, Until Thu 2/4/2021, No Print      metoclopramide (REGLAN) 10 mg tablet Take 1 tablet (10 mg total) by mouth every 6 (six) hours, Starting Sun 1/3/2021, Until Tue 2/2/2021, Print      ondansetron (ZOFRAN-ODT) 4 mg disintegrating tablet Take 1 tablet (4 mg total) by mouth every 6 (six) hours as needed for nausea or vomiting, Starting Sun 1/10/2021, Normal      Pyridoxine HCl (vitamin B-6) 25 MG tablet Take 1 tablet (25 mg total) by mouth 3 (three) times a day, Starting Sun 1/3/2021, Until Tue 2/2/2021, Print           No discharge procedures on file  PDMP Review     None           ED Provider  Attending physically available and evaluated [facility]  I managed the patient along with the ED Attending      Electronically Signed by         Ivan Padilla DO  01/27/21 7267

## 2021-01-27 NOTE — ED PROCEDURE NOTE
Procedure  POC Pelvic US    Date/Time: 1/27/2021 6:13 PM  Performed by: Maria E Latif DO  Authorized by: Maria E Latif DO     Patient location:  ED  Other Assisting Provider: No    Procedure details:     Exam Type:  Diagnostic    Assessment for: evaluate fetal viability      Technique:  Transabdominal obstetric (HCG+) exam    Views obtained: uterus (transverse and sagittal)      Image quality: diagnostic      Image availability:  Images available in PACS  Uterine findings:     Intrauterine pregnancy: identified      Fetal heart rate: identified      Fetal heart rate (bpm):  142  Interpretation:     Ultrasound impressions: normal      Pregnancy findings: intrauterine pregnancy (IUP)                       Maria E Latif DO  01/27/21 181

## 2021-01-27 NOTE — ED PROCEDURE NOTE
Procedure  POC Ocular US    Date/Time: 1/27/2021 6:13 PM  Performed by: Andres Menon DO  Authorized by: Andres Menon DO     Patient location:  ED  Other Assisting Provider: No    Procedure details:     Exam Type:  Diagnostic    Indications: evaluation for increased intracranial pressure      Assessment for: optic nerve sheath diameter      Eye(s) assessed:   Both eyes    Structures visualized: anterior chamber, posterior chamber and optic nerve      Image quality: limited diagnostic      Image availability:  Images available in PACS  Left eye findings:     Left optic nerve sheath diameter: normal (less than or equal to 5 mm)    Right eye findings:     Right optic nerve sheath diameter: normal (less than or equal to 5 mm)    Interpretation:     Ocular US impressions: indeterminate                       Andres Menon DO  01/27/21 1819

## 2021-01-27 NOTE — PROGRESS NOTES
Virtual Regular Visit      Assessment/Plan: On your visit today we did discuss your headache that started 2 days ago  We did review that you do have nausea and vomiting but have had this since the start of your pregnancy with no improvement to date with medications and lifestyle modifications  The headache has been persistent and getting worse over the past 2 days  You are now reporting pressure without change in vision behind your right eye  You have tried Tylenol as well as heat and ice and rest none of these have improved your pain  Because of your already underlying nausea and vomiting you have not been able to eat or drink well in addition to the pain  As per our discussion you do not have any symptoms directly consistent with COVID which you had previously while this does not rule out COVID completely I do have concern considering your headache has been getting worse and not able to tolerate food or medications  For these reasons for your health and the health of your pregnancy you are agreeable to going to the emergency room for further evaluation and treatment as appropriate to your symptoms at this time  Please continue follow-up with your OBGYN as scheduled and to also further discuss options for your ongoing nausea and vomiting  Problem List Items Addressed This Visit        Nervous and Auditory    Chiari malformation type I ([address] )    IIH (idiopathic intracranial hypertension)      Other Visit Diagnoses     Episodic tension-type headache, intractable    -  Primary    Nausea and vomiting during pregnancy        Dizziness        Photophobia                   Reason for visit is   Chief Complaint   Patient presents with    Headache     Patient complain of headaches since yesterday 1/26/2021 and was sent to home from work  Patient state this morning at 9:00 am she check her blood pressure and was 139/94       Virtual Regular Visit        Encounter provider Cristóbal Cheek MARIBEL    Provider located at [address] [place] [address] [address] [address] [phone]      Recent Visits  No visits were found meeting these conditions  Showing recent visits within past 7 days and meeting all other requirements     Today's Visits  Date Type Provider Dept   01/27/21 Telemedicine Zoie Tavares, [facility] today's visits and meeting all other requirements     Future Appointments  No visits were found meeting these conditions  Showing future appointments within next 150 days and meeting all other requirements        The patient was identified by name and date of birth  Emilietroy Willis was informed that this is a telemedicine visit and that the visit is being conducted through [facility] and patient was informed that this is a secure, HIPAA-compliant platform  She agrees to proceed     My office door was closed  No one else was in the room  She acknowledged consent and understanding of privacy and security of the video platform  The patient has agreed to participate and understands they can discontinue the visit at any time  Patient is aware this is a billable service  Rhonda Bhakta is a 26 y o  female    Pt contacted regarding H/A that started 1/26/2021  Checked her BP and was a little elevated  Patient was sent home from work for headache  Headache is across front both sides but also has some pain in the back of her neck as well  Has been taking tylenol and not really helping  Has tried warm, cold  Patient is struggling with Nausea and vomiting during the pregnancy and has med from her OB gyn  Patient has been trialed on 3 medications for nausea and vomiting most recently Zofran which also she states is not helping much  Current estimated delivery is August of 2021    Patient does share that with her prior pregnancy she had issues with nausea and vomiting into her 8th month  Patient did check her blood pressure  136/90 when rechecked 1 hour ago  Prior to visit it was 139/94  Patient has past medical history significant for Chiari 1 malformation and idiopathic intracranial hypertension and prior history of preeclampsia  States call her OB and told to call here as concerned about COVID  Patient did have COVID back in July but does not completely rule out new infection  Patient denies anyone else in the home or family is sick  Patient does work in [employer] but does not know of any positive coworkers  Reviewed with patient based on the fact that for 2 days she has had a severe progressively worsening headache associated with nausea and vomiting although admittedly nausea and vomiting had started prior to the headache due to pregnancy but at this point not sleeping well overnight not able to drink or keep food down and for her safety and the safety of the baby patient was advised needs to go to the emergency room for further evaluation  No obvious red flags on the visit patient was able to complete the video but clearly looks unwell and uncomfortable in pain  Past Medical History:   Diagnosis Date    Chiari malformation type I ([facility] [address] )     Chronic fatigue     last assessed 9/26/16    COVID-19 07/25/2020    Fever and chills 7/25/2020    Hyperlipidemia     resolved 9/29/17    Hypertension     pre-eclampsia 2016    Idiopathic intracranial hypertension     Varicella     pt unsure    Visual impairment     glasses       Past Surgical History:   Procedure Laterality Date    CHOLECYSTECTOMY LAPAROSCOPIC N/A 5/11/2016    Procedure: CHOLECYSTECTOMY LAPAROSCOPIC possible open;  Surgeon: Dale Brock MD;  Location: [facility];  Service:    Derrick Spinner REDUCTION MAMMAPLASTY         No current facility-administered medications for this visit        Current Outpatient Medications   Medication Sig Dispense Refill    ondansetron (ZOFRAN-ODT) 4 mg disintegrating tablet Take 1 tablet (4 mg total) by mouth every 6 (six) hours as needed for nausea or vomiting 20 tablet 0    Pyridoxine HCl (vitamin B-6) 25 MG tablet Take 1 tablet (25 mg total) by mouth 3 (three) times a day 90 tablet 0    doxylamine (UNISON) 25 MG tablet Take 1 tablet (25 mg total) by mouth daily at bedtime as needed for sleep (Patient not taking: Reported on 1/27/2021) 45 tablet 0    metoclopramide (REGLAN) 10 mg tablet Take 1 tablet (10 mg total) by mouth every 6 (six) hours (Patient not taking: Reported on 1/27/2021) 120 tablet 0     Facility-Administered Medications Ordered in Other Visits   Medication Dose Route Frequency Provider Last Rate Last Admin    lidocaine (PF) (XYLOCAINE-MPF) 1 % injection 10 mL  10 mL Infiltration Once Jaya Millstadt, DO            No Known Allergies    Review of Systems   Constitutional: Positive for appetite change  Negative for chills, fever and unexpected weight change  Eyes: Positive for photophobia  Negative for visual disturbance  Not severe pain but patient is experiencing pressure behind her right eye,    confirms  photophobia   Cardiovascular: Negative  Negative for chest pain  Gastrointestinal: Positive for abdominal pain, nausea and vomiting  Negative for diarrhea  Neurological: Positive for light-headedness and headaches  Negative for syncope and weakness  Video Exam    Vitals:    01/27/21 1113   BP: 139/94       Physical Exam  Vitals signs and nursing note reviewed  Constitutional:       General: She is not in acute distress  Comments: Patient not in any acute distress but clearly in pain and uncomfortable  HENT:      Head: Normocephalic  Eyes:      Extraocular Movements: Extraocular movements intact  Conjunctiva/sclera: Conjunctivae normal    Pulmonary:      Effort: Pulmonary effort is normal  No respiratory distress  Neurological:      General: No focal deficit present  Mental Status: She is alert     Psychiatric: Mood and Affect: Mood normal           I spent 17 minutes directly with the patient during this visit      [address] acknowledges that she has consented to an online visit or consultation  She understands that the online visit is based solely on information provided by her, and that, in the absence of a face-to-face physical evaluation by the physician, the diagnosis she receives is both limited and provisional in terms of accuracy and completeness  This is not intended to replace a full medical face-to-face evaluation by the physician  [facility] understands and accepts these terms

## 2021-01-27 NOTE — ED ATTENDING ATTESTATION
2021  IAdrianna MD, saw and evaluated the patient  I have discussed the patient with the resident/non-physician practitioner and agree with the resident's/non-physician practitioner's findings, Plan of Care, and MDM as documented in the resident's/non-physician practitioner's note, except where noted  All available labs and Radiology studies were reviewed  I was present for key portions of any procedure(s) performed by the resident/non-physician practitioner and I was immediately available to provide assistance  At this point I agree with the current assessment done in the Emergency Department  I have conducted an independent evaluation of this patient a history and physical is as follows:    ED Course         Critical Care Time  Procedures    27 yo female 10 weeks pregnant, , hx of pseudotumor and chiari malformation c/o headache for few days  No change in vision, no numbness, tingling, weakness  Pt with nausea, no vomiting  Pt with photophobia  Vss, afebrile, lungs cta, rrr, abdomen soft nontender, no neuro defiicts  U/s fetus  Likely migraine type headache  Pain meds, offer lp to check pressure  X-Ray at bedside.

## 2021-01-28 ENCOUNTER — TELEPHONE (OUTPATIENT)
Dept: INTERNAL MEDICINE CLINIC | Facility: CLINIC | Age: 26
End: 2021-01-28

## 2021-01-28 NOTE — DISCHARGE INSTRUCTIONS
You were seen today in the Emergency Department for a headache  Your workup included a lumbar puncture and an exam by two physicians  Please follow up with your Primary Care Provider in the next 1-2 days to recheck your symptoms and to follow up on your visit to the Emergency Department today  Please return to the Emergency Department if you have worsening symptoms, fevers, chills, chest pain, shortness of breath, are unable to eat or drink, or have any other symptoms that concern you  Please look this over and let your nurse and/or me know if you have any further questions before you leave

## 2021-01-28 NOTE — TELEPHONE ENCOUNTER
Patient is requesting a letter asking Lisa Navarrete to prepare a letter to be able to stay out of work for a while since she has been having headaches, nausea, weak due to her pregnancy and cannot perform her job,    Please check into this and call the office with the decision or to ask further questions      Thanks    Patient states once she shows the letter her employer will give her forms to have PCP complete

## 2021-01-28 NOTE — PATIENT INSTRUCTIONS
On your visit today we did discuss your headache that started 2 days ago  We did review that you do have nausea and vomiting but have had this since the start of your pregnancy with no improvement to date with medications and lifestyle modifications  The headache has been persistent and getting worse over the past 2 days  You are now reporting pressure without change in vision behind your right eye  You have tried Tylenol as well as heat and ice and rest none of these have improved your pain  Because of your already underlying nausea and vomiting you have not been able to eat or drink well in addition to the pain  As per our discussion you do not have any symptoms directly consistent with COVID which you had previously while this does not rule out COVID completely I do have concern considering your headache has been getting worse and not able to tolerate food or medications  For these reasons for your health and the health of your pregnancy you are agreeable to going to the emergency room for further evaluation and treatment as appropriate to your symptoms at this time  Please continue follow-up with your OBGYN as scheduled and to also further discuss options for your ongoing nausea and vomiting

## 2021-01-28 NOTE — ED PROCEDURE NOTE
Procedure  Lumbar puncture    Date/Time: 1/27/2021 8:06 PM  Performed by: Maria E Latif DO  Authorized by: Maria E Latif DO   Universal Protocol:  Procedure performed by: (Dr Miriam Solis)  Consent: Verbal consent obtained  Written consent obtained  Risks and benefits: risks, benefits and alternatives were discussed  Consent given by: patient  Time out: Immediately prior to procedure a "time out" was called to verify the correct patient, procedure, equipment, support staff and site/side marked as required  Patient understanding: patient states understanding of the procedure being performed  Site marked: the operative site was marked  Required items: required blood products, implants, devices, and special equipment available  Patient identity confirmed: verbally with patient      Patient location:  ED  Pre-procedure details:     Preparation: Patient was prepped and draped in usual sterile fashion    Indications:     Indications: evaluation of opening pressure    Procedure details:     Lumbar space:  L4-L5 interspace    Patient position:  L lateral decubitus    Equipment: Lumbar puncture kit used      Needle gauge:  20G x 3 5in    Ultrasound guidance: no      Number of attempts:  5 or more    Opening pressure (cm H2O):  11    Fluid appearance:  Clear  Post-procedure:     Puncture site:  Adhesive bandage applied and direct pressure applied    Patient tolerance of procedure:   Tolerated well, no immediate complications    Patient instructed to lie flat for one(1) hour post lumbar puncture : Yes                       Maria E Latif DO  01/27/21 2007

## 2021-01-28 NOTE — TELEPHONE ENCOUNTER
Patient called to get a letter from [employer] to be able to be out of work for  -- patient states that she cannot

## 2021-01-28 NOTE — TELEPHONE ENCOUNTER
Patient called regarding this letter, she would like a call when it's available she'll print it from [employer] [facility]  Andreia Sequin

## 2021-01-29 NOTE — TELEPHONE ENCOUNTER
Patient called for the status - she would like to have the letter done today   -- please check into this   thanks

## 2021-01-30 ENCOUNTER — HOSPITAL ENCOUNTER (OUTPATIENT)
Facility: HOSPITAL | Age: 26
Setting detail: OBSERVATION
Discharge: HOME | End: 2021-02-01
Attending: EMERGENCY MEDICINE | Admitting: INTERNAL MEDICINE
Payer: COMMERCIAL

## 2021-01-30 DIAGNOSIS — O21.9 NAUSEA AND VOMITING DURING PREGNANCY: ICD-10-CM

## 2021-01-30 DIAGNOSIS — Z3A.10 10 WEEKS GESTATION OF PREGNANCY: ICD-10-CM

## 2021-01-30 DIAGNOSIS — R51.9 INTRACTABLE HEADACHE: ICD-10-CM

## 2021-01-30 DIAGNOSIS — G43.909 MIGRAINE HEADACHE: ICD-10-CM

## 2021-01-30 DIAGNOSIS — O21.0 HYPEREMESIS OF PREGNANCY: Primary | ICD-10-CM

## 2021-01-30 PROBLEM — Z34.90 INTRAUTERINE PREGNANCY: Status: ACTIVE | Noted: 2020-07-21

## 2021-01-30 LAB
ALBUMIN SERPL BCP-MCNC: 3.4 G/DL (ref 3.5–5)
ALP SERPL-CCNC: 49 U/L (ref 46–116)
ALT SERPL W P-5'-P-CCNC: 53 U/L (ref 12–78)
ANION GAP SERPL CALCULATED.3IONS-SCNC: 9 MMOL/L (ref 4–13)
AST SERPL W P-5'-P-CCNC: 19 U/L (ref 5–45)
BACTERIA UR QL AUTO: ABNORMAL /HPF
BASOPHILS # BLD AUTO: 0 THOUSANDS/ΜL (ref 0–0.1)
BASOPHILS NFR BLD AUTO: 0 % (ref 0–1)
BILIRUB SERPL-MCNC: 0.28 MG/DL (ref 0.2–1)
BILIRUB UR QL STRIP: ABNORMAL
BUN SERPL-MCNC: 5 MG/DL (ref 5–25)
CALCIUM ALBUM COR SERPL-MCNC: 10.7 MG/DL (ref 8.3–10.1)
CALCIUM SERPL-MCNC: 10.2 MG/DL (ref 8.3–10.1)
CHLORIDE SERPL-SCNC: 105 MMOL/L (ref 100–108)
CLARITY UR: CLEAR
CO2 SERPL-SCNC: 26 MMOL/L (ref 21–32)
COLOR UR: ABNORMAL
CREAT SERPL-MCNC: 0.59 MG/DL (ref 0.6–1.3)
EOSINOPHIL # BLD AUTO: 0.01 THOUSAND/ΜL (ref 0–0.61)
EOSINOPHIL NFR BLD AUTO: 0 % (ref 0–6)
ERYTHROCYTE [DISTWIDTH] IN BLOOD BY AUTOMATED COUNT: 12.5 % (ref 11.6–15.1)
GFR SERPL CREATININE-BSD FRML MDRD: 129 ML/MIN/1.73SQ M
GLUCOSE SERPL-MCNC: 96 MG/DL (ref 65–140)
GLUCOSE UR STRIP-MCNC: ABNORMAL MG/DL
HCT VFR BLD AUTO: 40 % (ref 34.8–46.1)
HGB BLD-MCNC: 13.5 G/DL (ref 11.5–15.4)
HGB UR QL STRIP.AUTO: NEGATIVE
HYALINE CASTS #/AREA URNS LPF: ABNORMAL /LPF
IMM GRANULOCYTES # BLD AUTO: 0.04 THOUSAND/UL (ref 0–0.2)
IMM GRANULOCYTES NFR BLD AUTO: 0 % (ref 0–2)
KETONES UR STRIP-MCNC: ABNORMAL MG/DL
LEUKOCYTE ESTERASE UR QL STRIP: ABNORMAL
LYMPHOCYTES # BLD AUTO: 1.24 THOUSANDS/ΜL (ref 0.6–4.47)
LYMPHOCYTES NFR BLD AUTO: 9 % (ref 14–44)
MCH RBC QN AUTO: 29.2 PG (ref 26.8–34.3)
MCHC RBC AUTO-ENTMCNC: 33.8 G/DL (ref 31.4–37.4)
MCV RBC AUTO: 87 FL (ref 82–98)
MONOCYTES # BLD AUTO: 0.52 THOUSAND/ΜL (ref 0.17–1.22)
MONOCYTES NFR BLD AUTO: 4 % (ref 4–12)
NEUTROPHILS # BLD AUTO: 11.8 THOUSANDS/ΜL (ref 1.85–7.62)
NEUTS SEG NFR BLD AUTO: 87 % (ref 43–75)
NITRITE UR QL STRIP: NEGATIVE
NON-SQ EPI CELLS URNS QL MICRO: ABNORMAL /HPF
NRBC BLD AUTO-RTO: 0 /100 WBCS
PH UR STRIP.AUTO: 6.5 [PH] (ref 4.5–8)
PLATELET # BLD AUTO: 273 THOUSANDS/UL (ref 149–390)
PMV BLD AUTO: 9.6 FL (ref 8.9–12.7)
POTASSIUM SERPL-SCNC: 3.7 MMOL/L (ref 3.5–5.3)
PROT SERPL-MCNC: 7.6 G/DL (ref 6.4–8.2)
PROT UR STRIP-MCNC: ABNORMAL MG/DL
RBC # BLD AUTO: 4.62 MILLION/UL (ref 3.81–5.12)
RBC #/AREA URNS AUTO: ABNORMAL /HPF
SODIUM SERPL-SCNC: 140 MMOL/L (ref 136–145)
SP GR UR STRIP.AUTO: >=1.03 (ref 1–1.03)
UROBILINOGEN UR QL STRIP.AUTO: 4 E.U./DL
WBC # BLD AUTO: 13.61 THOUSAND/UL (ref 4.31–10.16)
WBC #/AREA URNS AUTO: ABNORMAL /HPF

## 2021-01-30 PROCEDURE — 36415 COLL VENOUS BLD VENIPUNCTURE: CPT | Performed by: EMERGENCY MEDICINE

## 2021-01-30 PROCEDURE — 85025 COMPLETE CBC W/AUTO DIFF WBC: CPT | Performed by: EMERGENCY MEDICINE

## 2021-01-30 PROCEDURE — 99285 EMERGENCY DEPT VISIT HI MDM: CPT

## 2021-01-30 PROCEDURE — 99285 EMERGENCY DEPT VISIT HI MDM: CPT | Performed by: EMERGENCY MEDICINE

## 2021-01-30 PROCEDURE — 81001 URINALYSIS AUTO W/SCOPE: CPT

## 2021-01-30 PROCEDURE — 80053 COMPREHEN METABOLIC PANEL: CPT | Performed by: EMERGENCY MEDICINE

## 2021-01-30 PROCEDURE — 96365 THER/PROPH/DIAG IV INF INIT: CPT

## 2021-01-30 PROCEDURE — 96366 THER/PROPH/DIAG IV INF ADDON: CPT

## 2021-01-30 PROCEDURE — 96368 THER/DIAG CONCURRENT INF: CPT

## 2021-01-30 PROCEDURE — 93005 ELECTROCARDIOGRAM TRACING: CPT

## 2021-01-30 PROCEDURE — 87086 URINE CULTURE/COLONY COUNT: CPT

## 2021-01-30 PROCEDURE — 96375 TX/PRO/DX INJ NEW DRUG ADDON: CPT

## 2021-01-30 RX ORDER — MAGNESIUM SULFATE 1 G/100ML
1 INJECTION INTRAVENOUS ONCE
Status: COMPLETED | OUTPATIENT
Start: 2021-01-30 | End: 2021-01-30

## 2021-01-30 RX ORDER — ONDANSETRON 2 MG/ML
4 INJECTION INTRAMUSCULAR; INTRAVENOUS EVERY 6 HOURS PRN
Status: DISCONTINUED | OUTPATIENT
Start: 2021-01-30 | End: 2021-01-31

## 2021-01-30 RX ORDER — METOCLOPRAMIDE HYDROCHLORIDE 5 MG/ML
10 INJECTION INTRAMUSCULAR; INTRAVENOUS EVERY 8 HOURS SCHEDULED
Status: DISCONTINUED | OUTPATIENT
Start: 2021-01-30 | End: 2021-02-01 | Stop reason: HOSPADM

## 2021-01-30 RX ORDER — KETOROLAC TROMETHAMINE 30 MG/ML
30 INJECTION, SOLUTION INTRAMUSCULAR; INTRAVENOUS EVERY 8 HOURS
Status: DISCONTINUED | OUTPATIENT
Start: 2021-01-30 | End: 2021-01-31

## 2021-01-30 RX ORDER — METOCLOPRAMIDE HYDROCHLORIDE 5 MG/ML
10 INJECTION INTRAMUSCULAR; INTRAVENOUS ONCE
Status: COMPLETED | OUTPATIENT
Start: 2021-01-30 | End: 2021-01-30

## 2021-01-30 RX ORDER — DEXTROSE, SODIUM CHLORIDE, SODIUM LACTATE, POTASSIUM CHLORIDE, AND CALCIUM CHLORIDE 5; .6; .31; .03; .02 G/100ML; G/100ML; G/100ML; G/100ML; G/100ML
125 INJECTION, SOLUTION INTRAVENOUS CONTINUOUS
Status: DISCONTINUED | OUTPATIENT
Start: 2021-01-30 | End: 2021-01-30

## 2021-01-30 RX ORDER — MAGNESIUM SULFATE 1 G/100ML
1 INJECTION INTRAVENOUS ONCE
Status: COMPLETED | OUTPATIENT
Start: 2021-01-30 | End: 2021-01-31

## 2021-01-30 RX ORDER — ACETAMINOPHEN 325 MG/1
975 TABLET ORAL ONCE
Status: COMPLETED | OUTPATIENT
Start: 2021-01-30 | End: 2021-01-30

## 2021-01-30 RX ORDER — SODIUM CHLORIDE, SODIUM LACTATE, POTASSIUM CHLORIDE, CALCIUM CHLORIDE 600; 310; 30; 20 MG/100ML; MG/100ML; MG/100ML; MG/100ML
125 INJECTION, SOLUTION INTRAVENOUS CONTINUOUS
Status: DISPENSED | OUTPATIENT
Start: 2021-01-30 | End: 2021-02-01

## 2021-01-30 RX ORDER — DIPHENHYDRAMINE HYDROCHLORIDE 50 MG/ML
25 INJECTION INTRAMUSCULAR; INTRAVENOUS EVERY 8 HOURS PRN
Status: DISCONTINUED | OUTPATIENT
Start: 2021-01-30 | End: 2021-02-01 | Stop reason: HOSPADM

## 2021-01-30 RX ORDER — PYRIDOXINE HCL (VITAMIN B6) 50 MG
25 TABLET ORAL 3 TIMES DAILY
Status: DISCONTINUED | OUTPATIENT
Start: 2021-01-30 | End: 2021-02-01 | Stop reason: HOSPADM

## 2021-01-30 RX ORDER — ONDANSETRON 2 MG/ML
4 INJECTION INTRAMUSCULAR; INTRAVENOUS ONCE
Status: COMPLETED | OUTPATIENT
Start: 2021-01-30 | End: 2021-01-30

## 2021-01-30 RX ORDER — ACETAMINOPHEN 325 MG/1
650 TABLET ORAL EVERY 6 HOURS PRN
Status: DISCONTINUED | OUTPATIENT
Start: 2021-01-30 | End: 2021-02-01 | Stop reason: HOSPADM

## 2021-01-30 RX ADMIN — SODIUM CHLORIDE, SODIUM LACTATE, POTASSIUM CHLORIDE, AND CALCIUM CHLORIDE 125 ML/HR: .6; .31; .03; .02 INJECTION, SOLUTION INTRAVENOUS at 23:19

## 2021-01-30 RX ADMIN — DEXTROSE 1000 ML: 5 SOLUTION INTRAVENOUS at 17:07

## 2021-01-30 RX ADMIN — ACETAMINOPHEN 975 MG: 325 TABLET, FILM COATED ORAL at 19:24

## 2021-01-30 RX ADMIN — DEXTROSE 1000 ML: 5 SOLUTION INTRAVENOUS at 19:25

## 2021-01-30 RX ADMIN — MAGNESIUM SULFATE HEPTAHYDRATE 1 G: 1 INJECTION, SOLUTION INTRAVENOUS at 17:51

## 2021-01-30 RX ADMIN — METOCLOPRAMIDE 10 MG: 5 INJECTION, SOLUTION INTRAMUSCULAR; INTRAVENOUS at 17:06

## 2021-01-30 RX ADMIN — METOCLOPRAMIDE 10 MG: 5 INJECTION, SOLUTION INTRAMUSCULAR; INTRAVENOUS at 23:19

## 2021-01-30 RX ADMIN — KETOROLAC TROMETHAMINE 30 MG: 30 INJECTION, SOLUTION INTRAMUSCULAR at 23:19

## 2021-01-30 RX ADMIN — ONDANSETRON 4 MG: 2 INJECTION INTRAMUSCULAR; INTRAVENOUS at 21:11

## 2021-01-30 NOTE — ED PROVIDER NOTES
History  Chief Complaint   Patient presents with    Vomiting During Pregnancy     pt c/o vomiting every 30-60 minutes today, unable to keep anything down  pt also c/o severe headache, no hx migraines   Headache     Pt is a 18yo F who is a  who is currently 10 weeks pregnant and presents for headache, nausea, and vomiting  Patient states she has had the headache for the past 4 days was seen here on day 1  Patient states she got a migraine cocktail at that time as well as an LP without improvement  Patient states she has continued to take Tylenol at home for pain control without any relief  Patient states her headache is a 10/10 and she feels it primarily in the frontal region and in the posterior aspect of her neck  Patient states that she has history of headaches and this feels similar to her previous headaches but is significantly more severe  Patient reports that she had headaches with her 1st pregnancy that was similar to this, however this is more severe  Patient also reports new vision changes consisting of blurred vision bilaterally  Patient also reports intermittent dizziness "when she opens her eyes"  Patient has had difficulty ambulating due to the dizziness  In regards to the nausea and vomiting, patient states that this has been going on for the past several weeks  Over the past several days she has not been able to eat or drink much  Patient states she last ate yesterday and immediately vomited  Patient has prescribe Zofran that she has been using without relief   states that patient drinks water immediately after using the Zofran because she does not like the taste  Patient then immediately vomits  Patient denies any abdominal pain, vaginal discharge, vaginal bleeding  Patient denies any swelling in her lower extremities  Patient denies any recent illness including fevers, chills, shortness breath, cough  Patient denies any chronic medical problems    Patient states that she has not yet established care with Ob, but has appointment in the coming week  Prior to Admission Medications   Prescriptions Last Dose Informant Patient Reported? Taking? Pyridoxine HCl (vitamin B-6) 25 MG tablet 1/30/2021 at Unknown time  No Yes   Sig: Take 1 tablet (25 mg total) by mouth 3 (three) times a day   doxylamine (UNISON) 25 MG tablet Not Taking at Unknown time  No No   Sig: Take 1 tablet (25 mg total) by mouth daily at bedtime as needed for sleep   Patient not taking: Reported on 1/27/2021   metoclopramide (REGLAN) 10 mg tablet Not Taking at Unknown time  No No   Sig: Take 1 tablet (10 mg total) by mouth every 6 (six) hours   Patient not taking: Reported on 1/27/2021   ondansetron (ZOFRAN-ODT) 4 mg disintegrating tablet 1/30/2021 at Unknown time  No Yes   Sig: Take 1 tablet (4 mg total) by mouth every 6 (six) hours as needed for nausea or vomiting      Facility-Administered Medications: None       Past Medical History:   Diagnosis Date    Chiari malformation type I ([facility] [address] )     Chronic fatigue     last assessed 9/26/16    COVID-19 07/25/2020    Fever and chills 7/25/2020    Hyperlipidemia     resolved 9/29/17    Hypertension     pre-eclampsia 2016    Idiopathic intracranial hypertension     Varicella     pt unsure    Visual impairment     glasses       Past Surgical History:   Procedure Laterality Date    CHOLECYSTECTOMY LAPAROSCOPIC N/A 5/11/2016    Procedure: CHOLECYSTECTOMY LAPAROSCOPIC possible open;  Surgeon: Cristal Ocampo MD;  Location: [facility];  Service:    Geena Lakhani REDUCTION MAMMAPLASTY         Family History   Problem Relation Age of Onset    Leukemia Maternal Grandfather     No Known Problems Mother     Other Father         MVA    No Known Problems Sister     Hypertension Maternal Grandmother     No Known Problems Daughter     No Known Problems Sister      I have reviewed and agree with the history as documented      E-Cigarette/Vaping    E-Cigarette Use Never User E-Cigarette/Vaping Substances    Nicotine No     THC No     CBD No     Flavoring No     Other No     Unknown No      Social History     Tobacco Use    Smoking status: Never Smoker    Smokeless tobacco: Never Used   Substance Use Topics    Alcohol use: Not Currently     Frequency: Never     Binge frequency: Never    Drug use: Never        Review of Systems   Constitutional: Positive for activity change (decreased) and appetite change (decreased due to nausea)  Negative for chills and fever  HENT: Negative for ear pain, sinus pressure, sinus pain and sore throat  Eyes: Positive for visual disturbance (blurring)  Negative for pain  Respiratory: Negative for cough and shortness of breath  Cardiovascular: Negative for chest pain and leg swelling  Gastrointestinal: Positive for nausea and vomiting (non-bloody)  Negative for abdominal pain and diarrhea  Genitourinary: Negative for dysuria, frequency, hematuria and urgency  Musculoskeletal: Positive for back pain (intermittent at site of LP) and neck pain  Negative for arthralgias and neck stiffness  Skin: Negative for color change, pallor, rash and wound  Neurological: Positive for dizziness ('when opening eyes'), weakness (generalized) and headaches (frontal and posterior)  Negative for tremors, seizures, syncope and numbness  Psychiatric/Behavioral: Negative for agitation, behavioral problems and confusion         Physical Exam  ED Triage Vitals   Temperature Pulse Respirations Blood Pressure SpO2   01/30/21 1624 01/30/21 1624 01/30/21 1624 01/30/21 1624 01/30/21 1624   97 6 °F (36 4 °C) 82 16 127/88 96 %      Temp Source Heart Rate Source Patient Position - Orthostatic VS BP Location FiO2 (%)   01/30/21 1624 01/30/21 1624 01/30/21 1624 01/30/21 1624 --   Oral Monitor Lying Right arm       Pain Score       01/30/21 1730       9             Orthostatic Vital Signs  Vitals:    01/30/21 1624 01/30/21 1754 01/30/21 2114 01/30/21 2243   BP: 127/88 101/50 103/59 127/82   Pulse: 82 60 61 78   Patient Position - Orthostatic VS: Lying  Lying Lying       Physical Exam  Vitals signs reviewed  Constitutional:       General: She is not in acute distress  Appearance: She is well-developed  She is not toxic-appearing  Comments: Pt laying in bed and appears uncomfortable  HENT:      Head: Normocephalic and atraumatic  Right Ear: External ear normal       Left Ear: External ear normal       Nose: Nose normal    Eyes:      Extraocular Movements: Extraocular movements intact  Conjunctiva/sclera: Conjunctivae normal       Pupils: Pupils are equal, round, and reactive to light  Neck:      Musculoskeletal: Normal range of motion and neck supple  Muscular tenderness present  No neck rigidity  Cardiovascular:      Rate and Rhythm: Normal rate and regular rhythm  Heart sounds: Normal heart sounds  No murmur  Pulmonary:      Effort: Pulmonary effort is normal  No respiratory distress  Breath sounds: Normal breath sounds  No stridor  No wheezing or rhonchi  Abdominal:      General: Bowel sounds are normal  There is no distension  Palpations: Abdomen is soft  There is no mass  Tenderness: There is no abdominal tenderness  There is no guarding  Musculoskeletal: Normal range of motion  Right lower leg: No edema  Left lower leg: No edema  Lymphadenopathy:      Cervical: No cervical adenopathy  Skin:     General: Skin is warm and dry  Capillary Refill: Capillary refill takes less than 2 seconds  Coloration: Skin is not pale  Findings: No erythema or rash  Neurological:      Mental Status: She is alert and oriented to person, place, and time  GCS: GCS eye subscore is 4  GCS verbal subscore is 5  GCS motor subscore is 6  Cranial Nerves: Cranial nerves are intact  Sensory: Sensation is intact  Motor: Motor function is intact        Comments: Motor and sensory intact in all extremities  Vision grossly intact and pt denying any blurring  Psychiatric:         Behavior: Behavior is cooperative  ED Medications  Medications   metoclopramide (REGLAN) injection 10 mg (10 mg Intravenous Given 1/30/21 2319)   ketorolac (TORADOL) injection 30 mg (30 mg Intravenous Given 1/30/21 2319)   diphenhydrAMINE (BENADRYL) injection 25 mg (has no administration in time range)   acetaminophen (TYLENOL) tablet 650 mg (has no administration in time range)   ondansetron (ZOFRAN) injection 4 mg (has no administration in time range)   magnesium sulfate IVPB (premix) SOLN 1 g (1 g Intravenous New Bag 1/31/21 0011)   pyridoxine (VITAMIN B6) tablet 25 mg (has no administration in time range)   lactated ringers infusion (125 mL/hr Intravenous New Bag 1/30/21 2319)   metoclopramide (REGLAN) injection 10 mg (10 mg Intravenous Given 1/30/21 1706)   magnesium sulfate IVPB (premix) SOLN 1 g (0 g Intravenous Stopped 1/30/21 1916)   dextrose 5 % bolus 1,000 mL (0 mL Intravenous Stopped 1/30/21 1916)   acetaminophen (TYLENOL) tablet 975 mg (975 mg Oral Given 1/30/21 1924)   dextrose 5 % bolus 1,000 mL (0 mL Intravenous Stopped 1/30/21 2114)   ondansetron (ZOFRAN) injection 4 mg (4 mg Intravenous Given 1/30/21 2111)       Diagnostic Studies  Results Reviewed     Procedure Component Value Units Date/Time    Urine Microscopic [[id number]]  (Abnormal) Collected: 01/30/21 1751    Lab Status: Final result Specimen: Urine, Other Updated: 01/30/21 1814     RBC, UA 4-10 /hpf      WBC, UA 10-20 /hpf      Epithelial Cells Moderate /hpf      Bacteria, UA Occasional /hpf      Hyaline Casts, UA 10-25 /lpf     Urine culture [[id number]] Collected: 01/30/21 1751    Lab Status:  In process Specimen: Urine, Other Updated: 01/30/21 1806    Urine Macroscopic, POC [[id number]]  (Abnormal) Collected: 01/30/21 1751    Lab Status: Final result Specimen: Urine Updated: 01/30/21 1752     Color, UA Niesha     Clarity, UA Clear     pH, UA 6 5 Leukocytes, UA Trace     Nitrite, UA Negative     Protein, UA 30 (1+) mg/dl      Glucose,  (1/10%) mg/dl      Ketones, UA 80 (3+) mg/dl      Urobilinogen, UA 4 0 E U /dl      Bilirubin, UA Interference- unable to analyze     Blood, UA Negative     Specific Gravity, UA >=1 030    Narrative:      CLINITEK RESULT    Comprehensive metabolic panel [[id number]]  (Abnormal) Collected: 01/30/21 1711    Lab Status: Final result Specimen: Blood from Arm, Left Updated: 01/30/21 1747     Sodium 140 mmol/L      Potassium 3 7 mmol/L      Chloride 105 mmol/L      CO2 26 mmol/L      ANION GAP 9 mmol/L      BUN 5 mg/dL      Creatinine 0 59 mg/dL      Glucose 96 mg/dL      Calcium 10 2 mg/dL      Corrected Calcium 10 7 mg/dL      AST 19 U/L      ALT 53 U/L      Alkaline Phosphatase 49 U/L      Total Protein 7 6 g/dL      Albumin 3 4 g/dL      Total Bilirubin 0 28 mg/dL      eGFR 129 ml/min/1 73sq m     Narrative:      [facility] guidelines for Chronic Kidney Disease (CKD):     Stage 1 with normal or high GFR (GFR > 90 mL/min/1 73 square meters)    Stage 2 Mild CKD (GFR = 60-89 mL/min/1 73 square meters)    Stage 3A Moderate CKD (GFR = 45-59 mL/min/1 73 square meters)    Stage 3B Moderate CKD (GFR = 30-44 mL/min/1 73 square meters)    Stage 4 Severe CKD (GFR = 15-29 mL/min/1 73 square meters)    Stage 5 End Stage CKD (GFR <15 mL/min/1 73 square meters)  Note: GFR calculation is accurate only with a steady state creatinine    CBC and differential [[id number]]  (Abnormal) Collected: 01/30/21 1711    Lab Status: Final result Specimen: Blood from Arm, Left Updated: 01/30/21 1727     WBC 13 61 Thousand/uL      RBC 4 62 Million/uL      Hemoglobin 13 5 g/dL      Hematocrit 40 0 %      MCV 87 fL      MCH 29 2 pg      MCHC 33 8 g/dL      RDW 12 5 %      MPV 9 6 fL      Platelets 504 Thousands/uL      nRBC 0 /100 WBCs      Neutrophils Relative 87 %      Immat GRANS % 0 %      Lymphocytes Relative 9 % Monocytes Relative 4 %      Eosinophils Relative 0 %      Basophils Relative 0 %      Neutrophils Absolute 11 80 Thousands/µL      Immature Grans Absolute 0 04 Thousand/uL      Lymphocytes Absolute 1 24 Thousands/µL      Monocytes Absolute 0 52 Thousand/µL      Eosinophils Absolute 0 01 Thousand/µL      Basophils Absolute 0 00 Thousands/µL                  No orders to display         Procedures  Procedures      ED Course  ED Course as of Jan 31 0110   Sat Jan 30, 2021   1723 Pt reassessed and looking more comfortable at this time  Pt reports no improvement of symptoms yet  Reglan given  Fluids running  Awaiting Mg from pharmacy  1728 Mild leukocytosis present  Non-diagnostic  Awaiting further labs  WBC(!): 13 61   1748 No evidence of BEE  Creatinine(!): 0 59   1749 Mild hypercalcemia  Pt already receiving fluids  CORRECTED CALCIUM(!): 10 7   1752 3+ ketones present  Ketones, UA(!): 80 (3+)   1753 Trace leuks without nitrites  Awaiting micro  Leukocytes, UA(!): Trace   1805 Pt reassessed  Reports improvement of nausea but not of headache  Mg initiated  1845 Pt sleeping comfortably  Mg almost completed  No further vomiting per   1905 Pt still complaining of 10/10 headache  Will order Tylenol and another L of fluids at this time  2038 Pt reported increasing nausea with one episode of vomiting  2136 Pt still having no improvement in headache or nausea  Pt does not believe she will be able to eat  Discussed DC vs admission and pt requesting to stay  Will reach out to admitting team        2144 SOD contacted for admission  SBIRT 20yo+      Most Recent Value   SBIRT (22 yo +)   In order to provide better care to our patients, we are screening all of our patients for alcohol and drug use  Would it be okay to ask you these screening questions?   Unable to answer at this time Filed at: 01/30/2021 2119                [facility]  Number of Diagnoses or Management Options  Hyperemesis of pregnancy:   Intractable headache:   Diagnosis management comments: Pt is a 20yo F who presents with ache, nausea, and vomiting  Exam pertinent for uncomfortable appearing patient with muscular neck tenderness and no neurologic deficit  Differential diagnosis to include but not limited to hyperemesis, ketosis, dehydration, intracranial abnormality  Due to the fact the patient was also seen in the ED 3 days ago for headache and had work-up at time including LP  Pt also has history of headaches similar to this and has no neurologic deficits  No imaging indicated at this time  Will plan to treat symptomatically with magnesium  Will plan to add Tylenol once patient's nausea and vomiting under control  Patient has been using Zofran at home without relief, will plan to order Reglan for nausea control  Will give fluids as well due to persistent vomiting  Will get basic labs and urine to evaluate for dehydration and ketosis  CBC shows mild leukocytosis without other actionable derangement  CMP shows no evidence BEE or dehydration  UA shows 3+ ketones consistent with ketosis  Upon multiple reassessments, patient states no improvement to her headache but some improvement to her nausea  Tylenol and additional fluids added with plan to reassess  Patient still reports no improvement of headache and has had return of her nausea with repeat bouts of vomiting  Will order Zofran at this time  Discussed with patient and has been that due to her pregnancy, we have exhausted our options for headache analgesia  Discuss that patient's labs showed ketosis indicating that she has not been eating enough  Recommended p o  Challenge should patient went to be discharged home  Patient states she does not believe she can knee and is requesting admission  Due to patient's prolonged symptoms, admission reasonable at this time  Plan to admit patient to SOD    Discussed patient with admitting team and admission orders placed  Patient admitted without incident  Amount and/or Complexity of Data Reviewed  Clinical lab tests: ordered and reviewed  Discussion of test results with the performing providers: yes        Disposition  Final diagnoses:   Hyperemesis of pregnancy   Intractable headache     Time reflects when diagnosis was documented in both MDM as applicable and the Disposition within this note     Time User Action Codes Description Comment    1/30/2021  9:51 PM Valdez Madrigal [O21 [id number]] Hyperemesis of pregnancy     1/30/2021  9:51 PM Caitlin Amezquita [R51 [id number]] Intractable headache       ED Disposition     ED Disposition Condition Date/Time Comment    Admit Stable Sat Jan 30, 2021  9:50 PM Case was discussed with SOD and the patient's admission status was agreed to be Admission Status: observation status to the service of Dr Mati Claudio  Follow-up Information    None         Current Discharge Medication List      CONTINUE these medications which have NOT CHANGED    Details   ondansetron (ZOFRAN-ODT) 4 mg disintegrating tablet Take 1 tablet (4 mg total) by mouth every 6 (six) hours as needed for nausea or vomiting  Qty: 20 tablet, Refills: 0    Associated Diagnoses: Nausea/vomiting in pregnancy      Pyridoxine HCl (vitamin B-6) 25 MG tablet Take 1 tablet (25 mg total) by mouth 3 (three) times a day  Qty: 90 tablet, Refills: 0    Associated Diagnoses: Nausea and vomiting during pregnancy      doxylamine (UNISON) 25 MG tablet Take 1 tablet (25 mg total) by mouth daily at bedtime as needed for sleep  Qty: 45 tablet, Refills: 0    Associated Diagnoses: Nausea and vomiting during pregnancy      metoclopramide (REGLAN) 10 mg tablet Take 1 tablet (10 mg total) by mouth every 6 (six) hours  Qty: 120 tablet, Refills: 0    Associated Diagnoses: Nausea and vomiting during pregnancy           No discharge procedures on file      PDMP Review     None           ED Provider  Attending physically available and evaluated [facility]  I managed the patient along with the ED Attending      Electronically Signed by

## 2021-01-30 NOTE — ED ATTENDING ATTESTATION
2021  IAvni MD, saw and evaluated the patient  I have discussed the patient with the resident/non-physician practitioner and agree with the resident's/non-physician practitioner's findings, Plan of Care, and MDM as documented in the resident's/non-physician practitioner's note, except where noted  All available labs and Radiology studies were reviewed  I was present for key portions of any procedure(s) performed by the resident/non-physician practitioner and I was immediately available to provide assistance  At this point I agree with the current assessment done in the Emergency Department  I have conducted an independent evaluation of this patient a history and physical is as follows:    ED Course     60-year-old female, , history idiopathic intracranial hypertension, presenting to the emergency department for 2 complaints  Complaint 1  Is 4 day history nausea and vomiting  Complaint number to is a 4 day history of gradual onset headache, frontal, described as throbbing, without any alleviating or exacerbating factors  Patient was seen in the emergency department 4 days ago for the same and underwent lumbar puncture with a opening pressure of 11  Patient denies any fever, neck pain, chest pain, cough, shortness of breath, abdominal pain, diarrhea  No dysuria, urinary frequency or urgency  Ten systems reviewed negative except as noted  On examination the patient appears mildly uncomfortable  She is lying on her side  Head is normocephalic and atraumatic  Eyelids lashes are normal   Conjunctivae were nonicteric  Pupils are 3 mm bilaterally reactive  Extraocular movements are intact  Mucous membranes are slightly dry  Neck is supple without meningismus  Lungs are clear to auscultation bilaterally with no wheezes rales or rhonchi  Heart is regular rate and rhythm with no murmurs rubs or gallops  Abdomen is nondistended, soft and nontender    Extremities are unremarkable  Patient is awake, alert, oriented to person time and place  Cranial nerves 2-12 are intact  Motor is 5/5 bilateral upper lower extremities  78-year-old female presenting with hyperemesis and headache  Plan is medications and reassessment  Labs Reviewed   CBC AND DIFFERENTIAL - Abnormal       Result Value Ref Range Status    WBC 13 61 (*) 4 31 - 10 16 Thousand/uL Final    RBC 4 62  3 81 - 5 12 Million/uL Final    Hemoglobin 13 5  11 5 - 15 4 g/dL Final    Hematocrit 40 0  34 8 - 46 1 % Final    MCV 87  82 - 98 fL Final    MCH 29 2  26 8 - 34 3 pg Final    MCHC 33 8  31 4 - 37 4 g/dL Final    RDW 12 5  11 6 - 15 1 % Final    MPV 9 6  8 9 - 12 7 fL Final    Platelets 358  734 - 390 Thousands/uL Final    nRBC 0  /100 WBCs Final    Neutrophils Relative 87 (*) 43 - 75 % Final    Immat GRANS % 0  0 - 2 % Final    Lymphocytes Relative 9 (*) 14 - 44 % Final    Monocytes Relative 4  4 - 12 % Final    Eosinophils Relative 0  0 - 6 % Final    Basophils Relative 0  0 - 1 % Final    Neutrophils Absolute 11 80 (*) 1 85 - 7 62 Thousands/µL Final    Immature Grans Absolute 0 04  0 00 - 0 20 Thousand/uL Final    Lymphocytes Absolute 1 24  0 60 - 4 47 Thousands/µL Final    Monocytes Absolute 0 52  0 17 - 1 22 Thousand/µL Final    Eosinophils Absolute 0 01  0 00 - 0 61 Thousand/µL Final    Basophils Absolute 0 00  0 00 - 0 10 Thousands/µL Final   COMPREHENSIVE METABOLIC PANEL - Abnormal    Sodium 140  136 - 145 mmol/L Final    Potassium 3 7  3 5 - 5 3 mmol/L Final    Chloride 105  100 - 108 mmol/L Final    CO2 26  21 - 32 mmol/L Final    ANION GAP 9  4 - 13 mmol/L Final    BUN 5  5 - 25 mg/dL Final    Creatinine 0 59 (*) 0 60 - 1 30 mg/dL Final    Comment: Standardized to IDMS reference method    Glucose 96  65 - 140 mg/dL Final    Comment: If the patient is fasting, the ADA then defines impaired fasting glucose as > 100 mg/dL and diabetes as > or equal to 123 mg/dL    Specimen collection should occur prior to Sulfasalazine administration due to the potential for falsely depressed results  Specimen collection should occur prior to Sulfapyridine administration due to the potential for falsely elevated results  Calcium 10 2 (*) 8 3 - 10 1 mg/dL Final    Corrected Calcium 10 7 (*) 8 3 - 10 1 mg/dL Final    AST 19  5 - 45 U/L Final    Comment: Specimen collection should occur prior to Sulfasalazine administration due to the potential for falsely depressed results  ALT 53  12 - 78 U/L Final    Comment: Specimen collection should occur prior to Sulfasalazine and/or Sulfapyridine administration due to the potential for falsely depressed results  Alkaline Phosphatase 49  46 - 116 U/L Final    Total Protein 7 6  6 4 - 8 2 g/dL Final    Albumin 3 4 (*) 3 5 - 5 0 g/dL Final    Total Bilirubin 0 28  0 20 - 1 00 mg/dL Final    Comment: Use of this assay is not recommended for patients undergoing treatment with eltrombopag due to the potential for falsely elevated results      eGFR 129  ml/min/1 73sq m Final    Narrative:     [place] guidelines for Chronic Kidney Disease (CKD):     Stage 1 with normal or high GFR (GFR > 90 mL/min/1 73 square meters)    Stage 2 Mild CKD (GFR = 60-89 mL/min/1 73 square meters)    Stage 3A Moderate CKD (GFR = 45-59 mL/min/1 73 square meters)    Stage 3B Moderate CKD (GFR = 30-44 mL/min/1 73 square meters)    Stage 4 Severe CKD (GFR = 15-29 mL/min/1 73 square meters)    Stage 5 End Stage CKD (GFR <15 mL/min/1 73 square meters)  Note: GFR calculation is accurate only with a steady state creatinine   URINE MICROSCOPIC - Abnormal    RBC, UA 4-10 (*) None Seen, 2-4 /hpf Final    WBC, UA 10-20 (*) None Seen, 2-4 /hpf Final    Epithelial Cells Moderate (*) None Seen, Occasional /hpf Final    Bacteria, UA Occasional  None Seen, Occasional /hpf Final    Hyaline Casts, UA 10-25 (*) None Seen /lpf Final   URINE MACROSCOPIC, POC - Abnormal    Color, UA Niesha   Final Clarity, UA Clear   Final    pH, UA 6 5  4 5 - 8 0 Final    Leukocytes, UA Trace (*) Negative Final    Nitrite, UA Negative  Negative Final    Protein, UA 30 (1+) (*) Negative mg/dl Final    Glucose,  (1/10%) (*) Negative mg/dl Final    Ketones, UA 80 (3+) (*) Negative mg/dl Final    Urobilinogen, UA 4 0 (*) 0 2, 1 0 E U /dl E U /dl Final    Bilirubin, UA Interference- unable to analyze (*) Negative Final    Comment: The dipstick result may be falsely positive due to interfering substances  We recommend reliance upon serum bilirubin, liver & kidney function tests to guide patient care if clinically indicated      Blood, UA Negative  Negative Final    Specific Gravity, UA >=1 030  1 003 - 1 030 Final    Narrative:     CLINITEK RESULT   URINE CULTURE       No orders to display             Critical Care Time  Procedures

## 2021-01-31 PROBLEM — Z3A.10 10 WEEKS GESTATION OF PREGNANCY: Status: ACTIVE | Noted: 2021-01-31

## 2021-01-31 PROBLEM — Z34.90 INTRAUTERINE PREGNANCY: Status: RESOLVED | Noted: 2020-07-21 | Resolved: 2021-01-31

## 2021-01-31 LAB
ANION GAP SERPL CALCULATED.3IONS-SCNC: 7 MMOL/L (ref 4–13)
BUN SERPL-MCNC: 4 MG/DL (ref 5–25)
CALCIUM SERPL-MCNC: 9.3 MG/DL (ref 8.3–10.1)
CHLORIDE SERPL-SCNC: 102 MMOL/L (ref 100–108)
CO2 SERPL-SCNC: 26 MMOL/L (ref 21–32)
CREAT SERPL-MCNC: 0.37 MG/DL (ref 0.6–1.3)
ERYTHROCYTE [DISTWIDTH] IN BLOOD BY AUTOMATED COUNT: 12.2 % (ref 11.6–15.1)
GFR SERPL CREATININE-BSD FRML MDRD: 150 ML/MIN/1.73SQ M
GLUCOSE P FAST SERPL-MCNC: 88 MG/DL (ref 65–99)
GLUCOSE SERPL-MCNC: 88 MG/DL (ref 65–140)
HCT VFR BLD AUTO: 35.2 % (ref 34.8–46.1)
HGB BLD-MCNC: 12 G/DL (ref 11.5–15.4)
MCH RBC QN AUTO: 29.3 PG (ref 26.8–34.3)
MCHC RBC AUTO-ENTMCNC: 34.1 G/DL (ref 31.4–37.4)
MCV RBC AUTO: 86 FL (ref 82–98)
PLATELET # BLD AUTO: 251 THOUSANDS/UL (ref 149–390)
PMV BLD AUTO: 10.1 FL (ref 8.9–12.7)
POTASSIUM SERPL-SCNC: 3.2 MMOL/L (ref 3.5–5.3)
RBC # BLD AUTO: 4.09 MILLION/UL (ref 3.81–5.12)
SODIUM SERPL-SCNC: 135 MMOL/L (ref 136–145)
WBC # BLD AUTO: 10.62 THOUSAND/UL (ref 4.31–10.16)

## 2021-01-31 PROCEDURE — 80048 BASIC METABOLIC PNL TOTAL CA: CPT | Performed by: STUDENT IN AN ORGANIZED HEALTH CARE EDUCATION/TRAINING PROGRAM

## 2021-01-31 PROCEDURE — 85027 COMPLETE CBC AUTOMATED: CPT | Performed by: STUDENT IN AN ORGANIZED HEALTH CARE EDUCATION/TRAINING PROGRAM

## 2021-01-31 RX ORDER — KETOROLAC TROMETHAMINE 30 MG/ML
15 INJECTION, SOLUTION INTRAMUSCULAR; INTRAVENOUS EVERY 6 HOURS PRN
Status: DISCONTINUED | OUTPATIENT
Start: 2021-01-31 | End: 2021-02-01 | Stop reason: HOSPADM

## 2021-01-31 RX ORDER — POTASSIUM CHLORIDE 20 MEQ/1
40 TABLET, EXTENDED RELEASE ORAL 2 TIMES DAILY
Status: COMPLETED | OUTPATIENT
Start: 2021-01-31 | End: 2021-01-31

## 2021-01-31 RX ORDER — KETOROLAC TROMETHAMINE 30 MG/ML
15 INJECTION, SOLUTION INTRAMUSCULAR; INTRAVENOUS EVERY 8 HOURS
Status: DISCONTINUED | OUTPATIENT
Start: 2021-01-31 | End: 2021-01-31

## 2021-01-31 RX ADMIN — SODIUM CHLORIDE, SODIUM LACTATE, POTASSIUM CHLORIDE, AND CALCIUM CHLORIDE 125 ML/HR: .6; .31; .03; .02 INJECTION, SOLUTION INTRAVENOUS at 07:01

## 2021-01-31 RX ADMIN — METOCLOPRAMIDE 10 MG: 5 INJECTION, SOLUTION INTRAMUSCULAR; INTRAVENOUS at 20:50

## 2021-01-31 RX ADMIN — ACETAMINOPHEN 650 MG: 325 TABLET ORAL at 17:53

## 2021-01-31 RX ADMIN — METOCLOPRAMIDE 10 MG: 5 INJECTION, SOLUTION INTRAMUSCULAR; INTRAVENOUS at 07:01

## 2021-01-31 RX ADMIN — Medication 25 MG: at 17:10

## 2021-01-31 RX ADMIN — Medication 25 MG: at 08:26

## 2021-01-31 RX ADMIN — POTASSIUM CHLORIDE 40 MEQ: 1500 TABLET, EXTENDED RELEASE ORAL at 17:09

## 2021-01-31 RX ADMIN — POTASSIUM CHLORIDE 40 MEQ: 1500 TABLET, EXTENDED RELEASE ORAL at 10:13

## 2021-01-31 RX ADMIN — KETOROLAC TROMETHAMINE 30 MG: 30 INJECTION, SOLUTION INTRAMUSCULAR at 07:01

## 2021-01-31 RX ADMIN — Medication 25 MG: at 20:50

## 2021-01-31 RX ADMIN — METOCLOPRAMIDE 10 MG: 5 INJECTION, SOLUTION INTRAMUSCULAR; INTRAVENOUS at 14:16

## 2021-01-31 RX ADMIN — MAGNESIUM SULFATE HEPTAHYDRATE 1 G: 1 INJECTION, SOLUTION INTRAVENOUS at 00:11

## 2021-01-31 RX ADMIN — SODIUM CHLORIDE, SODIUM LACTATE, POTASSIUM CHLORIDE, AND CALCIUM CHLORIDE 125 ML/HR: .6; .31; .03; .02 INJECTION, SOLUTION INTRAVENOUS at 14:22

## 2021-01-31 NOTE — UTILIZATION REVIEW
Initial Clinical Review    Admission: Date/Time/Statement:   Admission Orders (From admission, onward)     Ordered        21  Place in Observation  Once                   Orders Placed This Encounter   Procedures    Place in Observation     Standing Status:   Standing     Number of Occurrences:   1     Order Specific Question:   Level of Care     Answer:   Med Surg [16]     ED Arrival Information     Expected Arrival Acuity Means of Arrival Escorted By Service Admission Type    - 2021 16:13 Urgent Walk-In Self General Medicine Urgent    Arrival Complaint    Vominting,10 weeks pregnant headchaes        Chief Complaint   Patient presents with    Vomiting During Pregnancy     pt c/o vomiting every 30-60 minutes today, unable to keep anything down  pt also c/o severe headache, no hx migraines   Headache     Assessment/Plan:  25 y/o female who is 10 wks pregnant, A1 with prior h/o of Chiari Malformation type 1 and idiopathic intracranial HTN who presents to [facility] with chief c/o persistent headache that started on   Pt was seen on  in ED for same and given migraine cocktail without improvement in her HA/  An LP was done which was nl  Describes HA in frontal and occipital region a/w N/V  In ED WBC 13 6  UA + ketones  Given 2L boluses of D5, tylenol po, 1g mag, reglan and zofran without much improvement  Unable to bishop  Po  Admit observation to M/S unit with intractable headache, hyperemesis gravidarum  Continue home medication of pyridoxine 25mg daily  Start 2g mag daily, IV toradol 30mg q8h for 48 hrs, IV reglan 10mg q8h  Tylenol, benadryl and zofran ordered as PRN  Consider brain MRI to reassess chiari malformation  Consider neurology consult if no improvement in symptoms  IVF's for 15 hrs  Advance diet as bishop       --  Patient states that headache and nausea have improved somewhat    She would like to trial a diet this morning to see if she tolerates that   continue with scheduled Reglan and prn Benadryl  Tylenol p r n  For mild pain  Will switch ketorolac to 15 mg q 6 p r n  For severe pain   Will try to limit to no more than 48 hrs d/t patient's pregnancy status and risk of worsening nausea      ED Triage Vitals   Temperature Pulse Respirations Blood Pressure SpO2   01/30/21 1624 01/30/21 1624 01/30/21 1624 01/30/21 1624 01/30/21 1624   97 6 °F (36 4 °C) 82 16 127/88 96 %      Temp Source Heart Rate Source Patient Position - Orthostatic VS BP Location FiO2 (%)   01/30/21 1624 01/30/21 1624 01/30/21 1624 01/30/21 1624 --   Oral Monitor Lying Right arm       Pain Score       01/30/21 1730       9          Wt Readings from Last 1 Encounters:   01/30/21 99 8 kg (220 lb)     Additional Vital Signs:   Date/Time  Temp  Pulse  Resp  BP  MAP (mmHg)  SpO2  O2 Device  Patient Position - Orthostatic VS   01/31/21 0727  98 °F (36 7 °C)  79  16  118/74  90  97 %  None (Room air)  Lying   01/31/21 0100  --  --  --  --  --  --  None (Room air)  --   01/30/21 2243  97 8 °F (36 6 °C)  78  18  127/82  --  100 %  None (Room air)  Lying   01/30/21 2114  --  61  18  103/59  --  96 %  None (Room air)  Lying   01/30/21 1754  --  60  16  101/50  70  100 %  --  --   01/30/21 1730  --  --  --  --  --  --  None (Room air)  --   01/30/21 1624  97 6 °F (36 4 °C)  82  16  127/88  --  96 %  None (Room air)  Lying       Pertinent Labs/Diagnostic Test Results:     Results from last 7 days   Lab Units 01/31/21  0505 01/30/21  1711   WBC Thousand/uL 10 62* 13 61*   HEMOGLOBIN g/dL 12 0 13 5   HEMATOCRIT % 35 2 40 0   PLATELETS Thousands/uL 251 273   NEUTROS ABS Thousands/µL  --  11 80*     Results from last 7 days   Lab Units 01/31/21  0505 01/30/21  1711   SODIUM mmol/L 135* 140   POTASSIUM mmol/L 3 2* 3 7   CHLORIDE mmol/L 102 105   CO2 mmol/L 26 26   ANION GAP mmol/L 7 9   BUN mg/dL 4* 5   CREATININE mg/dL 0 37* 0 59*   EGFR ml/min/1 73sq m 150 129   CALCIUM mg/dL 9 3 10 2*     Results from last 7 days   Lab Units 01/30/21  1711   AST U/L 19   ALT U/L 53   ALK PHOS U/L 49   TOTAL PROTEIN g/dL 7 6   ALBUMIN g/dL 3 4*   TOTAL BILIRUBIN mg/dL 0 28     Results from last 7 days   Lab Units 01/31/21  0505 01/30/21  1711   GLUCOSE RANDOM mg/dL 88 96     Results from last 7 days   Lab Units 01/30/21  1751   CLARITY UA  Clear   COLOR UA  Niesha   SPEC GRAV UA  >=1 030   PH UA  6 5   GLUCOSE UA mg/dl 100 (1/10%)*   KETONES UA mg/dl 80 (3+)*   BLOOD UA  Negative   PROTEIN UA mg/dl 30 (1+)*   NITRITE UA  Negative   BILIRUBIN UA  Interference- unable to analyze*   UROBILINOGEN UA E U /dl 4 0*   LEUKOCYTES UA  Trace*   WBC UA /hpf 10-20*   RBC UA /hpf 4-10*   BACTERIA UA /hpf Occasional   EPITHELIAL CELLS WET PREP /hpf Moderate*     ED Treatment:   Medication Administration from 01/30/2021 1613 to 01/30/2021 2238       Date/Time Order Dose Route Action     01/30/2021 1706 metoclopramide (REGLAN) injection 10 mg 10 mg Intravenous Given     01/30/2021 1751 magnesium sulfate IVPB (premix) SOLN 1 g 1 g Intravenous New Bag     01/30/2021 1707 dextrose 5 % bolus 1,000 mL 1,000 mL Intravenous New Bag     01/30/2021 1924 acetaminophen (TYLENOL) tablet 975 mg 975 mg Oral Given     01/30/2021 1925 dextrose 5 % bolus 1,000 mL 1,000 mL Intravenous New Bag     01/30/2021 2111 ondansetron (ZOFRAN) injection 4 mg 4 mg Intravenous Given     Past Medical History:   Diagnosis Date    Chiari malformation type I (HCC)     Chronic fatigue     last assessed 9/26/16    COVID-19 07/25/2020    Fever and chills 7/25/2020    Hyperlipidemia     resolved 9/29/17    Hypertension     pre-eclampsia 2016    Idiopathic intracranial hypertension     Varicella     pt unsure    Visual impairment     glasses     Present on Admission:   IIH (idiopathic intracranial hypertension)   Chiari malformation type I (HCC)      Admitting Diagnosis: Hyperemesis of pregnancy [O21 0]  Intractable headache [R51 9]  Age/Sex: 26 y o  female  Admission Orders:  Scheduled Medications:  ketorolac, 30 mg, Intravenous, Q8H  metoclopramide, 10 mg, Intravenous, Q8H [address]  potassium chloride, 40 mEq, Oral, BID  vitamin B-6, 25 mg, Oral, TID    Continuous IV Infusions:  lactated ringers, 125 mL/hr, Intravenous, Continuous      PRN Meds:  acetaminophen, 650 mg, Oral, Q6H PRN  diphenhydrAMINE, 25 mg, Intravenous, Q8H PRN  ondansetron, 4 mg, Intravenous, Q6H PRN        Network Utilization Review Department  ATTENTION: Please call with any questions or concerns to [phone] and carefully listen to the prompts so that you are directed to the right person  All voicemails are confidential   Tracy Pate all requests for admission clinical reviews, approved or denied determinations and any other requests to dedicated fax number below belonging to the campus where the patient is receiving treatment   List of dedicated fax numbers for the Facilities:  [address] [place] [address] DENIALS (Administrative/Medical Necessity) [phone]   [address] (Maternity/NICU/Pediatrics) [phone]   [address] Darlyn Hollingsworth 2627 (Mylene Silveira "Maryana" 103) [phone] [address] Asim Lomas 1481 [address] [facility]) [facility] [place] [address] [phone]

## 2021-01-31 NOTE — ASSESSMENT & PLAN NOTE
-prior urine culture collected in January 2021 showed less than 10,000 CFUs group B beta-hemolytic strep, greater than 100,000 Gardnerella vaginalis  -Patient will require penicillin with labor due to GBS in urine  -repeat cultures collected this admission will need to be followed up

## 2021-01-31 NOTE — ASSESSMENT & PLAN NOTE
Presented with persistent frontal and occipital HA since associated with nausea, photophobia, sensitivity to noise  Seen in ED on 1/27 where an LP was done to evaluate opening pressure which was normal (11)  Denies any fever, chills, neck pain, blurry vision  Symptoms thought to be most likely secondary to migraine versus tension type headache  -received IV magnesium, Reglan, Zofran, ketorolac in the ED  -treated with scheduled Reglan, p r n   Tylenol and Toradol, magnesium while inpatient  -patient strongly advised to follow-up with neurology as outpatient for further management of her headaches during pregnancy

## 2021-01-31 NOTE — H&P
INTERNAL MEDICINE RESIDENCY ADMISSION H&P     Name: Mike Jones   Age & Sex: 26 y o  female   MRN: [id number]  Unit/Bed#: [facility] 321-01   Encounter: [id number]  Primary Care Provider: Nyla Alberto PA-C    Code Status: Level 1 - Full Code  Admission Status: OBSERVATION  Disposition: Patient requires Med/Surg    Admit to team: SOD Team B     ASSESSMENT/PLAN     Principal Problem:    Intractable headache  Active Problems:    Hyperemesis gravidarum    Chiari malformation type I ([address] )    IIH (idiopathic intracranial hypertension)    10 weeks gestation of pregnancy      * Intractable headache  Assessment & Plan  Persistent frontal and occipital HA since  associated with nausea, photophobia, sensitivity to noise  Seen in ED on  where an LP was done to evaluate opening pressure which was normal (11)  Denies any improvement/worsening of HA since LP, no fever, chills, neck pain, blurry vision    Likely migraine type HA vs tension HA vs HA related to prior hx of IIH vs chiari malformation type 1     - continue home medication of pyridoxine 25mg daily  - will start 2g magnesium daily, IV toradol 30mg q8h for 48 hrs, IV reglan 10mg q8h  - tylenol, benadryl and zofran ordered as PRN  - can consider brain MRI to reassess chiari malformation  - consider neurology consult if no improvement in symptoms    Hyperemesis gravidarum  Assessment & Plan  Nausea, vomiting since start of pregnancy, worsening over the past week  Received zofran 4mg and reglan 10mg in the ED, did not tolerate PO intake  UA with ketones    - will treat headache with regimen listed above, including scheduled IV reglan 10mg q8h  - zofran ordered as PRN  - will start IV fluids, lactated ringers at 125cc/hr for 15 hrs  - currently NPO, advance diet as tolerated      10 weeks gestation of pregnancy  Assessment & Plan  Approx 10 weeks pregnant  A1  Hx of preeclampsia with prior pregnancy  Last appointment with Ob on         VTE Pharmacologic Prophylaxis: Reason for no pharmacologic prophylaxis low risk  VTE Mechanical Prophylaxis: sequential compression device    CHIEF COMPLAINT     Chief Complaint   Patient presents with    Vomiting During Pregnancy     pt c/o vomiting every 30-60 minutes today, unable to keep anything down  pt also c/o severe headache, no hx migraines   Headache      HISTORY OF PRESENT ILLNESS     Arabella León is a 25 yo F who is 10 weeks pregnant, A1, with prior history of Chiari Malformation type 1 and idiopathic intracranial hypertension who presents to [facility] with chief complaint of persistent headache that started on   She was seen in the ED on  for the same symptoms and was given a migraine cocktail without improvement in her headache  A lumbar puncture was performed to evaluated for opening pressure which was normal  Since then, pt has continued to take Tylenol at home for pain control without relief  States her headache did not improve or worsen since the lumbar puncture; it has been constant and throbbing in nature  She primarily feels the headache in the frontal and occipital region  Associated symptoms include nausea, vomiting, photosensitivity and sensitivity to noise  Symptoms worsen when she stands up  Pt reports prior history of headaches, previously followed by neurology, but states her current headache feels worse  Pt currently only taking pyridoxine 25mg for headaches as well as tylenol PRN  Pt has been nauseous and vomiting since the start of her current pregnancy, but over the past week she hasn't been able to tolerate much PO intake  Reports she has been vomiting every hour or so for the last several days  She has lightheadedness as well when she ambulates, but denies fever, chills, neck pain/stiffness, blurry vision, dysarthria, focal weakness or numbness  On presentation, pt was afebrile and hemodynamically stable   Labwork remarkable for leukocytosis of 13 6  UA positive for ketones but without signs of acute infection  In the ED, she received 2L boluses of D5, Tylenol 975mg, 1g of magnesium, 10mg of reglan and 4mg of zofran without much improvement in her symptoms  Pt was unable to tolerate PO intake and was admitted to the hospital for further management  REVIEW OF SYSTEMS     Review of Systems   Constitutional: Negative for fatigue and fever  HENT: Negative for congestion and tinnitus  Eyes: Positive for photophobia  Negative for visual disturbance  Respiratory: Negative for cough and shortness of breath  Cardiovascular: Negative for chest pain, palpitations and leg swelling  Gastrointestinal: Positive for nausea and vomiting  Negative for abdominal pain, constipation and diarrhea  Genitourinary: Negative for dysuria, flank pain, frequency, hematuria, pelvic pain, urgency, vaginal bleeding, vaginal discharge and vaginal pain  Musculoskeletal: Negative for back pain and neck stiffness  Skin: Negative for rash  Neurological: Positive for light-headedness and headaches  Negative for seizures, syncope and numbness  Psychiatric/Behavioral: Negative for confusion  OBJECTIVE     Vitals:    21 1624 21 1754 21 2114 21 2243   BP: 127/88 101/50 103/59 127/82   BP Location: Right arm  Right arm Right arm   Pulse: 82 60 61 78   Resp: 16 16 18 18   Temp: 97 6 °F (36 4 °C)   97 8 °F (36 6 °C)   TempSrc: Oral   Oral   SpO2: 96% 100% 96% 100%   Weight: 99 8 kg (220 lb)         Temperature:   Temp (24hrs), Av 7 °F (36 5 °C), Min:97 6 °F (36 4 °C), Max:97 8 °F (36 6 °C)    Temperature: 97 8 °F (36 6 °C)  Intake & Output:  I/O     None        Weights:   IBW: -92 5 kg    Body mass index is 33 45 kg/m²  Weight (last 2 days)     Date/Time   Weight    21 1624   99 8 (220)            Physical Exam  Constitutional:       General: She is not in acute distress  HENT:      Head: Normocephalic and atraumatic        Mouth/Throat:      Mouth: Mucous membranes are moist    Eyes:      Conjunctiva/sclera: Conjunctivae normal       Pupils: Pupils are equal, round, and reactive to light  Neck:      Musculoskeletal: Neck supple  Cardiovascular:      Rate and Rhythm: Normal rate and regular rhythm  Heart sounds: Normal heart sounds  Pulmonary:      Effort: Pulmonary effort is normal  No respiratory distress  Breath sounds: Normal breath sounds  Abdominal:      Tenderness: There is no right CVA tenderness or left CVA tenderness  Comments: Soft, gravid abdomen, no tenderness to palpation   Musculoskeletal:      Right lower leg: No edema  Left lower leg: No edema  Skin:     General: Skin is warm  Neurological:      Mental Status: She is alert and oriented to person, place, and time  Cranial Nerves: No cranial nerve deficit  Sensory: No sensory deficit  Motor: No weakness         PAST MEDICAL HISTORY     Past Medical History:   Diagnosis Date    [facility]     Chronic fatigue     last assessed 9/26/16    COVID-19 07/25/2020    Fever and chills 7/25/2020    Hyperlipidemia     resolved 9/29/17    Hypertension     pre-eclampsia 2016    Idiopathic intracranial hypertension     Varicella     pt unsure    Visual impairment     glasses     PAST SURGICAL HISTORY     Past Surgical History:   Procedure Laterality Date    CHOLECYSTECTOMY LAPAROSCOPIC N/A 5/11/2016    Procedure: CHOLECYSTECTOMY LAPAROSCOPIC possible open;  Surgeon: Chao Damico MD;  Location: [facility];  Service:    Roanna Kugel REDUCTION MAMMAPLASTY       SOCIAL & FAMILY HISTORY     Social History     Substance and Sexual Activity   Alcohol Use Not Currently    Frequency: Never    Binge frequency: Never     Substance and Sexual Activity   Alcohol Use Not Currently    Frequency: Never    Binge frequency: Never        Substance and Sexual Activity   Drug Use Never     Social History     Tobacco Use   Smoking Status Never Smoker   Smokeless Tobacco Never Used     Family History   Problem Relation Age of Onset    Leukemia Maternal Grandfather     No Known Problems Mother     Other Father         MVA    No Known Problems Sister     Hypertension Maternal Grandmother     No Known Problems Daughter     No Known Problems Sister      LABORATORY DATA     Labs: I have personally reviewed pertinent reports  Results from last 7 days   Lab Units 01/30/21  1711   WBC Thousand/uL 13 61*   HEMOGLOBIN g/dL 13 5   HEMATOCRIT % 40 0   PLATELETS Thousands/uL 273   NEUTROS PCT % 87*   MONOS PCT % 4      Results from last 7 days   Lab Units 01/30/21  1711   POTASSIUM mmol/L 3 7   CHLORIDE mmol/L 105   CO2 mmol/L 26   BUN mg/dL 5   CREATININE mg/dL 0 59*   CALCIUM mg/dL 10 2*   ALK PHOS U/L 49   ALT U/L 53   AST U/L 19                          Micro:  Lab Results   Component Value Date    URINECX (A) 01/13/2021     <10,000 cfu/ml Beta Hemolytic Streptococcus Group B    URINECX >100,000 cfu/ml Gardnerella vaginalis (A) 01/13/2021    URINECX <10,000 cfu/ml  01/13/2021     IMAGING & DIAGNOSTIC TESTS     Imaging: I have personally reviewed pertinent reports  No results found  EKG, Pathology, and Other Studies: I have personally reviewed pertinent reports  ALLERGIES   No Known Allergies  MEDICATIONS PRIOR TO ARRIVAL     Prior to Admission medications    Medication Sig Start Date End Date Taking?  Authorizing Provider   ondansetron (ZOFRAN-ODT) 4 mg disintegrating tablet Take 1 tablet (4 mg total) by mouth every 6 (six) hours as needed for nausea or vomiting 1/10/21  Yes Sunny Dockery MD   Pyridoxine HCl (vitamin B-6) 25 MG tablet Take 1 tablet (25 mg total) by mouth 3 (three) times a day 1/3/21 2/2/21 Yes Yoli Livingston DO   doxylamine (UNISON) 25 MG tablet Take 1 tablet (25 mg total) by mouth daily at bedtime as needed for sleep  Patient not taking: Reported on 1/27/2021 1/5/21 2/4/21  Lisa Beard MD   metoclopramide (REGLAN) 10 mg tablet Take 1 tablet (10 mg total) by mouth every 6 (six) hours  Patient not taking: Reported on 1/27/2021 1/3/21 2/2/21  Rocky Posada DO     MEDICATIONS ADMINISTERED IN LAST 24 HOURS     Medication Administration - last 24 hours from 01/30/2021 0038 to 01/31/2021 0038       Date/Time Order Dose Route Action Action by     01/30/2021 1706 metoclopramide (REGLAN) injection 10 mg 10 mg Intravenous Given Rylie Rivera RN     01/30/2021 1916 magnesium sulfate IVPB (premix) SOLN 1 g 0 g Intravenous Stopped Rylie Rivera RN     01/30/2021 1751 magnesium sulfate IVPB (premix) SOLN 1 g 1 g Intravenous [address] Rylie Rivera RN     01/30/2021 1916 dextrose 5 % bolus 1,000 mL 0 mL Intravenous Stopped Rylie Rivera RN     01/30/2021 1707 dextrose 5 % bolus 1,000 mL 1,000 mL Intravenous [address] Rylie Rivera RN     01/30/2021 1924 acetaminophen (TYLENOL) tablet 975 mg 975 mg Oral Given Rylie Rivera RN     01/30/2021 2114 dextrose 5 % bolus 1,000 mL 0 mL Intravenous Stopped Rylie Rivera RN     01/30/2021 1925 dextrose 5 % bolus 1,000 mL 1,000 mL Intravenous [address] Rylie Rivera RN     01/30/2021 2111 ondansetron (ZOFRAN) injection 4 mg 4 mg Intravenous Given Rylie Rivera RN     01/30/2021 2319 metoclopramide (REGLAN) injection 10 mg 10 mg Intravenous Given Euel Schlatter, RN     01/30/2021 2319 ketorolac (TORADOL) injection 30 mg 30 mg Intravenous Given Euel Schlatter, RN     01/31/2021 0011 magnesium sulfate IVPB (premix) SOLN 1 g 1 g Intravenous [address] Euel Schlatter, RN     01/30/2021 2319 lactated ringers infusion 125 mL/hr Intravenous New Bag Euel Schlatter, RN        CURRENT MEDICATIONS     Current Facility-Administered Medications   Medication Dose Route Frequency Provider Last Rate    acetaminophen  650 mg Oral Q6H PRN Bonita Josue MD      diphenhydrAMINE  25 mg Intravenous Q8H PRN Bonita Josue MD      ketorolac  30 mg Intravenous Q8H Bonita Josue MD      lactateekaterina ringers  125 mL/hr Intravenous Continuous Sam Freitas  mL/hr (01/30/21 2319)    magnesium sulfate  1 g Intravenous Once Sam Freitas MD      metoclopramide  10 mg Intravenous [facility] Sam Freitas MD      ondansetron  4 mg Intravenous Q6H PRN Sam Freitas MD      vitamin B-6  25 mg Oral TID Sam Freitas MD       lactated ringers, 125 mL/hr, Last Rate: 125 mL/hr (01/30/21 2319)      acetaminophen, 650 mg, Q6H PRN  diphenhydrAMINE, 25 mg, Q8H PRN  ondansetron, 4 mg, Q6H PRN        Admission Time  I spent 45 minutes admitting the patient  This involved direct patient contact where I performed a full history and physical, reviewing previous records, and reviewing laboratory and other diagnostic studies  Portions of the record may have been created with voice recognition software  Occasional wrong word or "sound a like" substitutions may have occurred due to the inherent limitations of voice recognition software    Read the chart carefully and recognize, using context, where substitutions have occurred     ==  Sam Freitas MD  [address]  Internal Medicine Residency PGY-1

## 2021-01-31 NOTE — ASSESSMENT & PLAN NOTE
Nausea, vomiting since start of pregnancy, worsening over the past week  Received zofran 4mg and reglan 10mg in the ED, did not tolerate PO intake  UA with ketones  -treated with Reglan, Zofran as above  -may continue with doxylamine and pyridoxine on discharge  -script for p r n   Reglan sent to pharmacy

## 2021-01-31 NOTE — PLAN OF CARE
Problem: Potential for Falls  Goal: Patient will remain free of falls  Description: INTERVENTIONS:  - Assess patient frequently for physical needs  -  Identify cognitive and physical deficits and behaviors that affect risk of falls    -  [place] fall precautions as indicated by assessment   - Educate patient/family on patient safety including physical limitations  - Instruct patient to call for assistance with activity based on assessment  - Modify environment to reduce risk of injury  - Consider OT/PT consult to assist with strengthening/mobility  Outcome: Progressing     Problem: PAIN - ADULT  Goal: Verbalizes/displays adequate comfort level or baseline comfort level  Description: Interventions:  - Encourage patient to monitor pain and request assistance  - Assess pain using appropriate pain scale  - Administer analgesics based on type and severity of pain and evaluate response  - Implement non-pharmacological measures as appropriate and evaluate response  - Consider cultural and social influences on pain and pain management  - Notify physician/advanced practitioner if interventions unsuccessful or patient reports new pain  Outcome: Progressing     Problem: INFECTION - ADULT  Goal: Absence or prevention of progression during hospitalization  Description: INTERVENTIONS:  - Assess and monitor for signs and symptoms of infection  - Monitor lab/diagnostic results  - Monitor all insertion sites, i e  indwelling lines, tubes, and drains  - Monitor endotracheal if appropriate and nasal secretions for changes in amount and color  - [place] appropriate cooling/warming therapies per order  - Administer medications as ordered  - Instruct and encourage patient and family to use good hand hygiene technique  - Identify and instruct in appropriate isolation precautions for identified infection/condition  Outcome: Progressing  Goal: Absence of fever/infection during neutropenic period  Description: INTERVENTIONS:  - Monitor WBC    Outcome: Progressing     Problem: SAFETY ADULT  Goal: Patient will remain free of falls  Description: INTERVENTIONS:  - Assess patient frequently for physical needs  -  Identify cognitive and physical deficits and behaviors that affect risk of falls    -  [place] fall precautions as indicated by assessment   - Educate patient/family on patient safety including physical limitations  - Instruct patient to call for assistance with activity based on assessment  - Modify environment to reduce risk of injury  - Consider OT/PT consult to assist with strengthening/mobility  Outcome: Progressing  Goal: Maintain or return to baseline ADL function  Description: INTERVENTIONS:  -  Assess patient's ability to carry out ADLs; assess patient's baseline for ADL function and identify physical deficits which impact ability to perform ADLs (bathing, care of mouth/teeth, toileting, grooming, dressing, etc )  - Assess/evaluate cause of self-care deficits   - Assess range of motion  - Assess patient's mobility; develop plan if impaired  - Assess patient's need for assistive devices and provide as appropriate  - Encourage maximum independence but intervene and supervise when necessary  - Involve family in performance of ADLs  - Assess for home care needs following discharge   - Consider OT consult to assist with ADL evaluation and planning for discharge  - Provide patient education as appropriate  Outcome: Progressing  Goal: Maintain or return mobility status to optimal level  Description: INTERVENTIONS:  - Assess patient's baseline mobility status (ambulation, transfers, stairs, etc )    - Identify cognitive and physical deficits and behaviors that affect mobility  - Identify mobility aids required to assist with transfers and/or ambulation (gait belt, sit-to-stand, lift, walker, cane, etc )  - [place] fall precautions as indicated by assessment  - Record patient progress and toleration of activity level on Mobility SBAR; progress patient to next Phase/Stage  - Instruct patient to call for assistance with activity based on assessment  - Consider rehabilitation consult to assist with strengthening/weightbearing, etc   Outcome: Progressing     Problem: DISCHARGE PLANNING  Goal: Discharge to home or other facility with appropriate resources  Description: INTERVENTIONS:  - Identify barriers to discharge w/patient and caregiver  - Arrange for needed discharge resources and transportation as appropriate  - Identify discharge learning needs (meds, wound care, etc )  - Arrange for interpretive services to assist at discharge as needed  - Refer to Case Management Department for coordinating discharge planning if the patient needs post-hospital services based on physician/advanced practitioner order or complex needs related to functional status, cognitive ability, or social support system  Outcome: Progressing     Problem: Knowledge Deficit  Goal: Patient/family/caregiver demonstrates understanding of disease process, treatment plan, medications, and discharge instructions  Description: Complete learning assessment and assess knowledge base    Interventions:  - Provide teaching at level of understanding  - Provide teaching via preferred learning methods  Outcome: Progressing

## 2021-01-31 NOTE — PROGRESS NOTES
[facility] Senior Admission Note   Unit/Bed # @DBLINK (RENE,06660)@ Encounter: [id number]  SOD Team B           Wagner Vogel 26 y o  female [id number]       Patient seen and examined  Reviewed H&P per Dr Lanre Rded  Agree with the assessment and plan except as follows: Wagner Vogel is a 61-year-old  female with history of Chiari 1 malformation, idiopathic intracranial hypertension, who is currently 10 weeks pregnant who presents to the ED for 4 days of consistent headache as well as chronic worsening nausea and vomiting with inability to tolerate p o  Patient recently presented to the ED for extreme headache, wherein she received an LP which revealed normal opening pressure  Her symptoms did not improve at that time with migraine cocktail, however she was discharged to home  Since that time, she reports newly constant headache along with hyperemesis  On arrival to the ED today, her vital signs were stable  She continued to complain of constant headache that appears consistent with migraine and emesis  CMP revealed mildly elevated corrected calcium and hypoalbuminemia  CBC revealed mild leukocytosis  Urinalysis revealed some ketonuria but was negative for infection  In the ED, she received 2 L boluses D5, 1 g of magnesium, 975 mg of Tylenol, 10 mg of Reglan IV, and 4 mg of IV Zofran  Following these therapies, she again failed a p o  Challenge and was recommended for admission to Medicine  Her exam is largely benign, though patient is slightly dry as evidenced by decreased skin turgor  Our plan at this stage is to hydrate patient with lactated Ringer's  She will be given an additional gram of magnesium  We will treat her symptomatic complaints per the migraine/headache order set with scheduled Toradol for 2 days as well as scheduled Reglan  She receive as needed Tylenol, Zofran, and Benadryl  She will be made NPO with plans to advance diet as she can tolerate    If her headache is not amenable to these interventions, would recommend neuro imaging with MRI in setting of known Chiari 1 malformation verses neurology consult  If her symptoms are not controlled, can also consider treatment with IV steroids  Will monitor patient closely and adjust regimen as required   on arrival     Assessment/Plan: Active Problems:    * No active hospital problems   *         Disposition:  OBSERVATION    Expected LOS: <2 [address], DO

## 2021-01-31 NOTE — PROGRESS NOTES
INTERNAL MEDICINE RESIDENCY PROGRESS NOTE     Name: Vinicio Saeed   Age & Sex: 26 y o  female   MRN: [id number]  Unit/Bed#: [facility] 321-01   Encounter: [id number]  Team: SOD Team B     PATIENT INFORMATION     Name: Vinicio Saeed   Age & Sex: 26 y o  female   MRN: [id number]  Hospital Stay Days: 0    ASSESSMENT/PLAN     Principal Problem:    Intractable headache  Active Problems:    IIH (idiopathic intracranial hypertension)    Chiari malformation type I ([address] )    GBS bacteriuria    Hyperemesis gravidarum    10 weeks gestation of pregnancy      * Intractable headache  Assessment & Plan  Persistent frontal and occipital HA since  associated with nausea, photophobia, sensitivity to noise  -Seen in ED on  where an LP was done to evaluate opening pressure which was normal (11)  Denies any improvement/worsening of HA since LP, no fever, chills, neck pain, blurry vision  -Likely migraine type HA vs tension HA  Less likely IIH versus related to Chiari malformation  -received IV magnesium x2, reglan x3, zofran, ketorolac 30 mg x2 in the ED    Plan:  - continue with scheduled Reglan 10 mg every 8 hours  -continue Benadryl p r n   -Tylenol p r n  For mild pain  - will switch ketorolac to 15 mg q 6 p r n  For severe pain   Will try to limit to no more than 48 hours due to patient's pregnancy status and risk of worsening nausea  - consider neurology consult if no improvement in symptoms    10 weeks gestation of pregnancy  Assessment & Plan  Approx 10 weeks pregnant  A1  Hx of preeclampsia with prior pregnancy  Last appointment with Ob on       Hyperemesis gravidarum  Assessment & Plan  Nausea, vomiting since start of pregnancy, worsening over the past week  Received zofran 4mg and reglan 10mg in the ED, did not tolerate PO intake  UA with ketones    - will treat headache with regimen listed above, including scheduled IV reglan 10mg q8h  - continue home medication of pyridoxine 25mg 3 times daily  - continue fluid resuscitation  - will trial clear liquid diet this morning  Advance as tolerated      GBS bacteriuria  Assessment & Plan  -prior urine culture collected in 2021 showed less than 10,000 CFUs group B beta-hemolytic strep, greater than 100,000 Gardnerella vaginalis  -Patient will require penicillin with labor due to GBS in urine  -follow-up repeat cultures collected this admission      Disposition:  Continue inpatient care for ongoing management of headache and nausea/vomiting     SUBJECTIVE     Patient seen and examined  No acute events overnight  Patient states that headache and nausea have improved somewhat  She would like to trial a diet this morning to see if she tolerates that  No other acute concerns    OBJECTIVE     Vitals:    21 1754 21 2114 21 2243 21 0727   BP: 101/50 103/59 127/82 118/74   BP Location:  Right arm Right arm Right arm   Pulse: 60 61 78 79   Resp: 16 18 18 16   Temp:   97 8 °F (36 6 °C) 98 °F (36 7 °C)   TempSrc:   Oral Oral   SpO2: 100% 96% 100% 97%   Weight:          Temperature:   Temp (24hrs), Av 8 °F (36 6 °C), Min:97 6 °F (36 4 °C), Max:98 °F (36 7 °C)    Temperature: 98 °F (36 7 °C)  Intake & Output:  I/O        07 -  0700  07 -  0700  07 -  0700    Urine (mL/kg/hr)   200 (0 5)    Total Output   200    Net   -200               Weights:   IBW: -92 5 kg    Body mass index is 33 45 kg/m²  Weight (last 2 days)     Date/Time   Weight    21 1624   99 8 (220)            Physical Exam  Constitutional:       Appearance: Normal appearance  She is not ill-appearing  HENT:      Head: Normocephalic and atraumatic  Eyes:      General: No scleral icterus  Right eye: No discharge  Left eye: No discharge  Cardiovascular:      Rate and Rhythm: Normal rate and regular rhythm  Heart sounds: No murmur  No friction rub     Pulmonary:      Effort: Pulmonary effort is normal       Breath sounds: Normal breath sounds  No wheezing or rales  Abdominal:      General: Abdomen is flat  There is no distension  Palpations: Abdomen is soft  Tenderness: There is no abdominal tenderness  Musculoskeletal:         General: No swelling, tenderness or deformity  Skin:     General: Skin is warm and dry  Findings: No erythema  Neurological:      Mental Status: She is alert and oriented to person, place, and time  Mental status is at baseline  Motor: No weakness  Psychiatric:         Mood and Affect: Mood normal          Behavior: Behavior normal        LABORATORY DATA     Labs: I have personally reviewed pertinent reports  Results from last 7 days   Lab Units 01/31/21  0505 01/30/21  1711   WBC Thousand/uL 10 62* 13 61*   HEMOGLOBIN g/dL 12 0 13 5   HEMATOCRIT % 35 2 40 0   PLATELETS Thousands/uL 251 273   NEUTROS PCT %  --  87*   MONOS PCT %  --  4      Results from last 7 days   Lab Units 01/31/21  0505 01/30/21  1711   POTASSIUM mmol/L 3 2* 3 7   CHLORIDE mmol/L 102 105   CO2 mmol/L 26 26   BUN mg/dL 4* 5   CREATININE mg/dL 0 37* 0 59*   CALCIUM mg/dL 9 3 10 2*   ALK PHOS U/L  --  49   ALT U/L  --  53   AST U/L  --  19                            IMAGING & DIAGNOSTIC TESTING     Radiology Results: I have personally reviewed pertinent reports  No results found  Other Diagnostic Testing: I have personally reviewed pertinent reports      ACTIVE MEDICATIONS     Current Facility-Administered Medications   Medication Dose Route Frequency    acetaminophen (TYLENOL) tablet 650 mg  650 mg Oral Q6H PRN    diphenhydrAMINE (BENADRYL) injection 25 mg  25 mg Intravenous Q8H PRN    ketorolac (TORADOL) injection 15 mg  15 mg Intravenous Q6H PRN    lactated ringers infusion  125 mL/hr Intravenous Continuous    metoclopramide (REGLAN) injection 10 mg  10 mg Intravenous Q8H [address]    potassium chloride (K-DUR,KLOR-CON) CR tablet 40 mEq  40 mEq Oral BID    pyridoxine (VITAMIN B6) tablet 25 mg  25 mg Oral TID       VTE Pharmacologic Prophylaxis: Reason for no pharmacologic prophylaxis Low risk  Ambulate patient  VTE Mechanical Prophylaxis: sequential compression device    Portions of the record may have been created with voice recognition software  Occasional wrong word or "sound a like" substitutions may have occurred due to the inherent limitations of voice recognition software    Read the chart carefully and recognize, using context, where substitutions have occurred   ==  Jayy Lau, [facility]  Internal Medicine Residency PGY-3

## 2021-01-31 NOTE — PLAN OF CARE
Problem: Potential for Falls  Goal: Patient will remain free of falls  Description: INTERVENTIONS:  - Assess patient frequently for physical needs  -  Identify cognitive and physical deficits and behaviors that affect risk of falls    -  [place] fall precautions as indicated by assessment   - Educate patient/family on patient safety including physical limitations  - Instruct patient to call for assistance with activity based on assessment  - Modify environment to reduce risk of injury  - Consider OT/PT consult to assist with strengthening/mobility  Outcome: Progressing     Problem: PAIN - ADULT  Goal: Verbalizes/displays adequate comfort level or baseline comfort level  Description: Interventions:  - Encourage patient to monitor pain and request assistance  - Assess pain using appropriate pain scale  - Administer analgesics based on type and severity of pain and evaluate response  - Implement non-pharmacological measures as appropriate and evaluate response  - Consider cultural and social influences on pain and pain management  - Notify physician/advanced practitioner if interventions unsuccessful or patient reports new pain  Outcome: Progressing     Problem: INFECTION - ADULT  Goal: Absence or prevention of progression during hospitalization  Description: INTERVENTIONS:  - Assess and monitor for signs and symptoms of infection  - Monitor lab/diagnostic results  - Monitor all insertion sites, i e  indwelling lines, tubes, and drains  - Monitor endotracheal if appropriate and nasal secretions for changes in amount and color  - [place] appropriate cooling/warming therapies per order  - Administer medications as ordered  - Instruct and encourage patient and family to use good hand hygiene technique  - Identify and instruct in appropriate isolation precautions for identified infection/condition  Outcome: Progressing  Goal: Absence of fever/infection during neutropenic period  Description: INTERVENTIONS:  - Monitor WBC    Outcome: Progressing     Problem: SAFETY ADULT  Goal: Patient will remain free of falls  Description: INTERVENTIONS:  - Assess patient frequently for physical needs  -  Identify cognitive and physical deficits and behaviors that affect risk of falls    -  [place] fall precautions as indicated by assessment   - Educate patient/family on patient safety including physical limitations  - Instruct patient to call for assistance with activity based on assessment  - Modify environment to reduce risk of injury  - Consider OT/PT consult to assist with strengthening/mobility  Outcome: Progressing  Goal: Maintain or return to baseline ADL function  Description: INTERVENTIONS:  -  Assess patient's ability to carry out ADLs; assess patient's baseline for ADL function and identify physical deficits which impact ability to perform ADLs (bathing, care of mouth/teeth, toileting, grooming, dressing, etc )  - Assess/evaluate cause of self-care deficits   - Assess range of motion  - Assess patient's mobility; develop plan if impaired  - Assess patient's need for assistive devices and provide as appropriate  - Encourage maximum independence but intervene and supervise when necessary  - Involve family in performance of ADLs  - Assess for home care needs following discharge   - Consider OT consult to assist with ADL evaluation and planning for discharge  - Provide patient education as appropriate  Outcome: Progressing  Goal: Maintain or return mobility status to optimal level  Description: INTERVENTIONS:  - Assess patient's baseline mobility status (ambulation, transfers, stairs, etc )    - Identify cognitive and physical deficits and behaviors that affect mobility  - Identify mobility aids required to assist with transfers and/or ambulation (gait belt, sit-to-stand, lift, walker, cane, etc )  - [place] fall precautions as indicated by assessment  - Record patient progress and toleration of activity level on Mobility SBAR; progress patient to next Phase/Stage  - Instruct patient to call for assistance with activity based on assessment  - Consider rehabilitation consult to assist with strengthening/weightbearing, etc   Outcome: Progressing     Problem: DISCHARGE PLANNING  Goal: Discharge to home or other facility with appropriate resources  Description: INTERVENTIONS:  - Identify barriers to discharge w/patient and caregiver  - Arrange for needed discharge resources and transportation as appropriate  - Identify discharge learning needs (meds, wound care, etc )  - Arrange for interpretive services to assist at discharge as needed  - Refer to Case Management Department for coordinating discharge planning if the patient needs post-hospital services based on physician/advanced practitioner order or complex needs related to functional status, cognitive ability, or social support system  Outcome: Progressing     Problem: Knowledge Deficit  Goal: Patient/family/caregiver demonstrates understanding of disease process, treatment plan, medications, and discharge instructions  Description: Complete learning assessment and assess knowledge base    Interventions:  - Provide teaching at level of understanding  - Provide teaching via preferred learning methods  Outcome: Progressing

## 2021-02-01 VITALS
DIASTOLIC BLOOD PRESSURE: 92 MMHG | OXYGEN SATURATION: 98 % | TEMPERATURE: 98 F | WEIGHT: 220 LBS | HEART RATE: 87 BPM | BODY MASS INDEX: 33.45 KG/M2 | RESPIRATION RATE: 16 BRPM | SYSTOLIC BLOOD PRESSURE: 132 MMHG

## 2021-02-01 LAB
ANION GAP SERPL CALCULATED.3IONS-SCNC: 6 MMOL/L (ref 4–13)
ATRIAL RATE: 83 BPM
BACTERIA UR CULT: NORMAL
BUN SERPL-MCNC: 3 MG/DL (ref 5–25)
CALCIUM SERPL-MCNC: 9.3 MG/DL (ref 8.3–10.1)
CHLORIDE SERPL-SCNC: 111 MMOL/L (ref 100–108)
CO2 SERPL-SCNC: 26 MMOL/L (ref 21–32)
CREAT SERPL-MCNC: 0.42 MG/DL (ref 0.6–1.3)
ERYTHROCYTE [DISTWIDTH] IN BLOOD BY AUTOMATED COUNT: 12.3 % (ref 11.6–15.1)
GFR SERPL CREATININE-BSD FRML MDRD: 144 ML/MIN/1.73SQ M
GLUCOSE SERPL-MCNC: 87 MG/DL (ref 65–140)
HCT VFR BLD AUTO: 35 % (ref 34.8–46.1)
HGB BLD-MCNC: 11.7 G/DL (ref 11.5–15.4)
MAGNESIUM SERPL-MCNC: 1.8 MG/DL (ref 1.6–2.6)
MCH RBC QN AUTO: 29 PG (ref 26.8–34.3)
MCHC RBC AUTO-ENTMCNC: 33.4 G/DL (ref 31.4–37.4)
MCV RBC AUTO: 87 FL (ref 82–98)
P AXIS: 60 DEGREES
PLATELET # BLD AUTO: 241 THOUSANDS/UL (ref 149–390)
PMV BLD AUTO: 9.8 FL (ref 8.9–12.7)
POTASSIUM SERPL-SCNC: 3.8 MMOL/L (ref 3.5–5.3)
PR INTERVAL: 146 MS
QRS AXIS: 61 DEGREES
QRSD INTERVAL: 92 MS
QT INTERVAL: 358 MS
QTC INTERVAL: 420 MS
RBC # BLD AUTO: 4.03 MILLION/UL (ref 3.81–5.12)
SODIUM SERPL-SCNC: 143 MMOL/L (ref 136–145)
T WAVE AXIS: 22 DEGREES
VENTRICULAR RATE: 83 BPM
WBC # BLD AUTO: 9.2 THOUSAND/UL (ref 4.31–10.16)

## 2021-02-01 PROCEDURE — 93010 ELECTROCARDIOGRAM REPORT: CPT | Performed by: INTERNAL MEDICINE

## 2021-02-01 PROCEDURE — 80048 BASIC METABOLIC PNL TOTAL CA: CPT | Performed by: INTERNAL MEDICINE

## 2021-02-01 PROCEDURE — 83735 ASSAY OF MAGNESIUM: CPT | Performed by: INTERNAL MEDICINE

## 2021-02-01 PROCEDURE — 85027 COMPLETE CBC AUTOMATED: CPT | Performed by: INTERNAL MEDICINE

## 2021-02-01 RX ORDER — METOCLOPRAMIDE 10 MG/1
10 TABLET ORAL EVERY 6 HOURS PRN
Qty: 30 TABLET | Refills: 0 | Status: SHIPPED | OUTPATIENT
Start: 2021-02-01 | End: 2021-04-02 | Stop reason: ALTCHOICE

## 2021-02-01 RX ORDER — MAGNESIUM SULFATE HEPTAHYDRATE 40 MG/ML
2 INJECTION, SOLUTION INTRAVENOUS ONCE
Status: DISCONTINUED | OUTPATIENT
Start: 2021-02-01 | End: 2021-02-01 | Stop reason: HOSPADM

## 2021-02-01 RX ORDER — ACETAMINOPHEN 325 MG/1
650 TABLET ORAL EVERY 6 HOURS PRN
Qty: 60 TABLET | Refills: 0 | Status: SHIPPED | OUTPATIENT
Start: 2021-02-01 | End: 2021-06-19 | Stop reason: HOSPADM

## 2021-02-01 RX ADMIN — METOCLOPRAMIDE 10 MG: 5 INJECTION, SOLUTION INTRAMUSCULAR; INTRAVENOUS at 05:16

## 2021-02-01 NOTE — DISCHARGE SUMMARY
INTERNAL MEDICINE RESIDENCY DISCHARGE SUMMARY     Júnior Reals   26 y o  female  MRN: [id number]  Room/Bed: [facility] 321/[facility] [address]    Encounter: [id number]    Principal Problem:    Intractable headache  Active Problems:    IIH (idiopathic intracranial hypertension)    Chiari malformation type I (H[facility])    GBS bacteriuria    Hyperemesis gravidarum    10 weeks gestation of pregnancy      * Intractable headache  Assessment & Plan  Presented with persistent frontal and occipital HA since associated with nausea, photophobia, sensitivity to noise  Seen in ED on  where an LP was done to evaluate opening pressure which was normal (11)  Denies any fever, chills, neck pain, blurry vision  Symptoms thought to be most likely secondary to migraine versus tension type headache  -received IV magnesium, Reglan, Zofran, ketorolac in the ED  -treated with scheduled Reglan, p r n  Tylenol and Toradol, magnesium while inpatient  -patient strongly advised to follow-up with neurology as outpatient for further management of her headaches during pregnancy    10 weeks gestation of pregnancy  Assessment & Plan  Approx 10 weeks pregnant  A1  Hx of preeclampsia with prior pregnancy  Last appointment with Ob on       Hyperemesis gravidarum  Assessment & Plan  Nausea, vomiting since start of pregnancy, worsening over the past week  Received zofran 4mg and reglan 10mg in the ED, did not tolerate PO intake  UA with ketones  -treated with Reglan, Zofran as above  -may continue with doxylamine and pyridoxine on discharge  -script for p r n   Reglan sent to pharmacy      GBS bacteriuria  Assessment & Plan  -prior urine culture collected in 2021 showed less than 10,000 CFUs group B beta-hemolytic strep, greater than 100,000 Gardnerella vaginalis  -Patient will require penicillin with labor due to GBS in urine  -repeat cultures collected this admission will need to be followed up      [address]     H&P per Dr Shavon Alvarez on 21:    Ld Mauricio is a 27 yo F who is 10 weeks pregnant, A1, with prior history of Chiari Malformation type 1 and idiopathic intracranial hypertension who presents to [facility] with chief complaint of persistent headache that started on   She was seen in the ED on  for the same symptoms and was given a migraine cocktail without improvement in her headache  A lumbar puncture was performed to evaluated for opening pressure which was normal  Since then, pt has continued to take Tylenol at home for pain control without relief  States her headache did not improve or worsen since the lumbar puncture; it has been constant and throbbing in nature  She primarily feels the headache in the frontal and occipital region  Associated symptoms include nausea, vomiting, photosensitivity and sensitivity to noise  Symptoms worsen when she stands up  Pt reports prior history of headaches, previously followed by neurology, but states her current headache feels worse  Pt currently only taking pyridoxine 25mg for headaches as well as tylenol PRN     Pt has been nauseous and vomiting since the start of her current pregnancy, but over the past week she hasn't been able to tolerate much PO intake  Reports she has been vomiting every hour or so for the last several days  She has lightheadedness as well when she ambulates, but denies fever, chills, neck pain/stiffness, blurry vision, dysarthria, focal weakness or numbness      On presentation, pt was afebrile and hemodynamically stable  Labwork remarkable for leukocytosis of 13 6  UA positive for ketones but without signs of acute infection  In the ED, she received 2L boluses of D5, Tylenol 975mg, 1g of magnesium, 10mg of reglan and 4mg of zofran without much improvement in her symptoms  Pt was unable to tolerate PO intake and was admitted to the hospital for further management      Hospital Course:    Patient's headache and nausea over managed with scheduled Reglan in addition to p r n  Toradol, Tylenol, magnesium  She remained afebrile and hemodynamically stable during her stay  She was transitioned to clear liquid diet and appeared to be able to tolerate this  Patient had mild to moderate improvement in her symptoms over the course of the next 48 hours  She was adamant about returning home on 02/01/2021 stating that "I've been dealing with this for over a month and I'd like to go home"  Given her clinical stability it was felt that she was appropriate for discharge home on 02/01/2021  The patient advised to follow-up with Neurology and her primary provider as soon as possible for further management of her headaches during pregnancy  Also reinforced that she should follow-up with Obstetrics as advised for routine pregnancy care  P r n  Script of Reglan was sent to pharmacy  Patient may continue with Tylenol p r n, doxylamine, pyridoxine  Zofran was discontinued on discharge  DISCHARGE INFORMATION       Physical Exam  Constitutional:       Appearance: Normal appearance  She is not ill-appearing  HENT:      Head: Normocephalic and atraumatic  Eyes:      General: No scleral icterus  Right eye: No discharge  Left eye: No discharge  Cardiovascular:      Rate and Rhythm: Normal rate and regular rhythm  Heart sounds: No murmur  No friction rub  Pulmonary:      Effort: Pulmonary effort is normal       Breath sounds: Normal breath sounds  No wheezing or rales  Abdominal:      General: Abdomen is flat  There is no distension  Palpations: Abdomen is soft  Tenderness: There is no abdominal tenderness  Musculoskeletal:         General: No swelling, tenderness or deformity  Skin:     General: Skin is warm and dry  Findings: No erythema  Neurological:      Mental Status: She is alert and oriented to person, place, and time   Mental status is at baseline  Motor: No weakness  Psychiatric:         Mood and Affect: Mood normal          Behavior: Behavior normal        PCP at Discharge: Samantha Macdonald PA-C    Admitting Provider: Jarvis Alanis MD  Admission Date: 1/30/2021    Discharge Provider: No att  providers found  Discharge Date:  02/01/2021    Discharge Disposition: Home/Self Care  Discharge Condition: stable  Discharge with Lines: no    Discharge Diet: regular diet  Activity Restrictions: none  Test Results Pending at Discharge:  Urine cultures    Discharge Diagnoses:  Principal Problem:    Intractable headache  Active Problems:    IIH (idiopathic intracranial hypertension)    Chiari malformation type I ([facility] [address] )    GBS bacteriuria    Hyperemesis gravidarum    10 weeks gestation of pregnancy  Resolved Problems:    Intrauterine pregnancy      Consulting Providers:      Diagnostic & Therapeutic Procedures Performed:  No results found      Code Status: Level 1 - Full Code  Advance Directive & Living Will: <no information>  Power of :    POLST:      Discharge Medication List as of 2/1/2021 11:37 AM      START taking these medications    Details   acetaminophen (TYLENOL) 325 mg tablet Take 2 tablets (650 mg total) by mouth every 6 (six) hours as needed for mild pain or headaches, Starting Mon 2/1/2021, Normal           Discharge Medication List as of 2/1/2021 11:37 AM      STOP taking these medications       ondansetron (ZOFRAN-ODT) 4 mg disintegrating tablet Comments:   Reason for Stopping:             Discharge Medication List as of 2/1/2021 11:37 AM      CONTINUE these medications which have CHANGED    Details   metoclopramide (REGLAN) 10 mg tablet Take 1 tablet (10 mg total) by mouth every 6 (six) hours as needed (Nausea/vomiting), Starting Mon 2/1/2021, Normal           Discharge Medication List as of 2/1/2021 11:37 AM      CONTINUE these medications which have NOT CHANGED    Details   Pyridoxine HCl (vitamin B-6) 25 MG tablet Take 1 tablet (25 mg total) by mouth 3 (three) times a day, Starting Sun 1/3/2021, Until Tue 2/2/2021, Print      doxylamine (UNISON) 25 MG tablet Take 1 tablet (25 mg total) by mouth daily at bedtime as needed for sleep, Starting Tue 1/5/2021, Until Thu 2/4/2021, No Print             Allergies:  No Known Allergies    FOLLOW-UP     PCP Outpatient Follow-up:  Beverly Potter PA-C    Consulting Providers Follow-up:  Neurology    Active Issues Requiring Follow-up:   Pregnancy  Headache  Hyperemesis gravidarum    Discharge Statement:   I spent 30 minutes minutes discharging the patient  This time was spent on the day of discharge  I had direct contact with the patient on the day of discharge  Additional documentation is required if more than 30 minutes were spent on discharge  Portions of the record may have been created with voice recognition software  Occasional wrong word or "sound a like" substitutions may have occurred due to the inherent limitations of voice recognition software    Read the chart carefully and recognize, using context, where substitutions have occurred     ==  Jayy Lau, [address]  Internal Medicine Resident PGY-3

## 2021-02-01 NOTE — PLAN OF CARE
Problem: Potential for Falls  Goal: Patient will remain free of falls  Description: INTERVENTIONS:  - Assess patient frequently for physical needs  -  Identify cognitive and physical deficits and behaviors that affect risk of falls    -  [place] fall precautions as indicated by assessment   - Educate patient/family on patient safety including physical limitations  - Instruct patient to call for assistance with activity based on assessment  - Modify environment to reduce risk of injury  - Consider OT/PT consult to assist with strengthening/mobility  Outcome: Adequate for Discharge     Problem: PAIN - ADULT  Goal: Verbalizes/displays adequate comfort level or baseline comfort level  Description: Interventions:  - Encourage patient to monitor pain and request assistance  - Assess pain using appropriate pain scale  - Administer analgesics based on type and severity of pain and evaluate response  - Implement non-pharmacological measures as appropriate and evaluate response  - Consider cultural and social influences on pain and pain management  - Notify physician/advanced practitioner if interventions unsuccessful or patient reports new pain  Outcome: Adequate for Discharge     Problem: INFECTION - ADULT  Goal: Absence or prevention of progression during hospitalization  Description: INTERVENTIONS:  - Assess and monitor for signs and symptoms of infection  - Monitor lab/diagnostic results  - Monitor all insertion sites, i e  indwelling lines, tubes, and drains  - Monitor endotracheal if appropriate and nasal secretions for changes in amount and color  - [place] appropriate cooling/warming therapies per order  - Administer medications as ordered  - Instruct and encourage patient and family to use good hand hygiene technique  - Identify and instruct in appropriate isolation precautions for identified infection/condition  Outcome: Adequate for Discharge  Goal: Absence of fever/infection during neutropenic period  Description: INTERVENTIONS:  - Monitor WBC    Outcome: Adequate for Discharge     Problem: SAFETY ADULT  Goal: Patient will remain free of falls  Description: INTERVENTIONS:  - Assess patient frequently for physical needs  -  Identify cognitive and physical deficits and behaviors that affect risk of falls    -  [place] fall precautions as indicated by assessment   - Educate patient/family on patient safety including physical limitations  - Instruct patient to call for assistance with activity based on assessment  - Modify environment to reduce risk of injury  - Consider OT/PT consult to assist with strengthening/mobility  Outcome: Adequate for Discharge  Goal: Maintain or return to baseline ADL function  Description: INTERVENTIONS:  -  Assess patient's ability to carry out ADLs; assess patient's baseline for ADL function and identify physical deficits which impact ability to perform ADLs (bathing, care of mouth/teeth, toileting, grooming, dressing, etc )  - Assess/evaluate cause of self-care deficits   - Assess range of motion  - Assess patient's mobility; develop plan if impaired  - Assess patient's need for assistive devices and provide as appropriate  - Encourage maximum independence but intervene and supervise when necessary  - Involve family in performance of ADLs  - Assess for home care needs following discharge   - Consider OT consult to assist with ADL evaluation and planning for discharge  - Provide patient education as appropriate  Outcome: Adequate for Discharge  Goal: Maintain or return mobility status to optimal level  Description: INTERVENTIONS:  - Assess patient's baseline mobility status (ambulation, transfers, stairs, etc )    - Identify cognitive and physical deficits and behaviors that affect mobility  - Identify mobility aids required to assist with transfers and/or ambulation (gait belt, sit-to-stand, lift, walker, cane, etc )  - [place] fall precautions as indicated by assessment  - Record patient progress and toleration of activity level on Mobility SBAR; progress patient to next Phase/Stage  - Instruct patient to call for assistance with activity based on assessment  - Consider rehabilitation consult to assist with strengthening/weightbearing, etc   Outcome: Adequate for Discharge     Problem: DISCHARGE PLANNING  Goal: Discharge to home or other facility with appropriate resources  Description: INTERVENTIONS:  - Identify barriers to discharge w/patient and caregiver  - Arrange for needed discharge resources and transportation as appropriate  - Identify discharge learning needs (meds, wound care, etc )  - Arrange for interpretive services to assist at discharge as needed  - Refer to Case Management Department for coordinating discharge planning if the patient needs post-hospital services based on physician/advanced practitioner order or complex needs related to functional status, cognitive ability, or social support system  Outcome: Adequate for Discharge     Problem: Knowledge Deficit  Goal: Patient/family/caregiver demonstrates understanding of disease process, treatment plan, medications, and discharge instructions  Description: Complete learning assessment and assess knowledge base    Interventions:  - Provide teaching at level of understanding  - Provide teaching via preferred learning methods  Outcome: Adequate for Discharge

## 2021-02-01 NOTE — DISCHARGE INSTRUCTIONS
Please follow-up with your primary care provider as well as Neurology as soon as possible for further management of your headaches    Please continue to follow with obstetrics for your routine pregnancy care     Hyperemesis Gravidarum   WHAT YOU NEED TO KNOW:   Hyperemesis gravidarum is a severe form of nausea and vomiting that happens during pregnancy  Hyperemesis is more severe than morning sickness  It may cause you to have nausea or vomiting all day for many days  It may also keep you from getting enough food and liquid  DISCHARGE INSTRUCTIONS:   Return to the emergency department if:   · You have signs of severe dehydration including little to no urine and dry mouth or lips  · You have severe stomach pain  · You feel too weak or dizzy to stand up  · You see blood in your vomit or bowel movements  Contact your healthcare provider if:   · You cannot keep any food or liquid down  · You are losing weight  · You have a fever  · You have questions or concerns about your condition or care  Medicines:   · Medicines, vitamins, or supplements  may be given to help decrease nausea and vomiting  · Take your medicine as directed  Contact your healthcare provider if you think your medicine is not helping or if you have side effects  Tell him of her if you are allergic to any medicine  Keep a list of the medicines, vitamins, and herbs you take  Include the amounts, and when and why you take them  Bring the list or the pill bottles to follow-up visits  Carry your medicine list with you in case of an emergency  Manage your symptoms:   · Eat small amounts of food every 1 to 2 hours  Some examples of good foods to eat include broth, toast, fruit, eggs, gelatin, or cottage cheese  Do not eat spicy or high-fat foods  Try to eat crackers before getting out of bed each morning  Foods and drinks with ginger, such as ginger ale, may help to decrease nausea and vomiting  · Drink liquids as directed  You may need to drink small amounts of liquid often to prevent dehydration  Ask how much liquid to drink each day and which liquids are best for you  · Rest when you need to  Start activity slowly and work up to your usual routine as you start to feel better  · Avoid things that may make hyperemesis worse  Avoid odors, heat, and humidity  Limit noise and flickering lights  · Weigh yourself daily if directed by your healthcare provider  You may need to keep a record of your daily weights for your healthcare provider  He or she may want to make sure you are not losing too much weight  Follow up with your healthcare provider as directed:  Write down your questions so you remember to ask them during your visits  © Copyright [address] Information is for End User's use only and may not be sold, redistributed or otherwise used for commercial purposes  All illustrations and images included in CareNotes® are the copyrighted property of [employer] A M [employer]  or [employer]   The above information is an  only  It is not intended as medical advice for individual conditions or treatments  Talk to your doctor, nurse or pharmacist before following any medical regimen to see if it is safe and effective for you  Pregnancy at [address]:   You are now at the end of your first trimester and entering your second trimester  Morning sickness usually goes away by this time  You may have other symptoms such as fatigue, frequent urination, and headaches  You may have gained 2 to 4 pounds by now  DISCHARGE INSTRUCTIONS:   Return to the emergency department if:   · You have pain or cramping in your abdomen or low back  · You have heavy vaginal bleeding or clotting  · You pass material that looks like tissue or large clots  Collect the material and bring it with you      Call your doctor or obstetrician if:   · You cannot keep food or drinks down, and you are losing weight  · You have light bleeding  · You have chills or a fever  · You have vaginal itching, burning, or pain  · You have yellow, green, white, or foul-smelling vaginal discharge  · You have pain or burning when you urinate, less urine than usual, or pink or bloody urine  · You have questions or concerns about your condition or care  How to care for yourself at this stage of your pregnancy:   · Get plenty of rest   You may feel more tired than normal  You may need to take naps or go to bed earlier  · Manage nausea and vomiting  Avoid fatty and spicy foods  Eat small meals throughout the day instead of large meals  Kiki may help to decrease nausea  Ask your healthcare provider about other ways of decreasing nausea and vomiting  · Eat a variety of healthy foods  Healthy foods include fruits, vegetables, whole-grain breads, low-fat dairy foods, beans, lean meats, and fish  Drink liquids as directed  Ask how much liquid to drink each day and which liquids are best for you  Limit caffeine to less than 200 milligrams each day  Limit your intake of fish to 2 servings each week  Choose fish low in mercury such as canned light tuna, shrimp, salmon, cod, or tilapia  Do not  eat fish high in mercury such as swordfish, tilefish, clari mackerel, and shark  · Take prenatal vitamins as directed  Your need for certain vitamins and minerals, such as folic acid, increases during pregnancy  Prenatal vitamins provide some of the extra vitamins and minerals you need  Prenatal vitamins may also help to decrease the risk of certain birth defects  · Do not smoke  Smoking increases your risk of a miscarriage and other health problems during your pregnancy  Smoking can cause your baby to be born too early or weigh less at birth  Ask your healthcare provider for information if you need help quitting  · Do not drink alcohol  Alcohol passes from your body to your baby through the placenta  It can affect your baby's brain development and cause fetal alcohol syndrome (FAS)  FAS is a group of conditions that causes mental, behavior, and growth problems  · Talk to your healthcare provider before you take any medicines  Many medicines may harm your baby if you take them when you are pregnant  Do not take any medicines, vitamins, herbs, or supplements without first talking to your healthcare provider  Never use illegal or street drugs (such as marijuana or cocaine) while you are pregnant  Safety tips during pregnancy:   · Avoid hot tubs and saunas  Do not use a hot tub or sauna while you are pregnant, especially during your first trimester  Hot tubs and saunas may raise your baby's temperature and increase the risk of birth defects  · Avoid toxoplasmosis  This is an infection caused by eating raw meat or being around infected cat feces  It can cause birth defects, miscarriages, and other problems  Wash your hands after you touch raw meat  Make sure any meat is well-cooked before you eat it  Avoid raw eggs and unpasteurized milk  Use gloves or ask someone else to clean your cat's litter box while you are pregnant  Changes happening with your baby: Your baby has fully formed fingernails and toenails  Your baby's heartbeat can now be heard  Ask your healthcare provider if you can listen to your baby's heartbeat  By week 14, your baby is over 4 inches long from the top of the head to the rump (baby's bottom)  Your baby weighs over 3 ounces  Prenatal care:  Prenatal care is a series of visits with your healthcare provider throughout your pregnancy  During the first 28 weeks of your pregnancy, you will see your healthcare provider 1 time each month  Prenatal care can help prevent problems during pregnancy and childbirth  Your healthcare provider will check your blood pressure and weight  Your baby's heart rate will also be checked   You may also need the following at some visits:  · A pelvic exam allows your healthcare provider to see your cervix (the bottom part of your uterus)  Your healthcare provider will use a speculum to open your vagina  He or she will check the size and shape of your uterus  · Blood tests  may be done to check for any of the following:     ? Gestational diabetes or anemia (low iron level)    ? Blood type or Rh factor, or certain birth defects    ? Immunity to certain diseases, such as chickenpox or rubella    ? An infection, such as a sexually transmitted infection, HIV, or hepatitis B    · Hepatitis B  may need to be prevented or treated  Hepatitis B is inflammation of the liver caused by the hepatitis B virus (HBV)  HBV can spread from a mother to her baby during delivery  You will be checked for HBV as early as possible in the first trimester of each pregnancy  You need the test even if you received the hepatitis B vaccine or were tested before  You may need to have an HBV infection treated before you give birth  · Urine tests  may also be done to check for sugar and protein  These can be signs of gestational diabetes or preeclampsia  Urine tests may also be done to check for signs of infection  · A fetal ultrasound  shows pictures of your baby inside your uterus  The pictures are used to check your baby's development, movement, and position  · Genetic disorder screening tests  may be offered to you  These tests check your baby's risk for genetic disorders such as Down syndrome  A screening test includes a blood test and ultrasound  Follow up with your doctor or obstetrician as directed:  Go to all prenatal visits  Write down your questions so you remember to ask them during your visits  © Copyright [address] Information is for End User's use only and may not be sold, redistributed or otherwise used for commercial purposes   All illustrations and images included in CareNotes® are the copyrighted property of [employer] Inc  or [employer]  The above information is an  only  It is not intended as medical advice for individual conditions or treatments  Talk to your doctor, nurse or pharmacist before following any medical regimen to see if it is safe and effective for you

## 2021-02-02 PROCEDURE — 99285 EMERGENCY DEPT VISIT HI MDM: CPT

## 2021-02-03 ENCOUNTER — HOSPITAL ENCOUNTER (EMERGENCY)
Facility: HOSPITAL | Age: 26
Discharge: HOME | End: 2021-02-03
Attending: EMERGENCY MEDICINE | Admitting: EMERGENCY MEDICINE
Payer: COMMERCIAL

## 2021-02-03 VITALS
TEMPERATURE: 98 F | WEIGHT: 220 LBS | OXYGEN SATURATION: 99 % | HEART RATE: 80 BPM | BODY MASS INDEX: 33.45 KG/M2 | DIASTOLIC BLOOD PRESSURE: 76 MMHG | RESPIRATION RATE: 18 BRPM | SYSTOLIC BLOOD PRESSURE: 128 MMHG

## 2021-02-03 DIAGNOSIS — O21.9 NAUSEA AND VOMITING DURING PREGNANCY: Primary | ICD-10-CM

## 2021-02-03 LAB
BACTERIA UR QL AUTO: ABNORMAL /HPF
BILIRUB UR QL STRIP: ABNORMAL
CLARITY UR: ABNORMAL
COLOR UR: ABNORMAL
GLUCOSE UR STRIP-MCNC: NEGATIVE MG/DL
HGB UR QL STRIP.AUTO: NEGATIVE
HYALINE CASTS #/AREA URNS LPF: ABNORMAL /LPF
KETONES UR STRIP-MCNC: ABNORMAL MG/DL
LEUKOCYTE ESTERASE UR QL STRIP: NEGATIVE
NITRITE UR QL STRIP: NEGATIVE
NON-SQ EPI CELLS URNS QL MICRO: ABNORMAL /HPF
PH UR STRIP.AUTO: 6.5 [PH] (ref 4.5–8)
PROT UR STRIP-MCNC: ABNORMAL MG/DL
RBC #/AREA URNS AUTO: ABNORMAL /HPF
SP GR UR STRIP.AUTO: >=1.03 (ref 1–1.03)
UROBILINOGEN UR QL STRIP.AUTO: 2 E.U./DL
WBC #/AREA URNS AUTO: ABNORMAL /HPF

## 2021-02-03 PROCEDURE — 96375 TX/PRO/DX INJ NEW DRUG ADDON: CPT

## 2021-02-03 PROCEDURE — 87086 URINE CULTURE/COLONY COUNT: CPT

## 2021-02-03 PROCEDURE — 81001 URINALYSIS AUTO W/SCOPE: CPT

## 2021-02-03 PROCEDURE — 96366 THER/PROPH/DIAG IV INF ADDON: CPT

## 2021-02-03 PROCEDURE — 96365 THER/PROPH/DIAG IV INF INIT: CPT

## 2021-02-03 PROCEDURE — 99284 EMERGENCY DEPT VISIT MOD MDM: CPT | Performed by: EMERGENCY MEDICINE

## 2021-02-03 RX ORDER — METOCLOPRAMIDE HYDROCHLORIDE 5 MG/ML
10 INJECTION INTRAMUSCULAR; INTRAVENOUS ONCE
Status: COMPLETED | OUTPATIENT
Start: 2021-02-03 | End: 2021-02-03

## 2021-02-03 RX ORDER — ONDANSETRON 2 MG/ML
4 INJECTION INTRAMUSCULAR; INTRAVENOUS ONCE
Status: COMPLETED | OUTPATIENT
Start: 2021-02-03 | End: 2021-02-03

## 2021-02-03 RX ADMIN — ONDANSETRON 4 MG: 2 INJECTION INTRAMUSCULAR; INTRAVENOUS at 03:12

## 2021-02-03 RX ADMIN — METOCLOPRAMIDE 10 MG: 5 INJECTION, SOLUTION INTRAMUSCULAR; INTRAVENOUS at 02:34

## 2021-02-03 RX ADMIN — DEXTROSE AND SODIUM CHLORIDE 1000 ML: 5; .9 INJECTION, SOLUTION INTRAVENOUS at 02:34

## 2021-02-03 NOTE — DISCHARGE INSTRUCTIONS
Follow-up with Ob by keeping your upcoming appointment  You can also follow-up with our primary care provider  Continue to use the Zofran as needed for nausea and vomiting  Wait 30-60 minutes after taking the Zofran before you attempt to eat or drink  Return to the ED with new or worsening symptoms including increased blood in your vomit

## 2021-02-03 NOTE — ED PROVIDER NOTES
History  Chief Complaint   Patient presents with    Vomiting Blood     pt states that she has been vomiting all day 2 episodes of vomiting w red blood, abdominal cramping  took zofran at 1800 w/o relief     Abdominal Pain Pregnant     Pt is a 20yo F who is approximately 10 weeks pregnant and presents for vomiting  Patient has been seen multiple times in the past month for similar complaint  Patient states she has had vomiting on and off throughout her pregnancy, but states today she had approximately 15 bouts of emesis  Patient states she has not been able to eat or drink anything for several days due to the emesis  Patient reports today 2 bouts of her emesis had small amounts of dark blood in them  Patient states she has not had any vomiting since her episodes of hematemesis  Patient reports that she is still mildly nauseous  Patient has been taking Zofran at home without much relief  Patient previously had a headache associated with her pregnancy that has significantly improved  Patient also reporting lower abdominal cramping that started following arrival and has no associated vaginal bleeding or discharge  Patient has upcoming appointment with OB in 3 days  Patient states she does not take any daily meds  Prior to Admission Medications   Prescriptions Last Dose Informant Patient Reported? Taking?    Pyridoxine HCl (vitamin B-6) 25 MG tablet   No No   Sig: Take 1 tablet (25 mg total) by mouth 3 (three) times a day   acetaminophen (TYLENOL) 325 mg tablet Past Week at Unknown time  No Yes   Sig: Take 2 tablets (650 mg total) by mouth every 6 (six) hours as needed for mild pain or headaches   metoclopramide (REGLAN) 10 mg tablet Past Week at Unknown time  No Yes   Sig: Take 1 tablet (10 mg total) by mouth every 6 (six) hours as needed (Nausea/vomiting)      Facility-Administered Medications: None       Past Medical History:   Diagnosis Date    [facility]     Chronic fatigue     last assessed 9/26/16    COVID-19 07/25/2020    Fever and chills 7/25/2020    Hyperlipidemia     resolved 9/29/17    Hypertension     pre-eclampsia 2016    Idiopathic intracranial hypertension     Varicella     pt unsure    Visual impairment     glasses       Past Surgical History:   Procedure Laterality Date    CHOLECYSTECTOMY LAPAROSCOPIC N/A 5/11/2016    Procedure: CHOLECYSTECTOMY LAPAROSCOPIC possible open;  Surgeon: Edilson Salmeron MD;  Location: [facility] OR;  Service:    [employer] REDUCTION MAMMAPLASTY         Family History   Problem Relation Age of Onset    Leukemia Maternal Grandfather     No Known Problems Mother     Other Father         MVA    No Known Problems Sister     Hypertension Maternal Grandmother     No Known Problems Daughter     No Known Problems Sister      I have reviewed and agree with the history as documented  E-Cigarette/Vaping    E-Cigarette Use Never User      E-Cigarette/Vaping Substances    Nicotine No     THC No     CBD No     Flavoring No     Other No     Unknown No      Social History     Tobacco Use    Smoking status: Never Smoker    Smokeless tobacco: Never Used   Substance Use Topics    Alcohol use: Not Currently     Frequency: Never     Binge frequency: Never    Drug use: Never        Review of Systems   Constitutional: Positive for appetite change (decreased due to nausea) and fatigue  Negative for chills and fever  HENT: Negative for ear pain, sinus pressure, sinus pain and sore throat  Eyes: Negative for pain  Respiratory: Negative for cough and shortness of breath  Cardiovascular: Negative for chest pain  Gastrointestinal: Positive for abdominal pain (bilateral lower cramping), nausea and vomiting (15 bouts today; 2 with dark blood)  Negative for abdominal distention, blood in stool, constipation and diarrhea  Genitourinary: Negative for dysuria, frequency, hematuria, urgency, vaginal bleeding, vaginal discharge and vaginal pain  Musculoskeletal: Negative for arthralgias, back pain, gait problem and neck pain  Skin: Negative for color change, pallor, rash and wound  Neurological: Positive for headaches (improved)  Negative for dizziness, seizures, syncope and light-headedness  Psychiatric/Behavioral: Negative for agitation, behavioral problems and confusion  Physical Exam  ED Triage Vitals   Temperature Pulse Respirations Blood Pressure SpO2   02/03/21 0003 02/03/21 0003 02/03/21 0003 02/03/21 0003 02/03/21 0003   98 °F (36 7 °C) 98 18 123/98 98 %      Temp Source Heart Rate Source Patient Position - Orthostatic VS BP Location FiO2 (%)   02/03/21 0003 02/03/21 0238 02/03/21 0238 02/03/21 0238 --   Oral Monitor Lying Right arm       Pain Score       02/03/21 0003       6             Orthostatic Vital Signs  Vitals:    02/03/21 0003 02/03/21 0238 02/03/21 0245 02/03/21 0445   BP: 123/98 122/80 122/80 128/76   Pulse: 98 89 96 80   Patient Position - Orthostatic VS:  Lying Lying Lying       Physical Exam  Vitals signs reviewed  Constitutional:       General: She is not in acute distress  Appearance: Normal appearance  She is well-developed  She is not toxic-appearing or diaphoretic  HENT:      Head: Normocephalic and atraumatic  Right Ear: External ear normal       Left Ear: External ear normal       Nose: Nose normal       Mouth/Throat:      Pharynx: Oropharynx is clear  Comments: Mucus membranes tachy  Eyes:      Extraocular Movements: Extraocular movements intact  Conjunctiva/sclera: Conjunctivae normal       Pupils: Pupils are equal, round, and reactive to light  Neck:      Musculoskeletal: Normal range of motion and neck supple  Cardiovascular:      Rate and Rhythm: Normal rate and regular rhythm  Heart sounds: Normal heart sounds  No murmur  Pulmonary:      Effort: Pulmonary effort is normal  No respiratory distress  Breath sounds: Normal breath sounds  No stridor   No wheezing or rhonchi  Abdominal:      General: Bowel sounds are normal  There is no distension  Palpations: Abdomen is soft  There is no mass  Tenderness: There is abdominal tenderness in the suprapubic area and left lower quadrant  Musculoskeletal: Normal range of motion  Right lower leg: No edema  Left lower leg: No edema  Skin:     General: Skin is warm and dry  Capillary Refill: Capillary refill takes less than 2 seconds  Coloration: Skin is not pale  Findings: No erythema or rash  Neurological:      General: No focal deficit present  Mental Status: She is alert and oriented to person, place, and time  Psychiatric:         Mood and Affect: Mood normal          Behavior: Behavior normal          Thought Content:  Thought content normal          Judgment: Judgment normal          ED Medications  Medications   metoclopramide (REGLAN) injection 10 mg (10 mg Intravenous Given 2/3/21 0234)   dextrose 5 % and sodium chloride 0 9 % bolus 1,000 mL (0 mL Intravenous Stopped 2/3/21 0452)   ondansetron (ZOFRAN) injection 4 mg (4 mg Intravenous Given 2/3/21 0312)       Diagnostic Studies  Results Reviewed     Procedure Component Value Units Date/Time    Urine culture [[id number]] Collected: 02/03/21 0228    Lab Status: Final result Specimen: Urine, Clean Catch Updated: 02/04/21 0718     Urine Culture No Growth <1000 cfu/mL    Urine Microscopic [[id number]]  (Abnormal) Collected: 02/03/21 0228    Lab Status: Final result Specimen: Urine, Clean Catch Updated: 02/03/21 0247     RBC, UA 4-10 /hpf      WBC, UA 10-20 /hpf      Epithelial Cells Moderate /hpf      Bacteria, UA Occasional /hpf      Hyaline Casts, UA 10-25 /lpf     Urine Macroscopic, POC [[id number]]  (Abnormal) Collected: 02/03/21 0228    Lab Status: Final result Specimen: Urine Updated: 02/03/21 0229     Color, UA Orange     Clarity, UA Turbid     pH, UA 6 5     Leukocytes, UA Negative     Nitrite, UA Negative     Protein,  (2+) mg/dl      Glucose, UA Negative mg/dl      Ketones, UA >=160 (4+) mg/dl      Urobilinogen, UA 2 0 E U /dl      Bilirubin, UA Interference- unable to analyze     Blood, UA Negative     Specific Gravity, UA >=1 030    Narrative:      CLINITEK RESULT                 No orders to display         Procedures  Procedures      ED Course  ED Course as of Feb 04 1653   Wed Feb 03, 2021   0229 4+ ketones  D5 ordered for likely ketosis  Ketones, UA(!): >=160 (4+)   0302 Reassessed pt who reports no improvement of nausea  Will order zofran  0416 Pt reassessed and reports improvement of nausea  Pt provided with crackers and water for PO challenge  0448 Pt successfully passed PO challenge  Will DC at this time  SBIRT 22yo+      Most Recent Value   SBIRT (24 yo +)   In order to provide better care to our patients, we are screening all of our patients for alcohol and drug use  Would it be okay to ask you these screening questions? No Filed at: 02/03/2021 0110                MDM  Number of Diagnoses or Management Options  Nausea and vomiting during pregnancy:   Diagnosis management comments: Pt is a 18yo F who presents with N/V and minimal hematemesis  Exam pertinent for minimal abdominal tenderness  Differential diagnosis to include but not limited to laura-nassar tear, mucosal perforation, hyperemesis, ketosis, UTI  Based on pt's complaint of persistent N/V without much oral intake, will get UA to evaluate for ketosis  Due to history of minimal bleeding, as well as lab work available from recent admission, likely no significant change  No lab work indicated at this time  Will plan to treat symptomatically with reglan for nausea and D5NS for ketosis and dehydration  UA shows 4+ ketones without evidence of infection  Pt reported no improvement following the Reglan  Zofran ordered at that time  Upon reassessment, pt reported improvement of nausea   Pt had successful PO challenge  Plan to discharge pt with follow-up to Ob  Pt has upcoming appointment scheduled and is encouraged to keep it  Discussed returning the ED with significant worsening of symptoms or recurrent hematemesis  Discussed use of Tylenol as stated on the bottle as needed for pain  Discussed continuing to use Zofran at home for N/V  Pt reports that she still has a sufficient supply of Zofran and does not require further script  Pt expressed understanding of discharge instructions, return precautions, and medication instructions  All questions were answered and pt was discharged without incident  Amount and/or Complexity of Data Reviewed  Clinical lab tests: ordered and reviewed  Discussion of test results with the performing providers: yes        Disposition  Final diagnoses:   Nausea and vomiting during pregnancy     Time reflects when diagnosis was documented in both MDM as applicable and the Disposition within this note     Time User Action Codes Description Comment    2/3/2021  4:17 AM Tyler Ruiz Add [O21 9] Nausea and vomiting during pregnancy       ED Disposition     ED Disposition Condition Date/Time Comment    Discharge Stable Wed Feb 3, 2021  4:49 AM [facility] discharge to home/self care  Follow-up Information     Follow up With Specialties Details Why Contact Info Additional Information    Ashley Larsen PA-C Internal Medicine, Physician Assistant Call  As needed [phone]  [facility]  [phone]  [address] [facility] Emergency Department Emergency Medicine Go to  If symptoms worsen [address]  [address] Emergency Department, [address], [address] [facility],[address] Obstetrics and Gynecology Go on 2/5/2021 Keep your existing appointment   Edward Aponte [phone]  [address] [place] [address], [address] [place] [address] [address], [place], [place], [facility]          Discharge Medication List as of 2/3/2021  4:50 AM      CONTINUE these medications which have NOT CHANGED    Details   acetaminophen (TYLENOL) 325 mg tablet Take 2 tablets (650 mg total) by mouth every 6 (six) hours as needed for mild pain or headaches, Starting Mon 2/1/2021, Normal      metoclopramide (REGLAN) 10 mg tablet Take 1 tablet (10 mg total) by mouth every 6 (six) hours as needed (Nausea/vomiting), Starting Mon 2/1/2021, Normal      Pyridoxine HCl (vitamin B-6) 25 MG tablet Take 1 tablet (25 mg total) by mouth 3 (three) times a day, Starting Sun 1/3/2021, Until Tue 2/2/2021, Print           No discharge procedures on file  PDMP Review     None           ED Provider  Attending physically available and evaluated [facility]  I managed the patient along with the ED Attending      Electronically Signed by         Rafael Hinton MD  02/04/21 8643

## 2021-02-03 NOTE — ED ATTENDING ATTESTATION
2/2/2021  Markel Berrios DO, saw and evaluated the patient  I have discussed the patient with the resident/non-physician practitioner and agree with the resident's/non-physician practitioner's findings, Plan of Care, and MDM as documented in the resident's/non-physician practitioner's note, except where noted  All available labs and Radiology studies were reviewed  I was present for key portions of any procedure(s) performed by the resident/non-physician practitioner and I was immediately available to provide assistance  At this point I agree with the current assessment done in the Emergency Department  I have conducted an independent evaluation of this patient a history and physical is as follows:    25 yo female at approx 10 WGA, recent admission for hyperemesis and intractable  Presents for continued vomiting, now with what seems to be blood  Denies CP/SOB, cough, epistaxis, lightheadedness  Symptoms constant, wax and wane  Moderate severity  No a/e factors  generalized in nature  Imp: vomiting likely due to pregnancy  Hematemesis likely due to laura nassar tear vs gastritis from continued vomiting  Plan: tx sx, IVF, reassess          ED Course         Critical Care Time  Procedures

## 2021-02-04 LAB — BACTERIA UR CULT: NORMAL

## 2021-02-04 NOTE — TELEPHONE ENCOUNTER
I contact the patient and gave her the information in the message patient said the she already has appt schedule with OB-GYN for tomorrow and she is going to discuss with them her symptoms and to see if they create a plan to keep her out of work  Patient is going to call us back with the information

## 2021-02-05 ENCOUNTER — ROUTINE PRENATAL (OUTPATIENT)
Dept: OBGYN CLINIC | Facility: CLINIC | Age: 26
End: 2021-02-05

## 2021-02-05 ENCOUNTER — TELEPHONE (OUTPATIENT)
Dept: OBGYN CLINIC | Facility: CLINIC | Age: 26
End: 2021-02-05

## 2021-02-05 VITALS
BODY MASS INDEX: 32.13 KG/M2 | HEART RATE: 104 BPM | DIASTOLIC BLOOD PRESSURE: 89 MMHG | WEIGHT: 212 LBS | SYSTOLIC BLOOD PRESSURE: 120 MMHG | HEIGHT: 68 IN

## 2021-02-05 DIAGNOSIS — O21.0 HYPEREMESIS GRAVIDARUM: ICD-10-CM

## 2021-02-05 DIAGNOSIS — Z30.2 REQUEST FOR STERILIZATION: ICD-10-CM

## 2021-02-05 DIAGNOSIS — Z3A.12 12 WEEKS GESTATION OF PREGNANCY: Primary | ICD-10-CM

## 2021-02-05 PROCEDURE — 99213 OFFICE O/P EST LOW 20 MIN: CPT | Performed by: OBSTETRICS & GYNECOLOGY

## 2021-02-05 PROCEDURE — 87591 N.GONORRHOEAE DNA AMP PROB: CPT | Performed by: OBSTETRICS & GYNECOLOGY

## 2021-02-05 PROCEDURE — 87491 CHLMYD TRACH DNA AMP PROBE: CPT | Performed by: OBSTETRICS & GYNECOLOGY

## 2021-02-05 RX ORDER — ONDANSETRON 4 MG/1
4 TABLET, FILM COATED ORAL EVERY 8 HOURS PRN
COMMUNITY
End: 2021-04-02 | Stop reason: ALTCHOICE

## 2021-02-05 RX ORDER — PROMETHAZINE HYDROCHLORIDE 12.5 MG/1
12.5 TABLET ORAL EVERY 6 HOURS PRN
Qty: 30 TABLET | Refills: 0 | Status: SHIPPED | OUTPATIENT
Start: 2021-02-05 | End: 2021-02-08 | Stop reason: ALTCHOICE

## 2021-02-05 NOTE — PROGRESS NOTES
OB/GYN  PRENATAL H&P VISIT  Herb Mckenzie  2021  2:55 PM  Dr Jassi Beckford MD      SUBJECTIVE  Patient is here for initial prenatal H&P  This is an intended pregnancy  Patient is currently experiencing nausea/vomiting and has visited the ED multiple times for the same complaints  She is currently using B6, zofran and reglan with minimal improvement of symptoms  Of note, patient has a recorded 8lb weight loss since 2/3 to   This is her 3rd pregnancy  Her previous pregnancy was complicated by induction at 35w for pre-eclampsia with severe features  She previously delivered vaginally and that child is doing well at this time  She had a first trimester miscarriage last year that was medically managed  She denies hx of STD/STI, denies a hx of TB or close contacts with persons with TB  She denies a family history of inheritable conditions such as physical or intellectual disabilities, birth defects, blood disorders, heart or neural tube defects  She is here with   She has a good a support system at home  She denies recent travel or travel planned in the near future  She denies use of nicotine or recreational drug use  She denies vaginal bleeding, cramping, leakage, abnormal discharge  OB History    Para Term  AB Living   3 1 0 1 1 1   SAB TAB Ectopic Multiple Live Births   1 0 0 0 1      # Outcome Date GA Lbr Jani/2nd Weight Sex Delivery Anes PTL Lv   3 Current            2 SAB 2020 8w0d    SAB      1  16 35w3d  1745 g (3 lb 13 6 oz) F Vag-Spont EPI Y OSCAR      Apgar1: 9  Apgar5: 9       Review of Systems   Constitutional: Positive for activity change and fatigue  Negative for diaphoresis and fever  HENT: Negative for hearing loss, nosebleeds, tinnitus and trouble swallowing  Eyes: Negative for photophobia and visual disturbance  Respiratory: Negative for apnea, shortness of breath and wheezing      Cardiovascular: Negative for chest pain and palpitations  Gastrointestinal: Negative for blood in stool and vomiting  Endocrine: Negative for polydipsia and polyphagia  Genitourinary: Negative for dysuria and hematuria  Musculoskeletal: Negative for arthralgias, gait problem and myalgias  Skin: Negative for color change and pallor  Neurological: Positive for dizziness and headaches  Negative for seizures, facial asymmetry, speech difficulty and numbness  Psychiatric/Behavioral: Negative for agitation, behavioral problems and confusion           Past Medical History:   Diagnosis Date    [facility] mal[facility] type [facility] ([facility] )     Chronic fatigue     last assessed 9/26/16    COVID-19 07/25/2020    Fever and chills 7/25/2020    Hyperlipidemia     resolved 9/29/17    Hypertension     pre-eclampsia 2016    Idiopathic intracranial hypertension     Varicella     pt unsure    Visual impairment     glasses       Past Surgical History:   Procedure Laterality Date    CHOLECYSTECTOMY LAPAROSCOPIC N/A 5/11/2016    Procedure: CHOLECYSTECTOMY LAPAROSCOPIC possible open;  Surgeon: Stephon Gunter MD;  Location: [facility];  Service:    [facility] REDUCTION MAMMAPLASTY         Social History     Socioeconomic History    Marital status: Single     Spouse name: Not on file    Number of children: 1    Years of education: 15    Highest education level: High school graduate   Occupational History    Not on file   Social Needs    Financial resource strain: Not hard at all   [employer] insecurity     Worry: Never true     Inability: Never true   [employer] needs     Medical: No     Non-medical: No   Tobacco Use    Smoking status: Never Smoker    Smokeless tobacco: Never Used   Substance and Sexual Activity    Alcohol use: Not Currently     Frequency: Never     Binge frequency: Never    Drug use: Never    Sexual activity: Yes     Partners: Male     Birth control/protection: None   Lifestyle    Physical activity     Days per week: 0 days     Minutes per session: 0 min    Stress: Not at all   Relationships    Social connections     Talks on phone: More than three times a week     Gets together: Once a week     Attends Taoist service: Never     Active member of club or organization: No     Attends meetings of clubs or organizations: Never     Relationship status: Living with partner    Intimate partner violence     Fear of current or ex partner: No     Emotionally abused: No     Physically abused: No     Forced sexual activity: No   Other Topics Concern    Not on file   Social History Narrative    Always uses seatbelt    High school student    Younger sister    Lives with mother (single parent)       OBJECTIVE  Vitals:    02/05/21 0956   BP: 120/89   Pulse: 104       FHT 147bpm by TAUS    OBGyn Exam  Breast: normal appearing breasts bilaterally, no discharge or erythema noted  Vulva: normal  Vagina: normal mucosa, normal discharge  Cervix: multiparous appearance, no cervical motion tenderness and no lesions  Adnexa: normal adnexa and no mass, fullness, tenderness  Uterus: normal size, mobile, non-tender    ASSESSMENT AND PLAN    26 y o , [id number], with /89 (BP Location: Left arm, Patient Position: Sitting, Cuff Size: Large)   Pulse 104   Ht 5' 8" (1 727 m)   Wt 96 2 kg (212 lb)   LMP 11/10/2020 (Exact Date) , at Gestational Age: 12w3d here for her prenatal H&P     1  Pregnancy: H&P completed today  PN Labs reviewed today  Final dating via LMP  Labor expectations discussed with patient, including appointment schedule, nutrition, weight gain, exercise, medications, sexual intercourse, and nausea/vomiting  2  Screening: Pap smear WNL 5/2019  GC/CT collected  Sequential screening reviewed with patient - already scheduled  Reviewed carrier screening for cystic fibrosis, hemoglobinapathies, Fragile X, and somatic muscular atrophy  3  Hyperemesis Gravidarum: 8lb weight loss with continued nausea and vomiting   Phenergan ordered today; will plan for IV fluid infusions; also encouraged small, frequent meals, PO hdyration as much as possible  Discussed that it is possible that symptoms improve after first trimester but will plan for outpatient IV fluid infusions until then  4  Postpartum: Different methods of contraception were discussed with patient, including progesterone only oral pills, depo provera, nexplanon, mirena, and paragard  Patient would like to have sterilization during postpartum phase  Will need MA 31 signed later in pregnancy  5  Follow up: RTC in 4 weeks  Precautions regarding labor, leakage, bleeding, and fetal movement reviewed        D/w Dr Daryle Santee, MD  2/5/2021  2:55 PM

## 2021-02-06 LAB
C TRACH DNA SPEC QL NAA+PROBE: NEGATIVE
N GONORRHOEA DNA SPEC QL NAA+PROBE: NEGATIVE

## 2021-02-08 ENCOUNTER — ROUTINE PRENATAL (OUTPATIENT)
Dept: PERINATAL CARE | Facility: OTHER | Age: 26
End: 2021-02-08
Payer: COMMERCIAL

## 2021-02-08 ENCOUNTER — TELEPHONE (OUTPATIENT)
Dept: OBGYN CLINIC | Facility: CLINIC | Age: 26
End: 2021-02-08

## 2021-02-08 VITALS
BODY MASS INDEX: 32.86 KG/M2 | WEIGHT: 216.8 LBS | HEART RATE: 96 BPM | SYSTOLIC BLOOD PRESSURE: 130 MMHG | HEIGHT: 68 IN | DIASTOLIC BLOOD PRESSURE: 85 MMHG

## 2021-02-08 DIAGNOSIS — O09.291 HX OF PREECLAMPSIA, PRIOR PREGNANCY, CURRENTLY PREGNANT, FIRST TRIMESTER: Primary | ICD-10-CM

## 2021-02-08 DIAGNOSIS — O21.0 HYPEREMESIS GRAVIDARUM: Primary | ICD-10-CM

## 2021-02-08 DIAGNOSIS — Z3A.12 12 WEEKS GESTATION OF PREGNANCY: ICD-10-CM

## 2021-02-08 DIAGNOSIS — Z36.82 NUCHAL TRANSLUCENCY OF FETUS ON PRENATAL ULTRASOUND: ICD-10-CM

## 2021-02-08 DIAGNOSIS — Z34.91 PREGNANT AND NOT YET DELIVERED IN FIRST TRIMESTER: ICD-10-CM

## 2021-02-08 PROCEDURE — 76801 OB US < 14 WKS SINGLE FETUS: CPT | Performed by: OBSTETRICS & GYNECOLOGY

## 2021-02-08 PROCEDURE — 1036F TOBACCO NON-USER: CPT | Performed by: OBSTETRICS & GYNECOLOGY

## 2021-02-08 PROCEDURE — 99241 PR OFFICE CONSULTATION NEW/ESTAB PATIENT 15 MIN: CPT | Performed by: OBSTETRICS & GYNECOLOGY

## 2021-02-08 PROCEDURE — 76813 OB US NUCHAL MEAS 1 GEST: CPT | Performed by: OBSTETRICS & GYNECOLOGY

## 2021-02-08 RX ORDER — ASPIRIN 81 MG/1
162 TABLET, CHEWABLE ORAL DAILY
Qty: 60 TABLET | Refills: 6 | Status: SHIPPED | OUTPATIENT
Start: 2021-02-08 | End: 2021-06-19 | Stop reason: HOSPADM

## 2021-02-08 NOTE — TELEPHONE ENCOUNTER
Left voicemail for pt to call our office to help assist scheduling her first infusion for IV hydration related to hyper emesis  Office number provided

## 2021-02-08 NOTE — PROGRESS NOTES
Patient chose to have Part 1 Sequential Screening from [employer]  Finger stick specimen collected from right middle finger and patient tolerated procedure well  Sample mailed to [employer] via [employer]  Explained results will be available in 7-10 business days  M office will call her with results or she can view in [facility]  Ideal collection for Part 2 is between 16-18 weeks gestation, a lab slip and instruction letter will be mailed from our office  Patient instructed to take the paper lab slip to lab, this specialty genetic test is not yet in Epic  Patient verbalized understanding of all instructions

## 2021-02-08 NOTE — LETTER
2021     Saurabh Redmond, [address] [facility]    Patient: Gail Rincon   YOB: 1995   Date of Visit: 2021       Dear Dr William Loya: Thank you for referring Gail Rincon to me for evaluation  Below are my notes for this consultation  If you have questions, please do not hesitate to call me  I look forward to following your patient along with you  Sincerely,        Jose Kwan MD        CC: No Recipients  Jose Kwan MD  2021 10:48 PM  Sign when Signing Visit  OFFICE CONSULT  Referring physician:   Saurabh Redmond Md  [facility],  [address] [facility]      Dear Dr William Loya      Thank you for requesting a  consultation on your patient Ms  Gail Rincon for the following indications:  Genetic screening    History   Leatha Pike was here today with her partner Ping Liz  She reports she is on prenatal vitamins but then secondary to nausea and vomiting in pregnancy also has prescriptions for vitamin B6, Phenergan, Zofran, Reglan  She denies any known drug allergies  Her records report that she has a chiari  malformation and history of idiopathic intracranial hypertension and history of preeclampsia with a prior pregnancy at 35 weeks 3 days  Surgical history includes a laparoscopic cholecystectomy and a reduction mammoplasty  She denies any use of cigarettes, alcohol or illicit drugs  She denies any 1st generation family history of hypertension, diabetes, thrombosis or congenital anomalies  Her OB history includes a 35 week 3 day vaginal delivery of a 3 pound 13 ounce baby girl  She was induced secondary to preeclampsia on 3/21/16  She also had a 8 week miscarriage 20  She reports this was the same day that she was diagnosed with a COVID infection  She is planning on a tubal after this pregnancy   Baseline preeclamptic labs were normal      Ultrasound findings:  Today's ultrasound shows a fetus that is growing concordant with her dates  The nasal bone and nuchal translucency appear normal   No malformations are detected on today's early ultrasound  The patient was informed of the findings and counseled about the limitations of the exam in detecting all forms of fetal congenital abnormalities  She does not report any vaginal bleeding or uterine cramping or contractions  Specific counseling was provided on the following problems:  1  We discussed the options for genetic screening which include invasive testing on the fetal placenta or on fetal skin cells within the amniotic fluid and compared this to noninvasive testing which includes cell free DNA screening and the sequential screen  We reviewed the risks, the benefits and the limitations of each  In the end patient chose to complete the sequential screen  2  Recommend starting baby aspirin 162 mg daily       Follow up recommended:   Tests ordered today: Sequential screen part I    Future tests recommended:  1  The results will return in 7-10 days for part 1 which screens for Down syndrome and trisomy 18  Part 2 will then be ordered through our office to be completed between 16-18 weeks for further screening of Down syndrome and trisomy 18 and additional screening for spina bifida  Future ultrasounds ordered today: Fetal Level II ultrasound imaging is recommended at 19-20 weeks' gestation      The face to face time, in addition to time spent discussing ultrasound results, was approximately 15 minutes, greater than 50% of which was spent during counseling and coordination of care    Bobby Serrano MD

## 2021-02-08 NOTE — TELEPHONE ENCOUNTER
Pt returned our phone call and stated Tuesday and/or Friday's are more convenient for her  Scheduled pt for her first infusion on 02/12/2021 at 12:30 pm  Pt verbalized understanding and agreed to plan  Please change start date to 02/12/2021  Thanks!

## 2021-02-08 NOTE — PROGRESS NOTES
OFFICE CONSULT  Referring physician:   Makeda Herrmann Md  [facility],  [address] [place] [address]      Dear Dr Elza Ngo      Thank you for requesting a  consultation on your patient Ms Vinicio Saeed for the following indications:  Genetic screening    History   Eufemia Marquez was here today with her partner Giulia Fairbanks  She reports she is on prenatal vitamins but then secondary to nausea and vomiting in pregnancy also has prescriptions for vitamin B6, Phenergan, Zofran, Reglan  She denies any known drug allergies  Her records report that she has a chiari  malformation and history of idiopathic intracranial hypertension and history of preeclampsia with a prior pregnancy at 35 weeks 3 days  Surgical history includes a laparoscopic cholecystectomy and a reduction mammoplasty  She denies any use of cigarettes, alcohol or illicit drugs  She denies any 1st generation family history of hypertension, diabetes, thrombosis or congenital anomalies  Her OB history includes a 35 week 3 day vaginal delivery of a 3 pound 13 ounce baby girl  She was induced secondary to preeclampsia on 3/21/16  She also had a 8 week miscarriage 20  She reports this was the same day that she was diagnosed with a COVID infection  She is planning on a tubal after this pregnancy  Baseline preeclamptic labs were normal      Ultrasound findings:  Today's ultrasound shows a fetus that is growing concordant with her dates  The nasal bone and nuchal translucency appear normal   No malformations are detected on today's early ultrasound  The patient was informed of the findings and counseled about the limitations of the exam in detecting all forms of fetal congenital abnormalities  She does not report any vaginal bleeding or uterine cramping or contractions  Specific counseling was provided on the following problems:  1   We discussed the options for genetic screening which include invasive testing on the fetal placenta or on fetal skin cells within the amniotic fluid and compared this to noninvasive testing which includes cell free DNA screening and the sequential screen  We reviewed the risks, the benefits and the limitations of each  In the end patient chose to complete the sequential screen  2  Recommend starting baby aspirin 162 mg daily       Follow up recommended:   Tests ordered today: Sequential screen part I    Future tests recommended:  1  The results will return in 7-10 days for part 1 which screens for Down syndrome and trisomy 18  Part 2 will then be ordered through our office to be completed between 16-18 weeks for further screening of Down syndrome and trisomy 18 and additional screening for spina bifida  Future ultrasounds ordered today: Fetal Level II ultrasound imaging is recommended at 19-20 weeks' gestation      The face to face time, in addition to time spent discussing ultrasound results, was approximately 15 minutes, greater than 50% of which was spent during counseling and coordination of care    Dodie Wells MD

## 2021-02-08 NOTE — TELEPHONE ENCOUNTER
----- Message from Edie Dooley MD sent at 2/5/2021 12:47 PM EST -----  Hey this patient needs IV hydration for hyper emesis, can you call her with the first appointment  I have it listed as twice a week until discontinued  Thanks!

## 2021-02-12 ENCOUNTER — HOSPITAL ENCOUNTER (OUTPATIENT)
Dept: INFUSION CENTER | Facility: HOSPITAL | Age: 26
Discharge: HOME | End: 2021-02-12
Payer: COMMERCIAL

## 2021-02-12 ENCOUNTER — TELEPHONE (OUTPATIENT)
Dept: OBGYN CLINIC | Facility: CLINIC | Age: 26
End: 2021-02-12

## 2021-02-12 DIAGNOSIS — O21.0 HYPEREMESIS GRAVIDARUM: Primary | ICD-10-CM

## 2021-02-12 PROCEDURE — 96360 HYDRATION IV INFUSION INIT: CPT

## 2021-02-12 PROCEDURE — 96361 HYDRATE IV INFUSION ADD-ON: CPT

## 2021-02-12 RX ADMIN — DEXTROSE AND SODIUM CHLORIDE 1000 ML: 5; .45 INJECTION, SOLUTION INTRAVENOUS at 12:30

## 2021-02-12 NOTE — PROGRESS NOTES
pt states is to be scheduled for hydration twice weekly on Tuesdays and fridays  Orders that are currently available are for one time hydration  [facility] to notify orders need to be placed and patient needs to be notified  Left call back number to infusion for scheduling  Pt aware to expect call from office

## 2021-02-17 ENCOUNTER — HOSPITAL ENCOUNTER (OUTPATIENT)
Dept: INFUSION CENTER | Facility: HOSPITAL | Age: 26
Discharge: HOME | End: 2021-02-17
Attending: OBSTETRICS & GYNECOLOGY
Payer: COMMERCIAL

## 2021-02-17 VITALS — TEMPERATURE: 97.6 F | SYSTOLIC BLOOD PRESSURE: 140 MMHG | HEART RATE: 98 BPM | DIASTOLIC BLOOD PRESSURE: 90 MMHG

## 2021-02-17 DIAGNOSIS — O21.0 HYPEREMESIS GRAVIDARUM: Primary | ICD-10-CM

## 2021-02-17 PROCEDURE — 96360 HYDRATION IV INFUSION INIT: CPT

## 2021-02-17 PROCEDURE — 96361 HYDRATE IV INFUSION ADD-ON: CPT

## 2021-02-17 RX ADMIN — SODIUM CHLORIDE, SODIUM LACTATE, POTASSIUM CHLORIDE, AND CALCIUM CHLORIDE 1000 ML: .6; .31; .03; .02 INJECTION, SOLUTION INTRAVENOUS at 09:42

## 2021-02-17 NOTE — PLAN OF CARE
Problem: Nutrition/Hydration-ADULT  Goal: Nutrient/Hydration intake appropriate for improving, restoring or maintaining nutritional needs  Description: Monitor and assess patient's nutrition/hydration status for malnutrition  Collaborate with interdisciplinary team and initiate plan and interventions as ordered  Monitor patient's weight and dietary intake as ordered or per policy  Utilize nutrition screening tool and intervene as necessary  Determine patient's food preferences and provide high-protein, high-caloric foods as appropriate  INTERVENTIONS:  - Monitor oral intake, urinary output, labs, and treatment plans  - Assess nutrition and hydration status and recommend course of action  - Evaluate amount of meals eaten  - Assist patient with eating if necessary   - Allow adequate time for meals  - Recommend/ encourage appropriate diets, oral nutritional supplements, and vitamin/mineral supplements  - Order, calculate, and assess calorie counts as needed  - Recommend, monitor, and adjust tube feedings and TPN/PPN based on assessed needs  - Assess need for intravenous fluids  - Provide specific nutrition/hydration education as appropriate  - Include patient/family/caregiver in decisions related to nutrition  Outcome: Progressing     Problem: Potential for Falls  Goal: Patient will remain free of falls  Description: INTERVENTIONS:  - Assess patient frequently for physical needs  -  Identify cognitive and physical deficits and behaviors that affect risk of falls    -  [place] fall precautions as indicated by assessment   - Educate patient/family on patient safety including physical limitations  - Instruct patient to call for assistance with activity based on assessment  - Modify environment to reduce risk of injury  - Consider OT/PT consult to assist with strengthening/mobility  Outcome: Progressing

## 2021-02-19 ENCOUNTER — TELEPHONE (OUTPATIENT)
Dept: PERINATAL CARE | Facility: CLINIC | Age: 26
End: 2021-02-19

## 2021-02-19 NOTE — TELEPHONE ENCOUNTER
----- Message from Mirtha Man MD sent at 2/16/2021  4:21 PM EST -----  Patient updated with her lab results through my chart

## 2021-02-19 NOTE — TELEPHONE ENCOUNTER
Spoke with pt using [employer] (VVWVE # [id number])  Part 1 Sequential screen results provided, part 2 explained  Pt receptive to information and declines questions at this time  TRF and lab letter mailed to pt

## 2021-02-19 NOTE — LETTER
02/19/21  [place]  1995    Thank you for completing Part 1 of your Sequential Screen  To obtain a complete test result, please complete blood work for Part 2 Sequential Screen between the weeks of 3/3/2021 to 3/17/2021  Please verify a laboratory In Network with your insurance plan [facility] or [employer])  If you choose to use a [facility] lab, below are the sites to have this blood work drawn  If you choose [facility], the specimen must be forwarded to [employer]  For [facility] MalpracticeAgents   Call Maternal Fetal Medicine nurse line any questions at [phone] [address] [facility] Office Building  1492 [facility], [facility] [place] [address] [address]                [facility] 5, [place] [address], [id number] [place] [address]  [address], [facility], [address] [place] [address]    300 [facility]  [address], [place] [address] [address]             [address] BERRY Cantor [address]  1430 [facility], 230 [facility] [address] 8400 [facility]  [address], [facility] [place] [address] [address]                        [address], [facility] [place] [address] [place] [facility]  [address] [facility], [address] [place] [address]            207 [facility], [address]  [facility], [facility] [address]    Thank you,  Margareth 73 Maternal Fetal Medicine Staff

## 2021-03-02 NOTE — PROGRESS NOTES
OB/GYN prenatal visit    S: 26 y o  [id number] 16w1d here for PN visit  She denies obstetric complaints, including pelvic pain, contractions, vaginal bleeding, loss of fluid, or decreased fetal movement  Her pregnancy is complicated by hyperemesis gravidarum on outpatient IV hydration, zofran, reglan, B6 and Unisom  She is no longer taking anti-emetics and has stopped outpatient IV hydration  Reports she is able to tolerate regular diet although she will have intermittent episodes of nausea and vomiting  She also has a history of chiari malformation, history of idiopathic intracranial hypertension and history of preeclampsia with severe features in prior pregnancy at 35w3d  O:  Vitals:    21 1402   BP: 121/86   Pulse: 100       Gen: no acute distress, nonlabored breathing  Fetal Heart Rate: 140    A/P:      IUP at 16w1d  No obstetric complaints today  Flu vaccine declined, discussed benefits of flu and COVID vaccine with patient  Discussed  labor precautions and fetal kick counts    Return to clinic in 4 weeks    Hyperemesis  S/p outpatient IV hydration, zofran, reglan, B6, Unisom  Not currently taking anti-emetics, has stopped outpatient IV hydration  Vitals normal today, asymptomatic    History of preeclampsia with severe features  Baseline preeclamptic labs ordered 2021  Cr 0 57, P/C 0 17  /86 today, asymptomatic    Desire for permanent sterilization  Will need to sign MA consent form at 28 weeks  Discussed reversible medical options with her - she declines all  She is 100% decided that she does not want any more children  Discussed that sterilization may be performed at time of  or 6 weeks after        GBS bacteruria  Will need PCN during labor, can forego testing at 36 weeks      COVID 19 precautions were discussed with patient at length, reviewed symptoms, hygiene, social distancing, patient to call office  with questions/concerns        Lakisha Hirsch MD  3/3/2021  2:36 PM

## 2021-03-03 ENCOUNTER — ROUTINE PRENATAL (OUTPATIENT)
Dept: OBGYN CLINIC | Facility: CLINIC | Age: 26
End: 2021-03-03

## 2021-03-03 VITALS
HEIGHT: 69 IN | DIASTOLIC BLOOD PRESSURE: 86 MMHG | HEART RATE: 100 BPM | WEIGHT: 207 LBS | BODY MASS INDEX: 30.66 KG/M2 | SYSTOLIC BLOOD PRESSURE: 121 MMHG

## 2021-03-03 DIAGNOSIS — Z3A.16 16 WEEKS GESTATION OF PREGNANCY: Primary | ICD-10-CM

## 2021-03-03 PROCEDURE — 99213 OFFICE O/P EST LOW 20 MIN: CPT | Performed by: OBSTETRICS & GYNECOLOGY

## 2021-03-12 ENCOUNTER — LAB (OUTPATIENT)
Dept: LAB | Facility: HOSPITAL | Age: 26
End: 2021-03-12
Attending: OBSTETRICS & GYNECOLOGY
Payer: COMMERCIAL

## 2021-03-12 ENCOUNTER — TRANSCRIBE ORDERS (OUTPATIENT)
Dept: LAB | Facility: HOSPITAL | Age: 26
End: 2021-03-12

## 2021-03-12 DIAGNOSIS — Z36.9 UNSPECIFIED ANTENATAL SCREENING: ICD-10-CM

## 2021-03-12 DIAGNOSIS — Z33.1 PREGNANT STATE, INCIDENTAL: Primary | ICD-10-CM

## 2021-03-12 DIAGNOSIS — Z33.1 PREGNANT STATE, INCIDENTAL: ICD-10-CM

## 2021-03-12 PROCEDURE — 36415 COLL VENOUS BLD VENIPUNCTURE: CPT

## 2021-03-13 LAB — SCAN RESULT: NORMAL

## 2021-03-15 ENCOUNTER — TELEPHONE (OUTPATIENT)
Dept: PERINATAL CARE | Facility: CLINIC | Age: 26
End: 2021-03-15

## 2021-03-15 NOTE — RESULT ENCOUNTER NOTE
Patient had blood work drawn and formal results are expected 7-10 days from the blood draw  Nursing will have formal result reviewed by a [facility] provider when it returns and then will call patient with her result    Isela Swenson MD

## 2021-03-15 NOTE — LETTER
03/15/21  Angela Rodas  1995    Thank you for completing Part 1 of your Sequential Screen  To obtain a complete test result, please complete blood work for Part 2 Sequential Screen between the weeks of ______ to ______  Please verify a laboratory In Network with your insurance plan [facility] or [employer])  If you choose to use a [facility] lab, below are the sites to have this blood work drawn  If you choose [facility], the specimen must be forwarded to [employer]  For [facility] MalpracticeAgents   Call Maternal Fetal Medicine nurse line any questions at [phone] 525 [facility] Office Building  1492 [facility], [facility] [address]                [facility], [place] [facility]  300 [facility] [address], [place] [facility] 80 [facility]  [address], [place], [address] [place] [address]             [address]is[address], [place] JONES    300 [facility]  [address]  [place] [facility] [address]             1425 [facility],[address] [facility] [address]  1430 [facility], 230 [facility] [address] [place] [address]  [address], [facility] [address]                        [address], [facility] [place]isha 2311 [facility], [address] [place] [address]            207 [facility], [place] [address], [facility] [address]    Thank you,  Margareth 73 Maternal Fetal Medicine Staff

## 2021-03-15 NOTE — TELEPHONE ENCOUNTER
I spoke to Dac[place] to inform her that [address] [place] [address] was unable to completer her Sequential Part 2 she had drawn, due to an inadequate specimen  I informed patient that I would be sending her a new lab slip and she should get her blood drawn in the next 2 weeks at any [facility]'s lab  Pt stated understanding and declined further questions  TRF mailed

## 2021-03-26 ENCOUNTER — LAB (OUTPATIENT)
Dept: LAB | Facility: HOSPITAL | Age: 26
End: 2021-03-26
Attending: OBSTETRICS & GYNECOLOGY
Payer: COMMERCIAL

## 2021-03-26 DIAGNOSIS — Z33.1 PREGNANT STATE, INCIDENTAL: ICD-10-CM

## 2021-03-26 DIAGNOSIS — Z36.9 UNSPECIFIED ANTENATAL SCREENING: ICD-10-CM

## 2021-03-26 PROCEDURE — 36415 COLL VENOUS BLD VENIPUNCTURE: CPT

## 2021-03-27 LAB — SCAN RESULT: NORMAL

## 2021-03-28 NOTE — RESULT ENCOUNTER NOTE
Patient had blood work drawn and formal results are expected 7-10 days from the blood draw  Nursing will have formal result reviewed by a [facility] provider when it returns and then will call patient with her result       Alok Sauceda MD

## 2021-03-29 PROBLEM — O09.899 HIGH RISK PREGNANCY WITH HIGH INHIBIN: Status: ACTIVE | Noted: 2021-03-29

## 2021-03-29 PROBLEM — O28.8 HIGH RISK PREGNANCY WITH HIGH INHIBIN: Status: ACTIVE | Noted: 2021-03-29

## 2021-03-30 ENCOUNTER — TELEPHONE (OUTPATIENT)
Dept: PERINATAL CARE | Facility: CLINIC | Age: 26
End: 2021-03-30

## 2021-03-30 NOTE — TELEPHONE ENCOUNTER
----- Message from Arabella Miner MD sent at 3/29/2021 11:41 PM EDT -----  Patient updated with her lab results through my chart

## 2021-03-30 NOTE — TELEPHONE ENCOUNTER
Spoke with pt using [employer] (#[id number]) to provide results of the part 2 Sequential Screen  PT receptive and declines questions at this time

## 2021-03-31 ENCOUNTER — ROUTINE PRENATAL (OUTPATIENT)
Dept: OBGYN CLINIC | Facility: CLINIC | Age: 26
End: 2021-03-31

## 2021-03-31 VITALS
SYSTOLIC BLOOD PRESSURE: 110 MMHG | HEIGHT: 68 IN | WEIGHT: 214.6 LBS | DIASTOLIC BLOOD PRESSURE: 62 MMHG | HEART RATE: 83 BPM | BODY MASS INDEX: 32.52 KG/M2

## 2021-03-31 DIAGNOSIS — Z3A.20 20 WEEKS GESTATION OF PREGNANCY: Primary | ICD-10-CM

## 2021-03-31 LAB
SL AMB  POCT GLUCOSE, UA: NEGATIVE
SL AMB LEUKOCYTE ESTERASE,UA: NEGATIVE
SL AMB POCT BILIRUBIN,UA: NEGATIVE
SL AMB POCT BLOOD,UA: NEGATIVE
SL AMB POCT KETONES,UA: NEGATIVE
SL AMB POCT NITRITE,UA: NEGATIVE
SL AMB POCT URINE PROTEIN: NEGATIVE

## 2021-03-31 PROCEDURE — 99213 OFFICE O/P EST LOW 20 MIN: CPT | Performed by: OBSTETRICS & GYNECOLOGY

## 2021-03-31 PROCEDURE — 81002 URINALYSIS NONAUTO W/O SCOPE: CPT | Performed by: OBSTETRICS & GYNECOLOGY

## 2021-03-31 PROCEDURE — 3008F BODY MASS INDEX DOCD: CPT | Performed by: OBSTETRICS & GYNECOLOGY

## 2021-03-31 NOTE — PROGRESS NOTES
OB/GYN prenatal visit    S: 26 y o  [id number] 20w1d here for PN visit  She denies obstetric complaints, including pelvic pain, contractions, vaginal bleeding, loss of fluid, or decreased fetal movement  Her pregnancy is complicated by hyperemesis gravidarum (now resolved), history of chiari malformation, history of idiopathic intracranial hypertension and history of preeclampsia with severe features in prior pregnancy at 35w3d  O:  Vitals:    21 1534   BP: 110/62   Pulse: 83       Gen: no acute distress, nonlabored breathing  FHT 135bpm    A/P:      IUP at 20w1d  No obstetric complaints today  Plan for an epidural during labor  Plans to breastfeed  Discussed  labor precautions and fetal kick counts    Return to clinic in 4 weeks    Hyperemesis  S/p outpatient IV hydration, zofran, reglan, B6, Unisom  Not currently taking anti-emetics, has stopped outpatient IV hydration  Vitals normal today, asymptomatic    History of preeclampsia with severe features  Baseline preeclamptic labs ordered 2021  Cr 0 57, P/C 0 17  /62 today, asymptomatic    Contraception  At last visit, patient desired permanent sterilization  This visit, patient states that she is unsure if she would like to undergo sterilization  She states that it depends on the gender of her baby, as they are hoping this baby is a boy  If it is a boy, she would like sterilization  If it is a girl, she would like to maintain her fertility  Please continue to discussed with patient      GBS bacteruria  Will need PCN during labor, can forego testing at 36 weeks      COVID 19 precautions were discussed with patient at length, reviewed symptoms, hygiene, social distancing, patient to call office  with questions/concerns        Afua Spring MD  3/31/2021  4:55 PM

## 2021-03-31 NOTE — PATIENT INSTRUCTIONS
Pregnancy at 23 to 22 Weeks   AMBULATORY CARE:   What changes are happening to your body:  Now that you are in your second trimester, you have more energy  You may also be feeling hungrier than usual  You may be gaining about ½ to 1 pound a week, and your pregnancy is beginning to show  You may need to start wearing maternity clothes  As your baby gets larger, you may have other symptoms  These may include body aches or stretch marks on your abdomen, breasts, thighs, or buttocks  Seek care immediately if:   · You develop a severe headache that does not go away  · You have new or increased vision changes, such as blurred or spotted vision  · You have new or increased swelling in your face or hands  · You have vaginal spotting or bleeding  · Your water broke or you feel warm water gushing or trickling from your vagina  Contact your healthcare provider if:   · You have abdominal cramps, pressure, or tightening  · You have a change in vaginal discharge  · You cannot keep food or drinks down, and you are losing weight  · You have chills or a fever  · You have vaginal itching, burning, or pain  · You have yellow, green, white, or foul-smelling vaginal discharge  · You have pain or burning when you urinate, less urine than usual, or pink or bloody urine  · You have questions or concerns about your condition or care  How to care for yourself at this stage of your pregnancy:   · Eat a variety of healthy foods  Healthy foods include fruits, vegetables, whole-grain breads, low-fat dairy foods, beans, lean meats, and fish  Drink liquids as directed  Ask how much liquid to drink each day and which liquids are best for you  Limit caffeine to less than 200 milligrams each day  Limit your intake of fish to 2 servings each week  Choose fish low in mercury such as canned light tuna, shrimp, salmon, cod, or tilapia   Do not  eat fish high in mercury such as swordfish, tilefish, clari mackerel, and shark  · Take prenatal vitamins as directed  Your need for certain vitamins and minerals, such as folic acid, increases during pregnancy  Prenatal vitamins provide some of the extra vitamins and minerals you need  Prenatal vitamins may also help to decrease the risk of certain birth defects  · Talk to your healthcare provider about exercise  Moderate exercise can help you stay fit  Your healthcare provider will help you plan an exercise program that is safe for you during pregnancy  · Do not smoke  Smoking increases your risk of a miscarriage and other health problems during your pregnancy  Smoking can cause your baby to be born too early or weigh less at birth  Ask your healthcare provider for information if you need help quitting  · Do not drink alcohol  Alcohol passes from your body to your baby through the placenta  It can affect your baby's brain development and cause fetal alcohol syndrome (FAS)  FAS is a group of conditions that causes mental, behavior, and growth problems  · Talk to your healthcare provider before you take any medicines  Many medicines may harm your baby if you take them when you are pregnant  Do not take any medicines, vitamins, herbs, or supplements without first talking to your healthcare provider  Never use illegal or street drugs (such as marijuana or cocaine) while you are pregnant  Safety tips during pregnancy:   · Avoid hot tubs and saunas  Do not use a hot tub or sauna while you are pregnant, especially during your first trimester  Hot tubs and saunas may raise your baby's temperature and increase the risk of birth defects  · Avoid toxoplasmosis  This is an infection caused by eating raw meat or being around infected cat feces  It can cause birth defects, miscarriages, and other problems  Wash your hands after you touch raw meat  Make sure any meat is well-cooked before you eat it  Avoid raw eggs and unpasteurized milk   Use gloves or ask someone else to clean your cat's litter box while you are pregnant  Changes that are happening with your baby:  By 22 weeks, your baby is about 8 inches long from the top of the head to the rump (baby's bottom)  Your baby also weighs about 1 pound  Your baby is becoming much more active  You may be able to feel the baby move inside you now  The first movements may not be that noticeable  They may feel like a fluttering sensation  As time goes on, your baby's movements will become stronger and more noticeable  What you need to know about prenatal care:  During the first 28 weeks of your pregnancy, you will see your healthcare provider once a month  Your healthcare provider will check your blood pressure and weight  You may also need the following:  · A urine test  may also be done to check for sugar and protein  These can be signs of gestational diabetes or infection  Protein in your urine may also be a sign of preeclampsia  Preeclampsia is a condition that can develop during week 20 or later of your pregnancy  It causes high blood pressure, and it can cause problems with your kidneys and other organs  · Fundal height  is a measurement of your uterus to check your baby's growth  This number is usually the same as the number of weeks that you have been pregnant  · A fetal ultrasound  shows pictures of your baby inside your uterus  It shows your baby's development  The movement and position of your baby can also be seen  Your healthcare provider may be able to tell you what your baby's gender is during the ultrasound  · Your baby's heart rate  will be checked  © Copyright [address] Information is for End User's use only and may not be sold, redistributed or otherwise used for commercial purposes  All illustrations and images included in CareNotes® are the copyrighted property of [employer]  or [facility]   The above information is an  only   It is not intended as medical advice for individual conditions or treatments  Talk to your doctor, nurse or pharmacist before following any medical regimen to see if it is safe and effective for you

## 2021-04-02 ENCOUNTER — ROUTINE PRENATAL (OUTPATIENT)
Dept: PERINATAL CARE | Facility: CLINIC | Age: 26
End: 2021-04-02
Payer: COMMERCIAL

## 2021-04-02 VITALS
HEIGHT: 68 IN | BODY MASS INDEX: 31.98 KG/M2 | DIASTOLIC BLOOD PRESSURE: 73 MMHG | WEIGHT: 211 LBS | HEART RATE: 90 BPM | SYSTOLIC BLOOD PRESSURE: 130 MMHG

## 2021-04-02 DIAGNOSIS — G93.2 IIH (IDIOPATHIC INTRACRANIAL HYPERTENSION): ICD-10-CM

## 2021-04-02 DIAGNOSIS — Z36.86 ENCOUNTER FOR ANTENATAL SCREENING FOR CERVICAL LENGTH: ICD-10-CM

## 2021-04-02 DIAGNOSIS — Z3A.20 20 WEEKS GESTATION OF PREGNANCY: ICD-10-CM

## 2021-04-02 DIAGNOSIS — O09.291 HX OF PREECLAMPSIA, PRIOR PREGNANCY, CURRENTLY PREGNANT, FIRST TRIMESTER: Primary | ICD-10-CM

## 2021-04-02 PROCEDURE — 76811 OB US DETAILED SNGL FETUS: CPT | Performed by: OBSTETRICS & GYNECOLOGY

## 2021-04-02 PROCEDURE — 76817 TRANSVAGINAL US OBSTETRIC: CPT | Performed by: OBSTETRICS & GYNECOLOGY

## 2021-04-02 PROCEDURE — 99213 OFFICE O/P EST LOW 20 MIN: CPT | Performed by: OBSTETRICS & GYNECOLOGY

## 2021-04-02 PROCEDURE — 1036F TOBACCO NON-USER: CPT | Performed by: OBSTETRICS & GYNECOLOGY

## 2021-04-02 NOTE — LETTER
April 3, 2021     Antonio Cha, [address] [facility]    Patient: Cynthia Whelan   YOB: 1995   Date of Visit: 2021       Dear Dr Silvia Estrella: Thank you for referring Cynthia Whelan to me for evaluation  Below are my notes for this consultation  If you have questions, please do not hesitate to call me  I look forward to following your patient along with you  Sincerely,        Marquis Isaiah MD        CC: No Recipients  Marquis Isaiah MD  4/3/2021  8:51 AM  Incomplete  Cynthia Whelan  has no complaints today 20w3d  She reports fetal movements and does not report any vaginal bleeding or signs of labor  Her recently completed fetal testing revealed a normal sequential screen  Problem list:  1  History of a  delivery for preeclampsia  She is on a baby aspirin x2 daily  She has nml baseline preeclamptic labs  2  History of idiopathic intracranial hypertension- patient reports no symptoms and she currently is on no medication  Ultrasound findings: The ultrasound today shows normal interval fetal growth and fluid, normal cervical length, and no malformations were detected  Pregnancy ultrasound has limitations and is unable to detect all forms of fetal congenital abnormalities  Specific counseling was provided on the following problems:  1  Based on my review of the information below,  I would encourage vaccination to prevent patients from developing a severe COVID virus infection and the resultant maternal and fetal complications that can arise with a severe infection  COVID-19 mRNA vaccines have been found to generate Covid antibodies in pregnant and lactating women similar to that observed in non-pregnant women  Vaccine-induced antibody levels were significantly greater than the levels found in response to natural infection   Immune transfer of these antibodies to neonates if found to occur via the placenta and breast milk   2  DyeTool be  3  [employer] hu  4  https://www [employer]/  Follow up recommended:   1  Recommend a follow-up ultrasound in 8 weeks for growth and missed anatomy  Pre visit time reviewing her records   5 minutes  Face to face time 15 minutes  Post visit time on documentation of note, updating her problem list, adding orders and prescriptions 5 minutes  Procedures that were completed today were charged separately  The level of decision making was low level complexity  MD Juana Olvera MD  4/3/2021  8:51 AM  Sign when Signing Visit  Rose Rubinstein  has no complaints today 20w3d  She reports fetal movements and does not report any vaginal bleeding or signs of labor  Her recently completed fetal testing revealed a normal sequential screen  Problem list:  3  History of a  delivery for preeclampsia  She is on a baby aspirin x2 daily  She has nml baseline preeclamptic labs  4  History of idiopathic intracranial hypertension- patient reports no symptoms and she currently is on no medication  Ultrasound findings: The ultrasound today shows normal interval fetal growth and fluid, normal cervical length, and no malformations were detected  Pregnancy ultrasound has limitations and is unable to detect all forms of fetal congenital abnormalities  Specific counseling was provided on the following problems:  5  Based on my review of the information below,  I would encourage vaccination to prevent patients from developing a severe COVID virus infection and the resultant maternal and fetal complications that can arise with a severe infection  COVID-19 mRNA vaccines have been found to generate Covid antibodies in pregnant and lactating women similar to that observed in non-pregnant women   Vaccine-induced antibody levels were significantly greater than the levels found in response to natural infection  Immune transfer of these antibodies to neonates if found to occur via the placenta and breast milk  6  DyeTool be  7  [employer] hu  8  https://www [employer]/  Follow up recommended:   2  Recommend a follow-up ultrasound in 8 weeks for growth and missed anatomy  Pre visit time reviewing her records   5 minutes  Face to face time 15 minutes  Post visit time on documentation of note, updating her problem list, adding orders and prescriptions 5 minutes  Procedures that were completed today were charged separately     The level of decision making was {decision level:83075}    Samaria Rivera MD

## 2021-04-02 NOTE — PROGRESS NOTES
Ultrasound Probe Disinfection    A transvaginal ultrasound was performed  Prior to use, disinfection was performed with High Level Disinfection Process (Trophon)  Probe serial number B3: [id number] was used        Mohsen Herrmann  04/02/21  7:58 AM

## 2021-04-02 NOTE — LETTER
April 3, 2021     Jorge Mcnamara, [address] [place] [address]    Patient: Thea Fermin   YOB: 1995   Date of Visit: 2021       Dear Dr Vivi Manjarrez: Thank you for referring Thea Fermin to me for evaluation  Below are my notes for this consultation  If you have questions, please do not hesitate to call me  I look forward to following your patient along with you  Sincerely,        Sherman Phelps MD        CC: No Recipients  Sherman Phelps MD  4/3/2021  8:52 AM  Sign when Signing Visit  Thea Fermin  has no complaints today 20w3d  She reports fetal movements and does not report any vaginal bleeding or signs of labor  Her recently completed fetal testing revealed a normal sequential screen  Problem list:  1  History of a  delivery for preeclampsia  She is on a baby aspirin x2 daily  She has nml baseline preeclamptic labs  2  History of idiopathic intracranial hypertension- patient reports no symptoms and she currently is on no medication  Ultrasound findings: The ultrasound today shows normal interval fetal growth and fluid, normal cervical length, and no malformations were detected  Pregnancy ultrasound has limitations and is unable to detect all forms of fetal congenital abnormalities  Specific counseling was provided on the following problems:  1  Based on my review of the information below,  I would encourage vaccination to prevent patients from developing a severe COVID virus infection and the resultant maternal and fetal complications that can arise with a severe infection  COVID-19 mRNA vaccines have been found to generate Covid antibodies in pregnant and lactating women similar to that observed in non-pregnant women  Vaccine-induced antibody levels were significantly greater than the levels found in response to natural infection   Immune transfer of these antibodies to neonates if found to occur via the placenta and breast milk   2  DyeTool be  3  [employer] hu  4  https://www [employer]/  Follow up recommended:   1  Recommend a follow-up ultrasound in 8 weeks for growth and missed anatomy  Pre visit time reviewing her records   5 minutes  Face to face time 15 minutes  Post visit time on documentation of note, updating her problem list, adding orders and prescriptions 5 minutes  Procedures that were completed today were charged separately  The level of decision making was low level complexity      Sara Truong MD

## 2021-04-02 NOTE — PROGRESS NOTES
Alber Wills  has no complaints today 20w3d  She reports fetal movements and does not report any vaginal bleeding or signs of labor  Her recently completed fetal testing revealed a normal sequential screen  Problem list:  1  History of a  delivery for preeclampsia  She is on a baby aspirin x2 daily  She has nml baseline preeclamptic labs  2  History of idiopathic intracranial hypertension- patient reports no symptoms and she currently is on no medication  Ultrasound findings: The ultrasound today shows normal interval fetal growth and fluid, normal cervical length, and no malformations were detected  Pregnancy ultrasound has limitations and is unable to detect all forms of fetal congenital abnormalities  Specific counseling was provided on the following problems:  1  Based on my review of the information below,  I would encourage vaccination to prevent patients from developing a severe COVID virus infection and the resultant maternal and fetal complications that can arise with a severe infection  COVID-19 mRNA vaccines have been found to generate Covid antibodies in pregnant and lactating women similar to that observed in non-pregnant women  Vaccine-induced antibody levels were significantly greater than the levels found in response to natural infection  Immune transfer of these antibodies to neonates if found to occur via the placenta and breast milk  2  DyeTool be  3  Aisha weiner  4  https://www [employer]/  Follow up recommended:   1  Recommend a follow-up ultrasound in 8 weeks for growth and missed anatomy  Pre visit time reviewing her records   5 minutes  Face to face time 15 minutes  Post visit time on documentation of note, updating her problem list, adding orders and prescriptions 5 minutes    Procedures that were completed today were charged separately  The level of decision making was low level complexity      Landon Grullon MD

## 2021-04-29 ENCOUNTER — ROUTINE PRENATAL (OUTPATIENT)
Dept: OBGYN CLINIC | Facility: CLINIC | Age: 26
End: 2021-04-29

## 2021-04-29 VITALS
BODY MASS INDEX: 32.28 KG/M2 | SYSTOLIC BLOOD PRESSURE: 121 MMHG | HEART RATE: 95 BPM | WEIGHT: 213 LBS | HEIGHT: 68 IN | DIASTOLIC BLOOD PRESSURE: 75 MMHG

## 2021-04-29 DIAGNOSIS — Z3A.20 20 WEEKS GESTATION OF PREGNANCY: Primary | ICD-10-CM

## 2021-04-29 PROCEDURE — 3008F BODY MASS INDEX DOCD: CPT | Performed by: OBSTETRICS & GYNECOLOGY

## 2021-04-29 PROCEDURE — 99213 OFFICE O/P EST LOW 20 MIN: CPT | Performed by: OBSTETRICS & GYNECOLOGY

## 2021-04-29 NOTE — PROGRESS NOTES
Qian Myers is a 26 y o  [id number] at 24w2d     Chief complaint today: none    ROS:   VB: denies   LOF: denies   CXN: denies   FM: present    Vitals:    21 1300   BP: 121/75   Pulse: 95        bpm, S = D, baby girl    GBS bacteriuria  28w labs ordered   - F/u CBC, RPR, 1 hr glucose non-fasting  Hx of Pre-E w/SF ( delivery)   - BP normal today, denies Signs/Sx pre-E   - Baseline P/C 0 17, CBC normal, Cr 0 59  Contraception   - Pt would like to have a boy in the future, has girl at home, baby girl now, is not interested in permanent sterilization   - Reviewed IUD, nexplanon, OCPs, depo injection and other options, pt will consider her options, revisit next appt  RTC in 4 weeks     - Reviewed  labor precautions as well as ROM, dec FM, vaginal bleeding, and true vs false labor, pt to call with any questions and come to hospital with any concerns    COVID 19 precautions were discussed with patient at length, reviewed symptoms, hygiene, social distancing, patient to call office  with questions/concerns    Future Appointments   Date Time Provider Yu Uribe   2021  1:45 PM  [facility] [address] [place], [place]  PGY-4 OB/GYN   2021 2:21 PM

## 2021-05-17 ENCOUNTER — APPOINTMENT (OUTPATIENT)
Dept: LAB | Facility: HOSPITAL | Age: 26
End: 2021-05-17
Payer: COMMERCIAL

## 2021-05-17 DIAGNOSIS — Z3A.20 20 WEEKS GESTATION OF PREGNANCY: ICD-10-CM

## 2021-05-17 LAB
ERYTHROCYTE [DISTWIDTH] IN BLOOD BY AUTOMATED COUNT: 13.1 % (ref 11.6–15.1)
GLUCOSE 1H P 50 G GLC PO SERPL-MCNC: 83 MG/DL (ref 40–134)
HCT VFR BLD AUTO: 38.5 % (ref 34.8–46.1)
HGB BLD-MCNC: 13 G/DL (ref 11.5–15.4)
MCH RBC QN AUTO: 30.3 PG (ref 26.8–34.3)
MCHC RBC AUTO-ENTMCNC: 33.8 G/DL (ref 31.4–37.4)
MCV RBC AUTO: 90 FL (ref 82–98)
PLATELET # BLD AUTO: 312 THOUSANDS/UL (ref 149–390)
PMV BLD AUTO: 11.6 FL (ref 8.9–12.7)
RBC # BLD AUTO: 4.29 MILLION/UL (ref 3.81–5.12)
WBC # BLD AUTO: 9.25 THOUSAND/UL (ref 4.31–10.16)

## 2021-05-17 PROCEDURE — 86592 SYPHILIS TEST NON-TREP QUAL: CPT

## 2021-05-17 PROCEDURE — 82950 GLUCOSE TEST: CPT

## 2021-05-17 PROCEDURE — 36415 COLL VENOUS BLD VENIPUNCTURE: CPT

## 2021-05-17 PROCEDURE — 85027 COMPLETE CBC AUTOMATED: CPT

## 2021-05-18 LAB — RPR SER QL: NORMAL

## 2021-05-21 ENCOUNTER — ROUTINE PRENATAL (OUTPATIENT)
Dept: OBGYN CLINIC | Facility: CLINIC | Age: 26
End: 2021-05-21

## 2021-05-21 VITALS
HEIGHT: 68 IN | DIASTOLIC BLOOD PRESSURE: 81 MMHG | SYSTOLIC BLOOD PRESSURE: 130 MMHG | WEIGHT: 214 LBS | BODY MASS INDEX: 32.43 KG/M2 | HEART RATE: 93 BPM

## 2021-05-21 DIAGNOSIS — R82.71 GBS BACTERIURIA: ICD-10-CM

## 2021-05-21 DIAGNOSIS — Z30.2 REQUEST FOR STERILIZATION: ICD-10-CM

## 2021-05-21 DIAGNOSIS — O09.899 HIGH RISK PREGNANCY WITH HIGH INHIBIN: ICD-10-CM

## 2021-05-21 DIAGNOSIS — O28.8 HIGH RISK PREGNANCY WITH HIGH INHIBIN: ICD-10-CM

## 2021-05-21 DIAGNOSIS — Z3A.20 20 WEEKS GESTATION OF PREGNANCY: ICD-10-CM

## 2021-05-21 DIAGNOSIS — Z3A.27 27 WEEKS GESTATION OF PREGNANCY: Primary | ICD-10-CM

## 2021-05-21 PROCEDURE — 99213 OFFICE O/P EST LOW 20 MIN: CPT | Performed by: OBSTETRICS & GYNECOLOGY

## 2021-05-21 PROCEDURE — 81002 URINALYSIS NONAUTO W/O SCOPE: CPT | Performed by: OBSTETRICS & GYNECOLOGY

## 2021-05-21 NOTE — PROGRESS NOTES
OB/GYN  PN Visit  Pravin Velazco  [id number]  2021  11:17 AM  Julio Jesus MD    S: 26 y o  [id number] 27w3d here for PN visit  No contractions, LOF or VB  Reports good fetal movement  Denies any complaints today  O:  Vitals:    21 1047   BP: 130/81   Pulse: 93       Gen: no acute distress, nonlabored breathing  OB exam completed: fundal height 28cm, FHTs 149     A/P:  #1  27w3d GESTATION  Tdap at next visit  Delivery consent signed today   labor precautions reviewed  Fetal kick counts reviewed  RTC in 2 weeks    #2  H/o preeclampsia in prior pregnancy  BP wnl today  No preeclamptic symptoms  Continue ASA until 36 weeks    #3  Arnold Chiari malformation  Asx  Had epidural last pregnancy without complication    #4   Contraception  Considering permanent sterilization- please sign MA 31 at next visit    Future Appointments   Date Time Provider Yu Uribe   2021  1:45 PM  US 2 [address] [place] [address]   2021  2:00 PM Augusto Hutchinson [address] [place] [address]         Julio Jesus MD  2021  11:17 AM

## 2021-05-24 LAB
SL AMB  POCT GLUCOSE, UA: NORMAL
SL AMB POCT URINE PROTEIN: NORMAL

## 2021-05-28 ENCOUNTER — ULTRASOUND (OUTPATIENT)
Dept: PERINATAL CARE | Facility: CLINIC | Age: 26
End: 2021-05-28
Payer: COMMERCIAL

## 2021-05-28 VITALS
SYSTOLIC BLOOD PRESSURE: 127 MMHG | BODY MASS INDEX: 32.89 KG/M2 | WEIGHT: 217 LBS | HEART RATE: 72 BPM | HEIGHT: 68 IN | DIASTOLIC BLOOD PRESSURE: 83 MMHG

## 2021-05-28 DIAGNOSIS — Z3A.28 28 WEEKS GESTATION OF PREGNANCY: ICD-10-CM

## 2021-05-28 DIAGNOSIS — Z3A.20 20 WEEKS GESTATION OF PREGNANCY: ICD-10-CM

## 2021-05-28 DIAGNOSIS — G93.5 CHIARI MALFORMATION TYPE I (HCC): ICD-10-CM

## 2021-05-28 DIAGNOSIS — O36.5930 POOR FETAL GROWTH AFFECTING MANAGEMENT OF MOTHER IN THIRD TRIMESTER, SINGLE OR UNSPECIFIED FETUS: Primary | ICD-10-CM

## 2021-05-28 DIAGNOSIS — O99.613 GASTROESOPHAGEAL REFLUX IN PREGNANCY IN THIRD TRIMESTER: ICD-10-CM

## 2021-05-28 DIAGNOSIS — O09.291 HX OF PREECLAMPSIA, PRIOR PREGNANCY, CURRENTLY PREGNANT, FIRST TRIMESTER: ICD-10-CM

## 2021-05-28 DIAGNOSIS — K21.9 GASTROESOPHAGEAL REFLUX IN PREGNANCY IN THIRD TRIMESTER: ICD-10-CM

## 2021-05-28 DIAGNOSIS — O09.899 HIGH RISK PREGNANCY WITH HIGH INHIBIN: ICD-10-CM

## 2021-05-28 DIAGNOSIS — O28.8 HIGH RISK PREGNANCY WITH HIGH INHIBIN: ICD-10-CM

## 2021-05-28 PROCEDURE — 76820 UMBILICAL ARTERY ECHO: CPT | Performed by: OBSTETRICS & GYNECOLOGY

## 2021-05-28 PROCEDURE — 1036F TOBACCO NON-USER: CPT | Performed by: OBSTETRICS & GYNECOLOGY

## 2021-05-28 PROCEDURE — 99214 OFFICE O/P EST MOD 30 MIN: CPT | Performed by: OBSTETRICS & GYNECOLOGY

## 2021-05-28 PROCEDURE — 76816 OB US FOLLOW-UP PER FETUS: CPT | Performed by: OBSTETRICS & GYNECOLOGY

## 2021-05-28 PROCEDURE — 76819 FETAL BIOPHYS PROFIL W/O NST: CPT | Performed by: OBSTETRICS & GYNECOLOGY

## 2021-05-28 RX ORDER — FAMOTIDINE 20 MG/1
20 TABLET, FILM COATED ORAL 2 TIMES DAILY
Qty: 180 TABLET | Refills: 1 | Status: SHIPPED | OUTPATIENT
Start: 2021-05-28 | End: 2021-09-21

## 2021-05-28 NOTE — PROGRESS NOTES
Qian Myers has no complaints today  She reports regular fetal movements and does not report any problems  She is here today at 28w3d for an ultrasound for fetal growth  She reports she has had significant nausea vomiting in this pregnancy and reports that she had lost 40-50 pounds in this pregnancy and was starting to feel better only to then start to develop more reflux and she started vomiting in the last 2 days  This pregnancy is also complicated by an elevated inhibin level and history of preeclampsia in a prior pregnancy  She also has idiopathic intracranial hypertension which is not been a problem this pregnancy      Ultrasound findings: The ultrasound today shows a fetus that is asymmetric with a fetal abdomen less than 3rd percentile and fetal head circumference less than the 5th percentile  Her baby overall is measuring less than the 3rd percentile  Amniotic fluid appears normal in amount  Umbilical Dopplers are normal for gestational age  BPP is 8/8  Pregnancy ultrasound has limitations and is unable to detect all forms of fetal congenital abnormalities  The inaccuracy in the EFW can be off by 1 lb either way in the third trimester  Specific counseling was provided on the following problems:  1  IUGR is a diagnosis given to a pregnancy that either measures less than the 10th percentile overall or has an abdominal circumference less than the 10th percentile  Follow up recommended:   Recommend starting twice weekly NSTs and  Weekly AFIs and Dopplers with a growth scan planned for another 3 weeks  She cannot stay for an NST today due to time  Recommend monitoring daily fetal kick counts  She declines the COVID vaccine  Pre visit time reviewing her records   5 minutes  Face to face time 15 minutes  Post visit time on documentation of note, updating her problem list, adding orders and prescriptions 5 minutes  Procedures that were completed today were charged separately     The level of decision making was moderate complexity      Dexter Douglas MD

## 2021-05-28 NOTE — LETTER
May 28, 2021     Antonio Cha MD  [facility] [address]    Patient: Cynthia Whelan   YOB: 1995   Date of Visit: 5/28/2021       Dear Dr Silvia Estrella: Thank you for referring Cynthia Whelan to me for evaluation  Below are my notes for this consultation  If you have questions, please do not hesitate to call me  I look forward to following your patient along with you  Sincerely,        Marquis Isaiah MD        CC: No Recipients  Marquis Isaiah MD  5/28/2021 10:35 PM  Sign when Signing Visit  Cynthia Whelan has no complaints today  She reports regular fetal movements and does not report any problems  She is here today at 28w3d for an ultrasound for fetal growth  She reports she has had significant nausea vomiting in this pregnancy and reports that she had lost 40-50 pounds in this pregnancy and was starting to feel better only to then start to develop more reflux and she started vomiting in the last 2 days  This pregnancy is also complicated by an elevated inhibin level and history of preeclampsia in a prior pregnancy  She also has idiopathic intracranial hypertension which is not been a problem this pregnancy      Ultrasound findings: The ultrasound today shows a fetus that is asymmetric with a fetal abdomen less than 3rd percentile and fetal head circumference less than the 5th percentile  Her baby overall is measuring less than the 3rd percentile  Amniotic fluid appears normal in amount  Umbilical Dopplers are normal for gestational age  BPP is 8/8  Pregnancy ultrasound has limitations and is unable to detect all forms of fetal congenital abnormalities  The inaccuracy in the EFW can be off by 1 lb either way in the third trimester  Specific counseling was provided on the following problems:  1    IUGR is a diagnosis given to a pregnancy that either measures less than the 10th percentile overall or has an abdominal circumference less than the 10th percentile  Follow up recommended:   Recommend starting twice weekly NSTs and  Weekly AFIs and Dopplers with a growth scan planned for another 3 weeks  She cannot stay for an NST today due to time  Recommend monitoring daily fetal kick counts  She declines the COVID vaccine  Pre visit time reviewing her records   5 minutes  Face to face time 15 minutes  Post visit time on documentation of note, updating her problem list, adding orders and prescriptions 5 minutes  Procedures that were completed today were charged separately  The level of decision making was moderate complexity      Jan Veronica MD

## 2021-06-01 ENCOUNTER — ROUTINE PRENATAL (OUTPATIENT)
Dept: PERINATAL CARE | Facility: CLINIC | Age: 26
End: 2021-06-01
Payer: COMMERCIAL

## 2021-06-01 VITALS
HEART RATE: 64 BPM | WEIGHT: 214.2 LBS | SYSTOLIC BLOOD PRESSURE: 133 MMHG | HEIGHT: 68 IN | DIASTOLIC BLOOD PRESSURE: 85 MMHG | BODY MASS INDEX: 32.46 KG/M2

## 2021-06-01 DIAGNOSIS — O36.5930 INTRAUTERINE GROWTH RESTRICTION AFFECTING ANTEPARTUM CARE OF MOTHER IN THIRD TRIMESTER, SINGLE OR UNSPECIFIED FETUS: ICD-10-CM

## 2021-06-01 DIAGNOSIS — Z3A.29 29 WEEKS GESTATION OF PREGNANCY: Primary | ICD-10-CM

## 2021-06-01 PROCEDURE — 59025 FETAL NON-STRESS TEST: CPT | Performed by: OBSTETRICS & GYNECOLOGY

## 2021-06-01 NOTE — PATIENT INSTRUCTIONS
Examen de embarazo sin estrés   LO QUE NECESITA SABER:   ¿Qué necesito saber sobre un examen sin estrés? Un examen sin estrés mide el ritmo cardíaco y los movimientos de bhardwaj bebé  Sin estrés quiere decir que katie el examen no se pondrá ningún estrés o tensión al bebé  ¿Cómo me preparo para el examen sin estrés? Bhardwaj médico hablará con usted sobre cómo prepararse para ruslan examen  Le puede indicar que consuma alimentos y abundantes líquidos antes de bhardwaj examen  Si usted fuma, le puede solicitar que no fume por 2 horas antes del examen  También le indicará que medicamentos debe hodan y cuáles no debe hodan el día del examen  ¿Qué sucederá katie el examen sin estrés? Le podrían indicar que para el examen se acueste boca arriba en la jose alfredo  Alrededor de bhardwaj abdomen [facility] & [address] cinturones con sensores  El ritmo cardíaco de bhardwaj bebé será registrado en lucy [facility]  En dorcas que no haya movimiento de bhardwaj bebé, podría ser que esté dormido  Bhardwaj médico podría realizar unos sonidos cerca a bhardwaj abdomen para tratar de despertar al bebé  El examen por lo general se demora 20 minutos, dora podría durar más si es necesario despertar a bhardwaj bebé  ¿Qué necesito saber [place] del examen? Se espera que bhardwaj bebé se mueva por lo menos dos veces katie [employer]  Se espera que el ritmo cardíaco de bhardwaj bebé suba por cierto número de latidos por minuto katie el movimiento  Si bhardwaj bebé no se mueve gagan es lo esperado, es posible que sea necesario repetir el examen o usted puede necesitar que le ordenen otros exámenes  ACUERDOS SOBRE BHARDWAJ CUIDADO:   Usted tiene el derecho de ayudar a planear bhardwaj cuidado  Aprenda todo lo que pueda sobre bhardwaj condición y gagan darle tratamiento  Discuta tawny opciones de tratamiento con tawny médicos para decidir el cuidado que usted desea recibir  Usted siempre tiene el derecho de rechazar el tratamiento  Esta información es sólo para uso en educación   Bhardwaj intención no es darle un consejo médico sobre enfermedades o tratamientos  Colsulte con bhardwaj Sweta Antis farmacéutico antes de seguir cualquier régimen médico para saber si es seguro y efectivo para usted  © Copyright [address] Information is for End User's use only and may not be sold, redistributed or otherwise used for commercial purposes  All illustrations and images included in CareNotes® are the copyrighted property of A D A M , Inc  or [address] patadas en el embarazo   CUIDADO AMBULATORIO:   El conteo de patadas mide cuánto se está moviendo bhardwaj bebé en el [place]  Lucy patada de bhardwaj bebé podría sentirse gagan lucy torcedura, lucy vuelta, un crujido, un meneo o un golpe  Es común sentir a tu bebé patear a las 32 a 29 semanas de [facility]  Es posible que sienta al bebé patear ya a las 20 semanas de [facility]  Puede que desee empezar a contar a las 28 semanas  Comuníquese inmediatamente con bhardwaj médico si:  · Usted siente un cambio en el número de patadas o movimientos de bhardwaj bebé  · Siente menos de 10 patadas en 2 horas  · Usted tiene preguntas o inquietudes acerca de los movimientos de bhardwaj bebé  Por qué realizar el conteo de patadas: Los movimientos de bhardwaj bebé podrían proporcionar información de la alessandra de bhardwaj bebé  Es posible que si hay problemas, bhardwaj bebé se mueva menos o nada en lo absoluto  El bebé podría moverse menos si no recibe suficiente oxígeno o alimento de la placenta  No fume mientras está embarazada  Fumar disminuye la cantidad de oxígeno que llega a bhardwaj bebé  Hable con bhardwaj médico si necesita ayuda para dejar de fumar  Los problemas que se encuentran en lucy etapa más temprana son más fáciles de tratar  Cuándo realizar el conteo de patadas:  · Cuente las patadas en el mismo horario todos los GRASSE  · Realice el conteo de las patadas cuando bhardwaj bebé esté despierto y [place]  Bhardwaj bebé podría estar más activo en la tarde      Cómo realizar el conteo de patadas: Revise que bhardwaj bebé esté despierto antes de realizar el conteo de patadas  Usted puede despertar a bhardwaj bebé empujando bhardwaj estómago suavemente, caminando o tomando algo frío  Bhardwaj médico podría indicarle diferentes maneras de realizar el conteo  Es posible que le indique que ashlie lo siguiente:  · Use lucy gráfica o un reloj para mantener un registro de la hora en que comienza y termina de contar  · Siéntese en lucy silla o acuéstese en bhardwaj costado derecho  · Coloque tawny kasia en la parte más clark de bhardwaj BJURHOLM  · Cuente hasta que llegue a las 10 patadas  Escriba cuánto tiempo le lleva contar las 10 patadas  · Podría hodan de 30 minutos a 2 horas para contar 10 patadas  No debería de hodan más de 2 horas para contar 10 patadas  Acuda a tawny consultas de control con bhardwaj médico según le indicaron  Anote tawny preguntas para que se acuerde de hacerlas katie tawny visitas  © Copyright [address] Information is for End User's use only and may not be sold, redistributed or otherwise used for commercial purposes  All illustrations and images included in CareNotes® are the copyrighted property of A D A [employer]  or [address] [place] [address] Avenue es sólo para uso en educación  Bhardwaj intención no es darle un consejo médico sobre enfermedades o tratamientos  Colsulte con bhardwaj Geno Strong farmacéutico antes de seguir cualquier régimen médico para saber si es seguro y efectivo para usted

## 2021-06-01 NOTE — LETTER
[facility]  MRN: [id number] MRN: [id number] MRN: [id number]  : [birth year]  AZALEA/GA: AZALEA/GA: AZALEA/GA:  Date:  Date:  Date:      [facility]  MRN: [id number] MRN: [id number] MRN: [id number]  : 1995 : 1995 : 1995  AZALEA/GA: AZALEA/GA: AZALEA/GA:  Date:  Date:  Date:      [facility]  MRN: [id number] MRN: [id number] MRN: [id number]  : 1995 : 1995 : 1995  AZALEA/GA: AZALEA/GA: AZALEA/GA:  Date:  Date:  Date:      [facility]  MRN: [id number] MRN: [id number] MRN: [id number]  : 1995 : 1995 : 1995  AZALEA/GA: AZALEA/GA: AZALEA/GA:  Date:  Date:  Date:      [facility]  MRN: [id number] MRN: [id number] MRN: [id number]  : 1995 : 1995 : 1995  AZALEA/GA: AZALEA/GA: AZALEA/GA:  Date:  Date:  Date:      [facility]  MRN: [id number] MRN: [id number] MRN: [id number]  : 1995 : 1995 : 1995  AZALEA/GA: AZALEA/GA: AZALEA/GA:  Date:  Date:  Date:      [facility]  MRN: [id number] MRN: [id number] MRN: [id number]  : 1995 : 1995 : 1995  AZALEA/GA: AZALEA/GA: AZALEA/GA:  Date:  Date:  Date:

## 2021-06-01 NOTE — PROGRESS NOTES
Non-Stress Testing:    Non-Stress test, equipment, procedure, and expected outcomes reviewed  Reviewed fetal kick counts and when to call OB  Diamond Wheeler Verified patient understanding of fetal kick counts with teach back method  Patient reports feeling daily fetal movements  Patient has no questions or concerns  DR Shabbir Bangura reviewed NST prior to completion of appointment

## 2021-06-01 NOTE — LETTER
NST sleeve cover sheet    Patient name: Lisa Yun  : 1995  MRN: [id number]    AZALEA: Estimated Date of Delivery: 21    Obstetrician: [address] [facility]    Reason(s) for testing:    IUGR, idiopathic intracranial Hypertension  __________________________________________      Testing frequency:    __X_ 2x/wk  ___ 1x/wk  ___ Dopplers  ___ BPP?       Last growth scan: __________________________________________

## 2021-06-03 ENCOUNTER — ULTRASOUND (OUTPATIENT)
Dept: PERINATAL CARE | Facility: CLINIC | Age: 26
End: 2021-06-03
Payer: COMMERCIAL

## 2021-06-03 VITALS
HEIGHT: 68 IN | HEART RATE: 58 BPM | SYSTOLIC BLOOD PRESSURE: 138 MMHG | DIASTOLIC BLOOD PRESSURE: 87 MMHG | BODY MASS INDEX: 32.98 KG/M2 | WEIGHT: 217.6 LBS

## 2021-06-03 DIAGNOSIS — Z3A.29 29 WEEKS GESTATION OF PREGNANCY: Primary | ICD-10-CM

## 2021-06-03 DIAGNOSIS — O36.5930 INTRAUTERINE GROWTH RESTRICTION AFFECTING ANTEPARTUM CARE OF MOTHER IN THIRD TRIMESTER, SINGLE OR UNSPECIFIED FETUS: ICD-10-CM

## 2021-06-03 PROCEDURE — 59025 FETAL NON-STRESS TEST: CPT | Performed by: OBSTETRICS & GYNECOLOGY

## 2021-06-03 PROCEDURE — 76815 OB US LIMITED FETUS(S): CPT | Performed by: OBSTETRICS & GYNECOLOGY

## 2021-06-03 PROCEDURE — 76820 UMBILICAL ARTERY ECHO: CPT | Performed by: OBSTETRICS & GYNECOLOGY

## 2021-06-03 NOTE — PROGRESS NOTES
Please refer to the [facility] ultrasound report in Ob Procedures for additional information regarding today's visit

## 2021-06-03 NOTE — LETTER
Rubi 3, 2021     [address] PROVIDER    Patient: Claudette Makos   YOB: 1995   Date of Visit: 6/3/2021       Dear   Provider: Thank you for referring Claudette Makos to me for evaluation  Below are my notes for this consultation  If you have questions, please do not hesitate to call me  I look forward to following your patient along with you  Sincerely,        Leroy Gallegos MD        CC: No Recipients  Leroy Gallegos MD  6/3/2021  7:12 AM  Sign when Signing Visit   Please refer to the [facility] ultrasound report in Ob Procedures for additional information regarding today's visit

## 2021-06-03 NOTE — PATIENT INSTRUCTIONS
Examen de embarazo sin estrés   LO QUE NECESITA SABER:   ¿Qué necesito saber sobre un examen sin estrés? Un examen sin estrés mide el ritmo cardíaco y los movimientos de bhardwaj bebé  Sin estrés quiere decir que katie el examen no se pondrá ningún estrés o tensión al bebé  ¿Cómo me preparo para el examen sin estrés? Bhardwaj médico hablará con usted sobre cómo prepararse para ruslan examen  Le puede indicar que consuma alimentos y abundantes líquidos antes de bhardwaj examen  Si usted fuma, le puede solicitar que no fume por 2 horas antes del examen  También le indicará que medicamentos debe hodan y cuáles no debe hodan el día del examen  ¿Qué sucederá katie el examen sin estrés? Le podrían indicar que para el examen se acueste boca arriba en la jose alfredo  Alrededor de bhardwaj abdomen [facility] & [address] cinturones con sensores  El ritmo cardíaco de bhardwaj bebé será registrado en lucy [facility]  En dorcas que no haya movimiento de bhardwaj bebé, podría ser que esté dormido  Bhardwaj médico podría realizar unos sonidos cerca a bhardwaj abdomen para tratar de despertar al bebé  El examen por lo general se demora 20 minutos, dora podría durar más si es necesario despertar a bhardwaj bebé  ¿Qué necesito saber [place] del examen? Se espera que bhardwaj bebé se mueva por lo menos dos veces katie [employer]  Se espera que el ritmo cardíaco de bhardwaj bebé suba por cierto número de latidos por minuto katie el movimiento  Si bhardwaj bebé no se mueve gagan es lo esperado, es posible que sea necesario repetir el examen o usted puede necesitar que le ordenen otros exámenes  ACUERDOS SOBRE BHARDWAJ CUIDADO:   Usted tiene el derecho de ayudar a planear bhardwaj cuidado  Aprenda todo lo que pueda sobre bhardwaj condición y gagan darle tratamiento  Discuta tawny opciones de tratamiento con tawny médicos para decidir el cuidado que usted desea recibir  Usted siempre tiene el derecho de rechazar el tratamiento  Esta información es sólo para uso en educación   Bhardwaj intención no es darle un consejo médico sobre enfermedades o tratamientos  Colsulte con bhardwaj Gala Primer farmacéutico antes de seguir cualquier régimen médico para saber si es seguro y efectivo para usted  © Copyright [address] Information is for End User's use only and may not be sold, redistributed or otherwise used for commercial purposes  All illustrations and images included in CareNotes® are the copyrighted property of A D A M , Inc  or [address] patadas en el embarazo   CUIDADO AMBULATORIO:   El conteo de patadas mide cuánto se está moviendo bhardwaj bebé en el [place]  Cynthia patada de bhardwaj bebé podría sentirse gagan cynthia torcedura, cynthia vuelta, un crujido, un meneo o un golpe  Es común sentir a tu bebé patear a las 32 a 29 semanas de Zaria Kaila  Es posible que sienta al bebé patear ya a las 20 semanas de Zaria Kaila  Puede que desee empezar a contar a las 28 semanas  Comuníquese inmediatamente con bhardwaj médico si:  · Usted siente un cambio en el número de patadas o movimientos de bhardwaj bebé  · Siente menos de 10 patadas en 2 horas  · Usted tiene preguntas o inquietudes acerca de los movimientos de bhardwaj bebé  Por qué realizar el conteo de patadas: Los movimientos de bhardwaj bebé podrían proporcionar información de la alessandra de bhardwaj bebé  Es posible que si hay problemas, bhardwaj bebé se mueva menos o nada en lo absoluto  El bebé podría moverse menos si no recibe suficiente oxígeno o alimento de la placenta  No fume mientras está embarazada  Fumar disminuye la cantidad de oxígeno que llega a bhardwaj bebé  Hable con bhardwaj médico si necesita ayuda para dejar de fumar  Los problemas que se encuentran en cynthia etapa más temprana son más fáciles de tratar  Cuándo realizar el conteo de patadas:  · Cuente las patadas en el mismo horario todos los GRASSE  · Realice el conteo de las patadas cuando bhardwaj bebé esté despierto y [place]  Bhardwaj bebé podría estar más activo en la tarde      Cómo realizar el conteo de patadas: Revise que bhardwaj bebé esté despierto antes de realizar el conteo de patadas  Usted puede despertar a bhardwaj bebé empujando bhardwaj estómago suavemente, caminando o tomando algo frío  Bhardwaj médico podría indicarle diferentes maneras de realizar el conteo  Es posible que le indique que ashlie lo siguiente:  · Use lucy gráfica o un reloj para mantener un registro de la hora en que comienza y termina de contar  · Siéntese en lucy silla o acuéstese en bhardwaj costado derecho  · Coloque tawny kasia en la parte más clark de bhardwaj BJURHOLM  · Cuente hasta que llegue a las 10 patadas  Escriba cuánto tiempo le lleva contar las 10 patadas  · Podría hodan de 30 minutos a 2 horas para contar 10 patadas  No debería de hodan más de 2 horas para contar 10 patadas  Acuda a tawny consultas de control con bhardwaj médico según le indicaron  Anote tawny preguntas para que se acuerde de hacerlas katie tawny visitas  © Copyright [address] Information is for End User's use only and may not be sold, redistributed or otherwise used for commercial purposes  All illustrations and images included in CareNotes® are the copyrighted property of A D A [employer]  or [address] [place] [address] Avenue es sólo para uso en educación  Bhardwaj intención no es darle un consejo médico sobre enfermedades o tratamientos  Colsulte con bhardwaj [employer] farmacéutico antes de seguir cualquier régimen médico para saber si es seguro y efectivo para usted

## 2021-06-04 ENCOUNTER — ROUTINE PRENATAL (OUTPATIENT)
Dept: OBGYN CLINIC | Facility: CLINIC | Age: 26
End: 2021-06-04

## 2021-06-04 VITALS
BODY MASS INDEX: 32.71 KG/M2 | HEIGHT: 68 IN | HEART RATE: 93 BPM | WEIGHT: 215.8 LBS | SYSTOLIC BLOOD PRESSURE: 124 MMHG | DIASTOLIC BLOOD PRESSURE: 83 MMHG

## 2021-06-04 DIAGNOSIS — O09.291 HX OF PREECLAMPSIA, PRIOR PREGNANCY, CURRENTLY PREGNANT, FIRST TRIMESTER: ICD-10-CM

## 2021-06-04 DIAGNOSIS — Z3A.29 29 WEEKS GESTATION OF PREGNANCY: Primary | ICD-10-CM

## 2021-06-04 DIAGNOSIS — Z23 NEED FOR TDAP VACCINATION: ICD-10-CM

## 2021-06-04 DIAGNOSIS — O36.5930 INTRAUTERINE GROWTH RESTRICTION AFFECTING ANTEPARTUM CARE OF MOTHER IN THIRD TRIMESTER, SINGLE OR UNSPECIFIED FETUS: ICD-10-CM

## 2021-06-04 DIAGNOSIS — G93.5 CHIARI MALFORMATION TYPE I (HCC): ICD-10-CM

## 2021-06-04 LAB
SL AMB  POCT GLUCOSE, UA: NEGATIVE
SL AMB LEUKOCYTE ESTERASE,UA: ABNORMAL
SL AMB POCT BILIRUBIN,UA: ABNORMAL
SL AMB POCT BLOOD,UA: NEGATIVE
SL AMB POCT KETONES,UA: ABNORMAL
SL AMB POCT NITRITE,UA: NEGATIVE
SL AMB POCT URINE PROTEIN: 2000

## 2021-06-04 PROCEDURE — 81002 URINALYSIS NONAUTO W/O SCOPE: CPT | Performed by: OBSTETRICS & GYNECOLOGY

## 2021-06-04 PROCEDURE — 99214 OFFICE O/P EST MOD 30 MIN: CPT | Performed by: OBSTETRICS & GYNECOLOGY

## 2021-06-04 PROCEDURE — 90715 TDAP VACCINE 7 YRS/> IM: CPT

## 2021-06-04 PROCEDURE — 90471 IMMUNIZATION ADMIN: CPT

## 2021-06-04 NOTE — ASSESSMENT & PLAN NOTE
- Patient ast growth scan on 5/28, overall measurements were below 10th percentile and AC was below 3 percentile  - Patient has weekly doppler studies and biweekly NST  - HX of Preeclmasia, /83, decline headache, vision headaches, RUQ pain  PreE signs and symptoms discussed  Patient has BP cuff, I recommend regular measurement of Bps and keep record to show during office visit    - She is on low dose ASA

## 2021-06-04 NOTE — ASSESSMENT & PLAN NOTE
- GBS positive status  - Contraception desire Nexplanon, previously was considering tubal sterilization    - TDAP given today  She and her partner reluctant to get Covid vaccine   Discussed benefits and risk related not take the vaccine  - 28 weeks labs reviewed, 1 h Glucola normal, hgb 13

## 2021-06-04 NOTE — PROGRESS NOTES
OB/GYN prenatal visit    S: 26 y o  [id number] 29w3d here for PN visit  She has no obstetric complaints, including pelvic pain, contractions, vaginal bleeding, loss of fluid, or decreased fetal movement  O:  Vitals:    21 1120   BP: 124/83   Pulse: 93       Gen: no acute distress, nonlabored breathing  Fundal Height (cm): 28 cm  Fetal Heart Rate: 142    A/P:  Intrauterine growth restriction affecting antepartum care of mother in third trimester  - Patient ast growth scan on , overall measurements were below 10th percentile and AC was below 3 percentile  - Patient has weekly doppler studies and biweekly NST  - HX of Preeclmasia, /83, decline headache, vision headaches, RUQ pain  PreE signs and symptoms discussed  Patient has BP cuff, I recommend regular measurement of Bps and keep record to show during office visit  - She is on low dose ASA    Hx of preeclampsia, prior pregnancy, currently pregnant, first trimester  - BP surveillance  - Low dose ASA until 36 weeks    29 weeks gestation of pregnancy  - GBS positive status  - Contraception desire Nexplanon, previously was considering tubal sterilization    - TDAP given today  She and her partner reluctant to get Covid vaccine   Discussed benefits and risk related not take the vaccine  - 28 weeks labs reviewed, 1 h Glucola normal, hgb 13      Chiari malformation type I ([address] )  - Received epidural at first pregnancy       IUP at 29w3d  No obstetric complaints today  PNC US reviewed  TWG:  recommended weight gain 11-20lbs  Flu vaccine not given, TDAP vaccine given  Contraception: nexplanon  Breastfeeding: yes  Birth plan: yes about epidural  Discussed  labor precautions and fetal kick counts    Return to clinic in 2 weeks      COVID 19 precautions were discussed with patient at length, reviewed symptoms, hygiene, social distancing, patient to call office  with questions/concerns    28-32 weeks: tdap, flu vaccine, sign consent form, check in Lei donaldson MD  2/6/3235  4:72 PM

## 2021-06-04 NOTE — PROGRESS NOTES
[address]: Ms Her Every was seen for NST (found under the pregnancy episode) which I reviewed the RN assessment and agree  Please don't hesitate to contact our office with any concerns or questions    Jose Elias Parnell MD

## 2021-06-08 ENCOUNTER — ROUTINE PRENATAL (OUTPATIENT)
Dept: PERINATAL CARE | Facility: CLINIC | Age: 26
End: 2021-06-08
Payer: COMMERCIAL

## 2021-06-08 VITALS
HEART RATE: 56 BPM | HEIGHT: 68 IN | SYSTOLIC BLOOD PRESSURE: 123 MMHG | DIASTOLIC BLOOD PRESSURE: 79 MMHG | WEIGHT: 214.4 LBS | BODY MASS INDEX: 32.49 KG/M2

## 2021-06-08 DIAGNOSIS — Z3A.30 30 WEEKS GESTATION OF PREGNANCY: Primary | ICD-10-CM

## 2021-06-08 DIAGNOSIS — O36.5930 POOR FETAL GROWTH AFFECTING MANAGEMENT OF MOTHER IN THIRD TRIMESTER, SINGLE OR UNSPECIFIED FETUS: ICD-10-CM

## 2021-06-08 PROCEDURE — 59025 FETAL NON-STRESS TEST: CPT | Performed by: OBSTETRICS & GYNECOLOGY

## 2021-06-08 NOTE — PROGRESS NOTES
[address]: Ms Traci Milligan was seen today at 30w0d for NST (found under the pregnancy episode) which I reviewed the RN assessment and agree  Please don't hesitate to contact our office with any concerns or questions    Griselda Lu, MD

## 2021-06-08 NOTE — PATIENT INSTRUCTIONS
Conteo de patadas en el embarazo   CUIDADO AMBULATORIO:   El conteo de patadas mide cuánto se está moviendo bhardwaj bebé en el útero  Lucy patada de bhardwaj bebé podría sentirse gagan lucy torcedura, lucy vuelta, un crujido, un meneo o un golpe  Es común sentir a tu bebé patear a las 32 a 29 semanas de Norlene Marco  Es posible que sienta al bebé patear ya a las 20 semanas de Norlene Marco  Puede que desee empezar a contar a las 28 semanas  Comuníquese inmediatamente con bhardwaj médico si:  · Usted siente un cambio en el número de patadas o movimientos de bhardwaj bebé  · Siente menos de 10 patadas en 2 horas  · Usted tiene preguntas o inquietudes acerca de los movimientos de bhardwaj bebé  Por qué realizar el conteo de patadas: Los movimientos de bhardwaj bebé podrían proporcionar información de la alessandra de bhardwaj bebé  Es posible que si hay problemas, bhardwaj bebé se mueva menos o nada en lo absoluto  El bebé podría moverse menos si no recibe suficiente oxígeno o alimento de la placenta  No fume mientras está embarazada  Fumar disminuye la cantidad de oxígeno que llega a bhardwaj bebé  Hable con bhardwaj médico si necesita ayuda para dejar de fumar  Los problemas que se encuentran en lucy etapa más temprana son más fáciles de tratar  Cuándo realizar el conteo de patadas:  · Cuente las patadas en el mismo horario todos los GRASSE  · Realice el conteo de las patadas cuando bhardwaj bebé esté despierto y [place]  Bhardwaj bebé podría estar más activo en la tarde  Cómo realizar el conteo de patadas: Revise que bhardwaj bebé esté despierto antes de realizar el conteo de patadas  Usted puede despertar a bhardwaj bebé empujando bhardwaj estómago suavemente, caminando o tomando algo frío  Bhardwaj médico podría indicarle diferentes maneras de realizar el conteo  Es posible que le indique que ashlie lo siguiente:  · Use lucy gráfica o un reloj para mantener un registro de la hora en que comienza y termina de contar  · Siéntese en lucy silla o acuéstese en bhardwaj costado derecho      · Coloque tawny kasia en la parte más clark de bhardwaj estómago  · Cuente hasta que llegue a las 10 patadas  Escriba cuánto tiempo le lleva contar las 10 patadas  · Podría hodan de 30 minutos a 2 horas para contar 10 patadas  No debería de hodan más de 2 horas para contar 10 patadas  Acuda a tawny consultas de control con bhardwaj médico según le indicaron  Anote tawny preguntas para que se acuerde de hacerlas katie tawny visitas  © Copyright [employer] Information is for End User's use only and may not be sold, redistributed or otherwise used for commercial purposes  All illustrations and images included in CareNotes® are the copyrighted property of A D A M [employer]  or [address] [place] [address] es sólo para uso en educación  Bhardwaj intención no es darle un consejo médico sobre enfermedades o tratamientos  Colsulte con bhardwaj Freeda Ovalle farmacéutico antes de seguir cualquier régimen médico para saber si es seguro y efectivo para usted

## 2021-06-10 PROBLEM — Z3A.30 30 WEEKS GESTATION OF PREGNANCY: Status: ACTIVE | Noted: 2021-01-05

## 2021-06-10 NOTE — PROGRESS NOTES
The patient was seen today for an ultrasound and NST  Please see ultrasound report (located under OB Procedures tab) for additional details  Marylee Cheek presents for  surveillance for the indication of fetal growth restriction  A biophysical profile was overall reassuring  Nonstress test was nonreactive but had moderate variability and 1 acceleration  Umbilical artery Dopplers remain elevated with preserved diastolic velocity  Testing should continue as previously planned

## 2021-06-11 ENCOUNTER — ULTRASOUND (OUTPATIENT)
Dept: PERINATAL CARE | Facility: CLINIC | Age: 26
End: 2021-06-11
Payer: COMMERCIAL

## 2021-06-11 VITALS
SYSTOLIC BLOOD PRESSURE: 131 MMHG | BODY MASS INDEX: 32.37 KG/M2 | HEART RATE: 101 BPM | DIASTOLIC BLOOD PRESSURE: 77 MMHG | WEIGHT: 213.6 LBS | HEIGHT: 68 IN

## 2021-06-11 DIAGNOSIS — O36.5930 INTRAUTERINE GROWTH RESTRICTION AFFECTING ANTEPARTUM CARE OF MOTHER IN THIRD TRIMESTER, SINGLE OR UNSPECIFIED FETUS: Primary | ICD-10-CM

## 2021-06-11 DIAGNOSIS — Z3A.30 30 WEEKS GESTATION OF PREGNANCY: ICD-10-CM

## 2021-06-11 PROCEDURE — 76820 UMBILICAL ARTERY ECHO: CPT | Performed by: OBSTETRICS & GYNECOLOGY

## 2021-06-11 PROCEDURE — 3008F BODY MASS INDEX DOCD: CPT | Performed by: OBSTETRICS & GYNECOLOGY

## 2021-06-11 PROCEDURE — 76818 FETAL BIOPHYS PROFILE W/NST: CPT | Performed by: OBSTETRICS & GYNECOLOGY

## 2021-06-11 NOTE — PROGRESS NOTES
Pt  Reported active fetal movement at home between visit  Daily fetal kick count reviewed and emphasized  Patient verbalized understanding of all and was receptive      Marie Fuller RN

## 2021-06-15 ENCOUNTER — HOSPITAL ENCOUNTER (INPATIENT)
Facility: HOSPITAL | Age: 26
LOS: 4 days | Discharge: HOME | DRG: 540 | End: 2021-06-19
Attending: OBSTETRICS & GYNECOLOGY | Admitting: OBSTETRICS & GYNECOLOGY
Payer: COMMERCIAL

## 2021-06-15 ENCOUNTER — ROUTINE PRENATAL (OUTPATIENT)
Dept: PERINATAL CARE | Facility: CLINIC | Age: 26
End: 2021-06-15
Payer: COMMERCIAL

## 2021-06-15 VITALS
WEIGHT: 214.4 LBS | SYSTOLIC BLOOD PRESSURE: 135 MMHG | BODY MASS INDEX: 32.6 KG/M2 | HEART RATE: 61 BPM | DIASTOLIC BLOOD PRESSURE: 97 MMHG

## 2021-06-15 DIAGNOSIS — G93.2 IIH (IDIOPATHIC INTRACRANIAL HYPERTENSION): ICD-10-CM

## 2021-06-15 DIAGNOSIS — O09.899 HIGH RISK PREGNANCY WITH HIGH INHIBIN: ICD-10-CM

## 2021-06-15 DIAGNOSIS — G93.5 CHIARI MALFORMATION TYPE I (HCC): ICD-10-CM

## 2021-06-15 DIAGNOSIS — O36.5930 POOR FETAL GROWTH AFFECTING MANAGEMENT OF MOTHER IN THIRD TRIMESTER, SINGLE OR UNSPECIFIED FETUS: Primary | ICD-10-CM

## 2021-06-15 DIAGNOSIS — O09.299 HX OF PREECLAMPSIA, PRIOR PREGNANCY, CURRENTLY PREGNANT: ICD-10-CM

## 2021-06-15 DIAGNOSIS — Z3A.31 31 WEEKS GESTATION OF PREGNANCY: ICD-10-CM

## 2021-06-15 DIAGNOSIS — O14.90 PRE-ECLAMPSIA: ICD-10-CM

## 2021-06-15 DIAGNOSIS — Z3A.31 31 WEEKS GESTATION OF PREGNANCY: Primary | ICD-10-CM

## 2021-06-15 DIAGNOSIS — O28.8 HIGH RISK PREGNANCY WITH HIGH INHIBIN: ICD-10-CM

## 2021-06-15 DIAGNOSIS — Z98.891 STATUS POST PRIMARY LOW TRANSVERSE CESAREAN SECTION: ICD-10-CM

## 2021-06-15 LAB
ABO GROUP BLD: NORMAL
ALBUMIN SERPL BCP-MCNC: 2.6 G/DL (ref 3.5–5)
ALBUMIN SERPL BCP-MCNC: 2.7 G/DL (ref 3.5–5)
ALP SERPL-CCNC: 116 U/L (ref 46–116)
ALP SERPL-CCNC: 122 U/L (ref 46–116)
ALT SERPL W P-5'-P-CCNC: 23 U/L (ref 12–78)
ALT SERPL W P-5'-P-CCNC: 24 U/L (ref 12–78)
ANION GAP SERPL CALCULATED.3IONS-SCNC: 12 MMOL/L (ref 4–13)
ANION GAP SERPL CALCULATED.3IONS-SCNC: 13 MMOL/L (ref 4–13)
AST SERPL W P-5'-P-CCNC: 22 U/L (ref 5–45)
AST SERPL W P-5'-P-CCNC: 22 U/L (ref 5–45)
BASOPHILS # BLD AUTO: 0.02 THOUSANDS/ΜL (ref 0–0.1)
BASOPHILS NFR BLD AUTO: 0 % (ref 0–1)
BILIRUB SERPL-MCNC: 0.19 MG/DL (ref 0.2–1)
BILIRUB SERPL-MCNC: 0.24 MG/DL (ref 0.2–1)
BLD GP AB SCN SERPL QL: NEGATIVE
BUN SERPL-MCNC: 11 MG/DL (ref 5–25)
BUN SERPL-MCNC: 9 MG/DL (ref 5–25)
CALCIUM ALBUM COR SERPL-MCNC: 10.2 MG/DL (ref 8.3–10.1)
CALCIUM ALBUM COR SERPL-MCNC: 9.8 MG/DL (ref 8.3–10.1)
CALCIUM SERPL-MCNC: 8.8 MG/DL (ref 8.3–10.1)
CALCIUM SERPL-MCNC: 9.1 MG/DL (ref 8.3–10.1)
CHLORIDE SERPL-SCNC: 103 MMOL/L (ref 100–108)
CHLORIDE SERPL-SCNC: 105 MMOL/L (ref 100–108)
CO2 SERPL-SCNC: 20 MMOL/L (ref 21–32)
CO2 SERPL-SCNC: 21 MMOL/L (ref 21–32)
CREAT SERPL-MCNC: 0.59 MG/DL (ref 0.6–1.3)
CREAT SERPL-MCNC: 0.64 MG/DL (ref 0.6–1.3)
CREAT UR-MCNC: 431 MG/DL
EOSINOPHIL # BLD AUTO: 0.03 THOUSAND/ΜL (ref 0–0.61)
EOSINOPHIL NFR BLD AUTO: 0 % (ref 0–6)
ERYTHROCYTE [DISTWIDTH] IN BLOOD BY AUTOMATED COUNT: 13.5 % (ref 11.6–15.1)
ERYTHROCYTE [DISTWIDTH] IN BLOOD BY AUTOMATED COUNT: 13.6 % (ref 11.6–15.1)
GFR SERPL CREATININE-BSD FRML MDRD: 125 ML/MIN/1.73SQ M
GFR SERPL CREATININE-BSD FRML MDRD: 129 ML/MIN/1.73SQ M
GLUCOSE SERPL-MCNC: 120 MG/DL (ref 65–140)
GLUCOSE SERPL-MCNC: 70 MG/DL (ref 65–140)
HCT VFR BLD AUTO: 39.5 % (ref 34.8–46.1)
HCT VFR BLD AUTO: 40.1 % (ref 34.8–46.1)
HGB BLD-MCNC: 13.2 G/DL (ref 11.5–15.4)
HGB BLD-MCNC: 13.6 G/DL (ref 11.5–15.4)
IMM GRANULOCYTES # BLD AUTO: 0.05 THOUSAND/UL (ref 0–0.2)
IMM GRANULOCYTES NFR BLD AUTO: 1 % (ref 0–2)
LYMPHOCYTES # BLD AUTO: 2.63 THOUSANDS/ΜL (ref 0.6–4.47)
LYMPHOCYTES NFR BLD AUTO: 27 % (ref 14–44)
MAGNESIUM SERPL-MCNC: 4 MG/DL (ref 1.6–2.6)
MCH RBC QN AUTO: 29.5 PG (ref 26.8–34.3)
MCH RBC QN AUTO: 29.9 PG (ref 26.8–34.3)
MCHC RBC AUTO-ENTMCNC: 33.4 G/DL (ref 31.4–37.4)
MCHC RBC AUTO-ENTMCNC: 33.9 G/DL (ref 31.4–37.4)
MCV RBC AUTO: 87 FL (ref 82–98)
MCV RBC AUTO: 90 FL (ref 82–98)
MONOCYTES # BLD AUTO: 0.66 THOUSAND/ΜL (ref 0.17–1.22)
MONOCYTES NFR BLD AUTO: 7 % (ref 4–12)
NEUTROPHILS # BLD AUTO: 6.5 THOUSANDS/ΜL (ref 1.85–7.62)
NEUTS SEG NFR BLD AUTO: 65 % (ref 43–75)
NRBC BLD AUTO-RTO: 0 /100 WBCS
PLATELET # BLD AUTO: 269 THOUSANDS/UL (ref 149–390)
PLATELET # BLD AUTO: 270 THOUSANDS/UL (ref 149–390)
PMV BLD AUTO: 10.8 FL (ref 8.9–12.7)
PMV BLD AUTO: 11.4 FL (ref 8.9–12.7)
POTASSIUM SERPL-SCNC: 4 MMOL/L (ref 3.5–5.3)
POTASSIUM SERPL-SCNC: 4.3 MMOL/L (ref 3.5–5.3)
PROT SERPL-MCNC: 6.6 G/DL (ref 6.4–8.2)
PROT SERPL-MCNC: 6.9 G/DL (ref 6.4–8.2)
PROT UR-MCNC: 308 MG/DL
PROT/CREAT UR: 0.71 MG/G{CREAT} (ref 0–0.1)
RBC # BLD AUTO: 4.41 MILLION/UL (ref 3.81–5.12)
RBC # BLD AUTO: 4.61 MILLION/UL (ref 3.81–5.12)
RH BLD: POSITIVE
SODIUM SERPL-SCNC: 136 MMOL/L (ref 136–145)
SODIUM SERPL-SCNC: 138 MMOL/L (ref 136–145)
SPECIMEN EXPIRATION DATE: NORMAL
URATE SERPL-MCNC: 6.4 MG/DL (ref 2–6.8)
WBC # BLD AUTO: 11.91 THOUSAND/UL (ref 4.31–10.16)
WBC # BLD AUTO: 9.89 THOUSAND/UL (ref 4.31–10.16)

## 2021-06-15 PROCEDURE — NC001 PR NO CHARGE: Performed by: OBSTETRICS & GYNECOLOGY

## 2021-06-15 PROCEDURE — 86901 BLOOD TYPING SEROLOGIC RH(D): CPT | Performed by: OBSTETRICS & GYNECOLOGY

## 2021-06-15 PROCEDURE — 83735 ASSAY OF MAGNESIUM: CPT | Performed by: OBSTETRICS & GYNECOLOGY

## 2021-06-15 PROCEDURE — 85027 COMPLETE CBC AUTOMATED: CPT | Performed by: OBSTETRICS & GYNECOLOGY

## 2021-06-15 PROCEDURE — 99253 IP/OBS CNSLTJ NEW/EST LOW 45: CPT | Performed by: OBSTETRICS & GYNECOLOGY

## 2021-06-15 PROCEDURE — 99214 OFFICE O/P EST MOD 30 MIN: CPT | Performed by: OBSTETRICS & GYNECOLOGY

## 2021-06-15 PROCEDURE — 76816 OB US FOLLOW-UP PER FETUS: CPT | Performed by: OBSTETRICS & GYNECOLOGY

## 2021-06-15 PROCEDURE — 84550 ASSAY OF BLOOD/URIC ACID: CPT | Performed by: OBSTETRICS & GYNECOLOGY

## 2021-06-15 PROCEDURE — 86850 RBC ANTIBODY SCREEN: CPT | Performed by: OBSTETRICS & GYNECOLOGY

## 2021-06-15 PROCEDURE — 4A1HXCZ MONITORING OF PRODUCTS OF CONCEPTION, CARDIAC RATE, EXTERNAL APPROACH: ICD-10-PCS | Performed by: OBSTETRICS & GYNECOLOGY

## 2021-06-15 PROCEDURE — 76820 UMBILICAL ARTERY ECHO: CPT | Performed by: OBSTETRICS & GYNECOLOGY

## 2021-06-15 PROCEDURE — 85025 COMPLETE CBC W/AUTO DIFF WBC: CPT | Performed by: OBSTETRICS & GYNECOLOGY

## 2021-06-15 PROCEDURE — 84156 ASSAY OF PROTEIN URINE: CPT | Performed by: OBSTETRICS & GYNECOLOGY

## 2021-06-15 PROCEDURE — 82570 ASSAY OF URINE CREATININE: CPT | Performed by: OBSTETRICS & GYNECOLOGY

## 2021-06-15 PROCEDURE — 80053 COMPREHEN METABOLIC PANEL: CPT | Performed by: OBSTETRICS & GYNECOLOGY

## 2021-06-15 PROCEDURE — 76818 FETAL BIOPHYS PROFILE W/NST: CPT | Performed by: OBSTETRICS & GYNECOLOGY

## 2021-06-15 PROCEDURE — 86900 BLOOD TYPING SEROLOGIC ABO: CPT | Performed by: OBSTETRICS & GYNECOLOGY

## 2021-06-15 PROCEDURE — 99214 OFFICE O/P EST MOD 30 MIN: CPT

## 2021-06-15 RX ORDER — ASPIRIN 81 MG/1
162 TABLET, CHEWABLE ORAL DAILY
Status: DISCONTINUED | OUTPATIENT
Start: 2021-06-16 | End: 2021-06-15

## 2021-06-15 RX ORDER — ACETAMINOPHEN 325 MG/1
650 TABLET ORAL EVERY 4 HOURS PRN
Status: DISCONTINUED | OUTPATIENT
Start: 2021-06-15 | End: 2021-06-19 | Stop reason: HOSPADM

## 2021-06-15 RX ORDER — BETAMETHASONE SODIUM PHOSPHATE AND BETAMETHASONE ACETATE 3; 3 MG/ML; MG/ML
12 INJECTION, SUSPENSION INTRA-ARTICULAR; INTRALESIONAL; INTRAMUSCULAR; SOFT TISSUE EVERY 24 HOURS
Status: COMPLETED | OUTPATIENT
Start: 2021-06-15 | End: 2021-06-16

## 2021-06-15 RX ORDER — NIFEDIPINE 30 MG/1
30 TABLET, EXTENDED RELEASE ORAL DAILY
Status: DISCONTINUED | OUTPATIENT
Start: 2021-06-15 | End: 2021-06-19 | Stop reason: HOSPADM

## 2021-06-15 RX ORDER — MAGNESIUM SULFATE HEPTAHYDRATE 40 MG/ML
4 INJECTION, SOLUTION INTRAVENOUS ONCE
Status: COMPLETED | OUTPATIENT
Start: 2021-06-15 | End: 2021-06-15

## 2021-06-15 RX ORDER — MAGNESIUM SULFATE HEPTAHYDRATE 40 MG/ML
2 INJECTION, SOLUTION INTRAVENOUS CONTINUOUS
Status: DISCONTINUED | OUTPATIENT
Start: 2021-06-15 | End: 2021-06-17

## 2021-06-15 RX ORDER — BETAMETHASONE SODIUM PHOSPHATE AND BETAMETHASONE ACETATE 3; 3 MG/ML; MG/ML
12 INJECTION, SUSPENSION INTRA-ARTICULAR; INTRALESIONAL; INTRAMUSCULAR; SOFT TISSUE EVERY 24 HOURS
Status: DISCONTINUED | OUTPATIENT
Start: 2021-06-15 | End: 2021-06-15

## 2021-06-15 RX ORDER — SODIUM CHLORIDE, SODIUM LACTATE, POTASSIUM CHLORIDE, CALCIUM CHLORIDE 600; 310; 30; 20 MG/100ML; MG/100ML; MG/100ML; MG/100ML
25 INJECTION, SOLUTION INTRAVENOUS CONTINUOUS
Status: DISCONTINUED | OUTPATIENT
Start: 2021-06-15 | End: 2021-06-19 | Stop reason: HOSPADM

## 2021-06-15 RX ORDER — MAGNESIUM SULFATE HEPTAHYDRATE 40 MG/ML
2 INJECTION, SOLUTION INTRAVENOUS ONCE
Status: COMPLETED | OUTPATIENT
Start: 2021-06-15 | End: 2021-06-15

## 2021-06-15 RX ORDER — NIFEDIPINE 30 MG/1
30 TABLET, EXTENDED RELEASE ORAL DAILY
Status: DISCONTINUED | OUTPATIENT
Start: 2021-06-16 | End: 2021-06-15

## 2021-06-15 RX ORDER — NIFEDIPINE 30 MG/1
30 TABLET, EXTENDED RELEASE ORAL DAILY
Status: DISCONTINUED | OUTPATIENT
Start: 2021-06-15 | End: 2021-06-15

## 2021-06-15 RX ORDER — ONDANSETRON 2 MG/ML
4 INJECTION INTRAMUSCULAR; INTRAVENOUS EVERY 8 HOURS PRN
Status: DISCONTINUED | OUTPATIENT
Start: 2021-06-15 | End: 2021-06-19 | Stop reason: HOSPADM

## 2021-06-15 RX ADMIN — MAGNESIUM SULFATE HEPTAHYDRATE 2 G: 40 INJECTION, SOLUTION INTRAVENOUS at 19:56

## 2021-06-15 RX ADMIN — NIFEDIPINE 30 MG: 30 TABLET, FILM COATED, EXTENDED RELEASE ORAL at 18:47

## 2021-06-15 RX ADMIN — MAGNESIUM SULFATE HEPTAHYDRATE 4 G: 40 INJECTION, SOLUTION INTRAVENOUS at 19:35

## 2021-06-15 RX ADMIN — SODIUM CHLORIDE, SODIUM LACTATE, POTASSIUM CHLORIDE, AND CALCIUM CHLORIDE 25 ML/HR: .6; .31; .03; .02 INJECTION, SOLUTION INTRAVENOUS at 19:33

## 2021-06-15 RX ADMIN — SODIUM CHLORIDE, SODIUM LACTATE, POTASSIUM CHLORIDE, AND CALCIUM CHLORIDE 1000 ML: .6; .31; .03; .02 INJECTION, SOLUTION INTRAVENOUS at 16:15

## 2021-06-15 RX ADMIN — MAGNESIUM SULFATE HEPTAHYDRATE 2 G/HR: 40 INJECTION, SOLUTION INTRAVENOUS at 20:12

## 2021-06-15 RX ADMIN — BETAMETHASONE SODIUM PHOSPHATE AND BETAMETHASONE ACETATE 12 MG: 3; 3 INJECTION, SUSPENSION INTRA-ARTICULAR; INTRALESIONAL; INTRAMUSCULAR at 16:01

## 2021-06-15 NOTE — LETTER
Rubi 15, 2021     Barbie Cramer MD  [address] [facility] [address]    Patient: Hina Duncan   YOB: 1995   Date of Visit: 6/15/2021       Dear Dr Won Ayala:    Hina Duncan is on her way to [facility] for admission with IUGR and abnormal Dopplers  BP is elevated  Below are my notes for this consultation  If you have questions, please do not hesitate to call me  I look forward to following your patient along with you  Sincerely,        Ly Welch MD        CC: MD Ly Salter MD  6/15/2021  2:06 PM  Sign when Signing Visit  [address]: Ms Ismael Ruiz was seen today at 31w0d for NST which was nonreactive so she was added on for BPP and Dopplers; BPP is abnormal as are umbilical Doppler studies  I advise evaluation on L&D at [facility]  MFM team notified     See ultrasound report under "OB Procedures" tab  Please don't hesitate to contact our office with any concerns or questions    Ly Welch MD

## 2021-06-15 NOTE — H&P
History & Physical - OB/GYN   Shirley Nix 26 y o  female MRN: [id number]  Unit/Bed#: LD TRIAGE 3- Encounter: [id number]    Shirley Nix is a patient of [facility]    Chief complaint:  Sent over from the  center for fetal growth restriction, less than the 3rd percentile with abnormal Dopplers persistently absent end-diastolic flow with intermittent reversal     HPI:  Shirley Nix is a 26 y o  [id number] female with an AZALEA of 2021, by Last Menstrual Period at 31w0d weeks gestation who is being admitted for Observation  She was seen in the  center today  Nonstress test today was nonreactive  Pregnancy is notable for   elevated inhibin level  Her obstetric history includes an iatrogenic    birth at 28 weeks 3 days in the setting of preeclampsia  Her past medical history is also significant for Chiari malformation and idiopathic intracranial hypertension  Blood pressure today is elevated  There is a single live intrauterine pregnancy in cephalic presentation  Amniotic fluid is within normal limits  In light of the abnormal Dopplers and abnormal biophysical profile, fetal growth was then added on  Composite biometry is at less than the third percentile  AC measures over 4 weeks behind       Contractions:  no  Fetal movement:  yes  Vaginal bleeding:   no  Leaking of fluid:  no    Pregnancy Complications:  Patient Active Problem List   Diagnosis    IIH (idiopathic intracranial hypertension)    Papilledema    Chiari malformation type I ([address] )    History of 2019 novel coronavirus disease (COVID-19)    31 weeks gestation of pregnancy    GBS bacteriuria    Hyperemesis gravidarum    Hx of preeclampsia, prior pregnancy, currently pregnant    High risk pregnancy with high inhibin    Poor fetal growth affecting management of mother in third trimester    Intrauterine growth restriction affecting antepartum care of mother in third trimester       PMH:  Past Medical History: Diagnosis Date    Chiari malformation type I ([facility] [address]     Chronic fatigue     last assessed 16    COVID-19 2020    Fever and chills 2020    Hyperlipidemia     resolved 17    Hypertension     pre-eclampsia 2016    Idiopathic intracranial hypertension     Varicella     pt unsure    Visual impairment     glasses       PSH:  Past Surgical History:   Procedure Laterality Date    CHOLECYSTECTOMY LAPAROSCOPIC N/A 2016    Procedure: CHOLECYSTECTOMY LAPAROSCOPIC possible open;  Surgeon: Eliseo Sarkar MD;  [facility];  Service:    [facility] REDUCTION MAMMAPLASTY         OB Hx:  OB History    Para Term  AB Living   3 1 0 1 1 1   SAB TAB Ectopic Multiple Live Births   1 0 0 0 1      # Outcome Date GA Lbr Jani/2nd Weight Sex Delivery Anes PTL Lv   3 Current            2 SAB 2020 8w0d    SAB      1  16 35w3d  1745 g (3 lb 13 6 oz) F Vag-Spont EPI Y OSCAR      Apgar1: 9  Apgar5: 9       Meds:  No current facility-administered medications on file prior to encounter  Current Outpatient Medications on File Prior to Encounter   Medication Sig Dispense Refill    aspirin 81 mg chewable tablet Chew 2 tablets (162 mg total) daily 60 tablet 6    famotidine (PEPCID) 20 mg tablet Take 1 tablet (20 mg total) by mouth 2 (two) times a day 30 min before breakfast and dinner 180 tablet 1    Prenatal Vit-Fe Fumarate-FA (PRENATAL VITAMINS PO) Take 1 tablet by mouth daily      acetaminophen (TYLENOL) 325 mg tablet Take 2 tablets (650 mg total) by mouth every 6 (six) hours as needed for mild pain or headaches 60 tablet 0       Allergies:  No Known Allergies    Review of Systems   Psychiatric/Behavioral: Nervous/anxious: Anxious about future of baby  All other systems reviewed and are negative        Physical Exam    Cervix: deferred    Fetal heart rate:   Baseline Rate: 140 bpm  Variability: Moderate 6-25 bpm  [place]:   Contraction Frequency (minutes): none    EFW: 2 lbs 10 oz GBS: Positive    Membranes: intact    Labs:  Hemoglobin: 13 0  Blood type: A+  Antibody: negative  Group B strep: positive  HIV: negative  Hepatitis B: negative  RPR: non-reactive   Rubella: Immune  1 hour Glucose: not done    Assessment:   26 y o  [id number] at 31w0d here for observation    Plan:   1  IUP at 31w0d   - Continuous FHR and tocometry monitoring  2  Membrane status: Intact  3   labor management   - BTM for FLM 12mg q24h for 2 doses   - Consider Magnesium for neuroprotection (<32w) 6g loading, 2g maintenance dose, f/u with MFM consult  4  Routine antepartum labs   - CBC, RPR, T&S stat  5  MFM consult  6  NICU consult  7   FEN   - Regular diet    D/w Dr Sharon Marquez, DO  OB/GYN PGY-1  6/15/2021  5:43 PM

## 2021-06-15 NOTE — CONSULTS
Consultation - [address] [place] [address] 26 y o  female MRN: [id number]  Unit/Bed#: Lyle Ontiveros Encounter: [id number]      [address]    Chief Complaint   Patient presents with    Non-stress Test     NR NST and BPP 4/8 in MFM office today       History of Present Illness   Physician Requesting Consult: Cathy Francisco MD  Reason for Consult / Principal Problem: decelerations on NST at perinatology with known FGR with intermittently reverse UA flow     Subspeciality: Perinatology    HPI:  Alma Rosa Alexander is a 26 y o  [id number] female with an AZALEA of 2021, by Last Menstrual Period, at 31w0d gestation who is being evaluated for declerations on NST during perinatology appointment for known Fetal growth restriction, <3%  The patient had a scheduled appointment for routine  surveillance when she was found to have several variable decelerations  BPP was done and was 4/10  Umbilical artery doppler studies were abnormal today, showing persistently absent end-diastolic flow with intermittent reversal  Given these findings, fetal growth exam was added on and composite biometry was less than third percentile, measuring over 4 weeks behind  The patient was told to come to L&D immediately  The patient has a history of preeclampsia with severe features, for which she was induced at 35 weeks  The severe feature at that time was an intractable headache  Upon presentation today, she had elevated blood pressures in the severe range  She was found to have preeclampsia with severe features  Review of Systems   Constitutional: Negative  HENT: Negative  Eyes: Negative for photophobia and visual disturbance  Respiratory: Negative  Cardiovascular: Negative  Gastrointestinal: Negative for abdominal distention, abdominal pain, diarrhea and vomiting  Endocrine: Negative  Genitourinary: Negative  Musculoskeletal: Negative  Skin: Negative  Allergic/Immunologic: Negative      Neurological: Negative  Hematological: Negative  Psychiatric/Behavioral: Negative  All systems reviewed are negative    OB History    Para Term  AB Living   3 1   1 1 1   SAB TAB Ectopic Multiple Live Births   1     0 1      # Outcome Date GA Lbr Jani/2nd Weight Sex Delivery Anes PTL Lv   3 Current            2 SAB 2020 8w0d    SAB      1  16 35w3d  1745 g (3 lb 13 6 oz) F Vag-Spont EPI Y OSCAR       Historical Information   Past Medical History:   Diagnosis Date    [facility]ari malformation type I ([facility] [address] )     Chronic fatigue     last assessed 16    COVID-19 2020    Fever and chills 2020    Hyperlipidemia     resolved 17    Hypertension     pre-eclampsia     Idiopathic intracranial hypertension     Varicella     pt unsure    Visual impairment     glasses     Past Surgical History:   Procedure Laterality Date    CHOLECYSTECTOMY LAPAROSCOPIC N/A 2016    Procedure: CHOLECYSTECTOMY LAPAROSCOPIC possible open;  Surgeon: Kamar Maza MD;  Location[facility];  Service:    [facility] REDUCTION MAMMAPLASTY       Social History   Social History     Substance and Sexual Activity   Alcohol Use Not Currently     Social History     Substance and Sexual Activity   Drug Use Never     Social History     Tobacco Use   Smoking Status Never Smoker   Smokeless Tobacco Never Used       Meds/Allergies      Medications Prior to Admission   Medication    aspirin 81 mg chewable tablet    famotidine (PEPCID) 20 mg tablet    Prenatal Vit-Fe Fumarate-FA (PRENATAL VITAMINS PO)    acetaminophen (TYLENOL) 325 mg tablet      No Known Allergies    OBJECTIVE:    Vitals: Blood pressure (!) 137/107, pulse 61, temperature 99 2 °F (37 3 °C), temperature source Temporal, resp  rate 20, last menstrual period 11/10/2020, not currently breastfeeding  There is no height or weight on file to calculate BMI  Physical Exam  Not evaluated    GEN: NAD, AAO x 3  RESP: CTABL, No wheeze, rhonci, or stridor  CVS: RRR S1S2 nl, No murmurs, rubs, or gallops  ABD: Soft, NT, gravid, BS present  EXT: No c/c/e    Fetal heart rate: 135/moderate/accelerations, no decelerations  Amelia: none    Prenatal Labs:     Lab Results   Component Value Date    HCT 39 5 06/15/2021    HGB 13 2 06/15/2021       MFM U/S Findings Summary:     INDICATIONS      intrauterine growth restriction   obesity   Decelerations on NST      Exam Types      Amniotic Fluid Index   Biophysical Profile   Doppler study      RESULTS      Fetus # 1 of 1   Vertex presentation   Possible symmetric IUGR   Placenta Location = Posterior   Placenta Grade = II      MEASUREMENTS (* Included In Average GA)      AC              22 4 cm        26 weeks 6 days*   BPD              7 4 cm        29 weeks 4 days* (8%)   HC              26 2 cm        28 weeks 4 days* (<5%)   Femur            5 8 cm        30 weeks 1 day * (18%)      HC/AC           1 17                            (>95%)   FL/AC           0 26   FL/BPD          0 78   EFW Hadlock 4   1214 grams - 2 lbs 11 oz                 (<3%)      THE AVERAGE GESTATIONAL AGE is 28 weeks 6 days +/- 21 days  AMNIOTIC FLUID      Q1: 3 3      Q2: 1 8      Q3: 1 2      Q4: 2 6   CAIT Total = 8 9 cm   Amniotic Fluid: Normal      FETAL VESSELS                                     S/D   PI    RI    PSV   AEDV RF                                                    cm/s       Umbilical Artery           10 30 2 21  1 08        Yes  Yes      BIOPHYSICAL PROFILE      The Biophysical Profile score was 4/8     Breathin  Movement: 2  Tone: 0  AFV: 2      IMPRESSION      Nino IUP   28 weeks and 6 days by this ultrasound  (AZALEA=SEP 1 2021)   Vertex presentation   Growth assessment indicated possible symmetric IUGR   Regular fetal heart rate of 151 bpm   Posterior placenta    Assessment/Plan     ASSESSMENT:   Hannah Perry is a 26 y o  [id number] at 31w0d gestation who is being evaluated forFetal growth restriction, <3% with abnormal Dopplers and newly identified preeclampsia, likely with severe features  PLAN:    1  Fetal growth restriction, <3% with abnormal Dopplers    - Continuous fetal monitoring    - NICU consultation    - BTM 12mg daily for 2 doses     2  Preeclampsia   - Intermittent severe range blood pressure, will start Magnesium    - Will initiate Procardia 30mg XL daily    - Newly identified today with elevated blood pressure and urine protein:creatinine of 0 71   - CBC/CMP WNL, will trend every 6 hours     3   History of Idiopathic intracranial hypertension and Chiari malformation    - If patient develops headaches, will likely need a neurology consult     Jameson Xie MD  6/15/2021  3:59 PM

## 2021-06-15 NOTE — PROGRESS NOTES
[address]: Ms Vern York was seen today at 31w0d for NST which was nonreactive so she was added on for BPP and Dopplers; BPP is abnormal as are umbilical Doppler studies  I advise evaluation on L&D at [facility]  [facility] [place] team notified     See ultrasound report under "OB Procedures" tab  Please don't hesitate to contact our office with any concerns or questions    Kira Angel MD

## 2021-06-15 NOTE — PATIENT INSTRUCTIONS
Please go to Labor and Delivery now for evaluation  The address of [address] [place] [address] is [address] [place] [address], [address] [facility] [address]  Go to the [facility]  Kick Counts in Pregnancy   AMBULATORY CARE:   Kick counts  measure how much your baby is moving in your womb  A kick from your baby can be felt as a twist, turn, swish, roll, or jab  It is common to feel your baby kicking at 26 to 28 weeks of pregnancy  You may feel your baby kick as early as 20 weeks of pregnancy  You may want to start counting at 28 weeks  Contact your healthcare provider immediately if:   · You feel a change in the number of kicks or movements of your baby  · You feel fewer than 10 kicks within 2 hours  · You have questions or concerns about your baby's movements  Why measure kick counts:  Your baby's movement may provide information about your baby's health  He or she may move less, or not at all, if there are problems  Your baby may move less if he or she is not getting enough oxygen or nutrition from the placenta  Do not smoke while you are pregnant  Smoking decreases the amount of oxygen that gets to your baby  Talk to your healthcare provider if you need help to quit smoking  Tell your healthcare provider as soon as you feel a change in your baby's movements  When to measure kick counts:   · Measure kick counts at the same time every day  · Measure kick counts when your baby is awake and most active  Your baby may be most active in the evening  How to measure kick counts:  Check that your baby is awake before you measure kick counts  You can wake up your baby by lightly pushing on your belly, walking, or drinking something cold  Your healthcare provider may tell you different ways to measure kick counts  You may be told to do the following:  · Use a chart or clock to keep track of the time you start and finish counting  · Sit in a chair or lie on your left side       · Place your hands on the largest part of your belly  · Count until you reach 10 kicks  Write down how much time it takes to count 10 kicks  · It may take 30 minutes to 2 hours to count 10 kicks  It should not take more than 2 hours to count 10 kicks  Follow up with your healthcare provider as directed:  Write down your questions so you remember to ask them during your visits  © Copyright [address] Information is for End User's use only and may not be sold, redistributed or otherwise used for commercial purposes  All illustrations and images included in CareNotes® are the copyrighted property of A D A M , Inc  or [facility] [employer]   The above information is an  only  It is not intended as medical advice for individual conditions or treatments  Talk to your doctor, nurse or pharmacist before following any medical regimen to see if it is safe and effective for you  Nonstress Test for Pregnancy   WHAT YOU NEED TO KNOW:   What do I need to know about a nonstress test?  A nonstress test measures your baby's heart rate and movements  Nonstress means that no stress will be placed on your baby during the test    How do I prepare for a nonstress test?  Your healthcare provider will talk to you about how to prepare for this test  He may tell you to eat and drink plenty of fluids before your test  If you smoke, you may be asked not to smoke within 2 hours before the test  He will also tell you what medicines to take or not take on the day of your test    What will happen during a nonstress test?  You may be asked to lie down or recline back for the test on a bed  One or two belts with sensors will be placed around your abdomen  Your baby's heart rate will be recorded with a machine  If your baby does not move, your baby may be asleep  Your healthcare provider may make a noise near your abdomen to try to wake your baby   The test usually takes about 20 minutes, but can take longer if your baby needs to be awakened  What do I need to know about the test results? Your baby will be expected to move at least twice for a certain amount of time  Your baby's heart rate will be expected to go up by a certain number of beats per minute during movement  If your baby does not move as expected, the test may need to be repeated or you may need other tests  CARE AGREEMENT:   You have the right to help plan your care  Learn about your health condition and how it may be treated  Discuss treatment options with your healthcare providers to decide what care you want to receive  You always have the right to refuse treatment  The above information is an  only  It is not intended as medical advice for individual conditions or treatments  Talk to your doctor, nurse or pharmacist before following any medical regimen to see if it is safe and effective for you  © Copyright [address] Information is for End User's use only and may not be sold, redistributed or otherwise used for commercial purposes   All illustrations and images included in CareNotes® are the copyrighted property of [employer]  or [address] [place] [address]

## 2021-06-15 NOTE — PLAN OF CARE
Problem: PAIN - ADULT  Goal: Verbalizes/displays adequate comfort level or baseline comfort level  Description: Interventions:  - Encourage patient to monitor pain and request assistance  - Assess pain using appropriate pain scale  - Administer analgesics based on type and severity of pain and evaluate response  - Implement non-pharmacological measures as appropriate and evaluate response  - Consider cultural and social influences on pain and pain management  - Notify physician/advanced practitioner if interventions unsuccessful or patient reports new pain  Outcome: Progressing     Problem: INFECTION - ADULT  Goal: Absence or prevention of progression during hospitalization  Description: INTERVENTIONS:  - Assess and monitor for signs and symptoms of infection  - Monitor lab/diagnostic results  - Monitor all insertion sites, i e  indwelling lines, tubes, and drains  - Monitor endotracheal if appropriate and nasal secretions for changes in amount and color  - [place] appropriate cooling/warming therapies per order  - Administer medications as ordered  - Instruct and encourage patient and family to use good hand hygiene technique  - Identify and instruct in appropriate isolation precautions for identified infection/condition  Outcome: Progressing  Goal: Absence of fever/infection during neutropenic period  Description: INTERVENTIONS:  - Monitor WBC    Outcome: Progressing     Problem: Knowledge Deficit  Goal: Patient/family/caregiver demonstrates understanding of disease process, treatment plan, medications, and discharge instructions  Description: Complete learning assessment and assess knowledge base    Interventions:  - Provide teaching at level of understanding  - Provide teaching via preferred learning methods  Outcome: Progressing     Problem: DISCHARGE PLANNING  Goal: Discharge to home or other facility with appropriate resources  Description: INTERVENTIONS:  - Identify barriers to discharge w/patient and caregiver  - Arrange for needed discharge resources and transportation as appropriate  - Identify discharge learning needs (meds, wound care, etc )  - Arrange for interpretive services to assist at discharge as needed  - Refer to Case Management Department for coordinating discharge planning if the patient needs post-hospital services based on physician/advanced practitioner order or complex needs related to functional status, cognitive ability, or social support system  Outcome: Progressing     Problem: ANTEPARTUM  Goal: Maintain pregnancy as long as maternal and/or fetal condition is stable  Description: INTERVENTIONS:  - Maternal surveillance  - Fetal surveillance  - Monitor uterine activity  - Medications as ordered  - Bedrest  Outcome: Progressing

## 2021-06-16 ENCOUNTER — ANESTHESIA (INPATIENT)
Dept: LABOR AND DELIVERY | Facility: HOSPITAL | Age: 26
DRG: 540 | End: 2021-06-16
Payer: COMMERCIAL

## 2021-06-16 PROBLEM — Z98.891 STATUS POST PRIMARY LOW TRANSVERSE CESAREAN SECTION: Status: ACTIVE | Noted: 2021-06-16

## 2021-06-16 LAB
ALBUMIN SERPL BCP-MCNC: 2.6 G/DL (ref 3.5–5)
ALBUMIN SERPL BCP-MCNC: 2.6 G/DL (ref 3.5–5)
ALBUMIN SERPL BCP-MCNC: 2.7 G/DL (ref 3.5–5)
ALP SERPL-CCNC: 120 U/L (ref 46–116)
ALP SERPL-CCNC: 122 U/L (ref 46–116)
ALP SERPL-CCNC: 123 U/L (ref 46–116)
ALT SERPL W P-5'-P-CCNC: 24 U/L (ref 12–78)
ALT SERPL W P-5'-P-CCNC: 34 U/L (ref 12–78)
ALT SERPL W P-5'-P-CCNC: 53 U/L (ref 12–78)
ANION GAP SERPL CALCULATED.3IONS-SCNC: 11 MMOL/L (ref 4–13)
ANION GAP SERPL CALCULATED.3IONS-SCNC: 11 MMOL/L (ref 4–13)
ANION GAP SERPL CALCULATED.3IONS-SCNC: 9 MMOL/L (ref 4–13)
AST SERPL W P-5'-P-CCNC: 17 U/L (ref 5–45)
AST SERPL W P-5'-P-CCNC: 31 U/L (ref 5–45)
AST SERPL W P-5'-P-CCNC: 48 U/L (ref 5–45)
BASE EXCESS BLDCOA CALC-SCNC: -4.4 MMOL/L (ref 3–11)
BASE EXCESS BLDCOV CALC-SCNC: -3.6 MMOL/L (ref 1–9)
BILIRUB SERPL-MCNC: 0.15 MG/DL (ref 0.2–1)
BILIRUB SERPL-MCNC: 0.15 MG/DL (ref 0.2–1)
BILIRUB SERPL-MCNC: 0.22 MG/DL (ref 0.2–1)
BUN SERPL-MCNC: 6 MG/DL (ref 5–25)
BUN SERPL-MCNC: 7 MG/DL (ref 5–25)
BUN SERPL-MCNC: 9 MG/DL (ref 5–25)
CALCIUM ALBUM COR SERPL-MCNC: 9.1 MG/DL (ref 8.3–10.1)
CALCIUM ALBUM COR SERPL-MCNC: 9.4 MG/DL (ref 8.3–10.1)
CALCIUM ALBUM COR SERPL-MCNC: 9.7 MG/DL (ref 8.3–10.1)
CALCIUM SERPL-MCNC: 8 MG/DL (ref 8.3–10.1)
CALCIUM SERPL-MCNC: 8.4 MG/DL (ref 8.3–10.1)
CALCIUM SERPL-MCNC: 8.6 MG/DL (ref 8.3–10.1)
CHLORIDE SERPL-SCNC: 103 MMOL/L (ref 100–108)
CHLORIDE SERPL-SCNC: 104 MMOL/L (ref 100–108)
CHLORIDE SERPL-SCNC: 104 MMOL/L (ref 100–108)
CO2 SERPL-SCNC: 22 MMOL/L (ref 21–32)
CO2 SERPL-SCNC: 22 MMOL/L (ref 21–32)
CO2 SERPL-SCNC: 23 MMOL/L (ref 21–32)
CREAT SERPL-MCNC: 0.59 MG/DL (ref 0.6–1.3)
CREAT SERPL-MCNC: 0.6 MG/DL (ref 0.6–1.3)
CREAT SERPL-MCNC: 0.79 MG/DL (ref 0.6–1.3)
ERYTHROCYTE [DISTWIDTH] IN BLOOD BY AUTOMATED COUNT: 13.3 % (ref 11.6–15.1)
ERYTHROCYTE [DISTWIDTH] IN BLOOD BY AUTOMATED COUNT: 13.5 % (ref 11.6–15.1)
ERYTHROCYTE [DISTWIDTH] IN BLOOD BY AUTOMATED COUNT: 13.6 % (ref 11.6–15.1)
GFR SERPL CREATININE-BSD FRML MDRD: 105 ML/MIN/1.73SQ M
GFR SERPL CREATININE-BSD FRML MDRD: 128 ML/MIN/1.73SQ M
GFR SERPL CREATININE-BSD FRML MDRD: 129 ML/MIN/1.73SQ M
GLUCOSE SERPL-MCNC: 110 MG/DL (ref 65–140)
GLUCOSE SERPL-MCNC: 129 MG/DL (ref 65–140)
GLUCOSE SERPL-MCNC: 79 MG/DL (ref 65–140)
HCO3 BLDCOA-SCNC: 25.5 MMOL/L (ref 17.3–27.3)
HCO3 BLDCOV-SCNC: 25.9 MMOL/L (ref 12.2–28.6)
HCT VFR BLD AUTO: 36.3 % (ref 34.8–46.1)
HCT VFR BLD AUTO: 38 % (ref 34.8–46.1)
HCT VFR BLD AUTO: 40 % (ref 34.8–46.1)
HGB BLD-MCNC: 12.5 G/DL (ref 11.5–15.4)
HGB BLD-MCNC: 13 G/DL (ref 11.5–15.4)
HGB BLD-MCNC: 13.8 G/DL (ref 11.5–15.4)
MAGNESIUM SERPL-MCNC: 3.7 MG/DL (ref 1.6–2.6)
MAGNESIUM SERPL-MCNC: 5.1 MG/DL (ref 1.6–2.6)
MAGNESIUM SERPL-MCNC: 5.7 MG/DL (ref 1.6–2.6)
MCH RBC QN AUTO: 29.7 PG (ref 26.8–34.3)
MCH RBC QN AUTO: 30 PG (ref 26.8–34.3)
MCH RBC QN AUTO: 30.1 PG (ref 26.8–34.3)
MCHC RBC AUTO-ENTMCNC: 34.2 G/DL (ref 31.4–37.4)
MCHC RBC AUTO-ENTMCNC: 34.4 G/DL (ref 31.4–37.4)
MCHC RBC AUTO-ENTMCNC: 34.5 G/DL (ref 31.4–37.4)
MCV RBC AUTO: 87 FL (ref 82–98)
O2 CT VFR BLDCOA CALC: 2.5 ML/DL
OXYHGB MFR BLDCOA: 11.3 %
OXYHGB MFR BLDCOV: 10.9 %
PCO2 BLDCOA: 68.9 MM[HG] (ref 30–60)
PCO2 BLDCOV: 65.8 MM HG (ref 27–43)
PH BLDCOA: 7.19 [PH] (ref 7.23–7.43)
PH BLDCOV: 7.21 [PH] (ref 7.19–7.49)
PLATELET # BLD AUTO: 258 THOUSANDS/UL (ref 149–390)
PLATELET # BLD AUTO: 273 THOUSANDS/UL (ref 149–390)
PLATELET # BLD AUTO: 273 THOUSANDS/UL (ref 149–390)
PMV BLD AUTO: 10.4 FL (ref 8.9–12.7)
PMV BLD AUTO: 10.6 FL (ref 8.9–12.7)
PMV BLD AUTO: 10.7 FL (ref 8.9–12.7)
PO2 BLDCOA: 12.7 MM HG (ref 5–25)
PO2 BLDCOV: 10.7 MM HG (ref 15–45)
POTASSIUM SERPL-SCNC: 4.1 MMOL/L (ref 3.5–5.3)
POTASSIUM SERPL-SCNC: 4.1 MMOL/L (ref 3.5–5.3)
POTASSIUM SERPL-SCNC: 4.3 MMOL/L (ref 3.5–5.3)
PROT SERPL-MCNC: 6.6 G/DL (ref 6.4–8.2)
PROT SERPL-MCNC: 6.9 G/DL (ref 6.4–8.2)
PROT SERPL-MCNC: 6.9 G/DL (ref 6.4–8.2)
RBC # BLD AUTO: 4.16 MILLION/UL (ref 3.81–5.12)
RBC # BLD AUTO: 4.38 MILLION/UL (ref 3.81–5.12)
RBC # BLD AUTO: 4.59 MILLION/UL (ref 3.81–5.12)
SAO2 % BLDCOV: 2.5 ML/DL
SODIUM SERPL-SCNC: 135 MMOL/L (ref 136–145)
SODIUM SERPL-SCNC: 137 MMOL/L (ref 136–145)
SODIUM SERPL-SCNC: 137 MMOL/L (ref 136–145)
URATE SERPL-MCNC: 6.3 MG/DL (ref 2–6.8)
URATE SERPL-MCNC: 6.3 MG/DL (ref 2–6.8)
URATE SERPL-MCNC: 6.5 MG/DL (ref 2–6.8)
WBC # BLD AUTO: 10.79 THOUSAND/UL (ref 4.31–10.16)
WBC # BLD AUTO: 11.27 THOUSAND/UL (ref 4.31–10.16)
WBC # BLD AUTO: 11.86 THOUSAND/UL (ref 4.31–10.16)

## 2021-06-16 PROCEDURE — 83735 ASSAY OF MAGNESIUM: CPT | Performed by: OBSTETRICS & GYNECOLOGY

## 2021-06-16 PROCEDURE — 59025 FETAL NON-STRESS TEST: CPT | Performed by: OBSTETRICS & GYNECOLOGY

## 2021-06-16 PROCEDURE — 99232 SBSQ HOSP IP/OBS MODERATE 35: CPT | Performed by: OBSTETRICS & GYNECOLOGY

## 2021-06-16 PROCEDURE — 84550 ASSAY OF BLOOD/URIC ACID: CPT | Performed by: OBSTETRICS & GYNECOLOGY

## 2021-06-16 PROCEDURE — 88307 TISSUE EXAM BY PATHOLOGIST: CPT | Performed by: PATHOLOGY

## 2021-06-16 PROCEDURE — 85027 COMPLETE CBC AUTOMATED: CPT | Performed by: OBSTETRICS & GYNECOLOGY

## 2021-06-16 PROCEDURE — 80053 COMPREHEN METABOLIC PANEL: CPT | Performed by: OBSTETRICS & GYNECOLOGY

## 2021-06-16 PROCEDURE — 82805 BLOOD GASES W/O2 SATURATION: CPT | Performed by: OBSTETRICS & GYNECOLOGY

## 2021-06-16 PROCEDURE — NC001 PR NO CHARGE: Performed by: OBSTETRICS & GYNECOLOGY

## 2021-06-16 PROCEDURE — 99254 IP/OBS CNSLTJ NEW/EST MOD 60: CPT | Performed by: PSYCHIATRY & NEUROLOGY

## 2021-06-16 RX ORDER — METHYLPREDNISOLONE SODIUM SUCCINATE 40 MG/ML
40 INJECTION, POWDER, LYOPHILIZED, FOR SOLUTION INTRAMUSCULAR; INTRAVENOUS ONCE
Status: COMPLETED | OUTPATIENT
Start: 2021-06-16 | End: 2021-06-16

## 2021-06-16 RX ORDER — NALBUPHINE HCL 10 MG/ML
3 AMPUL (ML) INJECTION
Status: DISCONTINUED | OUTPATIENT
Start: 2021-06-16 | End: 2021-06-19 | Stop reason: HOSPADM

## 2021-06-16 RX ORDER — ONDANSETRON 2 MG/ML
INJECTION INTRAMUSCULAR; INTRAVENOUS AS NEEDED
Status: DISCONTINUED | OUTPATIENT
Start: 2021-06-16 | End: 2021-06-16

## 2021-06-16 RX ORDER — FENTANYL CITRATE 50 UG/ML
INJECTION, SOLUTION INTRAMUSCULAR; INTRAVENOUS AS NEEDED
Status: DISCONTINUED | OUTPATIENT
Start: 2021-06-16 | End: 2021-06-16

## 2021-06-16 RX ORDER — CEFAZOLIN SODIUM 2 G/50ML
2000 SOLUTION INTRAVENOUS ONCE
Status: COMPLETED | OUTPATIENT
Start: 2021-06-16 | End: 2021-06-16

## 2021-06-16 RX ORDER — KETOROLAC TROMETHAMINE 30 MG/ML
30 INJECTION, SOLUTION INTRAMUSCULAR; INTRAVENOUS ONCE
Status: COMPLETED | OUTPATIENT
Start: 2021-06-16 | End: 2021-06-16

## 2021-06-16 RX ORDER — BUTALBITAL, ACETAMINOPHEN AND CAFFEINE 50; 325; 40 MG/1; MG/1; MG/1
1 TABLET ORAL EVERY 4 HOURS PRN
Status: DISCONTINUED | OUTPATIENT
Start: 2021-06-16 | End: 2021-06-19 | Stop reason: HOSPADM

## 2021-06-16 RX ORDER — LOPERAMIDE HYDROCHLORIDE 2 MG/1
4 CAPSULE ORAL 4 TIMES DAILY PRN
Status: DISCONTINUED | OUTPATIENT
Start: 2021-06-16 | End: 2021-06-19 | Stop reason: HOSPADM

## 2021-06-16 RX ORDER — MORPHINE SULFATE 0.5 MG/ML
INJECTION, SOLUTION EPIDURAL; INTRATHECAL; INTRAVENOUS AS NEEDED
Status: DISCONTINUED | OUTPATIENT
Start: 2021-06-16 | End: 2021-06-16

## 2021-06-16 RX ORDER — OXYTOCIN/RINGER'S LACTATE 30/500 ML
62.5 PLASTIC BAG, INJECTION (ML) INTRAVENOUS ONCE
Status: COMPLETED | OUTPATIENT
Start: 2021-06-16 | End: 2021-06-17

## 2021-06-16 RX ORDER — HYDROMORPHONE HCL/PF 1 MG/ML
0.5 SYRINGE (ML) INJECTION EVERY 2 HOUR PRN
Status: DISCONTINUED | OUTPATIENT
Start: 2021-06-16 | End: 2021-06-19 | Stop reason: HOSPADM

## 2021-06-16 RX ORDER — HYDROXYZINE HYDROCHLORIDE 25 MG/1
25 TABLET, FILM COATED ORAL EVERY 6 HOURS PRN
Status: DISCONTINUED | OUTPATIENT
Start: 2021-06-16 | End: 2021-06-19 | Stop reason: HOSPADM

## 2021-06-16 RX ORDER — DIPHENHYDRAMINE HYDROCHLORIDE 50 MG/ML
INJECTION INTRAMUSCULAR; INTRAVENOUS AS NEEDED
Status: DISCONTINUED | OUTPATIENT
Start: 2021-06-16 | End: 2021-06-16

## 2021-06-16 RX ORDER — DEXAMETHASONE SODIUM PHOSPHATE 10 MG/ML
8 INJECTION, SOLUTION INTRAMUSCULAR; INTRAVENOUS ONCE AS NEEDED
Status: ACTIVE | OUTPATIENT
Start: 2021-06-16 | End: 2021-06-17

## 2021-06-16 RX ORDER — METOCLOPRAMIDE HYDROCHLORIDE 5 MG/ML
10 INJECTION INTRAMUSCULAR; INTRAVENOUS ONCE
Status: COMPLETED | OUTPATIENT
Start: 2021-06-16 | End: 2021-06-16

## 2021-06-16 RX ORDER — KETOROLAC TROMETHAMINE 30 MG/ML
15 INJECTION, SOLUTION INTRAMUSCULAR; INTRAVENOUS EVERY 6 HOURS
Status: DISCONTINUED | OUTPATIENT
Start: 2021-06-16 | End: 2021-06-17 | Stop reason: SDUPTHER

## 2021-06-16 RX ORDER — OXYTOCIN/RINGER'S LACTATE 30/500 ML
PLASTIC BAG, INJECTION (ML) INTRAVENOUS CONTINUOUS PRN
Status: DISCONTINUED | OUTPATIENT
Start: 2021-06-16 | End: 2021-06-16

## 2021-06-16 RX ORDER — METOCLOPRAMIDE HYDROCHLORIDE 5 MG/ML
5 INJECTION INTRAMUSCULAR; INTRAVENOUS EVERY 6 HOURS PRN
Status: ACTIVE | OUTPATIENT
Start: 2021-06-16 | End: 2021-06-17

## 2021-06-16 RX ORDER — CARBOPROST TROMETHAMINE 250 UG/ML
INJECTION, SOLUTION INTRAMUSCULAR AS NEEDED
Status: DISCONTINUED | OUTPATIENT
Start: 2021-06-16 | End: 2021-06-16

## 2021-06-16 RX ORDER — NALOXONE HYDROCHLORIDE 0.4 MG/ML
0.1 INJECTION, SOLUTION INTRAMUSCULAR; INTRAVENOUS; SUBCUTANEOUS
Status: ACTIVE | OUTPATIENT
Start: 2021-06-16 | End: 2021-06-17

## 2021-06-16 RX ORDER — BUPIVACAINE HYDROCHLORIDE 7.5 MG/ML
INJECTION, SOLUTION INTRASPINAL AS NEEDED
Status: DISCONTINUED | OUTPATIENT
Start: 2021-06-16 | End: 2021-06-16

## 2021-06-16 RX ORDER — DIPHENHYDRAMINE HYDROCHLORIDE 50 MG/ML
25 INJECTION INTRAMUSCULAR; INTRAVENOUS EVERY 6 HOURS PRN
Status: DISPENSED | OUTPATIENT
Start: 2021-06-16 | End: 2021-06-17

## 2021-06-16 RX ORDER — KETOROLAC TROMETHAMINE 30 MG/ML
INJECTION, SOLUTION INTRAMUSCULAR; INTRAVENOUS AS NEEDED
Status: DISCONTINUED | OUTPATIENT
Start: 2021-06-16 | End: 2021-06-16

## 2021-06-16 RX ORDER — ONDANSETRON 2 MG/ML
4 INJECTION INTRAMUSCULAR; INTRAVENOUS EVERY 4 HOURS PRN
Status: ACTIVE | OUTPATIENT
Start: 2021-06-16 | End: 2021-06-17

## 2021-06-16 RX ADMIN — METOCLOPRAMIDE HYDROCHLORIDE 10 MG: 5 INJECTION INTRAMUSCULAR; INTRAVENOUS at 14:57

## 2021-06-16 RX ADMIN — PHENYLEPHRINE HYDROCHLORIDE 50 MCG/MIN: 10 INJECTION INTRAVENOUS at 21:46

## 2021-06-16 RX ADMIN — BUPIVACAINE HYDROCHLORIDE IN DEXTROSE 1.6 ML: 7.5 INJECTION, SOLUTION SUBARACHNOID at 21:44

## 2021-06-16 RX ADMIN — MORPHINE SULFATE 0.15 MG: 0.5 INJECTION, SOLUTION EPIDURAL; INTRATHECAL; INTRAVENOUS at 21:44

## 2021-06-16 RX ADMIN — ONDANSETRON 4 MG: 2 INJECTION INTRAMUSCULAR; INTRAVENOUS at 22:13

## 2021-06-16 RX ADMIN — MAGNESIUM SULFATE HEPTAHYDRATE 2 G/HR: 40 INJECTION, SOLUTION INTRAVENOUS at 16:17

## 2021-06-16 RX ADMIN — ACETAMINOPHEN 650 MG: 325 TABLET, FILM COATED ORAL at 05:58

## 2021-06-16 RX ADMIN — KETOROLAC TROMETHAMINE 30 MG: 30 INJECTION, SOLUTION INTRAMUSCULAR at 10:48

## 2021-06-16 RX ADMIN — DIPHENHYDRAMINE HYDROCHLORIDE 25 MG: 50 INJECTION, SOLUTION INTRAMUSCULAR; INTRAVENOUS at 22:27

## 2021-06-16 RX ADMIN — Medication 62.5 MILLI-UNITS/MIN: at 23:50

## 2021-06-16 RX ADMIN — METHYLPREDNISOLONE SODIUM SUCCINATE 40 MG: 40 INJECTION, POWDER, FOR SOLUTION INTRAMUSCULAR; INTRAVENOUS at 14:51

## 2021-06-16 RX ADMIN — BUTALBITAL, ACETAMINOPHEN AND CAFFEINE 1 TABLET: 50; 325; 40 TABLET ORAL at 16:17

## 2021-06-16 RX ADMIN — NIFEDIPINE 30 MG: 30 TABLET, FILM COATED, EXTENDED RELEASE ORAL at 10:48

## 2021-06-16 RX ADMIN — Medication 1000 MILLI-UNITS/MIN: at 22:09

## 2021-06-16 RX ADMIN — CEFAZOLIN SODIUM 2000 MG: 2 SOLUTION INTRAVENOUS at 21:45

## 2021-06-16 RX ADMIN — LOPERAMIDE HYDROCHLORIDE 2 MG: 2 CAPSULE ORAL at 23:38

## 2021-06-16 RX ADMIN — CARBOPROST TROMETHAMINE 250 MCG: 250 INJECTION, SOLUTION INTRAMUSCULAR at 22:16

## 2021-06-16 RX ADMIN — MAGNESIUM SULFATE HEPTAHYDRATE 2 G/HR: 40 INJECTION, SOLUTION INTRAVENOUS at 05:56

## 2021-06-16 RX ADMIN — BETAMETHASONE SODIUM PHOSPHATE AND BETAMETHASONE ACETATE 12 MG: 3; 3 INJECTION, SUSPENSION INTRA-ARTICULAR; INTRALESIONAL; INTRAMUSCULAR at 16:17

## 2021-06-16 RX ADMIN — KETOROLAC TROMETHAMINE 30 MG: 30 INJECTION, SOLUTION INTRAMUSCULAR at 22:47

## 2021-06-16 RX ADMIN — FENTANYL CITRATE 15 MCG: 50 INJECTION, SOLUTION INTRAMUSCULAR; INTRAVENOUS at 21:44

## 2021-06-16 RX ADMIN — NALBUPHINE HYDROCHLORIDE 3 MG: 10 INJECTION, SOLUTION INTRAMUSCULAR; INTRAVENOUS; SUBCUTANEOUS at 23:17

## 2021-06-16 NOTE — CONSULTS
[facility] MRN: [id number]  Unit/Bed#: LD TRIAGE 3 Encounter: [id number]    History of Present Illness     Consults  HPI: Janice Sagastume is a 26y o  year old  female at 32w0d with an AZALEA of 2021, by Last Menstrual Period who is admitted for Observation for today's s with  abnormal Dopplers absent end-diastolic flow with intermittent reversal, fetal growth at less than the third percentile  Her past medical history is also significant for Chiari malformation and idiopathic intracranial hypertension  She has the following prenatal labs: A positive, HepBsAg neg, HIV neg, RPR neg, GBS unknown  Pregnancy complications: Chiari Type I, h/o pre eclampsia,     Fetal Complications: IUGR, abnormal doppler on scan    Maternal medical history and medications:    Chiari malformation type I ([facility] [address] )      COVID-19 2020    Hyperlipidemia       resolved 17    Hypertension       pre-eclampsia 2016    Idiopathic intracranial hypertension        Maternal  medications: received a dose of betamethasone and started on magnesium sulfate    Historical Information   OB History:   # 1 - Date: 16, Sex: Female, Weight: 1745 g (3 lb 13 6 oz), GA: 35w3d, Delivery: Vaginal, Spontaneous, Apgar1: 9, Apgar5: 9, Living: Living, Birth Comments: None    # 2 - Date: 2020, Sex: None, Weight: None, GA: 8w0d, Delivery: Spontaneous , Apgar1: None, Apgar5: None, Living: None, Birth Comments: None    # 3 - Date: None, Sex: None, Weight: None, GA: None, Delivery: None, Apgar1: None, Apgar5: None, Living: None, Birth Comments: None        No Known Allergies    Lab Results: I have personally reviewed pertinent reports  Imaging Studies: I have personally reviewed pertinent reports           Counseling / Coordination of Care    I met with Kenney Arita in Triage room ( FOADELFO was present during this meeting) and discussed the reason for NICU consult and complications related to  birth at 34 weeks GA, if infant is born at this gestation along with h/o IUGR and abnormal doppler scan  I explained that the NICU team with RT will be present for delivery if infant is delivered prior to 35 weeks or has any other concern OB may have related to fetus  I explained that the team will assess the infant after birth, keep infant warm under the warmer for thermoregulation,will start respiratory support, if needed  Infant may or may not need cpap and surfactant administration after birth  Then the infant will be admitted to nicu for further care and treatment  There infant will be in an incubator for thermoregulation, and on respiratory support as needed  I discussed about feeding difficulties at this gestation so need for IV placement and IV fluid/TPN for few days after birth depending on gestation/weight and feeding tolerance, and gavage feeding for few weeks  Infant may need an UVC placement for TPN  We also discussed the other risks or prematurity: hypothermia, hypoglycemia,jaundice and need for phototherapy  Risk of apnea, anemia and infection were also discussed  Then explained about the IVH, ROP, developmental delay in infant born at 34 weeks  We discussed the importance of feeding mother's breast milk and encouraged her to pump her breast milk after delivery  She is aware that we have donor milk in the NICU, which will be fed to the infant after her consent only  Visiting policy was discussed that parents can visit infant any day/time and we encourage their presence for rounds and for skin to skin with the baby  I asked her to call us/NICU if any further question  All her questions were answered  Assessment/Plan   Principal Problem:    31 weeks gestation of pregnancy    Intrauterine growth restriction     Plan:  - Complete the  steroid tomorrow  - Magnesium and GBS management per OB   - Fetal u/s and growth scan per MFM/OB    - Please call NICU as needed or at delivery

## 2021-06-16 NOTE — QUICK NOTE
[facility] complains of headache  ROS is negative for epigastric pain, visual changes, vaginal bleeding, or regular CTX  Positive for fetal movement  She is a 27 YO P1 (SAVD at 28 w) @ 31w 1d here for evaluation of preeclampsia, FGR (2 LB 11 OZ) with abnormal dopplers, VTX presentation,  Idiopathic intracranial hypertension and Chiari malformation type1  Given Betamethasone and Magnesium Sulfate  We will continue to follow with MFM

## 2021-06-16 NOTE — PROGRESS NOTES
Progress Note - Maternal Fetal Medicine   [facility] 26 y o  female MRN: [id number]  Unit/Bed#: -01 Encounter: [id number]    Assessment:  26 y o  [id number] at 31w1d admitted with severe fetal growth restriction (<3%) with abnormal umbilical doppler studies and newly diagnosed preeclampsia with severe features on admission  Hospital day 1    Plan:  1  Fetal growth restriction, <5%    - Umbilical artery dopplers with persistently absent end-diastolic flow with intermittent reversal    - FHT overnight showed moderate variability but few accelerations    - s/p NICU consultation   - BTM 12mg x2     2  Preeclampsia, likely severe    - Newly diagnosed preeclampsia based on blood pressure and urine protein:creatinine ratio yesterday   - Procardia 30mg XL was started and BP overnight after medication was 120-138/73-93   - Magnesium 6gram LD followed by 2 gram infusion, morning Mag level was 3 7, will continue to trend    - Magnesium to stay on until resolution of patient's headache   - New headache this morning, Tylenol given, will treat aggressively and consider as symptom of preeclampsia if unresolved     3  History of Idiopathic intracranial hypertension and Chiari malformation               - If patient develops headaches, will likely need a neurology consult    - patient states that she has not had symptoms of either condition for many years, however per chart review the patient was admitted in January of this year for an intractable headache requiring a migraine cocktail  Headache was refractory to the cocktail and she was ultimately discharged home with reglan and recommendation for outpatient neurology follow up  LP was done during that admission which showed a normal opening pressure  LP did not improve or worsen symptoms  The patient was last seen by Neurology in 2019  At that visit, Acetazolamide 500mg bid was prescribed as preventative therapy   At that time, plan was to trial Indomethacin 50 mg and Dexamethasone 2mg for 5 days as well as Cyclobenzaprine 5-10mg as abortive therapy  4  FEN: Regular diet    5  DVT ppx: SCDs     Subjective:     Patient is complaining of a painful frontal headache this morning  She denies visual changes and epigastric pain  Objective:     Vitals:   Temp:  [98 °F (36 7 °C)-99 2 °F (37 3 °C)] 98 °F (36 7 °C)  HR:  [49-96] 83  Resp:  [18-20] 18  BP: (120-178)/() 138/89       Physical Exam  Constitutional:       General: She is not in acute distress  Appearance: Normal appearance  She is well-developed  She is obese  She is not ill-appearing, toxic-appearing or diaphoretic  HENT:      Head: Normocephalic and atraumatic  Cardiovascular:      Rate and Rhythm: Normal rate and regular rhythm  Heart sounds: Normal heart sounds  No murmur heard  No gallop  Pulmonary:      Effort: Pulmonary effort is normal  No respiratory distress  Breath sounds: Normal breath sounds  No stridor  No wheezing or rales  Abdominal:      General: There is no distension  Palpations: Abdomen is soft  There is no mass  Tenderness: There is no abdominal tenderness  There is no guarding  Comments: gravid   Musculoskeletal:         General: No tenderness or deformity  Cervical back: Normal range of motion  Skin:     General: Skin is warm and dry  Neurological:      General: No focal deficit present  Mental Status: She is alert and oriented to person, place, and time  Psychiatric:         Mood and Affect: Mood normal          Behavior: Behavior normal          Thought Content:  Thought content normal          Judgment: Judgment normal            Fetal Assessment:  FHT: 125 / Moderate 6 - 25 bpm / no accelerations, variable decelerations  [place]: none      Lab Results   Component Value Date    WBC 10 79 (H) 06/16/2021    HGB 13 0 06/16/2021    HCT 38 0 06/16/2021    MCV 87 06/16/2021     06/16/2021       Lab Results   Component Value Date GLUCOSE 83 10/21/2015    CALCIUM 8 6 06/16/2021     (L) 10/21/2015    K 4 3 06/16/2021    CO2 22 06/16/2021     06/16/2021    BUN 7 06/16/2021    CREATININE 0 59 (L) 06/16/2021       Lab Results   Component Value Date    ALT 24 06/16/2021    AST 17 06/16/2021    ALKPHOS 120 (H) 06/16/2021    BILITOT 0 70 10/21/2015       Rebecca Pedraza MD  OBGYN, PGY-3  6/16/2021  5:47 AM

## 2021-06-16 NOTE — PROGRESS NOTES
I went to assess the patient to reassess her headache  The patient states that she continues to have a severe headache, rating it at 9/10  The patient has received:    6:00 Tylenol  10:40 Toradol  14:51 Solumedrol/Reglan   16:17 Fiorocet     Neurology has seen the patient and feels the headache is consistent with her history of migraines  However, the patient feels that this headaches is more intense and distinct from her usual migraines  Given her new onset elevated blood pressure, worsening umbilical artery Dopplers, new onset proteinuria and now severe headache, there is concern for fast progressing preeclampsia with severe features  Will plan to make the patient NPO in anticipation of possible need for  section  Will discuss IOL versus  section with the patient  Vitals:    21 1600   BP: 113/65   Pulse: 60   Resp: 18   Temp: 98 5 °F (36 9 °C)   SpO2: 98%         Management discussed with Dr Adrian Aguilera, Dr Uri Adkins and Dr Andrew Garg

## 2021-06-16 NOTE — PROGRESS NOTES
We discussed the risks and benefits of vaginal delivery vs   section and immediate vs  delayed delivery  We reviewed that with increasing lab abnormalities and persistent headache, there is concern that her preeclampsia is worsening  We reviewed the risks of continued changes in laboratory values and possibility of seizure despite being on magnesium for seizure prophylaxis  We discussed that for these reasons, immediate delivery may be warranted  We reviewed that immediate delivery would be accompolished by  section  We also reviewed that there is a risk to baby by delivering immediately rather than waiting until tomorrow  She just received her second dose of steroids this afternoon around 4pm and therefore baby has not had full steroid benefit  We could delay delivery for fetal benefit  The options then were presented as follows: IOL for possible vaginal delivery vs  Scheduled C/S tomorrow  We reviewed that IOL may be successful as she has had a vaginal delivery in the past  She is 1/T/H  We reviewed that IOL does take time and could therefore benefit baby by allowing more time for steroids but it is also a possibility that baby may not tolerate the induction process or she may worsen acutely or she may not progress in labor and still require a delayed  section  She was given the option for delayed scheduled  section  After reviewing the various options and scenarios, patient elected to defer ultimate decision to the physician team  The case was discussed with Debra Rothman, and Best Clark who agreed to proceed with  section at this time due to concern for maternal wellbeing and worsening preeclampsia  She was counseled regarding classical vs  Low transverse  section  We reviewed the steps of the procedure, recovery, and surgical risks  Informed consent was obtained  Will notify nursing staff, anesthesia, OR, and NICU  Clau Knight MD  OB/GYN  6/16/2021  7:36 PM

## 2021-06-16 NOTE — PLAN OF CARE
Problem: INFECTION - ADULT  Goal: Absence or prevention of progression during hospitalization  Description: INTERVENTIONS:  - Assess and monitor for signs and symptoms of infection  - Monitor lab/diagnostic results  - Monitor all insertion sites, i e  indwelling lines, tubes, and drains  - Monitor endotracheal if appropriate and nasal secretions for changes in amount and color  - [place] appropriate cooling/warming therapies per order  - Administer medications as ordered  - Instruct and encourage patient and family to use good hand hygiene technique  - Identify and instruct in appropriate isolation precautions for identified infection/condition  6/16/2021 1936 by Brianda Cantrell RN  Outcome: Progressing  6/16/2021 1705 by Brianda Cantrell RN  Outcome: Progressing  Goal: Absence of fever/infection during neutropenic period  Description: INTERVENTIONS:  - Monitor WBC    6/16/2021 1936 by Brianda Cantrell RN  Outcome: Progressing  6/16/2021 1705 by Brianda Cantrell RN  Outcome: Progressing     Problem: SAFETY ADULT  Goal: Patient will remain free of falls  Description: INTERVENTIONS:  - Educate patient/family on patient safety including physical limitations  - Instruct patient to call for assistance with activity   - Consult OT/PT to assist with strengthening/mobility   - Keep Call bell within reach  - Keep bed low and locked with side rails adjusted as appropriate  - Keep care items and personal belongings within reach  - Initiate and maintain comfort rounds  - Make Fall Risk Sign visible to staff  - Offer Toileting   - Initiate/Maintain   - Obtain necessary fall risk management equipment:   - Apply yellow socks and bracelet for high fall risk patients  - Consider moving patient to room near nurses station  6/16/2021 1936 by Brianda Cantrell RN  Outcome: Progressing  6/16/2021 1705 by Brianda Cantrell RN  Outcome: Progressing  Goal: Maintain or return to baseline ADL function  Description: INTERVENTIONS:  -  Assess patient's ability to carry out ADLs; assess patient's baseline for ADL function and identify physical deficits which impact ability to perform ADLs (bathing, care of mouth/teeth, toileting, grooming, dressing, etc )  - Assess/evaluate cause of self-care deficits   - Assess range of motion  - Assess patient's mobility; develop plan if impaired  - Assess patient's need for assistive devices and provide as appropriate  - Encourage maximum independence but intervene and supervise when necessary  - Involve family in performance of ADLs  - Assess for home care needs following discharge   - Consider OT consult to assist with ADL evaluation and planning for discharge  - Provide patient education as appropriate  6/16/2021 1936 by Paresh Winston RN  Outcome: Progressing  6/16/2021 1705 by Paresh Winston RN  Outcome: Progressing  Goal: Maintains/Returns to pre admission functional level  Description: INTERVENTIONS:  - Perform BMAT or MOVE assessment daily    - Set and communicate daily mobility goal to care team and patient/family/caregiver  - Collaborate with rehabilitation services on mobility goals if consulted  - Perform Range of Motion   - Reposition patient  - Dangle patient  - Stand patient   - Ambulate patient  - Out of bed to chair   -Out of bed for meals   - Record patient progress and toleration of activity level   6/16/2021 1936 by Paresh Winston RN  Outcome: Progressing  6/16/2021 1705 by Paresh Winston RN  Outcome: Progressing     Problem: Knowledge Deficit  Goal: Patient/family/caregiver demonstrates understanding of disease process, treatment plan, medications, and discharge instructions  Description: Complete learning assessment and assess knowledge base    Interventions:  - Provide teaching at level of understanding  - Provide teaching via preferred learning methods  6/16/2021 1936 by Paresh Winston RN  Outcome: Progressing  6/16/2021 1705 by Rae Ochoa MARKY Patino  Outcome: Progressing     Problem: DISCHARGE PLANNING  Goal: Discharge to home or other facility with appropriate resources  Description: INTERVENTIONS:  - Identify barriers to discharge w/patient and caregiver  - Arrange for needed discharge resources and transportation as appropriate  - Identify discharge learning needs (meds, wound care, etc )  - Arrange for interpretive services to assist at discharge as needed  - Refer to Case Management Department for coordinating discharge planning if the patient needs post-hospital services based on physician/advanced practitioner order or complex needs related to functional status, cognitive ability, or social support system  6/16/2021 1936 by Stone Salgado RN  Outcome: Progressing  6/16/2021 1705 by Stone Salgado RN  Outcome: Progressing     Problem: ANTEPARTUM  Goal: Maintain pregnancy as long as maternal and/or fetal condition is stable  Description: INTERVENTIONS:  - Maternal surveillance  - Fetal surveillance  - Monitor uterine activity  - Medications as ordered  - Bedrest  6/16/2021 1936 by Stone Salgado RN  Outcome: Progressing  6/16/2021 1705 by Stone Salgado RN  Outcome: Progressing

## 2021-06-16 NOTE — ASSESSMENT & PLAN NOTE
This patient had MRI imaging originally done in October of 2017 at [facility]   I do not have the films available but her report references no cerebellar tonsillar herniation

## 2021-06-16 NOTE — CONSULTS
[address] 1995, 26 y o  female   MRN: [id number] Unit/Bed#: -01 Encounter: [id number]    Inpatient consult to Neurology  Consult performed by: ROXANNE Iniguez  Consult ordered by: Ashely Padilla MD      Reason for Consult / Principal Problem:  Intractable headache in pregnancy  Hx and PE limited by:  None  Review of previous medical records was completed, as this patient has been seen by our practice previously  [facility] (idiopathic intracranial hypertension)  Assessment & Plan  This right-handed 52-year-old woman with a history of long time headaches originally had a spinal tap for a question of blurred vision and severe headaches in October of 2017  MRI at that time, report only available to this examiner, indicates likely optic nerve pressure and disc protrusion  Based on this this patient had a spinal tap and was noted to have an opening pressure of 55  She had 12 mL of fluid drained with a closing pressure of 22  Reportedly she had relief of her headache and improvement of her vision  She apparently had poor neurologic care, if any subsequent to that until 2019 when she saw a provider within our group  She was counseled that she should be compliant with a Diamox regime of a medication given that the patient reported that she was having headaches again  She was seen again or last actually in November of 2019 where she had complaints of headache again without visual disturbance  Again she was started or counseled for both a maintenance therapy including Diamox as well as abortive therapies  She had no further neurologic follow through  Neurology is now asked to see this [id number] woman for complaints of intractable headache which started yesterday as the reason for presentation    Speaking with the patient, her , as well as clinical exam indicates that her headaches right now are consistent with those of her regular every day headaches that she gets at random  There are no features of IIH headache or more concerning any features of a preeclamptic headache  At this time we feel that she can be treated with increased steroids and meds to relieve her current headache within the bound of maternal fetal medicine  We have discussed this with maternal fetal medicine who will order her meds     I expect that her headaches will likely continue for another day or so even with meds  Should they persist greater than 24 hour she may need repeat spinal tap again to assess for IIH  We will continue to follow with you  Questionable Chiari malformation type I [facility])  Assessment & Plan  This patient had MRI imaging originally done in October of 2017 at [facility]   I do not have the films available but her report references no cerebellar tonsillar herniation  History of 2019 novel coronavirus disease (COVID-19)  Assessment & Plan  This patient reports no change in her headaches post COVID infection  * 31 weeks gestation of pregnancy  Assessment & Plan  Referred to Ob and maternal fetal medicine notes  This patient has previously been seen in November of 2019, by Dr Helaine Homans in our [facility] office  She should follow up with us either there or in any of our offices here in the [facility]  She can be seen by any member of our headache team         HPI: Sebas Michael is a right handed  26 y o  [id number]  female who is at 31w0d with an AZALEA of 8/17/2021, who neurology is asked to see given this patient's pre morbid history of idiopathic intracranial hypertension  She has been seen as an outpatient by Dr Helaine Homans for her complaints of headache originally in 2019  We were able to determine from a review of her records at that time that she had been diagnosed with IIH after spinal tap in 2017 demonstrated an opening pressure of 55 at the time that she had severe headaches and blurry vision    She was reduced to 22 and closed  She immediately reported relief in her headache  She was apparently started on Diamox at that time was to have followed up in the office  Apparently she was lost to follow-up or had difficulty attending appointments and she was seen again as an outpatient in 2019  On her initial visit she was prescribed a titration dose of Diamox with the goal of 500 b i d  As well as headache abortive medications  she was subsequently seen in November of 2019   She has not been seen since November of 2019  She has now recently been followed by the OBGYN service for her pregnancy  She is now admitted it to [facility]  for monitoring out of concern for her being preeclamptic  Her complaints of headache have generated a neurology consultation to try to determine whether not her headache is related to her previous baseline headaches, possible hypertensive headache, or whether this may be related to preeclampsia  This patient also reports a history when queried of having headaches for some time  She reports that her headaches are sometimes triggered by light and they are made worse when she is unable to moved to a dark region  She reports she has not had any headache for quite some time possibly since January  At that time she had a spinal tap done then which she apparently had a normal pressure with no fluid withdrawal needed  ROS: 12 system cued query:  She reports she has headache right now similar to her previous headaches  She reports that she has pain in her frontal region the forehead the temple region around her eyes  She denies any blurred vision or visual disturbance she reports her eyes are heavy and tired and she reports a fair sensitive to the light  She also reports some posterior aspect pain but no paracervical or trapezius or scapular ridge pain  No pain on the crown  She denies any numbness or tingling    She has no other neurologic symptoms including dysphagia or aphasia no dysesthesias or weaknesses  She has no chest pain shortness of breath or palpitations  No nausea or vomiting although she does not feel very hungry right now the time of her headache  She reports her pain at about an 8 or 9  Again she reports that this headache is similar to her priors  And she has had no headache in between January and now  She reports she generally only uses Tylenol often times without relief  Historical Information     Past Medical History:   Diagnosis Date    Chiari malformation type I ([address] )     Chronic fatigue     last assessed 9/26/16    COVID-19 07/25/2020    Fever and chills 7/25/2020    Hyperlipidemia     resolved 9/29/17    Hypertension     pre-eclampsia 2016    Idiopathic intracranial hypertension     Varicella     pt unsure    Visual impairment     glasses     Past Surgical History:   Procedure Laterality Date    CHOLECYSTECTOMY LAPAROSCOPIC N/A 5/11/2016    Procedure: CHOLECYSTECTOMY LAPAROSCOPIC possible open;  Surgeon: Antoine Nunes MD;  Location: [facility];  Service:    [facility] REDUCTION MAMMAPLASTY         Social History :  She is a nonsmoker no alcohol no recreational is  Family History:   Family History   Problem Relation Age of Onset    Leukemia Maternal Grandfather     No Known Problems Mother     Other Father         MVA    No Known Problems Sister     Hypertension Maternal Grandmother     No Known Problems Daughter     No Known Problems Sister          No Known Allergies  Meds:The patient has been using a prenatal vitamin as well as Tylenol only for her headaches      Scheduled Meds:  Medication Dose Route Frequency   acetaminophen  650 mg Oral Q4H PRN   betamethasone acetate-betamethasone sodium phosphate  12 mg Intramuscular Q24H   bisacodyl  5 mg Oral Daily PRN   lactated ringers  25 mL/hr Intravenous Continuous   magnesium sulfate  2 g/hr Intravenous Continuous   methylPREDNISolone sodium succinate  40 mg Intravenous Once   metoclopramide  10 mg Intravenous Once   NIFEdipine  30 mg Oral Daily   ondansetron  4 mg Intravenous Q8H PRN     PRN Meds:   acetaminophen    bisacodyl    butalbital-acetaminophen-caffeine    ondansetron      Physical Exam:   Objective   Vitals:Blood pressure 113/65, pulse 60, temperature 98 5 °F (36 9 °C), resp  rate 18, height 5' 8" (1 727 m), weight 97 1 kg (214 lb), last menstrual period 11/10/2020, SpO2 98 %, not currently breastfeeding  ,Body mass index is 32 54 kg/m²  Patient was examined in bed she is currently on a fetal monitor as well  Her male partner and her mother were in the room  Her mother was non-English speaking with her partner translated for the mother  General:  Subdued appearing, appears stated age and cooperative  Head: Normocephalic, without obvious abnormality, atraumatic  Oral exam: lips, mucosa, and tongue moist;   Neck: no carotid bruit,   Lungs: clear to auscultation ant  bilaterally  Heart: regular rate and rhythm, S1, S2 normal, no murmur appreciated,   Abdomen: soft, +BS, a protruded abdomen, her monitor is in place    Extremities: atraumatic, no cyanosis or edema appreciated    Neurologic:   Mental status:  As noted above she appears subdued and tired  She is squinting also I notice with the bright light  She is spontaneously conversant without aphasia or dysarthria she is oriented, thought content simple in regard to the understanding of her neurologic conditions    CN Exam: RAMIREZ, she is light sensitive EOM's I, no nystagmus appreciated VF full, Gaze conjugate No sensory or motor lateralizations (No PP on face), Hearing I B, there does not appear to be phonosensitivity CNIX-XII I B  Motor: full power, age appropriate x 4 limbs, to bed resistance maneuvers  Sensory: intact  X 4 limbs, 4 mod inc lt, temp,  (not PP tested)  Cerebellar: no past pointing or drift from modified Romberg position, no ataxia w maneuvers, Fine motor & finger taps, age appropriate, WNL  DTR's: Age appropriate, WNL; Plantars: downgoing bilaterally  Gait:  She was not gaited on today's exam         Lab Results:   I have personally reviewed pertinent reports  , CBC:   Results from last 7 days   Lab Units 06/16/21  1045 06/16/21  0416 06/15/21  2146   WBC Thousand/uL 11 86* 10 79* 11 91*   RBC Million/uL 4 59 4 38 4 61   HEMOGLOBIN g/dL 13 8 13 0 13 6   HEMATOCRIT % 40 0 38 0 40 1   MCV fL 87 87 87   PLATELETS Thousands/uL 273 258 270   , BMP/CMP:   Results from last 7 days   Lab Units 06/16/21  1045 06/16/21  0416 06/15/21  2146   SODIUM mmol/L 137 135* 136   POTASSIUM mmol/L 4 1 4 3 4 0   CHLORIDE mmol/L 104 104 103   CO2 mmol/L 22 22 21   BUN mg/dL 6 7 9   CREATININE mg/dL 0 60 0 59* 0 59*   CALCIUM mg/dL 8 4 8 6 8 8   AST U/L 31 17 22   ALT U/L 34 24 24   ALK PHOS U/L 123* 120* 122*   EGFR ml/min/1 73sq m 128 129 129   , Vitamin B12:   , HgBA1C:   , TSH:   , Coagulation:   , Lipid Profile:        Imaging Studies: I have personally reviewed pertinent reports MRI LVH notes LEFT optic disc into posterior aspect of LEFT orbit, correlating with the reported history of papilledema  Bilateral   transverse dural venous sinus stenosis is present  No imaging of this available  She did have a spinal done in January of this year at our facility  Counseling / Coordination of Care  Total time spent today Greater than 45 minutes  Greater than 50% of total time was spent with the patient and / or family counseling and / or coordination of care  A description of the counseling / coordination of care: All of the above was discussed and reviewed with the patient and her partner at the bedside  They had several questions I believe they were all answered to their satisfaction at this time  Her care was also discussed with the maternal fetal medicine practice  Dictation voice to text software has been used in the creation of this document   Please consider this in light of any contextual or grammatical errors

## 2021-06-16 NOTE — PROGRESS NOTES
Progress Note - OB/GYN   Ivone Quinn 26 y o  female MRN: [id number]  Unit/Bed#: -01 Encounter: [id number]    Assessment:  IUGR < 3% with AEDV with intermittent reversal completed yesterday  Last US 6/11/21 she had nml bp and dopplers showed increased resistence so dopplers are worsening  Preeclampsia with severe features based on BP requiring meds and maybe headache if unrelieved by medical management  Plan:  Need to resolve HA and if resolved can stop mag after 12 hrs and would continue steroids and will reassess dopplers after 48 hr of steroid benefit  If HA unable to be relieved then will plan delivery  Subjective/Objective   Chief Complaint:headache 8/10    Subjective:+ FM denies other signs of preeclampsia    Objective:     Vitals: Blood pressure 136/92, pulse 83, temperature 98 °F (36 7 °C), temperature source Oral, resp  rate 18, height 5' 8" (1 727 m), weight 97 1 kg (214 lb), last menstrual period 11/10/2020, SpO2 97 %, not currently breastfeeding  ,Body mass index is 32 54 kg/m²  Lungs CTS, heart RRR no M/R/G  Abdomen no RUQ pain and gravid  Ext no edema and reflexes difficult to illicit and no clonus   with 10 beat accels and no decels and no contractions and rare 15 beat accels  Intake/Output Summary (Last 24 hours) at 6/16/2021 0814  Last data filed at 6/16/2021 0556  Gross per 24 hour   Intake 966 67 ml   Output 1400 ml   Net -433 33 ml       Invasive Devices     Peripheral Intravenous Line            Peripheral IV 06/15/21 Left;Ventral (anterior) Hand <1 day              Labs show uric acid 6 3  Prot Cr ratio 0 71  Rest of preeclamptic labs wnl       Marily Ortiz MD

## 2021-06-16 NOTE — UTILIZATION REVIEW
Initial Clinical Review    Admission: Date/Time/Statement:   Admission Orders (From admission, onward)     Ordered        06/15/21 1609  Inpatient Admission  Once                   Orders Placed This Encounter   Procedures    Inpatient Admission     Standing Status:   Standing     Number of Occurrences:   1     Order Specific Question:   Level of Care     Answer:   Med Surg [16]     Order Specific Question:   Estimated length of stay     Answer:   More than 2 Midnights     Order Specific Question:   Certification     Answer:   I certify that inpatient services are medically necessary for this patient for a duration of greater than two midnights  See H&P and MD Progress Notes for additional information about the patient's course of treatment  Chief Complaint   Patient presents with    Non-stress Test     NR NST and BPP 4/8 in [facility] office today       Initial Presentation: 25 yo G 3 P 1 @ 31 weeks gestation presented to L&D as inpatient admission for fetal growth restriction, less than the 3rd percentile with abnormal Dopplers persistently absent end-diastolic flow with intermittent reversal  AC measurement are 4 week behind, BBP 4/8 in [facility] NST nonreactive  Plan BTM, IV MSGO 4 consult perinatology and neonatology Plan delivery 32-34 weeks pending fetal status and Doppler findings       Fetal heart rate:   Baseline Rate: 140 bpm  Variability: Moderate 6-25 bpm  [place]:   Contraction Frequency (minutes): none     EFW: 2 lbs 10 oz    GBS: Positive        Date: 06-16-21   Day 2:  Patient with persist headache 8/10  If HA unable to be relieved then will plan delivery   BTM, IV MGSO4 infusion,continuous external fetal monitoring     Admitting  Vitals   Temperature Pulse Respirations Blood Pressure SpO2   06/15/21 1534 06/15/21 1547 06/15/21 1547 06/15/21 1547 06/15/21 2350   99 2 °F (37 3 °C) 61 20 (!) 137/107 98 %      Temp Source Heart Rate Source Patient Position - Orthostatic VS BP Location FiO2 (%)   06/15/21 1534 06/15/21 2350 06/15/21 2350 06/15/21 2350 --   Temporal Monitor Lying Right arm       Pain Score       06/15/21 1534       No Pain          Wt Readings from Last 1 Encounters:   06/15/21 97 1 kg (214 lb)     Additional Vital Signs:   06/15/21 2245  --  88  --  137/92  --  --  --  --   06/15/21 2230  --  88  --  132/91  --  --  --  --   06/15/21 2215  --  85  --  129/91  --  --  --  --   06/15/21 2200  --  96  --  138/93  --  --  --  --   06/15/21 2144  --  78  --  160/112Abnormal   --  --  --  --   06/15/21 2130  --  --  --  156/108Abnormal   --  --  --  --   06/15/21 2129  --  83  --  156/108Abnormal   --  --  --  --   06/15/21 2115  --  75  --  147/111Abnormal    --  --  --  --   BP: Dr Winifred Hoffmann made aware at 06/15/21 2115   06/15/21 2100  --  73  --  142/101Abnormal   --  --  --  --   06/15/21 2045  --  68  --  137/94  --  --  --  --   06/15/21 2030  --  76  --  143/104Abnormal   --  --  --  --   06/15/21 2015  --  82  --  138/98  --  --  --  --   06/15/21 2000  --  85  --  139/102Abnormal   --  --  --  --   06/15/21 1930  --  --  --  --  --  None (Room air)  --  --   06/15/21 1837  --  62  --  171/115Abnormal    --  --  --  --   BP: Dr Adrian Burleson notified  Procardia given   Per Dr Adrian Burleson wait 60 min at 06/15/21 1837   06/15/21 1822  --  67  --  178/122Abnormal   --  --  --  --   06/15/21 1806  --  49Abnormal   --  153/102Abnormal   --  --  --  --   06/15/21 1752  --  50Abnormal   --  155/94  --  --  --  --   06/15/21 1736  --  51Abnormal   --  155/103Abnormal   --  --  --  --   06/15/21 1721  --  54Abnormal   --  150/100  --  --  --  --   06/15/21 1707  --  61  --  143/95  --  --  --  --   06/15/21 1651  --  58  --  137/94  --  --  --  --   06/15/21 1636  --  56  --  149/93  --  --  --  --   06/15/21 1621  --  70  --  160/112Abnormal   --  --  --  --   06/15/21 1606  --  57  --  155/101Abnormal                Pertinent Labs/Diagnostic Test Results:       Results from last 7 days   Lab Units 06/16/21  0416 06/15/21  2146 06/15/21  1611   WBC Thousand/uL 10 79* 11 91* 9 89   HEMOGLOBIN g/dL 13 0 13 6 13 2   HEMATOCRIT % 38 0 40 1 39 5   PLATELETS Thousands/uL 258 270 269   NEUTROS ABS Thousands/µL  --   --  6 50         Results from last 7 days   Lab Units 06/16/21  0416 06/15/21  2146 06/15/21  1611   SODIUM mmol/L 135* 136 138   POTASSIUM mmol/L 4 3 4 0 4 3   CHLORIDE mmol/L 104 103 105   CO2 mmol/L 22 21 20*   ANION GAP mmol/L 9 12 13   BUN mg/dL 7 9 11   CREATININE mg/dL 0 59* 0 59* 0 64   EGFR ml/min/1 73sq m 129 129 125   CALCIUM mg/dL 8 6 8 8 9 1   MAGNESIUM mg/dL 3 7* 4 0*  --      Results from last 7 days   Lab Units 06/16/21 0416 06/15/21  2146 06/15/21  1611   AST U/L 17 22 22   ALT U/L 24 24 23   ALK PHOS U/L 120* 122* 116   TOTAL PROTEIN g/dL 6 9 6 9 6 6   ALBUMIN g/dL 2 6* 2 7* 2 6*   TOTAL BILIRUBIN mg/dL 0 15* 0 19* 0 24         Results from last 7 days   Lab Units 06/16/21  0416 06/15/21  2146 06/15/21  1611   GLUCOSE RANDOM mg/dL 110 120 70     Results from last 7 days   Lab Units 06/15/21  1611   CREATININE UR mg/dL 431 0   PROTEIN UR mg/dL 308   PROT/CREAT RATIO UR  0 71*       Past Medical History:   Diagnosis Date    [facility]     Chronic fatigue     last assessed 9/26/16    COVID-19 07/25/2020    Fever and chills 7/25/2020    Hyperlipidemia     resolved 9/29/17    Hypertension     pre-eclampsia 2016    Idiopathic intracranial hypertension     Varicella     pt unsure    Visual impairment     glasses     Present on Admission:   Chiari malformation type I ([address] )   History of 2019 novel coronavirus disease (COVID-19)   GBS bacteriuria   (Resolved) Poor fetal growth affecting management of mother in third trimester   Intrauterine growth restriction affecting antepartum care of mother in third trimester      Admitting Diagnosis: 31 weeks gestation of pregnancy [Z3A 31]  Age/Sex: 26 y o  female  Admission Orders:  Scheduled Medications:  betamethasone acetate-betamethasone sodium phosphate, 12 mg, Intramuscular, Q24H  ketorolac, 30 mg, Intravenous, Once  NIFEdipine, 30 mg, Oral, Daily      Continuous IV Infusions:  lactated ringers, 25 mL/hr, Intravenous, Continuous  magnesium sulfate, 2 g/hr, Intravenous, Continuous      PRN Meds:  acetaminophen, 650 mg, Oral, Q4H PRN  bisacodyl, 5 mg, Oral, Daily PRN  ondansetron, 4 mg, Intravenous, Q8H PRN        IP CONSULT TO NEONATOLOGY  IP CONSULT TO PERINATOLOGY   3000 fluid restriction  VS, I/O lung assessments and deep tendon reflexes q 2 hrs while on MGSO 4  Continuous external monitoring   Start Procardia XL 30 mg daily       Network Utilization Review Department  ATTENTION: Please call with any questions or concerns to [phone] and carefully listen to the prompts so that you are directed to the right person  All voicemails are confidential   Roya Alexis all requests for admission clinical reviews, approved or denied determinations and any other requests to dedicated fax number below belonging to the campus where the patient is receiving treatment   List of dedicated fax numbers for the Facilities:  [address] DENIALS (Administrative/Medical Necessity) [phone]   [address] (Maternity/NICU/Pediatrics) [phone] [address] Darlyn Hollingsworth 0103 [address] [place] [address] [address] [facility] [address] [phone]

## 2021-06-16 NOTE — ASSESSMENT & PLAN NOTE
This right-handed 64-year-old woman with a history of long time headaches originally had a spinal tap for a question of blurred vision and severe headaches in October of 2017  MRI at that time, report only available to this examiner, indicates likely optic nerve pressure and disc protrusion  Based on this this patient had a spinal tap and was noted to have an opening pressure of 55  She had 12 mL of fluid drained with a closing pressure of 22  Reportedly she had relief of her headache and improvement of her vision  She apparently had poor neurologic care, if any subsequent to that until 2019 when she saw a provider within our group  She was counseled that she should be compliant with a Diamox regime of a medication given that the patient reported that she was having headaches again  She was seen again or last actually in November of 2019 where she had complaints of headache again without visual disturbance  Again she was started or counseled for both a maintenance therapy including Diamox as well as abortive therapies  She had no further neurologic follow through  Neurology is now asked to see this [id number] woman for complaints of intractable headache which started yesterday as the reason for presentation  Speaking with the patient, her , as well as clinical exam indicates that her headaches right now are consistent with those of her regular every day headaches that she gets at random  There are no features of IIH headache or more concerning any features of a preeclamptic headache  At this time we feel that she can be treated with increased steroids and meds to relieve her current headache within the bound of maternal fetal medicine  We have discussed this with maternal fetal medicine who will order her meds     I expect that her headaches will likely continue for another day or so even with meds  Should they persist greater than 24 hour she may need repeat spinal tap again to assess for IIH    We will continue to follow with you

## 2021-06-16 NOTE — QUICK NOTE
MFM Attending: Patient seen/evaluated with Dr Becca Smith  [id number] at 31w0d with fetal growth restriction with absent and intermittently reversed end diastolic flow by US today, with BPP 4/10, now meeting criteria for severe preeclampsia  Full MFM consultation to follow by Dr Becca Smith  Briefly, recommendations are as follows:  -Antepartum admission  -Magnesium sulfate  -Start Procardia XL 30mg daily, additional antihypertensives PRN to keep BP non severe  -Betamethasone administration  -Serial HELLP labs  -Neonatology and anesthesiology consultations  -Continuous EFM/[place]  -Tentatively discussed delivery timing of 32-32 weeks pending fetal status and Doppler findings  Eliseo Harris MD  60 minutes of floor time devoted to consultation

## 2021-06-17 LAB
ALBUMIN SERPL BCP-MCNC: 2.3 G/DL (ref 3.5–5)
ALBUMIN SERPL BCP-MCNC: 2.4 G/DL (ref 3.5–5)
ALBUMIN SERPL BCP-MCNC: 2.4 G/DL (ref 3.5–5)
ALBUMIN SERPL BCP-MCNC: 2.7 G/DL (ref 3.5–5)
ALP SERPL-CCNC: 105 U/L (ref 46–116)
ALP SERPL-CCNC: 108 U/L (ref 46–116)
ALP SERPL-CCNC: 110 U/L (ref 46–116)
ALP SERPL-CCNC: 131 U/L (ref 46–116)
ALT SERPL W P-5'-P-CCNC: 48 U/L (ref 12–78)
ALT SERPL W P-5'-P-CCNC: 54 U/L (ref 12–78)
ANION GAP SERPL CALCULATED.3IONS-SCNC: 10 MMOL/L (ref 4–13)
ANION GAP SERPL CALCULATED.3IONS-SCNC: 11 MMOL/L (ref 4–13)
ANION GAP SERPL CALCULATED.3IONS-SCNC: 15 MMOL/L (ref 4–13)
ANION GAP SERPL CALCULATED.3IONS-SCNC: 9 MMOL/L (ref 4–13)
AST SERPL W P-5'-P-CCNC: 37 U/L (ref 5–45)
AST SERPL W P-5'-P-CCNC: 37 U/L (ref 5–45)
AST SERPL W P-5'-P-CCNC: 40 U/L (ref 5–45)
AST SERPL W P-5'-P-CCNC: 50 U/L (ref 5–45)
BILIRUB SERPL-MCNC: 0.1 MG/DL (ref 0.2–1)
BILIRUB SERPL-MCNC: 0.15 MG/DL (ref 0.2–1)
BILIRUB SERPL-MCNC: 0.15 MG/DL (ref 0.2–1)
BILIRUB SERPL-MCNC: 0.18 MG/DL (ref 0.2–1)
BUN SERPL-MCNC: 10 MG/DL (ref 5–25)
BUN SERPL-MCNC: 8 MG/DL (ref 5–25)
BUN SERPL-MCNC: 9 MG/DL (ref 5–25)
BUN SERPL-MCNC: 9 MG/DL (ref 5–25)
CALCIUM ALBUM COR SERPL-MCNC: 8.7 MG/DL (ref 8.3–10.1)
CALCIUM ALBUM COR SERPL-MCNC: 8.7 MG/DL (ref 8.3–10.1)
CALCIUM ALBUM COR SERPL-MCNC: 8.9 MG/DL (ref 8.3–10.1)
CALCIUM ALBUM COR SERPL-MCNC: 8.9 MG/DL (ref 8.3–10.1)
CALCIUM SERPL-MCNC: 7.3 MG/DL (ref 8.3–10.1)
CALCIUM SERPL-MCNC: 7.4 MG/DL (ref 8.3–10.1)
CALCIUM SERPL-MCNC: 7.6 MG/DL (ref 8.3–10.1)
CALCIUM SERPL-MCNC: 7.9 MG/DL (ref 8.3–10.1)
CHLORIDE SERPL-SCNC: 104 MMOL/L (ref 100–108)
CHLORIDE SERPL-SCNC: 105 MMOL/L (ref 100–108)
CHLORIDE SERPL-SCNC: 107 MMOL/L (ref 100–108)
CHLORIDE SERPL-SCNC: 99 MMOL/L (ref 100–108)
CO2 SERPL-SCNC: 20 MMOL/L (ref 21–32)
CO2 SERPL-SCNC: 22 MMOL/L (ref 21–32)
CO2 SERPL-SCNC: 24 MMOL/L (ref 21–32)
CO2 SERPL-SCNC: 26 MMOL/L (ref 21–32)
CREAT SERPL-MCNC: 0.67 MG/DL (ref 0.6–1.3)
CREAT SERPL-MCNC: 0.69 MG/DL (ref 0.6–1.3)
ERYTHROCYTE [DISTWIDTH] IN BLOOD BY AUTOMATED COUNT: 13.5 % (ref 11.6–15.1)
ERYTHROCYTE [DISTWIDTH] IN BLOOD BY AUTOMATED COUNT: 13.6 % (ref 11.6–15.1)
ERYTHROCYTE [DISTWIDTH] IN BLOOD BY AUTOMATED COUNT: 13.7 % (ref 11.6–15.1)
GFR SERPL CREATININE-BSD FRML MDRD: 122 ML/MIN/1.73SQ M
GFR SERPL CREATININE-BSD FRML MDRD: 123 ML/MIN/1.73SQ M
GLUCOSE SERPL-MCNC: 108 MG/DL (ref 65–140)
GLUCOSE SERPL-MCNC: 113 MG/DL (ref 65–140)
GLUCOSE SERPL-MCNC: 97 MG/DL (ref 65–140)
GLUCOSE SERPL-MCNC: 98 MG/DL (ref 65–140)
HCT VFR BLD AUTO: 35 % (ref 34.8–46.1)
HCT VFR BLD AUTO: 35.5 % (ref 34.8–46.1)
HCT VFR BLD AUTO: 36.5 % (ref 34.8–46.1)
HGB BLD-MCNC: 11.9 G/DL (ref 11.5–15.4)
HGB BLD-MCNC: 12.2 G/DL (ref 11.5–15.4)
HGB BLD-MCNC: 12.6 G/DL (ref 11.5–15.4)
MAGNESIUM SERPL-MCNC: 5.7 MG/DL (ref 1.6–2.6)
MAGNESIUM SERPL-MCNC: 5.8 MG/DL (ref 1.6–2.6)
MAGNESIUM SERPL-MCNC: 5.9 MG/DL (ref 1.6–2.6)
MAGNESIUM SERPL-MCNC: 6 MG/DL (ref 1.6–2.6)
MCH RBC QN AUTO: 30.1 PG (ref 26.8–34.3)
MCH RBC QN AUTO: 30.2 PG (ref 26.8–34.3)
MCH RBC QN AUTO: 30.3 PG (ref 26.8–34.3)
MCHC RBC AUTO-ENTMCNC: 34 G/DL (ref 31.4–37.4)
MCHC RBC AUTO-ENTMCNC: 34.4 G/DL (ref 31.4–37.4)
MCHC RBC AUTO-ENTMCNC: 34.5 G/DL (ref 31.4–37.4)
MCV RBC AUTO: 88 FL (ref 82–98)
MCV RBC AUTO: 88 FL (ref 82–98)
MCV RBC AUTO: 89 FL (ref 82–98)
PLATELET # BLD AUTO: 236 THOUSANDS/UL (ref 149–390)
PLATELET # BLD AUTO: 240 THOUSANDS/UL (ref 149–390)
PLATELET # BLD AUTO: 253 THOUSANDS/UL (ref 149–390)
PMV BLD AUTO: 10.2 FL (ref 8.9–12.7)
PMV BLD AUTO: 10.5 FL (ref 8.9–12.7)
PMV BLD AUTO: 10.7 FL (ref 8.9–12.7)
POTASSIUM SERPL-SCNC: 4.3 MMOL/L (ref 3.5–5.3)
POTASSIUM SERPL-SCNC: 4.5 MMOL/L (ref 3.5–5.3)
POTASSIUM SERPL-SCNC: 4.6 MMOL/L (ref 3.5–5.3)
POTASSIUM SERPL-SCNC: 4.6 MMOL/L (ref 3.5–5.3)
PROT SERPL-MCNC: 6 G/DL (ref 6.4–8.2)
PROT SERPL-MCNC: 6.1 G/DL (ref 6.4–8.2)
PROT SERPL-MCNC: 6.3 G/DL (ref 6.4–8.2)
PROT SERPL-MCNC: 7 G/DL (ref 6.4–8.2)
RBC # BLD AUTO: 3.95 MILLION/UL (ref 3.81–5.12)
RBC # BLD AUTO: 4.03 MILLION/UL (ref 3.81–5.12)
RBC # BLD AUTO: 4.17 MILLION/UL (ref 3.81–5.12)
SODIUM SERPL-SCNC: 134 MMOL/L (ref 136–145)
SODIUM SERPL-SCNC: 136 MMOL/L (ref 136–145)
SODIUM SERPL-SCNC: 140 MMOL/L (ref 136–145)
SODIUM SERPL-SCNC: 142 MMOL/L (ref 136–145)
URATE SERPL-MCNC: 6 MG/DL (ref 2–6.8)
URATE SERPL-MCNC: 6.2 MG/DL (ref 2–6.8)
URATE SERPL-MCNC: 6.4 MG/DL (ref 2–6.8)
URATE SERPL-MCNC: 6.5 MG/DL (ref 2–6.8)
WBC # BLD AUTO: 14.27 THOUSAND/UL (ref 4.31–10.16)
WBC # BLD AUTO: 18.6 THOUSAND/UL (ref 4.31–10.16)
WBC # BLD AUTO: 19.66 THOUSAND/UL (ref 4.31–10.16)

## 2021-06-17 PROCEDURE — 80053 COMPREHEN METABOLIC PANEL: CPT | Performed by: OBSTETRICS & GYNECOLOGY

## 2021-06-17 PROCEDURE — 84550 ASSAY OF BLOOD/URIC ACID: CPT | Performed by: OBSTETRICS & GYNECOLOGY

## 2021-06-17 PROCEDURE — 83735 ASSAY OF MAGNESIUM: CPT | Performed by: OBSTETRICS & GYNECOLOGY

## 2021-06-17 PROCEDURE — 99231 SBSQ HOSP IP/OBS SF/LOW 25: CPT | Performed by: PSYCHIATRY & NEUROLOGY

## 2021-06-17 PROCEDURE — 85027 COMPLETE CBC AUTOMATED: CPT | Performed by: OBSTETRICS & GYNECOLOGY

## 2021-06-17 RX ORDER — KETOROLAC TROMETHAMINE 30 MG/ML
15 INJECTION, SOLUTION INTRAMUSCULAR; INTRAVENOUS EVERY 6 HOURS
Status: DISPENSED | OUTPATIENT
Start: 2021-06-17 | End: 2021-06-18

## 2021-06-17 RX ORDER — DOCUSATE SODIUM 100 MG/1
100 CAPSULE, LIQUID FILLED ORAL 2 TIMES DAILY
Status: DISCONTINUED | OUTPATIENT
Start: 2021-06-17 | End: 2021-06-19 | Stop reason: HOSPADM

## 2021-06-17 RX ORDER — CALCIUM CARBONATE 200(500)MG
1000 TABLET,CHEWABLE ORAL DAILY PRN
Status: DISCONTINUED | OUTPATIENT
Start: 2021-06-17 | End: 2021-06-19 | Stop reason: HOSPADM

## 2021-06-17 RX ORDER — ACETAMINOPHEN 325 MG/1
650 TABLET ORAL EVERY 6 HOURS PRN
Status: DISCONTINUED | OUTPATIENT
Start: 2021-06-17 | End: 2021-06-19 | Stop reason: HOSPADM

## 2021-06-17 RX ORDER — SIMETHICONE 80 MG
80 TABLET,CHEWABLE ORAL 4 TIMES DAILY PRN
Status: DISCONTINUED | OUTPATIENT
Start: 2021-06-17 | End: 2021-06-19 | Stop reason: HOSPADM

## 2021-06-17 RX ADMIN — KETOROLAC TROMETHAMINE 15 MG: 30 INJECTION, SOLUTION INTRAMUSCULAR at 17:32

## 2021-06-17 RX ADMIN — DIPHENHYDRAMINE HYDROCHLORIDE 25 MG: 50 INJECTION, SOLUTION INTRAMUSCULAR; INTRAVENOUS at 18:31

## 2021-06-17 RX ADMIN — NIFEDIPINE 30 MG: 30 TABLET, FILM COATED, EXTENDED RELEASE ORAL at 08:48

## 2021-06-17 RX ADMIN — KETOROLAC TROMETHAMINE 15 MG: 30 INJECTION, SOLUTION INTRAMUSCULAR at 04:39

## 2021-06-17 RX ADMIN — MAGNESIUM SULFATE HEPTAHYDRATE 2 G/HR: 40 INJECTION, SOLUTION INTRAVENOUS at 01:32

## 2021-06-17 RX ADMIN — DOCUSATE SODIUM 100 MG: 100 CAPSULE, LIQUID FILLED ORAL at 08:48

## 2021-06-17 RX ADMIN — MAGNESIUM SULFATE HEPTAHYDRATE 2 G/HR: 40 INJECTION, SOLUTION INTRAVENOUS at 11:49

## 2021-06-17 RX ADMIN — NALBUPHINE HYDROCHLORIDE 3 MG: 10 INJECTION, SOLUTION INTRAMUSCULAR; INTRAVENOUS; SUBCUTANEOUS at 08:48

## 2021-06-17 RX ADMIN — SODIUM CHLORIDE, SODIUM LACTATE, POTASSIUM CHLORIDE, AND CALCIUM CHLORIDE 25 ML/HR: .6; .31; .03; .02 INJECTION, SOLUTION INTRAVENOUS at 11:50

## 2021-06-17 RX ADMIN — DOCUSATE SODIUM 100 MG: 100 CAPSULE, LIQUID FILLED ORAL at 17:32

## 2021-06-17 NOTE — OP NOTE
OPERATIVE REPORT  PATIENT NAME: Ange Feng    :  1995  MRN: [id number]  Pt Location: [facility] L&D OR ROOM 02    SURGERY DATE: 2021     Section Procedure Note    Indications: Worsening PreE w/ SF    Pre-operative Diagnosis:   31w1d pregnancy  PreE w/ SF  Hx Chiari malformation type I  Intracranial HTN  Hyperlipidemia    Post-operative Diagnosis:   Primary Classical CS   PreE w/ SF  Hx Chiari malformation type I  Intracranial HTN  Hyperlipidemia    Attending: Randi Silveira DO  Resident: Fanny Nuñez MD and Gris Lewis MD    Maternal Findings:  Normal uterus  Normal tubes and ovaries bilaterally  No adhesions  No difficulty noted from skin to delivery     Findings:  Viable female with weight pending;  Apgar scores of 4 at one minute and 9 at five minutes  Clear amniotic fluid  Normal placenta with 3-vessel cord    Arterial and Venous Gases:  Umbilical Cord Venous Blood Gas:  Results from last 7 days   Lab Units 21  2209   PH COV  7 213   PCO2 COV mm HG 65 8*   HCO3 COV mmol/L 25 9   BASE EXC COV mmol/L -3 6*   O2 CT CD VB mL/dL 2 5   O2 HGB, VENOUS CORD % 27 1     Umbilical Cord Arterial Blood Gas:  Results from last 7 days   Lab Units 21  2209   PH COA  7 187*   PCO2 COA  68 9*   PO2 COA mm HG 12 7   HCO3 COA mmol/L 25 5   BASE EXC COA mmol/L -4 4*   O2 CONTENT CORD ART ml/dl 2 5   O2 HGB, ARTERIAL CORD % 11 3       Specimens: Arterial and venous cord gases, cord blood, segment of umbilical cord, placenta to pathology    Quantitative Blood Loss: 318 mL    Drains: Urbina catheter           Complications:  None; patient tolerated the procedure well  Disposition: PACU            Condition: stable    Procedure Details   The patient was seen prior to the procedure  Risks, benefits, possible complications, alternate treatment options, and expected outcomes were discussed with the patient    The patient agreed with the proposed plan and gave informed consent for a primary classical  section  The patient was taken to the [facility] Operating Room at 2130 where she received spinal anesthesia  For infection prophylaxis, she received 2 g ancef preoperatively  Fetal heart tones in the OR were assessed and noted to be within normal limits and a Urbina catheter and SCDs were placed  The abdomen was prepped with Chloraprep, the vagina was prepped with Betadine, and following appropriate drying time, the patient was draped in the usual sterile manner  A Time Out was held and the above information confirmed  The patient was identified as Lazarus Confer and the procedure verified as a  Delivery for PreE with severe features  A Pfannenstiel incision was made with a scalpel and carried down through the underlying subcutaneous tissue to the fascia using the bovie  The rectus fascia was then nicked in the midline and dissected laterally using Trina clamps and the bovie  The superior edge of the  fascial incision was grasped with Kocher clamps bilaterally, tented upward and the underlying rectus muscles were dissected off using the bovie  This was repeated on the inferior edge of the fascia and dissected down to the pubic rami  The rectus muscles were  and the peritoneum was identified, entered, and extended longitudinally with blunt dissection  The bladder blade was inserted  A vertical uterine incision was made with the scalpel and extended laterally with blunt dissection  The amnion was entered bluntly  The fetal head was palpated, elevated, and delivered through the uterine incision followed by the body without difficulty  Time of birth was noted at 2208  There was noted to be spontaneous cry and good tone  There was no apparent injury to the   The umbilical cord was doubly clamped and cut after 30 seconds to allow for delayed cord clamping  The infant was handed off to the  providers    Arterial and venous cord gases, cord blood, and a segment of umbilical cord were obtained for evaluation  The placenta delivered spontaneously at 2210 with uterine fundal massage and appeared normal  The uterus was exteriorized and cleaned out with a moist lap sponge  The uterine incision was closed with a running locked suture of 0 Chromic  A second and third layer of the same suture was used to imbricate the first   Hemostasis was noted to be excellent  The uterus was returned to the abdomen  The paracolic gutters were inspected and cleared of all clots and debris withmoist lap sponges  The fascia was closed with a running suture of 0 Vicryl  Subcutaneous adipose tissue was closed with one suture of 2-0 Plain gut  The skin was closed with a subcuticular running suture of 4-0 Monocryl  Benzoin and steri-strip dressings were applied  The patient appeared to tolerate the procedure very well  Lap sponge, needle, and instrument counts were correct x2  The patient's fundus was palpated and the uterus was expressed  She was then cleaned and transferred to her postpartum recovery room in stable condition and her infant went to the NICU  Attending Attestation: Dr Breanne Kirby, DO was present for the entire procedure      Heena Pickett MD  PGY-1 OB/GYN   6/16/2021 11:18 PM

## 2021-06-17 NOTE — ASSESSMENT & PLAN NOTE
2021: This right-handed 21-year-old woman with a history of long time headaches originally had a spinal tap for a question of blurred vision and severe headaches in 2017  MRI at that time, report only available to this examiner, indicates likely optic nerve pressure and disc protrusion  Based on this this patient had a spinal tap and was noted to have an opening pressure of 55  She had 12 mL of fluid drained with a closing pressure of 22  Reportedly she had relief of her headache and improvement of her vision  She apparently had intermittent neurologic follow-up, 2018 x [address] and 2019 when she saw a provider within our group  She was counseled that she should be compliant with a Diamox regime of a medication given that the patient reported that she was having headaches again  She was last seen in 2019 where she had complaints of headache again without visual disturbance  Again she was started or counseled for both a maintenance therapy including Diamox as well as abortive therapies  She had no further neurologic follow through  Yesterday Speaking with the patient, her , as well as clinical exam indicates that her headaches right now are consistent with those of her regular every day headaches that she gets at random  There are no features of IIH headache  However continuation of her headache and examination by OBGYN and maternal fetal medicine examiner's she reported that her headache was more intense and she felt that it was different  Based on this she was taken to surgery last night for a  delivery  Apparently chart review indicates she did well postop, and the baby is stable       Today she is seen and reports no further headache  She reports she feels well fatigued and tired only  She denies any neurologic symptoms      I have discussed with this patient and her partner as well as the medical team that she should start a migraine her headache diary or journal recording her headache symptoms if any, her triggers and her affective treatments  She should call us next week and set up a follow-up appointment of 1-2 months with our headache team in the office as an outpatient  We will sign off at this time

## 2021-06-17 NOTE — ANESTHESIA PREPROCEDURE EVALUATION
Procedure:   SECTION () (N/A Uterus)    Relevant Problems   CARDIO   (+) Pre-eclampsia during pregnancy in third trimester, antepartum      GYN   (+) 31 weeks gestation of pregnancy      NEURO/PSYCH   (+) History of 2019 novel coronavirus disease (COVID-19)        Physical Exam    Airway    Mallampati score: II  TM Distance: >3 FB  Neck ROM: full     Dental   No notable dental hx     Cardiovascular      Pulmonary      Other Findings        Anesthesia Plan  ASA Score- 3 Emergent    Anesthesia Type- spinal with ASA Monitors  Additional Monitors:   Airway Plan:     Comment: Patient examined, history reviewed  The patient has Chiari malformation type 1  She was evaluated by neurology today  They felt her headaches were not due to elevated ICP  Last ICP in January was normal   In review of the literature, and discussion with the head of OB anesthesia, it was felt that spinal anesthetic would be safe  Plan for spinal anesthetic discussed with the patient  Risks explained, consent obtained          Plan Factors-Exercise tolerance (METS): >4 METS  Chart reviewed  Existing labs reviewed  Patient summary reviewed  Induction-     Postoperative Plan-     Informed Consent- Anesthetic plan and risks discussed with patient  I personally reviewed this patient with the CRNA  Discussed and agreed on the Anesthesia Plan with the CRNA  Bryan Maloney

## 2021-06-17 NOTE — PROGRESS NOTES
Progress Note - OB/GYN   Nito Linn 26 y o  female MRN: [id number]  Unit/Bed#:  322-01 Encounter: [id number]    Assessment:  POD#1 s/p primary classical  section, stable, baby in NICU    Plan:  1) Post-op care  Encourage ambulation   Encourage breastfeeding  Continue current meds   Hgb 12 5 --> 12 2  2) Urbina catheter   1 0 cc/kg/hr, f/u voiding trial  3) PreE w/ SF   Procardia 30 XL started 6/15   P:c 0 71   Currently on magnesium   Cr: 0 5 --> 0 6 --> 0 67   AST/ALT:  --> 37/48  4) Intractable headache   - Resolved after headache  5) Disposition: home POD4    Subjective/Objective     Subjective:     Pain: no  Tolerating PO: yes  Voiding: yes  Flatus: yes  BM: no  Ambulating: yes  Breastfeeding: Using breast pump  Chest pain: no  Shortness of breath: no  Leg pain: no    Objective:     Vitals:  Vitals:    21 0225 21 0330 21 0439 21 0555   BP: 138/94 137/92 132/85 136/52   BP Location: Left arm Left arm Left arm Left arm   Pulse: 58 56 61 60   Resp: 18 18 18 18   Temp: 98 1 °F (36 7 °C) 97 9 °F (36 6 °C) 98 °F (36 7 °C) 98 1 °F (36 7 °C)   TempSrc: Oral Oral Oral Oral   SpO2: 100% 98% 99% 99%   Weight:       Height:           Physical Exam:   GEN:appears well, alert and oriented x 3, pleasant and cooperative   CV: RRR, no m/r/g  Lungs: CTA b/l, no w/r/r  ABDOMEN: soft, moderate tenderness throughout abdomen,   Uterine fundus firm and non-tender, at the umbilicus, Incision C/D/I  EXTREMITIES: non-tender, no edema       Lab Results   Component Value Date    WBC 19 66 (H) 2021    HGB 12 2 2021    HCT 35 5 2021    MCV 88 2021     2021           Megan Pedraza DO, PGY-1  Obstetrics & Gynecology  21  7:23 AM

## 2021-06-17 NOTE — PROGRESS NOTES
[facility]  Progress Note Cesar Elliott 1995, 26 y o  female   MRN: [id number]  Unit/Bed#: -01 Encounter: [id number]    IIH (idiopathic intracranial hypertension)  Assessment & Plan  2021: This right-handed 67-year-old woman with a history of long time headaches originally had a spinal tap for a question of blurred vision and severe headaches in 2017  MRI at that time, report only available to this examiner, indicates likely optic nerve pressure and disc protrusion  Based on this this patient had a spinal tap and was noted to have an opening pressure of 55  She had 12 mL of fluid drained with a closing pressure of 22  Reportedly she had relief of her headache and improvement of her vision  She apparently had intermittent neurologic follow-up, 2018 x [address] and 2019 when she saw a provider within our group  She was counseled that she should be compliant with a Diamox regime of a medication given that the patient reported that she was having headaches again  She was last seen in 2019 where she had complaints of headache again without visual disturbance  Again she was started or counseled for both a maintenance therapy including Diamox as well as abortive therapies  She had no further neurologic follow through  Yesterday Speaking with the patient, her , as well as clinical exam indicates that her headaches right now are consistent with those of her regular every day headaches that she gets at random  There are no features of IIH headache  However continuation of her headache and examination by OBGYN and maternal fetal medicine examiner's she reported that her headache was more intense and she felt that it was different  Based on this she was taken to surgery last night for a  delivery  Apparently chart review indicates she did well postop, and the baby is stable       Today she is seen and reports no further headache  She reports she feels well fatigued and tired only  She denies any neurologic symptoms  I have discussed with this patient and her partner as well as the medical team that she should start a migraine her headache diary or journal recording her headache symptoms if any, her triggers and her affective treatments  She should call us next week and set up a follow-up appointment of 1-2 months with our headache team in the office as an outpatient  We will sign off at this time  Questionable Chiari malformation type I [facility])  Assessment & Plan  This patient had MRI imaging originally done in 2017 at [facility]   I do not have the films available but her report references no cerebellar tonsillar herniation  History of 2019 novel coronavirus disease (COVID-19)  Assessment & Plan  This patient reports no change in her headaches post 2020, COVID infection to this examiner  This patient should call our office post discharge and arrange for a convenient 1-2 month headache follow-up appointment with either Chele Mcmahon or another member of our headache team   I have asked this patient and her partner to adopt a headache apps so that they can easily keep track of her headaches, triggers, symptoms etcetera so that we can discuss these when and if they reoccur her in the office  Subjective/Objective     Subjective:  I am tired but I feel better today  ROS:  The patient reports that she has no complaints of headache pain or other today  She reports that she is tired and fatigued from her delivery and  last night  She reports no complaints of headache today  She denies any visual disturbances  She denies any chest pain palpitations or other complaints  No generalized weaknesses      Medication Dose Route Frequency    acetaminophen  650 mg Oral Q4H PRN    acetaminophen  650 mg Oral Q6H PRN    bisacodyl  5 mg Oral Daily PRN    butalbital-acetaminophen-caffeine  1 tablet Oral Q4H PRN    calcium carbonate  1,000 mg Oral Daily PRN    dexamethasone  8 mg Intravenous Once PRN    diphenhydrAMINE  25 mg Intravenous Q6H PRN    docusate sodium  100 mg Oral BID    HYDROmorphone  0 5 mg Intravenous Q2H PRN    hydrOXYzine HCL  25 mg Oral Q6H PRN    ketorolac  15 mg Intravenous Q6H    lactated ringers  25 mL/hr Intravenous Continuous    loperamide  4 mg Oral 4x Daily PRN    magnesium sulfate  2 g/hr Intravenous Continuous    metoclopramide  5 mg Intravenous Q6H PRN    nalbuphine  3 mg Intravenous Q3H PRN    naloxone  0 1 mg Intravenous Q3 min PRN    NIFEdipine  30 mg Oral Daily    ondansetron  4 mg Intravenous Q8H PRN    ondansetron  4 mg Intravenous Q4H PRN    simethicone  80 mg Oral 4x Daily PRN       acetaminophen    acetaminophen    bisacodyl    butalbital-acetaminophen-caffeine    calcium carbonate    dexamethasone    diphenhydrAMINE    HYDROmorphone    hydrOXYzine HCL    loperamide    metoclopramide    nalbuphine    naloxone    ondansetron    ondansetron    simethicone    Vitals: Blood pressure 132/87, pulse 79, temperature 97 7 °F (36 5 °C), temperature source Oral, resp  rate 18, height 5' 8" (1 727 m), weight 97 1 kg (214 lb), last menstrual period 11/10/2020, SpO2 98 %, currently breastfeeding  ,Body mass index is 32 54 kg/m²  Physical Exam:     Theresa Hart seen in:  Laying in bed  Her partner is at the bedside  The baby is not present  General appearance: alert,   Neck, Lungs, Heart, & abdomen: WNL  Extremities: atraumatic, no cyanosis or edema    Neurologic:   Mental status: Alert, spontaneously conversant and completely oriented, thought content appropriate  There is no speech or language dysfunction  She is attentive she has no complaints of pain  CN: exam EOM's I, I questioned whether there was a minor degree of nystagmus of her right eye with a left lateral end gaze  Otherwise her gaze conjugate  Non lateralizing sensory & motor exam, (PP not tested on face)  Reminder CNVIII-XII normal    Motor: full power age appropriate x 4 limbs, with bed maneuvers  Sensory: grossly intact  X 4 limbs, PP not tested  Cerebellar: no ataxia or past pointing w pronation from a modified Romberg position  Gait:  She was not gaited on today's exam she has a Urbina catheter  She is anxious to get up  DTR's: Age appropriate,  Plantars: downgoing bilaterally      Lab Results:   I have personally reviewed pertinent reports  , CBC:   Results from last 7 days   Lab Units 06/17/21  1215 06/17/21  0540 06/16/21  1819   WBC Thousand/uL 18 60* 19 66* 11 27*   RBC Million/uL 4 17 4 03 4 16   HEMOGLOBIN g/dL 12 6 12 2 12 5   HEMATOCRIT % 36 5 35 5 36 3   MCV fL 88 88 87   PLATELETS Thousands/uL 253 240 273   , BMP/CMP:   Results from last 7 days   Lab Units 06/17/21  1215 06/17/21  0540 06/17/21  0042   SODIUM mmol/L 142 136 134*   POTASSIUM mmol/L 4 3 4 6 4 6   CHLORIDE mmol/L 107 104 99*   CO2 mmol/L 24 22 20*   BUN mg/dL 8 9 10   CREATININE mg/dL 0 67 0 67 0 67   CALCIUM mg/dL 7 6* 7 3* 7 9*   AST U/L 40 37 50*   ALT U/L 54 48 54   ALK PHOS U/L 108 110 131*   EGFR ml/min/1 73sq m 123 123 123   , Vitamin B12:   , HgBA1C:   , TSH:   , Coagulation:   , Lipid Profile:        Imaging Studies: No new neurodiagnostic studies  Counseling / Coordination of Care  Total time spent today Greater than 20 minutes  Greater than 50% of total time was spent with the patient and / or family counseling and / or coordination of care  A description of the counseling / coordination of care: All of the above was discussed and reviewed with the patient and her partner at the bedside today  We discussed headache surveillance and recording her particular symptoms  We discussed a follow-up in the office  We discussed the warning signs of idiopathic intracranial hypertension and the concern for a loss of vision    They both had 1 or 2 questions I believe they were satisfied at this time

## 2021-06-17 NOTE — LACTATION NOTE
Follow up consult to NICU mom, who is pumping  She verb  she is doing well and has all the supplies she needs  She has no questions at this time  Reinforced to pump at least 8 times per day

## 2021-06-17 NOTE — PLAN OF CARE
Problem: ANTEPARTUM  Goal: Maintain pregnancy as long as maternal and/or fetal condition is stable  Description: INTERVENTIONS:  - Maternal surveillance  - Fetal surveillance  - Monitor uterine activity  - Medications as ordered  - Bedrest  Outcome: Completed

## 2021-06-17 NOTE — ANESTHESIA POSTPROCEDURE EVALUATION
Post-Op Assessment Note    CV Status:  Stable  Pain Score: 0    Pain management: adequate     Mental Status:  Alert and awake   Hydration Status:  Euvolemic   PONV Controlled:  Controlled   Airway Patency:  Patent      Post Op Vitals Reviewed: Yes      Staff: CRNA         No complications documented      BP (!) 136/105 (06/16/21 2310)    Temp (!) 97 °F (36 1 °C) (06/16/21 2310)    Pulse 81 (06/16/21 2310)   Resp 14 (06/16/21 2310)    SpO2 97 % (06/16/21 2310)

## 2021-06-17 NOTE — ANESTHESIA PROCEDURE NOTES
Spinal Block    Patient location during procedure: OR  Start time: 6/16/2021 9:31 PM  Reason for block: at surgeon's request and primary anesthetic  Staffing  Performed: anesthesiologist   Anesthesiologist: Ellen Rios MD  Preanesthetic Checklist  Completed: patient identified, IV checked, site marked, risks and benefits discussed, surgical consent, monitors and equipment checked, pre-op evaluation and timeout performed  Spinal Block  Patient position: sitting  Prep: ChloraPrep  Patient monitoring: cardiac monitor, frequent blood pressure checks, heart rate and continuous pulse ox  Approach: right paramedian  Location: L3-4  Injection technique: single-shot  Needle  Needle type: pencil-tip   Needle gauge: 25 G  Needle length: 10 cm  Assessment  Sensory level: T4  Injection Assessment:  negative aspiration for heme, no paresthesia on injection and positive aspiration for clear CSF    Post-procedure:  site cleaned

## 2021-06-17 NOTE — DISCHARGE SUMMARY
CS Discharge Summary - Pravin Velazco 26 y o  female MRN: [id number]    Unit/Bed#: LD PACU-01 Encounter: [id number]    Admission Date: 6/15/2021     Discharge Date:21    Admitting Diagnosis:   Patient Active Problem List   Diagnosis    Pre-eclampsia during pregnancy in third trimester, antepartum    IIH (idiopathic intracranial hypertension)    Papilledema    Questionable Chiari malformation type I ([address] )    History of 2019 novel coronavirus disease (COVID-19)    31 weeks gestation of pregnancy    GBS bacteriuria    Hyperemesis gravidarum    High risk pregnancy with high inhibin    Intrauterine growth restriction affecting antepartum care of mother in third trimester     Discharge Diagnosis:   Same, delivered    Procedures:   primary  section, classical incision    Admitting Attending: Dr Lucretia Greenberg  Delivery Attending: Dr Adonis Bearden  Discharge Attending: Dr Juan Jose Peña service: [facility] [place] [facility]:     Pravin Velazco is a 26 y o  [id number] who was admitted for preeclampsia with severe features with fetal growth restriction with absent and intermittently reversed end diastolic flow and intermittently severe range blood pressures  She was started on Magnesium for neuroprotection  She was also started on Procardia 30mg XL  Approximately 24 hours from admission, she had a persistent headache and increasing liver enzymes  After discussion with the patient and maternal-fetal medicine, the decision was made to proceed with primary  section in the setting of worsening preeclampsia  She then underwent an uncomplicated classical  section delivery and delivered a viable female  on 21 at 2208  APGARS were 4, 9 at 1 and 5 minutes, respectively   weighed 2lb 8 7oz   was then transferred to NICU  Patient tolerated the procedure well and was transferred to recovery in stable condition  The patient's post partum course was unremarkable   Her postoperative pain was well controlled with oral analgesics  On day of discharge, she was ambulating and able to reasonably perform all ADLs  She was voiding and had appropriate bowel function  Pain was well controlled  She was discharged home on post-operative day #3 without complications  Patient was instructed to follow up with her OB as an outpatient and was given appropriate warnings to call provider if she develops signs of infection or uncontrolled pain  Complications:   None    Condition at discharge:   good     Provisions for Follow-Up Care:  See after visit summary for information related to follow-up care and any pertinent home health orders  Disposition:   See After Visit Summary for discharge disposition information  Planned Readmission:   No    Discharge Medications:   Prenatal vitamin daily for 6 months or the duration of nursing whichever is longer  Motrin 600 mg orally every 6 hours as needed for pain  Tylenol (over the counter) per bottle directions as needed for pain  Hydrocortisone cream 1% (over the counter) applied 1-2x daily to hemorrhoids as needed  Witch hazel pads for hemorrhoidal discomfort as needed    Discharge instructions :   -Do not place anything (no partner, tampons or douche) in your vagina for 6 weeks  -You may walk for exercise for the first 6 weeks then gradually return to your usual activities    -Please do not drive for 1 week if you have no stitches and for 2 weeks if you have stitches or underwent a  delivery     -You may take baths or shower per your preference    -Please look at your bust (breasts) in the mirror daily and call provider for redness or tenderness or increased warmth  - If you have had a  please look at your incision daily as well and call provider for increasing redness or steady drainage from the incision    -Please call your provider if temperature > 100 4*F or 38* C, worsening pain or a foul discharge      Alberta Ill Jose Rafael Grover 89, [facility] Gynecology PGY-1  6/22/2021  11:04 AM

## 2021-06-17 NOTE — ASSESSMENT & PLAN NOTE
This patient had MRI imaging originally done in October of 2017 at [facility]   I do not have the films available but her report references no cerebellar tonsillar herniation

## 2021-06-18 LAB
ALBUMIN SERPL BCP-MCNC: 2.2 G/DL (ref 3.5–5)
ALBUMIN SERPL BCP-MCNC: 2.4 G/DL (ref 3.5–5)
ALP SERPL-CCNC: 111 U/L (ref 46–116)
ALP SERPL-CCNC: 96 U/L (ref 46–116)
ALT SERPL W P-5'-P-CCNC: 41 U/L (ref 12–78)
ALT SERPL W P-5'-P-CCNC: 48 U/L (ref 12–78)
ANION GAP SERPL CALCULATED.3IONS-SCNC: 8 MMOL/L (ref 4–13)
ANION GAP SERPL CALCULATED.3IONS-SCNC: 8 MMOL/L (ref 4–13)
AST SERPL W P-5'-P-CCNC: 28 U/L (ref 5–45)
AST SERPL W P-5'-P-CCNC: 34 U/L (ref 5–45)
BILIRUB SERPL-MCNC: 0.11 MG/DL (ref 0.2–1)
BILIRUB SERPL-MCNC: 0.13 MG/DL (ref 0.2–1)
BUN SERPL-MCNC: 11 MG/DL (ref 5–25)
BUN SERPL-MCNC: 12 MG/DL (ref 5–25)
CALCIUM ALBUM COR SERPL-MCNC: 8.8 MG/DL (ref 8.3–10.1)
CALCIUM ALBUM COR SERPL-MCNC: 9 MG/DL (ref 8.3–10.1)
CALCIUM SERPL-MCNC: 7.5 MG/DL (ref 8.3–10.1)
CALCIUM SERPL-MCNC: 7.6 MG/DL (ref 8.3–10.1)
CHLORIDE SERPL-SCNC: 106 MMOL/L (ref 100–108)
CHLORIDE SERPL-SCNC: 108 MMOL/L (ref 100–108)
CO2 SERPL-SCNC: 26 MMOL/L (ref 21–32)
CO2 SERPL-SCNC: 26 MMOL/L (ref 21–32)
CREAT SERPL-MCNC: 0.69 MG/DL (ref 0.6–1.3)
CREAT SERPL-MCNC: 0.72 MG/DL (ref 0.6–1.3)
ERYTHROCYTE [DISTWIDTH] IN BLOOD BY AUTOMATED COUNT: 13.8 % (ref 11.6–15.1)
ERYTHROCYTE [DISTWIDTH] IN BLOOD BY AUTOMATED COUNT: 14 % (ref 11.6–15.1)
GFR SERPL CREATININE-BSD FRML MDRD: 118 ML/MIN/1.73SQ M
GFR SERPL CREATININE-BSD FRML MDRD: 122 ML/MIN/1.73SQ M
GLUCOSE SERPL-MCNC: 71 MG/DL (ref 65–140)
GLUCOSE SERPL-MCNC: 91 MG/DL (ref 65–140)
HCT VFR BLD AUTO: 35.1 % (ref 34.8–46.1)
HCT VFR BLD AUTO: 38.4 % (ref 34.8–46.1)
HGB BLD-MCNC: 11.5 G/DL (ref 11.5–15.4)
HGB BLD-MCNC: 12.4 G/DL (ref 11.5–15.4)
MAGNESIUM SERPL-MCNC: 3.4 MG/DL (ref 1.6–2.6)
MAGNESIUM SERPL-MCNC: 5 MG/DL (ref 1.6–2.6)
MCH RBC QN AUTO: 29.7 PG (ref 26.8–34.3)
MCH RBC QN AUTO: 30 PG (ref 26.8–34.3)
MCHC RBC AUTO-ENTMCNC: 32.3 G/DL (ref 31.4–37.4)
MCHC RBC AUTO-ENTMCNC: 32.8 G/DL (ref 31.4–37.4)
MCV RBC AUTO: 91 FL (ref 82–98)
MCV RBC AUTO: 93 FL (ref 82–98)
PLATELET # BLD AUTO: 182 THOUSANDS/UL (ref 149–390)
PLATELET # BLD AUTO: 207 THOUSANDS/UL (ref 149–390)
PMV BLD AUTO: 10.5 FL (ref 8.9–12.7)
PMV BLD AUTO: 10.7 FL (ref 8.9–12.7)
POTASSIUM SERPL-SCNC: 4.2 MMOL/L (ref 3.5–5.3)
POTASSIUM SERPL-SCNC: 4.5 MMOL/L (ref 3.5–5.3)
PROT SERPL-MCNC: 5.7 G/DL (ref 6.4–8.2)
PROT SERPL-MCNC: 6.3 G/DL (ref 6.4–8.2)
RBC # BLD AUTO: 3.87 MILLION/UL (ref 3.81–5.12)
RBC # BLD AUTO: 4.14 MILLION/UL (ref 3.81–5.12)
SODIUM SERPL-SCNC: 140 MMOL/L (ref 136–145)
SODIUM SERPL-SCNC: 142 MMOL/L (ref 136–145)
URATE SERPL-MCNC: 6.4 MG/DL (ref 2–6.8)
URATE SERPL-MCNC: 6.6 MG/DL (ref 2–6.8)
WBC # BLD AUTO: 11.39 THOUSAND/UL (ref 4.31–10.16)
WBC # BLD AUTO: 13.49 THOUSAND/UL (ref 4.31–10.16)

## 2021-06-18 PROCEDURE — 85027 COMPLETE CBC AUTOMATED: CPT | Performed by: OBSTETRICS & GYNECOLOGY

## 2021-06-18 PROCEDURE — 80053 COMPREHEN METABOLIC PANEL: CPT | Performed by: OBSTETRICS & GYNECOLOGY

## 2021-06-18 PROCEDURE — 83735 ASSAY OF MAGNESIUM: CPT | Performed by: OBSTETRICS & GYNECOLOGY

## 2021-06-18 PROCEDURE — 84550 ASSAY OF BLOOD/URIC ACID: CPT | Performed by: OBSTETRICS & GYNECOLOGY

## 2021-06-18 RX ORDER — OXYCODONE HYDROCHLORIDE AND ACETAMINOPHEN 5; 325 MG/1; MG/1
2 TABLET ORAL EVERY 4 HOURS PRN
Status: DISCONTINUED | OUTPATIENT
Start: 2021-06-18 | End: 2021-06-19 | Stop reason: HOSPADM

## 2021-06-18 RX ORDER — IBUPROFEN 600 MG/1
600 TABLET ORAL EVERY 6 HOURS PRN
Status: DISCONTINUED | OUTPATIENT
Start: 2021-06-18 | End: 2021-06-19 | Stop reason: HOSPADM

## 2021-06-18 RX ORDER — OXYCODONE HYDROCHLORIDE AND ACETAMINOPHEN 5; 325 MG/1; MG/1
1 TABLET ORAL EVERY 4 HOURS PRN
Status: DISCONTINUED | OUTPATIENT
Start: 2021-06-18 | End: 2021-06-19 | Stop reason: HOSPADM

## 2021-06-18 RX ADMIN — KETOROLAC TROMETHAMINE 15 MG: 30 INJECTION, SOLUTION INTRAMUSCULAR at 00:17

## 2021-06-18 RX ADMIN — DOCUSATE SODIUM 100 MG: 100 CAPSULE, LIQUID FILLED ORAL at 17:17

## 2021-06-18 RX ADMIN — NIFEDIPINE 30 MG: 30 TABLET, FILM COATED, EXTENDED RELEASE ORAL at 09:08

## 2021-06-18 RX ADMIN — DOCUSATE SODIUM 100 MG: 100 CAPSULE, LIQUID FILLED ORAL at 09:08

## 2021-06-18 NOTE — PLAN OF CARE
Problem: PAIN - ADULT  Goal: Verbalizes/displays adequate comfort level or baseline comfort level  Description: Interventions:  - Encourage patient to monitor pain and request assistance  - Assess pain using appropriate pain scale  - Administer analgesics based on type and severity of pain and evaluate response  - Implement non-pharmacological measures as appropriate and evaluate response  - Consider cultural and social influences on pain and pain management  - Notify physician/advanced practitioner if interventions unsuccessful or patient reports new pain  Outcome: Progressing     Problem: INFECTION - ADULT  Goal: Absence or prevention of progression during hospitalization  Description: INTERVENTIONS:  - Assess and monitor for signs and symptoms of infection  - Monitor lab/diagnostic results  - Monitor all insertion sites, i e  indwelling lines, tubes, and drains  - Monitor endotracheal if appropriate and nasal secretions for changes in amount and color  - [place] appropriate cooling/warming therapies per order  - Administer medications as ordered  - Instruct and encourage patient and family to use good hand hygiene technique  - Identify and instruct in appropriate isolation precautions for identified infection/condition  Outcome: Progressing  Goal: Absence of fever/infection during neutropenic period  Description: INTERVENTIONS:  - Monitor WBC    Outcome: Progressing     Problem: SAFETY ADULT  Goal: Patient will remain free of falls  Description: INTERVENTIONS:  - Educate patient/family on patient safety including physical limitations  - Instruct patient to call for assistance with activity   - Consult OT/PT to assist with strengthening/mobility   - Keep Call bell within reach  - Keep bed low and locked with side rails adjusted as appropriate  - Keep care items and personal belongings within reach  - Initiate and maintain comfort rounds  - Make Fall Risk Sign visible to staff  - Offer Toileting every as needed, in advance of need  - Initiate/Maintain alarm  - Obtain necessary fall risk management equipment  - Apply yellow socks and bracelet for high fall risk patients  - Consider moving patient to room near nurses station  Outcome: Progressing  Goal: Maintain or return to baseline ADL function  Description: INTERVENTIONS:  -  Assess patient's ability to carry out ADLs; assess patient's baseline for ADL function and identify physical deficits which impact ability to perform ADLs (bathing, care of mouth/teeth, toileting, grooming, dressing, etc )  - Assess/evaluate cause of self-care deficits   - Assess range of motion  - Assess patient's mobility; develop plan if impaired  - Assess patient's need for assistive devices and provide as appropriate  - Encourage maximum independence but intervene and supervise when necessary  - Involve family in performance of ADLs  - Assess for home care needs following discharge   - Consider OT consult to assist with ADL evaluation and planning for discharge  - Provide patient education as appropriate  Outcome: Progressing  Goal: Maintains/Returns to pre admission functional level  Description: INTERVENTIONS:  - Perform BMAT or MOVE assessment daily    - Set and communicate daily mobility goal to care team and patient/family/caregiver  - Collaborate with rehabilitation services on mobility goals if consulted  - Perform Range of Motion as needed  - Reposition patient as needed  - Dangle patientas needed  - Stand patient as needed  - Ambulate patient as needed  - Out of bed to chair as needed  - Out of bed for meals as needed  - Out of bed for toileting  - Record patient progress and toleration of activity level   Outcome: Progressing     Problem: Knowledge Deficit  Goal: Patient/family/caregiver demonstrates understanding of disease process, treatment plan, medications, and discharge instructions  Description: Complete learning assessment and assess knowledge base    Interventions:  - Provide teaching at level of understanding  - Provide teaching via preferred learning methods  Outcome: Progressing     Problem: DISCHARGE PLANNING  Goal: Discharge to home or other facility with appropriate resources  Description: INTERVENTIONS:  - Identify barriers to discharge w/patient and caregiver  - Arrange for needed discharge resources and transportation as appropriate  - Identify discharge learning needs (meds, wound care, etc )  - Arrange for interpretive services to assist at discharge as needed  - Refer to Case Management Department for coordinating discharge planning if the patient needs post-hospital services based on physician/advanced practitioner order or complex needs related to functional status, cognitive ability, or social support system  Outcome: Progressing     Problem: POSTPARTUM  Goal: Experiences normal postpartum course  Description: INTERVENTIONS:  - Monitor maternal vital signs  - Assess uterine involution and lochia  Outcome: Progressing  Goal: Appropriate maternal -  bonding  Description: INTERVENTIONS:  - Identify family support  - Assess for appropriate maternal/infant bonding   -Encourage maternal/infant bonding opportunities  - Referral to  or  as needed  Outcome: Progressing  Goal: Establishment of infant feeding pattern  Description: INTERVENTIONS:  - Assess breast/bottle feeding  - Refer to lactation as needed  Outcome: Progressing  Goal: Incision(s), wounds(s) or drain site(s) healing without S/S of infection  Description: INTERVENTIONS  - Assess and document dressing, incision, wound bed, drain sites and surrounding tissue  - Provide patient and family education  - Perform skin care/dressing changes as needed  Outcome: Progressing     Problem: Potential for Falls  Goal: Patient will remain free of falls  Description: INTERVENTIONS:  - Educate patient/family on patient safety including physical limitations  - Instruct patient to call for assistance with activity   - Consult OT/PT to assist with strengthening/mobility   - Keep Call bell within reach  - Keep bed low and locked with side rails adjusted as appropriate  - Keep care items and personal belongings within reach  - Initiate and maintain comfort rounds  - Make Fall Risk Sign visible to staff  - Offer Toileting as needed, in advance of need  - Initiate/Maintain alarm  - Obtain necessary fall risk management equipment  - Apply yellow socks and bracelet for high fall risk patients  - Consider moving patient to room near nurses station  Outcome: Progressing

## 2021-06-18 NOTE — CASE MANAGEMENT
CM received consult for Spectra S2 breast pump      Referral sent via [employer] to [employer] for requested pump for room delivery preference  Pump approved and to be delivered by liaison to room  ALEXUS made [facility] Liaison aware of referral  CM spoke to patient to introduce self, explain role, and update them a liaison from [employer] will be delivering pump to patient bedside today  Patient appreciates update  CM verified with patient they have support at home, most supplies needed and will purchase rest as needed, and reports no further concerns from CM at this time  CM encouraged patient to reach out with any questions or concerns  CM will be available for further needs

## 2021-06-18 NOTE — PROGRESS NOTES
Progress Note - OB/GYN   Ivone Quinn 26 y o  female MRN: [id number]  Unit/Bed#:  322-01 Encounter: [id number]    Assessment:  POD#2 s/p primary classical  section, stable, baby in NICU    Plan:  1) Post-op care  Encourage ambulation   Encourage breastfeeding  Continue current meds   Hgb 12 5 --> 12 2 --> 11 5  2) Urbina catheter   Voiding spontaneously  3) PreE w/ SF   Procardia 30 XL started /15   P:c 0 71   S/p magnesium   Cr: 0 5 --> 0 6 --> 0 67 --> 0 69   AST/ALT:  --> 37/48 --> 28/   Bps 120-140/80-90  4) Intractable headache   - Resolved after delivery  5) Disposition: home POD4    Subjective/Objective     Subjective:     Pain: no  Tolerating PO: yes  Voiding: yes  Flatus: yes  BM: no  Ambulating: yes  Breastfeeding: Using breast pump  Chest pain: no  Shortness of breath: no  Leg pain: no    Objective:     Vitals:  Vitals:    21 2158 21 0000 21 0433   BP: 131/85 121/67 130/88 140/90   BP Location: Right arm Right arm Left arm Left arm   Pulse: 60 65 58 58   Resp:    Temp: 98 °F (36 7 °C) 98 5 °F (36 9 °C) 98 8 °F (37 1 °C) 98 8 °F (37 1 °C)   TempSrc: Oral Oral Oral Oral   SpO2: 98% 98% 98% 98%   Weight:       Height:           Physical Exam:   GEN:appears well, alert and oriented x 3, pleasant and cooperative   CV: RRR, no m/r/g  Lungs: CTA b/l, no w/r/r  ABDOMEN: soft, moderate tenderness throughout abdomen,   Uterine fundus firm and non-tender, at the umbilicus, Incision C/D/I  EXTREMITIES: non-tender, no edema       Lab Results   Component Value Date    WBC 11 39 (H) 2021    HGB 11 5 2021    HCT 35 1 2021    MCV 91 2021     2021           Dori Fermin DO, PGY-1  Obstetrics & Gynecology  21  7:21 AM

## 2021-06-19 VITALS
TEMPERATURE: 98.1 F | HEIGHT: 68 IN | DIASTOLIC BLOOD PRESSURE: 86 MMHG | RESPIRATION RATE: 20 BRPM | SYSTOLIC BLOOD PRESSURE: 152 MMHG | BODY MASS INDEX: 32.43 KG/M2 | OXYGEN SATURATION: 96 % | HEART RATE: 88 BPM | WEIGHT: 214 LBS

## 2021-06-19 PROBLEM — O36.5930 INTRAUTERINE GROWTH RESTRICTION AFFECTING ANTEPARTUM CARE OF MOTHER IN THIRD TRIMESTER: Status: RESOLVED | Noted: 2021-06-03 | Resolved: 2021-06-19

## 2021-06-19 PROBLEM — Z3A.31 31 WEEKS GESTATION OF PREGNANCY: Status: RESOLVED | Noted: 2021-01-05 | Resolved: 2021-06-19

## 2021-06-19 PROBLEM — O09.899 HIGH RISK PREGNANCY WITH HIGH INHIBIN: Status: RESOLVED | Noted: 2021-03-29 | Resolved: 2021-06-19

## 2021-06-19 PROBLEM — O21.0 HYPEREMESIS GRAVIDARUM: Status: RESOLVED | Noted: 2021-01-30 | Resolved: 2021-06-19

## 2021-06-19 PROBLEM — O28.8 HIGH RISK PREGNANCY WITH HIGH INHIBIN: Status: RESOLVED | Noted: 2021-03-29 | Resolved: 2021-06-19

## 2021-06-19 PROCEDURE — NC001 PR NO CHARGE: Performed by: OBSTETRICS & GYNECOLOGY

## 2021-06-19 RX ORDER — ACETAMINOPHEN 325 MG/1
650 TABLET ORAL EVERY 4 HOURS PRN
Refills: 0
Start: 2021-06-19 | End: 2021-09-21

## 2021-06-19 RX ORDER — NIFEDIPINE 30 MG/1
30 TABLET, FILM COATED, EXTENDED RELEASE ORAL DAILY
Qty: 30 TABLET | Refills: 0 | Status: SHIPPED | OUTPATIENT
Start: 2021-06-20 | End: 2021-09-21

## 2021-06-19 RX ORDER — IBUPROFEN 600 MG/1
600 TABLET ORAL EVERY 6 HOURS PRN
Qty: 20 TABLET | Refills: 0 | Status: SHIPPED | OUTPATIENT
Start: 2021-06-19 | End: 2021-09-21

## 2021-06-19 RX ADMIN — NIFEDIPINE 30 MG: 30 TABLET, FILM COATED, EXTENDED RELEASE ORAL at 08:15

## 2021-06-19 RX ADMIN — DOCUSATE SODIUM 100 MG: 100 CAPSULE, LIQUID FILLED ORAL at 08:15

## 2021-06-19 NOTE — DISCHARGE INSTRUCTIONS
Section   WHAT YOU SHOULD KNOW:   A  delivery, or , is abdominal surgery to deliver your baby  There are many reasons you may need a   · A  may be scheduled before labor if you had a  with your last baby  It may be scheduled if your baby is not positioned normally, or you are pregnant with more than 1 baby  · Your caregiver may perform an emergency  during labor to prevent life-threatening complications for you or your baby  A  may be done if your cervix does not dilate after several hours of active labor  · Other reasons for a  include maternal infections and problems with the placenta  CARE AGREEMENT:   You have the right to help plan your care  Learn about your health condition and how it may be treated  Discuss treatment options with your caregivers to decide what care you want to receive  You always have the right to refuse treatment  RISKS:   · You could develop an infection in your urinary tract, surgical incision, or uterus  You could have heavy bleeding, or your bladder or bowel could be injured during surgery  You could develop a blood clot  A serious infection or blood clot could be life-threatening  · There is a small risk that your baby's skin could be cut during the surgery  A  increases the risk that your baby will need to be admitted to the intensive care unit after birth  WHILE YOU ARE HERE:   Before your surgery:   · Informed consent  is a legal document that explains the tests, treatments, or procedures that you may need  Informed consent means you understand what will be done and can make decisions about what you want  You give your permission when you sign the consent form  You can have someone sign this form for you if you are not able to sign it  You have the right to understand your medical care in words you know   Before you sign the consent form, understand the risks and benefits of what will be done  Make sure all your questions are answered  · An IV  is a small tube placed in your vein that is used to give you medicine or liquids  · Anesthesia  is used to numb you below the waist  Your caregiver puts a shot of medicine in your lower spine  You will remain awake during the surgery  · A Urbina catheter  is a tube put into your bladder to drain urine into a bag  Keep the bag below your waist  This will prevent urine from flowing back into your bladder and causing an infection or other problems  Also, keep the tube free of kinks so the urine will drain properly  Do not pull on the catheter  This can cause pain and bleeding, and may cause the catheter to come out  · Antibiotics  may be given to prevent a bacterial infection  During your surgery:  Caregivers will remove any hair on your abdomen  A drape will be placed over your abdomen to reduce the risk of infection  An incision will be made across your lower abdomen, just above your pubic hairline  Another incision is made in your uterus  Caregivers will remove your baby through these incisions  The incision in your uterus will be closed with stitches  The incision in your abdomen will be closed with stitches or staples and covered with a bandage  After your surgery: You will be taken to a room to rest until you are fully awake  Caregivers will monitor you closely for any problems  Do not get out of bed until your caregiver says it is okay  When your caregiver sees that you are okay, you will be taken to your hospital room  · Medicines:      ¨ Antibiotic medicine  may be given to prevent a bacterial infection  ¨ Pain medicine  may be given to decrease pain  Do not wait until the pain is severe before you ask for more medicine  · You may need to walk around the same day of surgery , or the day after  Movement helps prevent blood clots  Do not get out of bed on your own   Caregivers will help you stand up or walk around as soon as your pain is controlled  You may also be given exercises to do in bed  · You may take a bath or shower after your bandage is removed  This is usually 1 to 2 days after your surgery  · You should be able to go home 2 to 4 days after your surgery  Your caregiver will give you instructions on wound care and activity limits  © 2014 [address] is for End User's use only and may not be sold, redistributed or otherwise used for commercial purposes  All illustrations and images included in CareNotes® are the copyrighted property of A D A M , Inc  or Fede Guillen  The above information is an  only  It is not intended as medical advice for individual conditions or treatments  Talk to your doctor, nurse or pharmacist before following any medical regimen to see if it is safe and effective for you  Hipertensión katie el embarazo   LO QUE NECESITA SABER:   La hipertensión es la presión arterial ofelia  [place] presión arterial normal es 119/79 o inferior  La hipertensión katie el embarazo es entre 140/90 o Cherylyn Danker  La hipertensión grave es de al menos 160/110  Miguel o ambos números de estas lecturas pueden ser altos  La hipertensión puede comenzar antes de que usted Shine Miner o se puede desarrollar katie el Marni Reeve  El embarazo puede causar presión arterial ofelia o puede desarrollarse debido a otros factores de riesgo que tenía antes de Terr[place] Sovereign  Es importante hacerse pruebas y recibir tratamiento para la presión arterial elevada o la hipertensión katie el Marni Reeve  [place] puede prevenir problemas con usted o bhardwaj bebé  INSTRUCCIONES SOBRE EL OFELIA HOSPITALARIA:   Llame al [employer] de emergencias (911 en los [place]), o pídale a alguien que llame si:  · Usted sufre lucy convulsión  · Usted tiene dolor en el pecho  · Usted se desmaya o pierde el conocimiento      · Tiene alguno de los siguientes signos de un ataque cardíaco:      ? Estrujamiento, presión o tensión en bhardwaj pecho    ? Usted también podría presentar alguno de los siguientes:     § Malestar o dolor en bhardwaj espalda, adán, mandíbula, abdomen, o brazo    § Falta de [employer] o vómitos    § Desvanecimiento o sudor frío repentino    [employer] atención médica de inmediato si:  · Usted tiene un eron dolor de trevor o pérdida de la visión  · Usted tiene debilidad en un brazo o en lucy pierna  · Usted tiene líquido o sangrado vaginal que no se detiene  · Usted siente un chorro de líquido que sale de bhardwaj vagina  · Usted siente un cambio en el movimiento de bhardwaj bebé o siente menos de 6 a 10 movimientos en lucy hora  Llame a bhardwaj médico u obstetra si:  · Usted hilario de orinar u orina menos de lo normal     · Tiene dolor abdominal intenso con o sin náuseas y vómitos  · Usted tiene inflamación nueva o creciente en la krystin o las kasia, o aumento de peso repentino  · Usted tiene preguntas o inquietudes acerca de bharwdaj condición o cuidado  Medicamentos: Es posible que usted necesite alguno de los siguientes:  · Medicamentos podrían administrarse para bajar bhardwaj presión arterial  La dosis del medicamento para la presión arterial que vincenzo actualmente puede cambiar  · La dosis baja diaria de aspirina podría recomendarse si tiene un riesgo alto de preeclampsia  La aspirina puede ayudar a evitar la preeclampsia o los problemas que puede causar  No tome aspirina a menos que bhardwaj médico se lo indique  · [place] tawny medicamentos gagan se le haya indicado  Consulte con bhardwaj médico si usted angelo que bhardwaj medicamento no le está ayudando o si presenta efectos secundarios  Infórmele si es alérgico a algún medicamento  Mantenga lucy lista actualizada de los [place], las vitaminas y los productos herbales que vincenzo  Incluya los siguientes datos de los medicamentos: cantidad, frecuencia y motivo de administración   Traiga con usted la lista o los envases de las píldoras a tawny citas de seguimiento  Lleve la lista de los medicamentos con usted en dorcas de lucy emergencia  Manejar la hipertensión katie el embarazo:  · Acuda a todas las consultas de control programadas  El médico le revisará la presión arterial y puede pedirle otros exámenes  · Repose según le indicaron  Bhardwaj médico podría indicarle que descanse más a menudo si tiene síntomas leves de preeclampsia  · Revise bhardwaj presión arterial gagan se le indicó si tiene hipertensión crónica  [place]nt[place] y descanse por 5 minutos antes de tomarse la presión arterial  Extienda bhardwaj brazo y apóyelo en lucy superficie plana  Bhardwaj brazo debe estar a la misma altura que bhardwaj corazón  Siga las instrucciones que vienen con el monitor para la presión arterial o tensiómetro  [place] los medicamentos para la presión arterial tan a menudo gagan se lo hayan indicado  Mantenga un registro de las lecturas de bhardwaj presión arterial y llévelo consigo a tawny consultas  · No consuma alcohol ni fume  El alcohol, la nicotina y otros químicos en los cigarrillos y cigarros pueden aumentar la presión arterial  También pueden dañar al bebé  Pida información a bhardwaj médico si usted actualmente vincenzo alcohol o fuma y [place] para dejar de hacerlo  Los cigarrillos electrónicos o el tabaco sin humo igualmente contienen nicotina  Consulte con bhardwaj médico antes de [employer]  · Consuma alimentos saludables  Los alimentos saludables pueden ayudarle a controlar bhardwaj presión arterial  Los alimentos saludables incluyen frutas, verduras, pan integral, productos lácteos bajos en grasa, frijoles, cielo magras y pescado  Pregunte si necesita seguir lucy dieta especial          · Ejercítese si se lo indicaron  El ejercicio puede ayudar a bajar bhardwaj presión arterial  Pregúntele a bhardwaj médico cuánto ejercicio necesita usted y cuál ejercicio es adecuado para usted  · David el conteo de las patadas gagan le hayan indicado   Es posible que necesite llevar un registro de la frecuencia con que bhardwaj bebé se mueve o patea katie un cierto lapso de tiempo  Pregúntele a bhardwaj obstétrico cómo contar las patadas y con qué frecuencia debe hacerlo  · Revise bhardwaj peso todos los GRASSE  Pésese diariamente antes de desayunar  El aumento de peso puede ser lucy señal de exceso de líquido en el cuerpo  Acuda a tawny consultas de control con bhardwaj médico u obstetra según le indicaron: Anote tawny preguntas para que se acuerde de hacerlas katie tawny visitas  © [employer] Information is for End User's use only and may not be sold, redistributed or otherwise used for commercial purposes  All illustrations and images included in CareNotes® are the copyrighted property of A D A G[employer]  or [address] [place] [address] es sólo para uso en educación  Bhardwaj intención no es darle un consejo médico sobre enfermedades o tratamientos  Colsulte con bhardwaj Raymundo Keas farmacéutico antes de seguir cualquier régimen médico para saber si es seguro y efectivo para usted

## 2021-06-19 NOTE — PROGRESS NOTES
Post- Progress note    Patient is post-op day 3 from a Primary Low Transverse  delivery  Pain: no  Tolerating Oral Intake: yes  Voiding: yes  Flatus: yes  Bowel Movement: no  Ambulating: yes  Breastfeeding: Using breast pump  Chest Pain: no  Shortness of Breath: no  Leg Pain/Discomfort: no  Lochia: minimal      Objective:   Vitals:    21 0843   BP: 152/86   Pulse: 88   Resp: 20   Temp: 98 1 °F (36 7 °C)   SpO2:      No intake or output data in the 24 hours ending 21 1246    Physical Exam:  General: in no apparent distress, well developed and well nourished, non-toxic, in no respiratory distress and acyanotic, alert, oriented times 3, afebrile, normal vitals, cooperative and well hydrated  Cardiovascular: Cor RRR  Lungs: clear to auscultation bilaterally  Abdomen: abdomen is soft without significant tenderness, masses, organomegaly or guarding  Fundus: Firm and non-tender, 2 below the umbilicus  Incision: clean, dry, and intact  Lower extremeties: nontender      Labs/Tests: Results for Emely Young (MRN [id number]) as of 2021 12:48   Ref   Range 2021 04:38   Sodium Latest Ref Range: 136 - 145 mmol/L 142   Potassium Latest Ref Range: 3 5 - 5 3 mmol/L 4 2   Chloride Latest Ref Range: 100 - 108 mmol/L 108   CO2 Latest Ref Range: 21 - 32 mmol/L 26   Anion Gap Latest Ref Range: 4 - 13 mmol/L 8   BUN Latest Ref Range: 5 - 25 mg/dL 12   Creatinine Latest Ref Range: 0 60 - 1 30 mg/dL 0 69   Glucose, Random Latest Ref Range: 65 - 140 mg/dL 71   Calcium Latest Ref Range: 8 3 - 10 1 mg/dL 7 6 (L)   CORRECTED CALCIUM Latest Ref Range: 8 3 - 10 1 mg/dL 9 0   AST Latest Ref Range: 5 - 45 U/L 28   ALT Latest Ref Range: 12 - 78 U/L 41   Alkaline Phosphatase Latest Ref Range: 46 - 116 U/L 96   Total Protein Latest Ref Range: 6 4 - 8 2 g/dL 5 7 (L)   Albumin Latest Ref Range: 3 5 - 5 0 g/dL 2 2 (L)   TOTAL BILIRUBIN Latest Ref Range: 0 20 - 1 00 mg/dL 0 13 (L)   eGFR Latest Units: ml/min/1 73sq m 122   Magnesium Latest Ref Range: 1 6 - 2 6 mg/dL 3 4 (H)   WBC Latest Ref Range: 4 31 - 10 16 Thousand/uL 11 39 (H)   Red Blood Cell Count Latest Ref Range: 3 81 - 5 12 Million/uL 3 87   Hemoglobin Latest Ref Range: 11 5 - 15 4 g/dL 11 5   HCT Latest Ref Range: 34 8 - 46 1 % 35 1   MCV Latest Ref Range: 82 - 98 fL 91   MCH Latest Ref Range: 26 8 - 34 3 pg 29 7   MCHC Latest Ref Range: 31 4 - 37 4 g/dL 32 8   RDW Latest Ref Range: 11 6 - 15 1 % 13 8   Platelet Count Latest Ref Range: 149 - 390 Thousands/uL 182   MPV Latest Ref Range: 8 9 - 12 7 fL 10 7   URIC ACID Latest Ref Range: 2 0 - 6 8 mg/dL 6 4         Assessment and Plan:  26y o  year-old , postoperative day 3 status-post Primary Low Transverse  delivery    Continue routine postpartum care  Encourage ambulation  Advance diet as tolerated  Patient stable for discharge - discharge instrucitons reviewed;  Continue Procardia XL 30mg po q day and monitor Bps at home;  [facility] for BP and incision check in one week    Madeleine MARTINEZ [facility] MD  OB/GYN Hospitalist  [phone]  2021   12:50 PM

## 2021-06-19 NOTE — CASE MANAGEMENT
AMY VAUGHAN spoke with patient re: breast pump  Pt made aware that because she received pump in 2016 she has exhausted her benefit and an additional pump would not be covered through insurance  AMY VAUGHAN made RN and lactation consultant of aware  Requested RN to provide a manual pump

## 2021-06-19 NOTE — LACTATION NOTE
Mom states she continues to pump for her infant in NICU  Encouraged every 3 hours pumping with breast massage and hand expression  Pt does not qualify for a breast pump for home  Discussed other places to obtain one  To take hand pump home

## 2021-06-19 NOTE — PLAN OF CARE
Problem: PAIN - ADULT  Goal: Verbalizes/displays adequate comfort level or baseline comfort level  Description: Interventions:  - Encourage patient to monitor pain and request assistance  - Assess pain using appropriate pain scale  - Administer analgesics based on type and severity of pain and evaluate response  - Implement non-pharmacological measures as appropriate and evaluate response  - Consider cultural and social influences on pain and pain management  - Notify physician/advanced practitioner if interventions unsuccessful or patient reports new pain  6/19/2021 1131 by Kim Christopher RN  Outcome: Progressing  6/19/2021 1130 by Kim Christopher RN  Outcome: Progressing     Problem: INFECTION - ADULT  Goal: Absence or prevention of progression during hospitalization  Description: INTERVENTIONS:  - Assess and monitor for signs and symptoms of infection  - Monitor lab/diagnostic results  - Monitor all insertion sites, i e  indwelling lines, tubes, and drains  - Monitor endotracheal if appropriate and nasal secretions for changes in amount and color  - [place] appropriate cooling/warming therapies per order  - Administer medications as ordered  - Instruct and encourage patient and family to use good hand hygiene technique  - Identify and instruct in appropriate isolation precautions for identified infection/condition  6/19/2021 1131 by Kim Christopher RN  Outcome: Progressing  6/19/2021 1130 by Kim Christopher RN  Outcome: Progressing  Goal: Absence of fever/infection during neutropenic period  Description: INTERVENTIONS:  - Monitor WBC    6/19/2021 1131 by Kim Christopher RN  Outcome: Progressing  6/19/2021 1130 by Kim Christopher RN  Outcome: Progressing     Problem: SAFETY ADULT  Goal: Patient will remain free of falls  Description: INTERVENTIONS:  - Educate patient/family on patient safety including physical limitations  - Instruct patient to call for assistance with activity   - Consult OT/PT to assist with strengthening/mobility   - Keep Call bell within reach  - Keep bed low and locked with side rails adjusted as appropriate  - Keep care items and personal belongings within reach  - Initiate and maintain comfort rounds  - Make Fall Risk Sign visible to  - Apply yellow socks and bracelet for high fall risk patients  - Consider moving patient to room near nurses station  6/19/2021 1131 by Ngoc Curtis RN  Outcome: Progressing  6/19/2021 1130 by Ngoc Curtis RN  Outcome: Progressing  Goal: Maintain or return to baseline ADL function  Description: INTERVENTIONS:  -  Assess patient's ability to carry out ADLs; assess patient's baseline for ADL function and identify physical deficits which impact ability to perform ADLs (bathing, care of mouth/teeth, toileting, grooming, dressing, etc )  - Assess/evaluate cause of self-care deficits   - Assess range of motion  - Assess patient's mobility; develop plan if impaired  - Assess patient's need for assistive devices and provide as appropriate  - Encourage maximum independence but intervene and supervise when necessary  - Involve family in performance of ADLs  - Assess for home care needs following discharge   - Consider OT consult to assist with ADL evaluation and planning for discharge  - Provide patient education as appropriate  6/19/2021 1131 by Ngoc Curtis RN  Outcome: Progressing  6/19/2021 1130 by Ngoc Curtis RN  Outcome: Progressing  Goal: Maintains/Returns to pre admission functional level  Description: INTERVENTIONS:  - Perform BMAT or MOVE assessment daily    - Set and communicate daily mobility goal to care team and patient/family/caregiver     - Collaborate with rehabilitation services on mobility goals if consu    - Out of bed for toileting  - Record patient progress and toleration of activity level   6/19/2021 1131 by Ngoc Curtis RN  Outcome: Progressing  6/19/2021 1130 by Ngoc Curtis RN  Outcome: Progressing     Problem: Knowledge Deficit  Goal: Patient/family/caregiver demonstrates understanding of disease process, treatment plan, medications, and discharge instructions  Description: Complete learning assessment and assess knowledge base    Interventions:  - Provide teaching at level of understanding  - Provide teaching via preferred learning methods  2021 1131 by Kam Pratt RN  Outcome: Progressing  2021 1130 by Kam Pratt RN  Outcome: Progressing     Problem: DISCHARGE PLANNING  Goal: Discharge to home or other facility with appropriate resources  Description: INTERVENTIONS:  - Identify barriers to discharge w/patient and caregiver  - Arrange for needed discharge resources and transportation as appropriate  - Identify discharge learning needs (meds, wound care, etc )  - Arrange for interpretive services to assist at discharge as needed  - Refer to Case Management Department for coordinating discharge planning if the patient needs post-hospital services based on physician/advanced practitioner order or complex needs related to functional status, cognitive ability, or social support system  2021 1131 by Kam Pratt RN  Outcome: Progressing  2021 1130 by Kam Pratt RN  Outcome: Progressing     Problem: POSTPARTUM  Goal: Experiences normal postpartum course  Description: INTERVENTIONS:  - Monitor maternal vital signs  - Assess uterine involution and lochia  2021 1131 by Kam Pratt RN  Outcome: Progressing  2021 1130 by Kam Pratt RN  Outcome: Progressing  Goal: Appropriate maternal -  bonding  Description: INTERVENTIONS:  - Identify family support  - Assess for appropriate maternal/infant bonding   -Encourage maternal/infant bonding opportunities  - Referral to  or  as needed  2021 1131 by Kam Pratt RN  Outcome: Progressing  2021 1130 by Kam Pratt RN  Outcome: Progressing  Goal: Establishment of infant feeding pattern  Description: INTERVENTIONS:  - Assess breast/bottle feeding  - Refer to lactation as needed  6/19/2021 1131 by Kim Christopher RN  Outcome: Progressing  6/19/2021 1130 by Kim Christopher RN  Outcome: Progressing  Goal: Incision(s), wounds(s) or drain site(s) healing without S/S of infection  Description: INTERVENTIONS  - Assess and document dressing, incision, wound bed, drain sites and surrounding tissue  - Provide patient and family Tori Jacob  6/19/2021 1131 by Kim Christopher RN  Outcome: Progressing  6/19/2021 1130 by Kim Christopher RN  Outcome: Progressing     Problem: Potential for Falls  Goal: Patient will remain free of falls  Description: INTERVENTIONS:  - Educate patient/family on patient safety including physical limitations  - Instruct patient to call for assistance with activity   - Consult OT/PT to assist with strengthening/mobility   - Keep Call bell within reach  - Keep bed low and locked with side rails adjusted as appropriate  - Keep care items and personal belongings within reach  - Initiate and maintain comfort rounds  - Make Fall Risk Sign visible to staff  - Apply yellow socks and bracelet for high fall risk patients  - Consider moving patient to room near nurses station  6/19/2021 1131 by Kim Christopher RN  Outcome: Progressing  6/19/2021 1130 by Kim Christopher RN  Outcome: Progressing

## 2021-06-28 ENCOUNTER — OFFICE VISIT (OUTPATIENT)
Dept: OBGYN CLINIC | Facility: CLINIC | Age: 26
End: 2021-06-28

## 2021-06-28 VITALS
BODY MASS INDEX: 31.37 KG/M2 | HEIGHT: 68 IN | SYSTOLIC BLOOD PRESSURE: 129 MMHG | HEART RATE: 79 BPM | DIASTOLIC BLOOD PRESSURE: 89 MMHG | WEIGHT: 207 LBS

## 2021-06-28 DIAGNOSIS — G93.2 IIH (IDIOPATHIC INTRACRANIAL HYPERTENSION): ICD-10-CM

## 2021-06-28 DIAGNOSIS — G93.5 CHIARI MALFORMATION TYPE I (HCC): ICD-10-CM

## 2021-06-28 DIAGNOSIS — H47.10 PAPILLEDEMA: ICD-10-CM

## 2021-06-28 DIAGNOSIS — Z98.891 STATUS POST PRIMARY LOW TRANSVERSE CESAREAN SECTION: ICD-10-CM

## 2021-06-28 DIAGNOSIS — O14.93 PRE-ECLAMPSIA DURING PREGNANCY IN THIRD TRIMESTER, ANTEPARTUM: Primary | ICD-10-CM

## 2021-06-28 PROCEDURE — T1015 CLINIC SERVICE: HCPCS | Performed by: OBSTETRICS & GYNECOLOGY

## 2021-06-28 PROCEDURE — 99213 OFFICE O/P EST LOW 20 MIN: CPT | Performed by: OBSTETRICS & GYNECOLOGY

## 2021-06-28 NOTE — PROGRESS NOTES
Assessment     Status post primary low transverse  section    Doing well postoperatively  Plan    1  Continue current medications  2  Wound care discussed  3  Increase activity as tolerated  4  Anticipated return to work: 8 - 12 weeks post partum  5  Follow up at 6 weeks post delivery for postpartum checkup  Roxie Singh is a 26 y o  y o  female who presents approximately one week post hospital discharge following a post  on 21  She is eating a regular diet without difficulty  Bowel movements are normal  The patient is not having any pain  The patient is pumping  Allergies  Patient has no known allergies  Medications    Current Outpatient Medications:     acetaminophen (TYLENOL) 325 mg tablet, Take 2 tablets (650 mg total) by mouth every 4 (four) hours as needed for mild pain, Disp: , Rfl: 0    famotidine (PEPCID) 20 mg tablet, Take 1 tablet (20 mg total) by mouth 2 (two) times a day 30 min before breakfast and dinner, Disp: 180 tablet, Rfl: 1    ibuprofen (MOTRIN) 600 mg tablet, Take 1 tablet (600 mg total) by mouth every 6 (six) hours as needed for mild pain, Disp: 20 tablet, Rfl: 0    NIFEdipine (ADALAT CC) 30 MG 24 hr tablet, Take 1 tablet (30 mg total) by mouth daily, Disp: 30 tablet, Rfl: 0    Prenatal Vit-Fe Fumarate-FA (PRENATAL VITAMINS PO), Take 1 tablet by mouth daily, Disp: , Rfl:     Review of Systems  Denies Fevers/chills/N/V/constipation/ excessive vaginal bleeding/excessive pain/dysuria/frequency of urination  Also as noted in HPI        Objective    Vitals:    21 1449   BP: 129/89   Pulse: 79     General: alert and oriented, in no acute distress  Abdomen: soft, bowel sounds active, non-tender  Incision: healing well, no drainage, no erythema, no hernia, no seroma, no swelling, no dehiscence, incision well approximated

## 2021-07-01 ENCOUNTER — TELEPHONE (OUTPATIENT)
Dept: INTERNAL MEDICINE CLINIC | Facility: CLINIC | Age: 26
End: 2021-07-01

## 2021-07-01 NOTE — TELEPHONE ENCOUNTER
Emmanuel Calhoun from [employer] called stating patient has an appt on 07/9/2021 w/ Neuro  Will need a referral placed   Please see below for diagnoses code    [id number]  [id number]  I10

## 2021-07-09 ENCOUNTER — OFFICE VISIT (OUTPATIENT)
Dept: NEUROLOGY | Facility: CLINIC | Age: 26
End: 2021-07-09
Payer: COMMERCIAL

## 2021-07-09 VITALS
DIASTOLIC BLOOD PRESSURE: 72 MMHG | WEIGHT: 202.3 LBS | BODY MASS INDEX: 30.66 KG/M2 | HEART RATE: 80 BPM | HEIGHT: 68 IN | SYSTOLIC BLOOD PRESSURE: 102 MMHG

## 2021-07-09 DIAGNOSIS — G93.5 CHIARI MALFORMATION TYPE I (HCC): ICD-10-CM

## 2021-07-09 DIAGNOSIS — G93.2 IIH (IDIOPATHIC INTRACRANIAL HYPERTENSION): ICD-10-CM

## 2021-07-09 PROCEDURE — 1036F TOBACCO NON-USER: CPT | Performed by: PSYCHIATRY & NEUROLOGY

## 2021-07-09 PROCEDURE — 99214 OFFICE O/P EST MOD 30 MIN: CPT | Performed by: PSYCHIATRY & NEUROLOGY

## 2021-07-09 NOTE — PATIENT INSTRUCTIONS
Top priority   *Most importantly, follow-up with Ophthalmology for dilated fundus exam to see if you have any signs of papilledema and he will have to continue to follow with them closely if you do - please call me or mychart me on my chart after you see them and let me know what they said and also try to have them fax the results to us    Second  Ask the pediatrician doctors about the safety of diamox in breastfeeding if we need to use it     [facility] 60 medical records if they have the MRI of her brain and if they do please get on CD so I can review the images and we can upload them to our system     If any worsening or new or concerning symptoms especially if any vision loss, go to the emergency department where I recommend a consider a lumbar puncture/spinal tap like he had before     Headache/migraine treatment:   Abortive medications (for immediate treatment of a headache): It is ok to take ibuprofen, acetaminophen or naproxen (Advil, Tylenol,  Aleve, Excedrin) if they help your headaches you should limit these to No more than 3 times a week to avoid medication overuse/rebound headaches         Prescription preventive medications for headaches/migraines   (to take every day to help prevent headaches - not to take at the time of headache):  [x] we will consider if needed after eye exam - acetazolamide        Self-Monitoring:  [x] Headache calendar  Each day andre a number from 0-10 indicating if there was a headache and how bad it was  This can be used to monitor gradual improvement and is helpful to make medication adjustments  You can do this on paper or there is an TK for a smart phone called "Migraine e Diary"       Lifestyle Recommendations:  [x] SLEEP - Maintain a regular sleep schedule: Adults need at least 7-8 hours of uninterrupted a night   Maintain good sleep hygiene:  Going to bed and waking up at consistent times, avoiding excessive daytime naps, avoiding caffeinated beverages in the evening, avoid excessive stimulation in the evening and generally using bed primarily for sleeping  One hour before bedtime would recommend turning lights down lower, decreasing your activity (may read quietly, listen to music at a low volume)  When you get into bed, should eliminate all technology (no texting, emailing, playing with your phone, iPad or tablet in bed)  [x] HYDRATION - Maintain good hydration  Drink  2L of fluid a day (4 typical small water bottles)  [x] DIET - Maintain good nutrition  In particular don't skip meals and try and eat healthy balanced meals regularly  [x] TRIGGERS - Look for other triggers and avoid them: Limit caffeine to 1-2 cups a day or less  Avoid dietary triggers that you have noticed bring on your headaches (this could include aged cheese, peanuts, MSG, aspartame and nitrates)  [x] EXERCISE - physical exercise as we all know is good for you in many ways, and not only is good for your heart, but also is beneficial for your mental health, cognitive health and  chronic pain/headaches  I would encourage at the least 5 days of physical exercise weekly for at least 30 minutes       Education and Follow-up  [x] Please call with any questions or concerns  Of course if any new concerning symptoms go to the emergency department    [x] Follow up 3 months

## 2021-07-09 NOTE — PROGRESS NOTES
Review of Systems   Constitutional: Negative  Negative for appetite change and fever  HENT: Negative  Negative for hearing loss, tinnitus, trouble swallowing and voice change  Eyes: Positive for photophobia  Negative for pain  Respiratory: Negative  Negative for shortness of breath  Cardiovascular: Negative  Negative for palpitations  Gastrointestinal: Negative  Negative for nausea and vomiting  Endocrine: Negative  Negative for cold intolerance  Genitourinary: Negative  Negative for dysuria, frequency and urgency  Musculoskeletal: Negative  Negative for myalgias and neck pain  Skin: Negative  Negative for rash  Neurological: Positive for headaches (Sometimes)  Negative for dizziness, tremors, seizures, syncope, facial asymmetry, speech difficulty, weakness, light-headedness and numbness  Hematological: Negative  Does not bruise/bleed easily  Psychiatric/Behavioral: Negative  Negative for confusion, hallucinations and sleep disturbance

## 2021-07-09 NOTE — PROGRESS NOTES
[address] [facility] Consult - Follow up   PATIENT:  Cain Muri  MRN:  [id number]  :  1995  DATE OF SERVICE:  2021  REFERRED BY: Ellen Shipley PA-C  PMD: Carmelina Aparicio PA-C    Assessment/Plan:   Cain Muir is a very pleasant  26 y o  female with a past medical history of preeclampsia, chronic neck and back pain (recently referred to pain management), chiari malformation type 1, idiopathic intracranial hypertension, papilledema referred here for evaluation of headache  Initial evaluation 2019  Follow-up 2019, 2021     Idiopathic intracranial hypertension  [facility] malformation [facility] ([facility] ) - per report, I do not have MRI results except for as documented   She reports headaches started around age 17, but did not become bad until age 22  Patient is a very poor historian therefore history as obtained primarily through extensive medical record review  In 2017 she presented to ED multiple times for diffuse headache, photophobia  In 10/06/2017 she was evaluated by Neurology for documented results that I do not see in chart: "An outside MRI of the brain was completed which demonstrated questionable protrusion of the left optic disc into posterior aspect of left orbit correlating with reported history of papilledema, transverse dural venous sinus stenosis is present with findings consistent with idiopathic intracranial hypertension   In the ED she had a lumbar puncture with opening pressure 55 cm H20 and closing pressure 22 5 cm H20 with relief of her symptoms after  "Per record review she was started on multiple medications including at 1 point acetazolamide in the past, but due to no insurance she stop taking any these medications  - as of 2019:  Chronic daily headache for the past approximately 6 months  Prior to that on and off  Has about 3 headache-free days per month    She describes bifrontal, bilateral retro-orbital, apex pulsating, pressure, stabbing  She denies aura  She does have associated photophobia, phonophobia  - as of 11/07/2019: Isaac Postal returns reporting daily mild headaches, worse 4 times a week  Unfortunately she has been noncompliant with my recommendations, has only been taking acetazolamide 2-3 days a week, took indomethacin 50 mg daily for 2 weeks despite explicit instructions  Did not follow up with Ophthalmology, did not get blood work and did not get an MRI brain  Had her  come in and re-explained everything as well as the severe possible complications including vision loss if she is not compliant  We will try to make it easier acetazolamide 500 mg b i d , dexamethasone for the next 5 days as she is very worried about current headache indomethacin as abortive  She is very focused on trouble sleeping when she has about headache and asking for an option to treat pain and sleep  We will try cyclobenzaprine   - as of 7/9/2021:  Please see HPI for details  She has been noncompliant with recommendations despite understanding the risk of losing her vision  At this time she has had no headaches since giving birth 3 weeks ago she denies any vision issues whatsoever  She has upcoming follow-up with Ophthalmology in a few weeks and plan will be determined from there depending on whether she has papilledema  We discussed treatment would be Diamox which she has been instructed to start many many times in the past as per my notes and recent inpatient neurology notes  She is breast-feeding at this time, therefore will not prophylactically treat with Diamox due to risk for fetus      Workup:  - 10/2017:  Per [facility] Neurology resident note - I do not have the official results, but requested patient obtain   "An outside MRI of the brain was completed which demonstrated questionable protrusion of the left optic disc into posterior aspect of left orbit correlating with reported history of papilledema, transverse dural venous sinus stenosis is present with findings consistent with idiopathic intracranial hypertension " (no mention of Chiari)  - 10/2017: at  ED she had a lumbar puncture with opening pressure 55 cm H20 and closing pressure 22 5 cm H20 with relief of her symptoms after    -  Ambulatory referral to Ophthalmology; Future  - will hold off on repeat imaging at this time due to no new symptoms, no red flags, no significant worsening, no visual changes, but would have low threshold for repeat MRI brain if needed in the future for any new or concerning features      Preventative:  - we discussed headache hygiene and lifestyle factors that may improve headaches  -     if needed in the future - acetaZOLAMIDE (DIAMOX) 500 mg BID  Discussed proper use, possible side effects and risks    - past:  Propranolol, topiramate, acetazolamide-patient does not recall how long she was on any of these or what affect had  - future options:  Topiramate     Abortive:  - discussed not taking over-the-counter or prescription pain medications more than 3 days per week to prevent medication overuse/rebound headache  - in the past prescribed dexamethasone, cyclobenzaprine, indomethacin with Carafate prior and is unclear if patient ever took these as she did not return in 4 weeks as she was instructed rather 2 years later  - past:  Sumatriptan, Fioricet, methocarbamol-patient does not recall how long she was on any of these or what affect had  - future options:  Could try Triptan             Patient instructions     Top priority   *Most importantly, follow-up with Ophthalmology for dilated fundus exam to see if you have any signs of papilledema and he will have to continue to follow with them closely if you do - please call me or mychart me on my chart after you see them and let me know what they said and also try to have them fax the results to us    Second  Ask the pediatrician doctors about the safety of diamox/acetazolamide in breastfeeding if we need to use it     [facility] medical records if they have the MRI of her brain and if they do please get on CD so I can review the images and we can upload them to our system     If any worsening or new or concerning symptoms especially if any vision loss, go to the emergency department where I recommend a consider a lumbar puncture/spinal tap like he had before     Headache/migraine treatment:   Abortive medications (for immediate treatment of a headache): It is ok to take ibuprofen, acetaminophen or naproxen (Advil, Tylenol,  Aleve, Excedrin) if they help your headaches you should limit these to No more than 3 times a week to avoid medication overuse/rebound headaches         Prescription preventive medications for headaches/migraines   (to take every day to help prevent headaches - not to take at the time of headache):  [x] we will consider if needed after eye exam - acetazolamide        Self-Monitoring:  [x] Headache calendar  Each day andre a number from 0-10 indicating if there was a headache and how bad it was  This can be used to monitor gradual improvement and is helpful to make medication adjustments  You can do this on paper or there is an TK for a smart phone called "Migraine e Diary"       Lifestyle Recommendations:  [x] SLEEP - Maintain a regular sleep schedule: Adults need at least 7-8 hours of uninterrupted a night  Maintain good sleep hygiene:  Going to bed and waking up at consistent times, avoiding excessive daytime naps, avoiding caffeinated beverages in the evening, avoid excessive stimulation in the evening and generally using bed primarily for sleeping  One hour before bedtime would recommend turning lights down lower, decreasing your activity (may read quietly, listen to music at a low volume)  When you get into bed, should eliminate all technology (no texting, emailing, playing with your phone, iPad or tablet in bed)  [x] HYDRATION - Maintain good hydration  Drink  2L of fluid a day (4 typical small water bottles)  [x] DIET - Maintain good nutrition  In particular don't skip meals and try and eat healthy balanced meals regularly  [x] TRIGGERS - Look for other triggers and avoid them: Limit caffeine to 1-2 cups a day or less  Avoid dietary triggers that you have noticed bring on your headaches (this could include aged cheese, peanuts, MSG, aspartame and nitrates)  [x] EXERCISE - physical exercise as we all know is good for you in many ways, and not only is good for your heart, but also is beneficial for your mental health, cognitive health and  chronic pain/headaches  I would encourage at the least 5 days of physical exercise weekly for at least 30 minutes       Education and Follow-up  [x] Please call with any questions or concerns  Of course if any new concerning symptoms go to the emergency department  [x] Follow up 3 months             CC: We had the pleasure of evaluating Jh Gan in neurological consultation today  Jh Gan is a   right handed female who presents today for evaluation of headaches       History obtained from patient as well as available medical record review  Patient is a very poor historian   Today  is present who helps with history     History of Present Illness:   Interval history as of 7/9/2021   - She was to follow-up in 4 weeks since last visit (no show 12/12/19) and she is now following up 2 years later   - lumbar puncture 01/27/2021 opening pressure 11    Please see EMR for details  - had baby 6/15/21, preeclampsia, breastfeeding  - Neuro saw her inpatient 6/17/21 - see EMR    Denies vision loss or other visual symptoms  - has upcoming visit in 7/28/2021 with  ophthalmology    Headaches and migraines   - headaches prior to pregnancy, worse while pregnant especially first trimester  - no headaches since delivery 3 weeks ago    Preventative: nothing   - not on diamox - per data - "Due to the potential for serious adverse reactions in the  infant, the  recommends a decision be made whether to discontinue breastfeeding or to discontinue the drug, taking into account the importance of treatment to the mother "      Interval history as of 11/07/2019  - She has not followed up with nutrition as recommended the setting of IIH - although she has upcoming appointment  - She has not followed up with Ophthalmology as recommended for IIH   - she did not obtain blood work as ordered  - she did not get MRI brain on CD for my review    She reports no improvement in headaches  - daily headaches, worse 4 times a week   - acetazolamide 500 mg b i d  - forgets most of the time, only taking 2-3 days a week 250 mg TID for some reason   - trial of indomethacin 50 mg - took daily for 2 weeks     History of initial visit 09/12/2019  Headaches started around 9/2017  - was evaluated at  ED 09/22/2017 and 09/23/2017 - diffuse headache, photophobia, no vision changes  - ED 10/06/2017 where she was evaluated by Neurology for headache, eye pain for months  An outside MRI of the brain was completed which demonstrated questionable protrusion of the left optic disc into posterior aspect of left orbit correlating with reported history of papilledema, transverse dural venous sinus stenosis is present with findings consistent with idiopathic intracranial hypertension  In the ED she had a lumbar puncture with opening pressure 55 cm H20 and closing pressure 22 5 cm H20 with relief of her symptoms after   - she was started on topiramate 25 mg b i d   And recommended outpatient Neurology follow-up  - 05/24/2018 return to ED with possible migraine  - ED 05/27/2018  - 06/05/2018 follow up with primary care prescribed acetazolamide and topiramate      Previously followed with [facility]  Last visit 05/2018  Previously on acetazolamide - only took 1 month and then stop feeling due to did not have insurance     Last visit with eye doctor was around 09/2018 - per patient vision was perfect and she did not have to come back  - she reports they did not mention papilledema then (and she does recall that they dilated her eyes), that it was the neurologist previously who had      *Today denies vision symptoms           Headaches started at what age? Started age 17 and came bad 22years old  How often do the headaches occur?   - as of 9/12/2019: daily for about 6 months  3 headache free days a month only   What time of the day do the headaches start? Sometimes wakes with it, Afternoon   How long do the headaches last? 1-2  hours  Are you ever headache free? rarely     Aura? without aura     Last eye exam: around 9/2018 she thinks      Where is your headache located and pain quality?   - bifrontal and apex, bilateral retro-orbital - pulsating, pressure, stabbing   What is the intensity of pain? Average: 7/10, worst 9/10  Associated symptoms:   [] Nausea       [] Vomiting        [] Diarrhea  [x] Insomnia    [] Stiff or sore neck   [x] Problems with concentration  [x] Photophobia     [x]Phonophobia      [] Osmophobia  [] Blurred vision   [x] Prefer quiet, dark room  [x] Light-headed or dizzy     [] Tinnitus   [] Hands or feet tingle or feel numb/paresthesias       [] Red ear      [] Ptosis   [] Facial droop  [] Lacrimation  [] Nasal congestion/rhinorrhea   [] Flushing of face  [] Change in pupil size     Number of days missed per month because of headaches:  Work (or school) days: 3     Headache triggers:  Exercise, softball, standing up quickly, wakes with it     Have you seen someone else for headaches or pain? Yes, Gracia Abler  Have you had trigger point injection performed and how often? No  Have you had Botox injection performed and how often? No   Have you had epidural injections or transforaminal injections performed? No  Are you current pregnant or planning on getting pregnant? No, IUD  Have you ever had any Brain imaging?  yes     What medications do you take or have you taken for your headaches? ABORTIVE:    OTC medications have been ineffective   Ibuprofen -   - as of 9/12/19: 3 times day - 5 days a week, helps a little      Past Per chart  - pt does not remember  sumatriptan 25 mg - she does not remember   fioricet 6/2018  Methocarbamol      PREVENTIVE:   Nothing currently         Past per chart - pt does not remember  - propranolol 20 mg 9/2017  - topiramate 25 mg BID 9/2017  - acetazolamide 250 mg - 6/2018     Alternative therapies used in the past for headaches? no other headache interventions have been tried     Neck pain?: chronic     LIFESTYLE  Sleep   - averages: 7 hours  - does not think she snores  - wakes 2 times a night  Problems falling asleep?:   Yes  Problems staying asleep?:  Yes        Physical activity: softball in summer, gym 3 days a week      Water: 2 bottles  per day  Caffeine: none per day  Diet:  Do you ever skip meals?   No     Mood:   Denies history of anxiety or depression or other diagnosed mood disorder     The following portions of the patient's history were reviewed and updated as appropriate: allergies, current medications, past family history, past medical history, past social history, past surgical history and problem list      Pertinent family history:  Family history of headaches:  no known family members with significant headaches  Any family history of aneurysms - No     Pertinent social history:  Work:  In a warehouse for [employer]  Education: 12th  Lives with  Mom, sister, daughter (3) Jovita Padilla     Illicit Drugs: denies  Alcohol/tobacco: Denies tobacco use, alcohol intake: social drinker        Past Medical History:     Past Medical History:   Diagnosis Date    31 weeks gestation of pregnancy 1/5/2021    GBS bacteruria- needs PCN intrapartum    [facility]ari malformation type I [facility] )     Chronic fatigue     last assessed 9/26/16    COVID-19 07/25/2020    Fever and chills 7/25/2020    High risk pregnancy with high inhibin 3/29/2021    Hyperemesis gravidarum 2021    IV fluid infusions ordered Phenergan ordered     Hyperlipidemia     resolved 17    Hypertension     pre-eclampsia 2016    Idiopathic intracranial hypertension     Intrauterine growth restriction affecting antepartum care of mother in third trimester 6/3/2021    Pre-eclampsia     with severe features     Varicella     pt unsure    Visual impairment     glasses       Patient Active Problem List   Diagnosis    Pre-eclampsia during pregnancy in third trimester, antepartum    IIH (idiopathic intracranial hypertension)    Papilledema    Questionable Chiari malformation type I ([facility] )    History of 2019 novel coronavirus disease (COVID-19)    GBS bacteriuria    Status post primary low transverse  section       Medications:      Current Outpatient Medications   Medication Sig Dispense Refill    acetaminophen (TYLENOL) 325 mg tablet Take 2 tablets (650 mg total) by mouth every 4 (four) hours as needed for mild pain  0    famotidine (PEPCID) 20 mg tablet Take 1 tablet (20 mg total) by mouth 2 (two) times a day 30 min before breakfast and dinner 180 tablet 1    ibuprofen (MOTRIN) 600 mg tablet Take 1 tablet (600 mg total) by mouth every 6 (six) hours as needed for mild pain 20 tablet 0    NIFEdipine (ADALAT CC) 30 MG 24 hr tablet Take 1 tablet (30 mg total) by mouth daily 30 tablet 0    Prenatal Vit-Fe Fumarate-FA (PRENATAL VITAMINS PO) Take 1 tablet by mouth daily       No current facility-administered medications for this visit          Allergies:    No Known Allergies    Family History:     Family History   Problem Relation Age of Onset    Leukemia Maternal Grandfather     No Known Problems Mother     Other Father         MVA    No Known Problems Sister     Hypertension Maternal Grandmother     No Known Problems Daughter     No Known Problems Sister        Social History:     Social History     Socioeconomic History    Marital status: Single     Spouse name: Not on file    Number of children: 1    Years of education: 15    Highest education level: High school graduate   Occupational History    Not on file   Tobacco Use    Smoking status: Never Smoker    Smokeless tobacco: Never Used   Vaping Use    Vaping Use: Never used   Substance and Sexual Activity    Alcohol use: Not Currently    Drug use: Never    Sexual activity: Yes     Partners: Male     Birth control/protection: None   Other Topics Concern    Not on file   Social History Narrative    Always uses seatbelt    High school student    Younger sister    Lives with mother (single parent)     Social Determinants of Health     Financial Resource Strain: Low Risk     Difficulty of Paying Living Expenses: Not hard at all   Food Insecurity: No Food Insecurity    Worried About Running Out of Food in the Last Year: Never true    Marva of Food in the Last Year: Never true   Transportation Needs: No Transportation Needs    Lack of Transportation (Medical): No    Lack of Transportation (Non-Medical): No   Physical Activity: Inactive    Days of Exercise per Week: 0 days    Minutes of Exercise per Session: 0 min   Stress: No Stress Concern Present    Feeling of Stress : Not at all   Social Connections:  Moderately Isolated    Frequency of Communication with Friends and Family: More than three times a week    Frequency of Social Gatherings with Friends and Family: Once a week    Attends Zoroastrianism Services: Never    Active Member of Clubs or Organizations: No    Attends Club or Organization Meetings: Never    Marital Status: Living with partner   Intimate Partner Violence: Not At Risk    Fear of Current or Ex-Partner: No    Emotionally Abused: No    Physically Abused: No    Sexually Abused: No         Objective:       Physical Exam:                                                                 Vitals:            Constitutional:    /72 (BP Location: Right arm, Patient Position: Sitting, Cuff Size: Large)   Pulse 80   Ht 5' 8" (1 727 m)   Wt 91 8 kg (202 lb 4 8 oz)   BMI 30 76 kg/m²   BP Readings from Last 3 Encounters:   07/09/21 102/72   06/28/21 129/89   06/19/21 152/86     Pulse Readings from Last 3 Encounters:   07/09/21 80   06/28/21 79   06/19/21 88         Well developed, well nourished, well groomed  No dysmorphic features  HEENT:  Normocephalic atraumatic  See neuro exam   Chest:  Respirations appear regular and unlabored  Cardiovascular:  Regular rate, no observed significant swelling  Musculoskeletal:  (see below under neurologic exam for evaluation of motor function and gait)   Skin:  warm and dry, not diaphoretic  No apparent birthmarks or stigmata of neurocutaneous disease  Psychiatric:  Normal behavior and appropriate affect        Neurological Examination:     Mental status/cognitive function:   Recent and remote memory intact  Attention span and concentration as well as fund of knowledge are appropriate for age  Normal language and spontaneous speech  Cranial Nerves:  Unable to appreciate fundi due to pupillary constriction  III, IV, VI-Pupils were equal, round  Extraocular movements were full and conjugate   VII-facial expression symmetric  VIII-hearing grossly intact bilaterally   Motor Exam: symmetric bulk throughout  no atrophy, fasciculations or abnormal movements noted  Coordination:  no apparent dysmetria, ataxia or tremor noted  Gait: steady casual gait      Pertinent lab results:   Please see EMR for recent labs      lumbar puncture 01/27/2021 opening pressure 11    Please see EMR for details    Per our system  09/29/2016:  CMP and CBC unremarkable   TSH 3 3       Imaging:   No head imaging in our system but she has had an MRI brain per report at outside hospital  -[facility] Neurology resident note:     - ED 10/06/2017 where she was evaluated by Neurology for headache, eye pain for months   An outside MRI of the brain was completed which demonstrated questionable protrusion of the left optic disc into posterior aspect of left orbit correlating with reported history of papilledema, transverse dural venous sinus stenosis is present with findings consistent with idiopathic intracranial hypertension   In the ED she had a lumbar puncture with opening pressure 55 cm H20 and closing pressure 22 5 cm H20 with relief of her symptoms after  Review of Systems:   ROS obtained by medical assistant Personally reviewed and updated if indicated  Review of Systems   Constitutional: Negative  Negative for appetite change and fever  HENT: Negative  Negative for hearing loss, tinnitus, trouble swallowing and voice change  Eyes: Positive for photophobia  Negative for pain  Respiratory: Negative  Negative for shortness of breath  Cardiovascular: Negative  Negative for palpitations  Gastrointestinal: Negative  Negative for nausea and vomiting  Endocrine: Negative  Negative for cold intolerance  Genitourinary: Negative  Negative for dysuria, frequency and urgency  Musculoskeletal: Negative  Negative for myalgias and neck pain  Skin: Negative  Negative for rash  Neurological: Positive for headaches  Negative for dizziness, tremors, seizures, syncope, facial asymmetry, speech difficulty, weakness, light-headedness and numbness  Hematological: Negative  Does not bruise/bleed easily  Psychiatric/Behavioral: Negative  Negative for confusion, hallucinations and sleep disturbance  I have spent 15 minutes with Patient and family today in which greater than 50% of this time was spent in counseling/coordination of care regarding Diagnostic results, Prognosis, Risks and benefits of tx options, Intructions for management, Patient and family education, Importance of tx compliance, Risk factor reductions and Impressions  I also spent 16 minutes non face to face for this patient the same day         Author:  Valentino Salon Lea Willis MD 7/9/2021 8:13 AM

## 2021-07-12 ENCOUNTER — POSTPARTUM VISIT (OUTPATIENT)
Dept: OBGYN CLINIC | Facility: CLINIC | Age: 26
End: 2021-07-12

## 2021-07-12 VITALS
WEIGHT: 210 LBS | DIASTOLIC BLOOD PRESSURE: 81 MMHG | HEART RATE: 86 BPM | SYSTOLIC BLOOD PRESSURE: 117 MMHG | BODY MASS INDEX: 31.83 KG/M2 | TEMPERATURE: 98.2 F | HEIGHT: 68 IN

## 2021-07-12 DIAGNOSIS — Z30.09 STERILIZATION CONSULT: Primary | ICD-10-CM

## 2021-07-12 PROCEDURE — 3008F BODY MASS INDEX DOCD: CPT | Performed by: PSYCHIATRY & NEUROLOGY

## 2021-07-12 PROCEDURE — T1015 CLINIC SERVICE: HCPCS | Performed by: OBSTETRICS & GYNECOLOGY

## 2021-07-12 PROCEDURE — 99213 OFFICE O/P EST LOW 20 MIN: CPT | Performed by: OBSTETRICS & GYNECOLOGY

## 2021-07-12 NOTE — PROGRESS NOTES
Assessment/Plan     Normal postpartum exam      1  Contraception: MA-31 signed today, patient desires tubal but declines a bridge  Pt to return to office in 2 months to sign consents and proceed with procedure if still desired then  2  Lactation consult, [address] information discussed  3  Increase activity as tolerated, may resume all normal activity  4  Anticipated return to work: 6 - 12 weeks post partum  5  PreE w/ SF: no sx today and /81  6  Next pap smear due May 2022  Jenn Xie is a 26 y o  female who presents for a postpartum visit  Her pregnancy was complicated by classical  section  She delivered via classical  section on 21, 2 lb 8 7oz, female   Her delivery course was complicated by PreE w/ SF in the setting of elevated liver enzymes and intractable headache  Bleeding staining only  Bowel function is normal  Bladder function is normal  Patient has not traveled outside the [place]  within the last 14 days  She has not been sexually active  Desired contraception method is tubal sterilization     Postpartum depression screening: negative  EPDS : 0    Baby's course has been good, she is still in the hospital in the NICU  She is moving to a regular crib today  She is receiving both donor milk and the patient's breast milk to feed her       Last Pap : 5/3/19, NILM  Gestational Diabetes: no      Current Outpatient Medications:     acetaminophen (TYLENOL) 325 mg tablet, Take 2 tablets (650 mg total) by mouth every 4 (four) hours as needed for mild pain, Disp: , Rfl: 0    famotidine (PEPCID) 20 mg tablet, Take 1 tablet (20 mg total) by mouth 2 (two) times a day 30 min before breakfast and dinner (Patient not taking: Reported on 2021), Disp: 180 tablet, Rfl: 1    ibuprofen (MOTRIN) 600 mg tablet, Take 1 tablet (600 mg total) by mouth every 6 (six) hours as needed for mild pain (Patient not taking: Reported on 2021), Disp: 20 tablet, Rfl: 0    NIFEdipine (ADALAT CC) 30 MG 24 hr tablet, Take 1 tablet (30 mg total) by mouth daily (Patient not taking: Reported on 7/9/2021), Disp: 30 tablet, Rfl: 0    Prenatal Vit-Fe Fumarate-FA (PRENATAL VITAMINS PO), Take 1 tablet by mouth daily, Disp: , Rfl:     No Known Allergies    Review of Systems  Constitutional: no fever, feels well  Breasts: no complaints of breast pain, breast lump, or nipple discharge  Gastrointestinal: no complaints nausea, vomiting  Genitourinary: as noted in HPI  Neurological: no complaints of headache    Objective      Ht 5' 8" (1 727 m)   Wt 95 3 kg (210 lb)   BMI 31 93 kg/m²     Physical Exam  Constitutional:       Appearance: Normal appearance  HENT:      Head: Normocephalic and atraumatic  Eyes:      Extraocular Movements: Extraocular movements intact  Pulmonary:      Effort: Pulmonary effort is normal    Abdominal:      General: There is no distension  Tenderness: There is no abdominal tenderness  There is no guarding  Comments: Incision healing well, no dehiscence, no erythema, no ecchymosis    Musculoskeletal:         General: Normal range of motion  Neurological:      Mental Status: She is alert and oriented to person, place, and time  Skin:     General: Skin is warm     Psychiatric:         Mood and Affect: Mood normal          Behavior: Behavior normal

## 2021-07-12 NOTE — PATIENT INSTRUCTIONS
Cuidado personal después del parto   CUIDADO AMBULATORIO:   El periodo postparto es el periodo de tiempo desde el momento de otilia a amish hasta por lo menos 6 semanas  Katie ruslan tiempo usted puede experimentar muchos cambios físicos gagan emocionales  Es muy importante entender que es lo normal y cuándo es necesario llamar a bhardwaj médico  También es importante saber cómo cuidarse a sí misma katie ruslan periodo  Llame al Karen Td de emergencias local (911 en los [place]) en cualquiera de los siguientes casos:  · Usted ve o escucha cosas que no existen o tiene pensamientos de [place] daño a sí misma o de lastimar a bhardwaj bebé  · Usted empapa 1 toalla higiénica en 15 minutos, tiene visión borrosa, piel húmeda o pálida y siente que se va a desmayar  · Usted se desmaya o pierde el conocimiento  · Usted tiene dificultad para respirar  · Usted expectora hailey  · Se abre la incisión de la cesárea  Busque atención médica de inmediato si:  · Bhardwaj corazón está latiendo más rápido de lo normal     · Usted tiene un eron dolor de trevor o cambios en bhardwaj visión  · La incisión de la episiotomía o de la cesárea está manuel, inflamada, sangrando o supurando pus  · Usted tiene dolor abdominal intenso  Llame a bhardwaj médico u obstetra si:  · La pierna está Grace [place] y [place] de lo normal     · Usted empapa 1 o más toallas higiénicas en lucy hora, o de bhardwaj vagina le salen unos coágulos de hailey más grandes que lucy moneda de 25 ORLÉANS  · Tiene fiebre  · Usted le comienza o se le empeora el dolor en bhardwaj abdomen o vagina  · Usted sigue con depresión 1 a 2 semanas después del parto  · Usted tiene dificultad para dormir  · Usted tiene flujo vaginal con mal olor  · Usted tiene dolor o ardor al orinar  · Usted no tiene evacuaciones intestinales por 3 días o más  · Tiene náuseas o está vomitando  · Usted tiene unos bultos duros o tran perez por encima de tawny senos      · Usted Google pezones cuarteados o le están sangrando  · Usted tiene preguntas o inquietudes acerca de bhardwaj condición o cuidado  Cambios físicos: A continuación están los cambios normales después de otilia a amish:  · Dolor en el área entre el ano y la vagina    · Dolor en el pecho    · Estreñimiento o hemorroides    · Golpes de calor o frío    · Sangrado o secreción vaginal    · Cólicos abdominales de leves a moderados    · Dificultad para controlar las deposiciones o la orina    Cambios emocionales: Cynthia caída en los niveles hormonales después del parto puede provocar cambios en tawny emociones  Puede que usted se sienta irritable, rogelio o ansioso  Es probable que llore fácilmente o sin ninguna razón  Puede que también se sienta deprimido  La depresión que continúa puede ser un signo de depresión posparto, un trastorno que puede ser tratado  El tratamiento puede incluir terapia conversacional, medicamentos o [place]  Los [employer] preguntarán cómo se siente y si tiene algún indicador de depresión  Estas conversaciones pueden tener lugar katie las citas de bhardwaj [place] y del cuidado de bhardwaj bebé, así gagan también katie las visitas al [employer]  Los proveedores pueden ayudarlo a encontrar maneras de cuidarse a sí misma y a bhardwaj bebé  Hable con tawny proveedores acerca de lo siguiente:  · Cuándo comenzaron los cambios emocionales o la depresión, y si está empeorando con el tiempo    · Problemas con las actividades diarias, el sueño o el cuidado de bhardwaj bebé    · Si algo lo hace sentir peor o lo hace sentir mejor    · Sentir que no se está vinculando con bhardwaj bebé de la manera que usted desea    · Cualquier problema que bhardwaj bebé tenga para dormir o alimentarse    · Bhardwaj bebé está quisquilloso o llora mucho    · Apoyo que usted tiene de amigos, familiares u otros    Cuidado de los senos para mamás lactantes: Usted puede tener los senos adoloridos katie 3 a 6 días después de otilia a amish   [place] sucede a medida que la [place] a llenar tawny senos  Es posible que también tenga los senos adoloridos en dorcas que usted no esté dando de lactar a bhardwaj bebé con frecuencia  David lo siguiente para cuidar de tawny senos:  · Aplique lucy compresa húmeda y tibia en tawny senos según las indicaciones  Ravine puede servir para calmar el dolor en tawny senos  Asegúrese que el paño no esté muy caliente antes de colocarlo en bhardwaj pecho  · Alimente al bebé o sáquese leche con frecuencia  Ravine puede prevenir la congestión de los conductos de Herculaneum  Pregunte a bhardwaj médico con qué frecuencia debe alimentar a bhardwaj bebé o sacarse leche  · Dese masajes en los senos según las indicaciones  Ravine puede ayudar a aumentar el flujo de Herculaneum  Frote con suavidad de forma circular los senos antes de la lactancia  Es posible que necesite apretar con suavidad bhardwaj pecho o pezón para ayudar a que salga la leche  Usted también puede usar un extractor de Herculaneum para ayudar a liberar la leche de tawny senos  · Lávese los senos sólo con agua tibia  No use jabón en tawny pezones  El jabón puede [employer]  · Aplique crema de lanolina en tawny pezones según las indicaciones  La crema de lanonila puede servir para humectar bhardwaj piel y evitar que los pezones se resequen  Siempre  debe quitarse la crema de lanolina con agua tibia antes de darle pecho a bhadrwaj bebé  · Use protectores para la lactancia en bhardwaj brasier o sostén  La [employer] salir de tawny pezones cuando usted no está dando de lactar  Usted se puede colocar los protectores dentro de bhardwaj brasier para evitar que la Herculaneum se traspase a bhardwaj ropa  Pregunte a bhardwaj médico sobre donde puede [employer] protectores para lactancia  · Solicite asistencia para la lactancia materna si lo necesita  Los médicos pueden contestar las preguntas sobre la lactancia y brindarle apoyo  Pregunte a bhardwaj médico con quién puede asesorarse si necesita asistencia con la lactancia      Cuidado de los senos para mamás no lactantes: La leche materna llenará tawny pechos incluso si usted alimenta a bhardwaj bebé con leche de formula  A continuación unas cosas que puede hacer que ayudan a que deje de producir [place] y que tawny senos se llenen y le provoque dolor:  · Use un sostén o brasier de soporte en todo momento  Un brasier deportivo o un sostén Tameka Darrin pueden ayudar a suspender la producción de [place]  · Aplique hielo en cada seno por 15 a 20 minutos cada hora según las indicaciones  Use lucy compresa de hielo o ponga hielo triturado en lucy bolsa de plástico  Carlean Christians con lucy toalla antes de aplicarla sobre la mama  El hielo ayuda a encoger los conductos de [place]  · Evite que le caiga agua tibia en tawny senos  El agua tibia hará que sea más fácil que la leche llene tawny pechos  En la ducha póngase de espaldas al agua  · Limite la cantidad de tiempo que toca tawny senos  [place] evitará que los senos se llenen de [place]  Cuidado del perineo: El perineo es el área entre el recto y la vagina  Es normal tener inflamación y dolor en esta área después de otilia a amish  Si a usted le hicieron lucy episiotomía, bhardwaj médico le proporcionará instrucciones especiales  · Limpie el perineo después de ir al baño  [place] puede prevenir lucy infección y [de-identified] para la cicatrización  Use lucy botella de agua tibia con atomizador para limpiar el perineo  También puede rociar suavemente agua tibia contra el perineo mientras orina  Siempre debe limpiarse de adelante hacia atrás  · [facility]  Un baño de asiento puede servir para aliviar la inflamación y el dolor  Llene la matheus o un recipiente con agua hasta que [place] tawny caderas y siéntese en el agua  Use agua fría por 2 días después del parto  Luego use agua tibia  Pregunte a bhardwaj médico por más [employer] caitlin de [place]  · Aplique compresas de hielo por las primeras 24 horas o [address] indicaciones  Use un guante médico y llénelo con hielo o compre compresas de hielo   Envuelva la compresa de hielo o el guante en [place] toalla o paño pequeño  Coloque la compresa de hielo en el perineo por 20 minutos cada vez  · Siéntese en un cojín en forma de krishna  [place] puede ayudar [employer] presión que se ejerce en bhardwaj perineo cuando se sienta  · Use toallitas que contienen medicamento o tome las píldoras según las indicaciones  Bhardwaj médico puede indicarle que use toallitas de hamamelis  Usted puede colocar las toallitas de hamamelis en el refrigerador antes de aplicarlas en el perineo  Bhardwaj médico también le puede indicar que tome [place]  Pregúntele con qué frecuencia [address] [place] [address]  · No vaya a nadar ni tome caitlin en matheus por 4 a 6 semanas o según las indicaciones  [place] servirá para evitar lucy infección en bhardwaj útero o vagina  El cuidado intestinal y de la vejiga: Verdia Greet puede hodan entre 3 a 5 días para tener lucy evacuación intestinal después de otilia a amish a bhardwaj bebé  Usted puede hacer lo siguiente para prevenir o controlar el estreñimiento y tener el control de tawny intestinos o vejiga:  · [facility] indicaciones  Un ablandador fecal es un medicamento que hará que tawny evacuaciones intestinales óscar más suaves  [place] puede prevenir o aliviar el estreñimiento  Un ablandador fecal además puede hacer que la evacuación intestinal duela menos  · [place] suficiente líquidos  Pregunte cuánto líquido debe hodan cada día y cuáles líquidos son los más adecuados para usted  Los líquidos pueden ayudar a prevenir el estreñimiento  · [place] alimentos ricos en fibras  Un[address] frutas, verduras, granos, frijoles y lentejas  Pregunte a bhardwaj médico cuál es la cantidad de fibra que necesita al día  La fibra puede prevenir el estreñimiento  · David los ejercicios de [place] según las indicaciones  Los ejercicios de [place] sirven para fortalecer los músculos que [employer] movimientos intestinales y la [place]  Pídale a bhardwaj médico más [employer] ejercicios de [place]      · Aplique lucy compresa o medicamento para las hemorroides según las indicaciones  [place] puede aliviar la inflamación y el dolor  Bhardwaj médico puede indicarle que use hielo o toallitas que contienen medicamentos para las hemorroides  También puede indicarle que se ashlie un baño de asiento  Pregunte a bhardwaj médico por mayor información sobre cómo controlar las hemorroides  Nutrición: Kina Huerta buena nutrición es importante en el periodo posparto  La nutrición ayuda a que usted regrese a bhardwaj peso saludable, aumenta el nivel de energía y jose antonio el estreñimiento  También sirve para que [employer] suficientes nutrientes y calorías si usted va a alimentar a bhardwaj marlena con [place]  · Consuma alimentos saludables y variados  Los alimentos saludables incluyen frutas, verduras, pan integral, productos lácteos bajos en grasa, frijoles, cielo magras y pescado  Usted puede [employer] 500 a 700 calorías adicionales al día si usted está dando de lactar a bhardwaj bebé  Puede que también necesite añadir proteína  · Limite los alimentos con azúcar añadida y grandes cantidades de [place]  [place] alimentos son altos en calorías y bajos en nutrientes saludables  Mely las etiquetas de los alimentos para que sepa cuál es la cantidad de azúcar y grasas que contiene los alimentos que quiere comer  · Google 8 a 10 vasos de agua al día  El agua le ayudará a producir suficiente leche para bhardwaj bebé  También le ayudará a prevenir el estreñimiento  Love un vaso de agua cada vez que amamanta a bhardwaj bebé  · [address]  Pregunte a bhardwaj médico cuáles vitaminas necesita  · Limite la cafeína y el alcohol si usted está alimentando a bhardwaj bebé con [place]  [place] y el alcohol pueden pasar a la [place]  [place] y el alcohol pueden hacer que bhardwaj bebé esté molesto  [place] también interfiere con el sueño de bhardwaj bebé  Pregunte a bhardwaj médico si usted puede hodan alcohol o cafeína      Descanse y duerma: Usted se puede sentir [address] [facility] cansada en el periodo posparto  Dormir lo suficiente le ayudará a recuperarse y tener la energía para cuidar de bhardwaj bebé  Los siguientes pueden ayudarle a dormir y descansar:  · Duerma cuando bhardwaj bebé esté tomando la siesta  Bhardwaj bebé puede hodan varias siestas katie el día  Descanse katie [employer]  · [address]  Muchas personas quieren venir a visitarla a usted y conocer al bebé  Pídales a tawny amigos y familiares que la visiten en diferentes días  [place] le dará tiempo para descansar  · No planee demasiadas cosas en un día  Deje los quehaceres de la casa para que tenga tiempo para descansar  Poco a poca ashlie más cada día  · Solicite ayuda de familiares, amigos y vecinos  Pida que le ayuden a pily la ropa, a limpiar o hacer mandados  También pregunte que alguien le cuide bhardwaj bebé mientras vincenzo lucy siesta o se relaja  Pídale a bhardwaj luis que la ayude con el cuidado del bebé  Sáquese leche para que bhardwaj luis pueda alimentar a bhardwaj bebé por la noche  Ejercicios después del parto: Espere hasta que bhardwaj médico la autorice a hacer ejercicio  El ejercicio puede ayudarla adelgazar, aumentar bhardwaj niveles de energía y controlar bhardwaj estado de ánimo  También puede prevenir el estreñimiento y los coágulos  Empiece con un ejercicio leve gagan caminar  [employer] a medida que tenga Марина  Es posible que necesite evitar hacer ejercicios para el abdomen por 1 a 2 semanas después del parto  Hable con bhardwaj médico sobre un plan de ejercicios que sea adecuado para usted  Actividad sexual después del parto:  · No tenga relaciones sexuales hasta que bhardwaj médico lo autorice  Es posible que necesite esperar de 4 a 6 semanas antes de [employer] relación sexual  [place] puede evitar que contraiga lucy infección y darle tiempo para recuperarse  · Bhardwaj ciclo menstrual puede comenzar tan pronto gagan en 3 semanas después de otilia a amish  Bhardwaj período puede demorarse si usted está dando de lactar a bhardwaj bebé   Usted puede Suresh Mcleod antes de que le venga lott primer período posparto  Consulte con lott médico sobre los anticonceptivos que son los adecuados para usted  Algunos tipos de anticonceptivos no son Jovany Villeda  Para [de-identified] y más información: Participe en un soheila de apoyo para madres primerizas  Pídales ayuda a familiares y [facility] con los [address] de la casa, Garrett Hooveralalex y el cuidado de lott bebé  · Office of Women's Health,  Department of Health and Human Services  [address], [address] [facility] , [facility] [address]  [address], [address] [facility] , [facility] [address]  Phone: [phone]  Web Address: [email or web address] gov  · [facility] [place] , [address] [place] [address]  [address] [facility] , [address] [place] [address]  Web Address: 3 day Blinds be  [email or web address]  [email or web address]  Acuda a tawny consultas de control con lott médico u obstetra según le indicaron: Usted tendrá que hacer un seguimiento en el transcurso de las 2 a 6 semanas posteriores al [employer]  Escriba las preguntas que tenga para que recuerde hacerlas katie tawny citas  © Copyright [facility] [address] Information is for End User's use only and may not be sold, redistributed or otherwise used for commercial purposes  All illustrations and images included in CareNotes® are the copyrighted property of A D A M , Inc  or [address] [place] [address] es sólo para uso en educación  Lott intención no es darle un consejo médico sobre enfermedades o tratamientos  Colsulte con lott Carolleyumi Harvey farmacéutico antes de seguir cualquier régimen médico para saber si es seguro y efectivo para usted

## 2021-07-14 ENCOUNTER — TELEPHONE (OUTPATIENT)
Dept: OBGYN CLINIC | Facility: CLINIC | Age: 26
End: 2021-07-14

## 2021-07-14 NOTE — TELEPHONE ENCOUNTER
----- Message from Robert Guzmán sent at 7/12/2021  2:31 PM EDT -----  Please schedule 2 mos FU with dr Judson Amor- for H&P and surg consent

## 2021-07-15 ENCOUNTER — OFFICE VISIT (OUTPATIENT)
Dept: POSTPARTUM | Facility: CLINIC | Age: 26
End: 2021-07-15
Payer: COMMERCIAL

## 2021-07-15 VITALS — DIASTOLIC BLOOD PRESSURE: 68 MMHG | SYSTOLIC BLOOD PRESSURE: 104 MMHG

## 2021-07-15 DIAGNOSIS — O92.70 UNSPECIFIED DISORDERS OF LACTATION: ICD-10-CM

## 2021-07-15 DIAGNOSIS — Z62.820 COUNSELING FOR PARENT-CHILD PROBLEM: Primary | ICD-10-CM

## 2021-07-15 DIAGNOSIS — Z71.89 COUNSELING FOR PARENT-CHILD PROBLEM: Primary | ICD-10-CM

## 2021-07-15 PROCEDURE — 99211 OFF/OP EST MAY X REQ PHY/QHP: CPT | Performed by: PEDIATRICS

## 2021-07-15 NOTE — PATIENT INSTRUCTIONS
[facility] is encouraged to:    Milk Supply:   - Allow for non-nutritive suck at the breast to stimulate supply   - Allow for skin to skin during and after each breastfeeding session   - Use massage, heat, and hand expression prior to feedings to assist with deep latch   - Educate self on galactagogues likes Moringa from [place], [employer], More [place] from [employer] and [employer] Too from [facility]  Educational information can be found at [facility] COTTAGE  com    Mom provided with and discussed RBS, Reviewed pumping log and expectations for pumping output in the first weeks  Reviewed cycle pumping and appropriate pump settings, as well as pumping for 10-15 min 8-12 times per day  Pumping:   - When pumping, begin in stimulation mode (high cycle, low vacuum) until milk begins to express  Change pump to expression mode (low cycle, high vacuum)  Use hands on pumping techniques to assist with milk transfer  When milk stops expressing, change back to stimulation mode  When milk begins to flow, change to expression mode  You make cycle pump up to three times in a pumping session     - Add in power pumping to assist in increasing milk supply  Take 2 consecutive days, 6 hours each day  Pump every hour for approx  5-10 min within the 6 hours  Continue your regular schedule pumping and feeding during this power pumping session  Enc Mom to discussed putting baby to the breast with the NICU team when baby is medically stable to do so  Enc her to call for lactation support as needed throughout her stay        - Contact the [facility] in [place] [phone]  to see about renting a hospital grade pump    - Request from NICU Provider a script for Hospital grade pump    Handouts:  Breast Compressions  Hands On Pumping      Follow up in 2 weeks on July 29th @ 11 am

## 2021-07-15 NOTE — PROGRESS NOTES
INITIAL BREAST FEEDING EVALUATION    Informant/Relationship: Self & FOB    Discussion of General Lactation Issues: Mom received the Ameda in the hospital during her first child  Since ins  States she only gets 1 pump per lifetime, mom bought a S2 on facebook after the birth of her second child  Mom states the pump does not drain the breast      History of breast reduction in 2019 @ [facility]  Infant is 2 weeks old today          History:  Fertility Problem:no  Breast changes:breast reducation history in 2019, latif  : ; emergency; pre-eclampsia  Full term:yes - no - 31 weeks   labor:no  First nursing/attempt < 1 hour after birth:no  Skin to skin following delivery:no  Breast changes after delivery:yes - Milk came in on day 3  Rooming in (infant in room with mother with exception of procedures, eg  Circumcision: no  Blood sugar issues:no  NICU stay:yes - still in NICU  Jaundice:no  Phototherapy:no  Supplement given: (list supplement and method used as well as reason(s):yes - Donor breast milk; 36 ml; gavage feeding    Past Medical History:   Diagnosis Date    31 weeks gestation of pregnancy 2021    GBS bacteruria- needs PCN intrapartum    Chiari malformation type I ([facility] [address] )     Chronic fatigue     last assessed 16    COVID-19 2020    Fever and chills 2020    High risk pregnancy with high inhibin 3/29/2021    Hyperemesis gravidarum 2021    IV fluid infusions ordered Phenergan ordered     Hyperlipidemia     resolved 17    Hypertension     pre-eclampsia 2016    Idiopathic intracranial hypertension     Intrauterine growth restriction affecting antepartum care of mother in third trimester 6/3/2021    Pre-eclampsia     with severe features     Varicella     pt unsure    Visual impairment     glasses         Current Outpatient Medications:     acetaminophen (TYLENOL) 325 mg tablet, Take 2 tablets (650 mg total) by mouth every 4 (four) hours as needed for mild pain, Disp: , Rfl: 0    famotidine (PEPCID) 20 mg tablet, Take 1 tablet (20 mg total) by mouth 2 (two) times a day 30 min before breakfast and dinner (Patient not taking: Reported on 7/9/2021), Disp: 180 tablet, Rfl: 1    ibuprofen (MOTRIN) 600 mg tablet, Take 1 tablet (600 mg total) by mouth every 6 (six) hours as needed for mild pain (Patient not taking: Reported on 7/9/2021), Disp: 20 tablet, Rfl: 0    NIFEdipine (ADALAT CC) 30 MG 24 hr tablet, Take 1 tablet (30 mg total) by mouth daily (Patient not taking: Reported on 7/9/2021), Disp: 30 tablet, Rfl: 0    Prenatal Vit-Fe Fumarate-FA (PRENATAL VITAMINS PO), Take 1 tablet by mouth daily, Disp: , Rfl:     No Known Allergies    Social History     Substance and Sexual Activity   Drug Use Never       Social History     Interval Breastfeeding History:    Frequency of breast feeding: attempts every day; baby will latch with no suckling  Does mother feel breastfeeding is effective: If no, explain: no active milk transfer  Does infant appear satisfied after nursing:If no, explain: no active suckling  Stooling pattern normal: Yes  Urinating frequently:Yes  Using shield or shells: No    Alternative/Artificial Feedings:   Bottle: Yes, attempted but baby started to demonstrate distress - uncoordinated  Cup: N/A  Syringe/Finger: N/A           Formula Type: n/a                     Amount: n/a            Breast Milk:                      Amount: Donor milk 30 ml            Frequency Q 2-3 Hr between feedings  Elimination Problems: No      Equipment:  Nipple Shield             Type: n/a             Size: n/a             Frequency of Use: n/a  Pump            Type: Spectra 2            Frequency of Use: 6-8 times  Shells            Type: n/a            Frequency of use: n/a    Equipment Problems: yes doesn't feel like it is effective  Hattie Pressley purchased a used Spectra 2 from someone on [employer]  The pump did not have original parts   Hattie Pressley is using the Ameda parts from the hospital to pump  Encouraged to get Spectra Parts for the pump  Mom:  Breast: L>R; bilateral anchor scar with total removal of areola and nipple  Right presents with scar tissue on lower out quadrant  Left presents with scar tissue in lower and upper inner quadrants  Upon palpation, R presents with slight glandular tissue in the upper quadrants  L presents with slight glandular tissue in the lower quadrants  Nipple Assessment in General: Normal: elongated/eraser, no discoloration and no damage noted  Upon compression, milk visible on nipple face  Mother's Awareness of Feeding Cues                 Recognizes: Yes                  Verbalizes: Yes  Support System: FOB; Extended family  History of Breastfeeding: only a few weeks for first child  Changes/Stressors/Violence: concerned about low milk supply  Concerns/Goals: wants to provide breast milk for baby in NICU    Problems with Mom: history of breast reeducation surgery    Physical Exam  Constitutional:       Appearance: Normal appearance  HENT:      Head: Normocephalic  Cardiovascular:      Rate and Rhythm: Normal rate and regular rhythm  Pulses: Normal pulses  Heart sounds: Normal heart sounds  Pulmonary:      Effort: Pulmonary effort is normal       Breath sounds: Normal breath sounds  Musculoskeletal:         General: Normal range of motion  Cervical back: Normal range of motion and neck supple  Neurological:      Mental Status: She is alert and oriented to person, place, and time  Skin:     General: Skin is warm and dry  Capillary Refill: Capillary refill takes less than 2 seconds  Psychiatric:         Mood and Affect: Mood normal          Behavior: Behavior normal          Thought Content:  Thought content normal          Judgment: Judgment normal          Handouts:   Hands on pumping and breast compressions    Education:  Reviewed Frequency/Supply & Demand: increase pumping sessions; introduce power pumping; galactagogues  Use warmth, massage, hand expression prior to pumping  Do s2s and allow non-nutritive suck in the NICU  Reviewed Infant:Cues and varied States of Awareness  Reviewed Alternative/Artificial Feedings: paced bottle; non-nutritive suck to the breast; pump every 2-3 hours  Reviewed Mom/Breast care: massage, warmth, hand expression prior to pumping; hands on pumping and breast compressions during pumping  Cool after pumping to reduce any inflammation  Reviewed Equipment: cycle and hands on pumping; power pumping; [facility] to [facility] with milk transfer  Purchase Spectra pump parts from   High cycles to stimulate the right nipple and areola  Massage and warmth with breast compressions to assist with milk transfer  Plan:  [facility] is encouraged to:    Milk Supply:   - Allow for non-nutritive suck at the breast to stimulate supply   - Allow for skin to skin during and after each breastfeeding session   - Use massage, heat, and hand expression prior to feedings to assist with deep latch   - Educate self on galactagogues likes Moringa from [employer], More [place] from [employer] and Bright [employer] Instrument Too from [facility]  Educational information can be found at [facility] [employer]  com    Mom provided with and discussed RBS, Reviewed pumping log and expectations for pumping output in the first weeks  Reviewed cycle pumping and appropriate pump settings, as well as pumping for 10-15 min 8-12 times per day  Pumping:   - When pumping, begin in stimulation mode (high cycle, low vacuum) until milk begins to express  Change pump to expression mode (low cycle, high vacuum)  Use hands on pumping techniques to assist with milk transfer  When milk stops expressing, change back to stimulation mode  When milk begins to flow, change to expression mode   You make cycle pump up to three times in a pumping session     - Add in power pumping to assist in increasing milk supply  Take 2 consecutive days, 6 hours each day  Pump every hour for approx  5-10 min within the 6 hours  Continue your regular schedule pumping and feeding during this power pumping session  Enc Mom to discussed putting baby to the breast with the NICU team when baby is medically stable to do so  Enc her to call for lactation support as needed throughout her stay  - Contact the [facility] in [place] [phone]  to see about renting a hospital grade pump    - Request from NICU Provider a script for Hospital grade pump    Handouts:  Breast Compressions  Hands On Pumping      Follow up in 2 weeks on July 29th @ 11 am          I have spent 90 minutes with Patient and family today in which greater than 50% of this time was spent in counseling/coordination of care regarding Patient and family education

## 2021-07-16 ENCOUNTER — TELEPHONE (OUTPATIENT)
Dept: POSTPARTUM | Facility: CLINIC | Age: 26
End: 2021-07-16

## 2021-07-16 NOTE — TELEPHONE ENCOUNTER
Called Ray Lozano - Provided education on how to get a hospital grade pump  - Call the [facility] office for loaner - [employer] will only provide a Synophany breast pump at a lower cost      Remember to ask to use a hospital grade pump when visiting your baby at the NICU   You may request new pump parts to keep at the hospital

## 2021-07-19 NOTE — PROGRESS NOTES
I have reviewed the notes, assessments, and/or procedures performed by Cristal Gaxiola MA, IBCLC, I concur with her/his documentation of Daiana Jin MD 07/18/21

## 2021-07-22 ENCOUNTER — TELEPHONE (OUTPATIENT)
Dept: OBGYN CLINIC | Facility: CLINIC | Age: 26
End: 2021-07-22

## 2021-08-18 ENCOUNTER — OFFICE VISIT (OUTPATIENT)
Dept: OBGYN CLINIC | Facility: CLINIC | Age: 26
End: 2021-08-18

## 2021-08-18 VITALS
HEART RATE: 76 BPM | DIASTOLIC BLOOD PRESSURE: 86 MMHG | HEIGHT: 68 IN | WEIGHT: 217 LBS | BODY MASS INDEX: 32.89 KG/M2 | SYSTOLIC BLOOD PRESSURE: 126 MMHG

## 2021-08-18 DIAGNOSIS — Z30.09 STERILIZATION CONSULT: Primary | ICD-10-CM

## 2021-08-18 PROCEDURE — 99212 OFFICE O/P EST SF 10 MIN: CPT | Performed by: OBSTETRICS & GYNECOLOGY

## 2021-08-18 NOTE — PROGRESS NOTES
[address]  [address] [facility] [address]  Phone#  [phone]  Fax#  [phone]        Subjective Lovely Scheuermann  is a [id number] y o  female who presents for sterilization discussion  Patient underwent a classical  section due to preeclampsia with severe features at 31 weeks and 1 day last 2021  Discussed with pt LARC methods of contraception including intrauterine device, Nexplanon, and Depo Provera in addition to surgical sterilization  Discussed the risks, benefits, and alternatives to each  Discussed that the Nexplanon has been proven to be the most effective form of contraception; pt desires permanence  Discussed that Mirena IUD has excellent bleeding profile that she would be unable to achieve with surgical sterilization alone; pt is uninterested in bleeding profile  Discussed that surgical sterilization is a PERMANENT and IRREVERSIBLE surgical procedure; pt demonstrated understanding and continued desire to proceed  Patient is okay if she does not have any more children, even if she dates a different partner or loses her children  Review of Systems  Pertinent items are noted in HPI  Objective    Vitals:    21 1358   BP: 126/86   Pulse: 76   Weight: 98 4 kg (217 lb)   Height: 5' 8" (1 727 m)         General:   alert and oriented, in no acute distress   Heart: regular rate and rhythm, S1, S2 normal, no murmur, click, rub or gallop   Lungs: clear to auscultation bilaterally   Abdomen: soft, non-tender, without masses or organomegaly   Vulva: defered                       Assessment    Desire for permanent sterilization     Discussed risks of surgical procedures, including risk of bleeding, blood clots, infection, injury to soft tissue or surrounding organs, permanent disability, or even death  Discussed procedure in detail   Discussed the various methods of surgical sterilization including tubal ligation and salpingectomy, and that route of procedure will be dependent on the physician covering the procedure  Pt demonstrated understanding and expressed a desire for salpingectomy as the preferred method   Sterilization consent  Signed today, MA 31  form signed today   We have discussed with patient to return back once surgical committee review and approve case for presurgical H&P  Hibiclens preop soap to be given in next appointment   Pt had opportunity to ask questions and all questions were answered to patient's satisfaction       Discussed with Dr Gamal Ronquillo MD

## 2021-09-08 ENCOUNTER — PREP FOR PROCEDURE (OUTPATIENT)
Dept: OBGYN CLINIC | Facility: CLINIC | Age: 26
End: 2021-09-08

## 2021-09-08 DIAGNOSIS — Z30.09 UNWANTED FERTILITY: Primary | ICD-10-CM

## 2021-09-13 ENCOUNTER — OFFICE VISIT (OUTPATIENT)
Dept: OBGYN CLINIC | Facility: CLINIC | Age: 26
End: 2021-09-13

## 2021-09-13 VITALS
HEART RATE: 82 BPM | SYSTOLIC BLOOD PRESSURE: 120 MMHG | BODY MASS INDEX: 33.65 KG/M2 | DIASTOLIC BLOOD PRESSURE: 80 MMHG | WEIGHT: 222 LBS | HEIGHT: 68 IN

## 2021-09-13 DIAGNOSIS — Z30.2 ENCOUNTER FOR STERILIZATION: Primary | Chronic | ICD-10-CM

## 2021-09-13 PROCEDURE — 99213 OFFICE O/P EST LOW 20 MIN: CPT | Performed by: OBSTETRICS & GYNECOLOGY

## 2021-09-13 NOTE — PROGRESS NOTES
H&P Exam - Gynecology   [facility] 26 y o  female MRN: [id number]  Unit/Bed#:  Encounter: [id number]      History of Present Illness     HPI:  [facility] is a 26 y o  female who presents with desire for permanent sterilization she has been counseled to twice  She is firm about her decision for permanent sterilization, she is understanding irreversible nature of bilateral salpingectomy  Her pregnancies were complicated and indicated  deliveries, the sites last pregnancy was complicated with preeclampsia with severe features that need to proceed delivery at 34st week  Patient is aware option for partner vasectomy  Review of Systems   Constitutional: Negative  HENT: Negative  Respiratory: Negative  Cardiovascular: Negative  Gastrointestinal: Negative  Genitourinary: Negative  Musculoskeletal: Negative  Neurological: Negative  Psychiatric/Behavioral: Negative          Historical Information   Past Medical History:   Diagnosis Date    31 weeks gestation of pregnancy 2021    GBS bacteruria- needs PCN intrapartum    Chiari malformation type I ([facility] [address] )     Chronic fatigue     last assessed 16    COVID-19 2020    Fever and chills 2020    High risk pregnancy with high inhibin 3/29/2021    Hyperemesis gravidarum 2021    IV fluid infusions ordered Phenergan ordered     Hyperlipidemia     resolved 17    Hypertension     pre-eclampsia 2016    Idiopathic intracranial hypertension     Intrauterine growth restriction affecting antepartum care of mother in third trimester 6/3/2021    Pre-eclampsia     with severe features     Varicella     pt unsure    Visual impairment     glasses     Past Surgical History:   Procedure Laterality Date    CHOLECYSTECTOMY LAPAROSCOPIC N/A 2016    Procedure: CHOLECYSTECTOMY LAPAROSCOPIC possible open;  Surgeon: Shyanne Sorensen MD;  [facility]: [facility];  Service:     AR  DELIVERY ONLY N/A 2021 Procedure:  SECTION (); Surgeon: Deborrah Gowers, DO;  Location: [facility] ;  Service: Obstetrics    REDUCTION MAMMAPLASTY       OB/GYN History: [id number]  Family History   Problem Relation Age of Onset    Leukemia Maternal Grandfather     No Known Problems Mother     Other Father         MVA    No Known Problems Sister     Hypertension Maternal Grandmother     No Known Problems Daughter     No Known Problems Sister      Social History   Social History     Substance and Sexual Activity   Alcohol Use Not Currently     Social History     Substance and Sexual Activity   Drug Use Never     Social History     Tobacco Use   Smoking Status Never Smoker   Smokeless Tobacco Never Used       Meds/Allergies   (Not in a hospital admission)    No Known Allergies    Objective   There were no vitals taken for this visit  [unfilled]    Physical Exam  Constitutional:       Appearance: Normal appearance  She is obese  Cardiovascular:      Rate and Rhythm: Normal rate  Pulses: Normal pulses  Pulmonary:      Effort: Pulmonary effort is normal    Abdominal:      Palpations: Abdomen is soft  Musculoskeletal:         General: Normal range of motion  Cervical back: Normal range of motion  Skin:     General: Skin is warm  Neurological:      General: No focal deficit present  Mental Status: She is alert and oriented to person, place, and time  Psychiatric:         Mood and Affect: Mood normal          Behavior: Behavior normal          Lab Results:   No visits with results within 1 Day(s) from this visit  Latest known visit with results is:   No results displayed because visit has over 200 results  Imaging: I have personally reviewed pertinent reports  EKG, Pathology, and Other Studies: I have personally reviewed pertinent reports        Assessment/Plan     A/P: Permanent sterilization  Encounter for sterilization  Patient understands that BTL is intended for permanent sterilization and is not intended to be a reversible form of birth control  Patient understands that though this procedure is intended to provide permanent sterilization, there is still a chance for failure of the procedure and that she may become pregnant in the future despite BTL  She understands that with BTL there exists an increased risk of ectopic pregnancy were she to become pregnant postprocedure, and that this is considered an abnormal pregnancy and would likely result in miscarriage or termination, and that ectopic pregnancy poses a risk to her maternal health and well-being including a risk of death  Patient understands that there are alternative forms of birth control including abstinence, condoms or other barrier methods, OCPs, depo provera injection, ring, patch, Nexplanon, IUD - all of which are reversible and would allow for attempt of future pregnancy  She has declined these methods of birth control  Patient also declines vasectomy of partner as a method of birth control  Discussed risks of surgical procedures, including risk of bleeding, blood clots, infection, injury to soft tissue or surrounding organs, permanent disability, or even death  Discussed procedure in detail  Discussed the various methods of surgical sterilization including tubal ligation and salpingectomy, and that route of procedure will be dependent on the physician covering the procedure  Pt demonstrated understanding and expressed a desire for salpingectomy as the preferred method  Pt had opportunity to ask questions and all questions were answered to patient's satisfaction  Hibiclens preop soap given to patient  Consent form signed  She is to arrive for surgery on 9/24/2021     20 minutes spent with patient, more than 50% visit spent counseling on contraception methods  Also counseled on abstinence, safe sex practices, STI screening needs, condom use and high risk behaviours       - Pap smear is up-to-date  -she does not have COVID vaccine, I encouraged to receive    Code Status: Full code    Scottie Morales MD  [id number]  0:04 PM

## 2021-09-13 NOTE — ASSESSMENT & PLAN NOTE
Patient understands that BTL is intended for permanent sterilization and is not intended to be a reversible form of birth control  Patient understands that though this procedure is intended to provide permanent sterilization, there is still a chance for failure of the procedure and that she may become pregnant in the future despite BTL  She understands that with BTL there exists an increased risk of ectopic pregnancy were she to become pregnant postprocedure, and that this is considered an abnormal pregnancy and would likely result in miscarriage or termination, and that ectopic pregnancy poses a risk to her maternal health and well-being including a risk of death  Patient understands that there are alternative forms of birth control including abstinence, condoms or other barrier methods, OCPs, depo provera injection, ring, patch, Nexplanon, IUD - all of which are reversible and would allow for attempt of future pregnancy  She has declined these methods of birth control  Patient also declines vasectomy of partner as a method of birth control  Discussed risks of surgical procedures, including risk of bleeding, blood clots, infection, injury to soft tissue or surrounding organs, permanent disability, or even death  Discussed procedure in detail  Discussed the various methods of surgical sterilization including tubal ligation and salpingectomy, and that route of procedure will be dependent on the physician covering the procedure  Pt demonstrated understanding and expressed a desire for salpingectomy as the preferred method  Pt had opportunity to ask questions and all questions were answered to patient's satisfaction  Hibiclens preop soap given to patient  Consent form signed  She is to arrive for surgery on 9/24/2021     20 minutes spent with patient, more than 50% visit spent counseling on contraception methods   Also counseled on abstinence, safe sex practices, STI screening needs, condom use and high risk behaviours

## 2021-09-13 NOTE — H&P (VIEW-ONLY)
H&P Exam - Gynecology   [facility] 26 y o  female MRN: [id number]  Unit/Bed#:  Encounter: [id number]      History of Present Illness     HPI:  [facility] is a 26 y o  female who presents with desire for permanent sterilization she has been counseled to twice  She is firm about her decision for permanent sterilization, she is understanding irreversible nature of bilateral salpingectomy  Her pregnancies were complicated and indicated  deliveries, the sites last pregnancy was complicated with preeclampsia with severe features that need to proceed delivery at 34st week  Patient is aware option for partner vasectomy  Review of Systems   Constitutional: Negative  HENT: Negative  Respiratory: Negative  Cardiovascular: Negative  Gastrointestinal: Negative  Genitourinary: Negative  Musculoskeletal: Negative  Neurological: Negative  Psychiatric/Behavioral: Negative          Historical Information   Past Medical History:   Diagnosis Date    31 weeks gestation of pregnancy 2021    GBS bacteruria- needs PCN intrapartum    Chiari malformation type I ([facility] [address] )     Chronic fatigue     last assessed 16    COVID-19 2020    Fever and chills 2020    High risk pregnancy with high inhibin 3/29/2021    Hyperemesis gravidarum 2021    IV fluid infusions ordered Phenergan ordered     Hyperlipidemia     resolved 17    Hypertension     pre-eclampsia 2016    Idiopathic intracranial hypertension     Intrauterine growth restriction affecting antepartum care of mother in third trimester 6/3/2021    Pre-eclampsia     with severe features     Varicella     pt unsure    Visual impairment     glasses     Past Surgical History:   Procedure Laterality Date    CHOLECYSTECTOMY LAPAROSCOPIC N/A 2016    Procedure: CHOLECYSTECTOMY LAPAROSCOPIC possible open;  Surgeon: Nemesio Menjivar MD;  Location: [facility];  Service:     [place]  DELIVERY ONLY N/A 2021 Procedure:  SECTION (); Surgeon: Khadijah Veloz DO;  Location: [facility] ;  Service: Obstetrics    REDUCTION MAMMAPLASTY       OB/GYN History: [id number]  Family History   Problem Relation Age of Onset    Leukemia Maternal Grandfather     No Known Problems Mother     Other Father         MVA    No Known Problems Sister     Hypertension Maternal Grandmother     No Known Problems Daughter     No Known Problems Sister      Social History   Social History     Substance and Sexual Activity   Alcohol Use Not Currently     Social History     Substance and Sexual Activity   Drug Use Never     Social History     Tobacco Use   Smoking Status Never Smoker   Smokeless Tobacco Never Used       Meds/Allergies   (Not in a hospital admission)    No Known Allergies    Objective   There were no vitals taken for this visit  [unfilled]    Physical Exam  Constitutional:       Appearance: Normal appearance  She is obese  Cardiovascular:      Rate and Rhythm: Normal rate  Pulses: Normal pulses  Pulmonary:      Effort: Pulmonary effort is normal    Abdominal:      Palpations: Abdomen is soft  Musculoskeletal:         General: Normal range of motion  Cervical back: Normal range of motion  Skin:     General: Skin is warm  Neurological:      General: No focal deficit present  Mental Status: She is alert and oriented to person, place, and time  Psychiatric:         Mood and Affect: Mood normal          Behavior: Behavior normal          Lab Results:   No visits with results within 1 Day(s) from this visit  Latest known visit with results is:   No results displayed because visit has over 200 results  Imaging: I have personally reviewed pertinent reports  EKG, Pathology, and Other Studies: I have personally reviewed pertinent reports        Assessment/Plan     A/P: Permanent sterilization  Encounter for sterilization  Patient understands that BTL is intended for permanent sterilization and is not intended to be a reversible form of birth control  Patient understands that though this procedure is intended to provide permanent sterilization, there is still a chance for failure of the procedure and that she may become pregnant in the future despite BTL  She understands that with BTL there exists an increased risk of ectopic pregnancy were she to become pregnant postprocedure, and that this is considered an abnormal pregnancy and would likely result in miscarriage or termination, and that ectopic pregnancy poses a risk to her maternal health and well-being including a risk of death  Patient understands that there are alternative forms of birth control including abstinence, condoms or other barrier methods, OCPs, depo provera injection, ring, patch, Nexplanon, IUD - all of which are reversible and would allow for attempt of future pregnancy  She has declined these methods of birth control  Patient also declines vasectomy of partner as a method of birth control  Discussed risks of surgical procedures, including risk of bleeding, blood clots, infection, injury to soft tissue or surrounding organs, permanent disability, or even death  Discussed procedure in detail  Discussed the various methods of surgical sterilization including tubal ligation and salpingectomy, and that route of procedure will be dependent on the physician covering the procedure  Pt demonstrated understanding and expressed a desire for salpingectomy as the preferred method  Pt had opportunity to ask questions and all questions were answered to patient's satisfaction  Hibiclens preop soap given to patient  Consent form signed  She is to arrive for surgery on 9/24/2021     20 minutes spent with patient, more than 50% visit spent counseling on contraception methods  Also counseled on abstinence, safe sex practices, STI screening needs, condom use and high risk behaviours       - Pap smear is up-to-date  -she does not have COVID vaccine, I encouraged to receive    Code Status: Full code    Noemi Ashley MD  7/14/3911  3:55 PM

## 2021-09-14 ENCOUNTER — PREP FOR PROCEDURE (OUTPATIENT)
Dept: OBGYN CLINIC | Facility: CLINIC | Age: 26
End: 2021-09-14

## 2021-09-15 ENCOUNTER — APPOINTMENT (OUTPATIENT)
Dept: LAB | Facility: HOSPITAL | Age: 26
End: 2021-09-15
Attending: OBSTETRICS & GYNECOLOGY
Payer: COMMERCIAL

## 2021-09-15 ENCOUNTER — TELEPHONE (OUTPATIENT)
Dept: OBGYN CLINIC | Facility: CLINIC | Age: 26
End: 2021-09-15

## 2021-09-15 ENCOUNTER — PREP FOR PROCEDURE (OUTPATIENT)
Dept: OBGYN CLINIC | Facility: CLINIC | Age: 26
End: 2021-09-15

## 2021-09-15 ENCOUNTER — TELEPHONE (OUTPATIENT)
Dept: LABOR AND DELIVERY | Facility: HOSPITAL | Age: 26
End: 2021-09-15

## 2021-09-15 DIAGNOSIS — Z30.09 UNWANTED FERTILITY: ICD-10-CM

## 2021-09-15 DIAGNOSIS — Z01.818 PREOP EXAMINATION: ICD-10-CM

## 2021-09-15 DIAGNOSIS — Z01.818 PREOP EXAMINATION: Primary | ICD-10-CM

## 2021-09-15 LAB
ALBUMIN SERPL BCP-MCNC: 3.7 G/DL (ref 3.5–5)
ALP SERPL-CCNC: 59 U/L (ref 46–116)
ALT SERPL W P-5'-P-CCNC: 45 U/L (ref 12–78)
ANION GAP SERPL CALCULATED.3IONS-SCNC: 4 MMOL/L (ref 4–13)
AST SERPL W P-5'-P-CCNC: 20 U/L (ref 5–45)
BASOPHILS # BLD AUTO: 0.01 THOUSANDS/ΜL (ref 0–0.1)
BASOPHILS NFR BLD AUTO: 0 % (ref 0–1)
BILIRUB SERPL-MCNC: 0.27 MG/DL (ref 0.2–1)
BUN SERPL-MCNC: 15 MG/DL (ref 5–25)
CALCIUM SERPL-MCNC: 9.2 MG/DL (ref 8.3–10.1)
CHLORIDE SERPL-SCNC: 106 MMOL/L (ref 100–108)
CO2 SERPL-SCNC: 26 MMOL/L (ref 21–32)
CREAT SERPL-MCNC: 0.45 MG/DL (ref 0.6–1.3)
EOSINOPHIL # BLD AUTO: 0.12 THOUSAND/ΜL (ref 0–0.61)
EOSINOPHIL NFR BLD AUTO: 2 % (ref 0–6)
ERYTHROCYTE [DISTWIDTH] IN BLOOD BY AUTOMATED COUNT: 12.7 % (ref 11.6–15.1)
GFR SERPL CREATININE-BSD FRML MDRD: 141 ML/MIN/1.73SQ M
GLUCOSE P FAST SERPL-MCNC: 80 MG/DL (ref 65–99)
HCT VFR BLD AUTO: 39.5 % (ref 34.8–46.1)
HGB BLD-MCNC: 12.9 G/DL (ref 11.5–15.4)
IMM GRANULOCYTES # BLD AUTO: 0.03 THOUSAND/UL (ref 0–0.2)
IMM GRANULOCYTES NFR BLD AUTO: 0 % (ref 0–2)
LYMPHOCYTES # BLD AUTO: 2.33 THOUSANDS/ΜL (ref 0.6–4.47)
LYMPHOCYTES NFR BLD AUTO: 31 % (ref 14–44)
MCH RBC QN AUTO: 29.5 PG (ref 26.8–34.3)
MCHC RBC AUTO-ENTMCNC: 32.7 G/DL (ref 31.4–37.4)
MCV RBC AUTO: 90 FL (ref 82–98)
MONOCYTES # BLD AUTO: 0.53 THOUSAND/ΜL (ref 0.17–1.22)
MONOCYTES NFR BLD AUTO: 7 % (ref 4–12)
NEUTROPHILS # BLD AUTO: 4.56 THOUSANDS/ΜL (ref 1.85–7.62)
NEUTS SEG NFR BLD AUTO: 60 % (ref 43–75)
NRBC BLD AUTO-RTO: 0 /100 WBCS
PLATELET # BLD AUTO: 287 THOUSANDS/UL (ref 149–390)
PMV BLD AUTO: 10.1 FL (ref 8.9–12.7)
POTASSIUM SERPL-SCNC: 4 MMOL/L (ref 3.5–5.3)
PROT SERPL-MCNC: 7.2 G/DL (ref 6.4–8.2)
RBC # BLD AUTO: 4.38 MILLION/UL (ref 3.81–5.12)
SODIUM SERPL-SCNC: 136 MMOL/L (ref 136–145)
WBC # BLD AUTO: 7.58 THOUSAND/UL (ref 4.31–10.16)

## 2021-09-15 PROCEDURE — 85025 COMPLETE CBC W/AUTO DIFF WBC: CPT

## 2021-09-15 PROCEDURE — 36415 COLL VENOUS BLD VENIPUNCTURE: CPT

## 2021-09-15 PROCEDURE — 80053 COMPREHEN METABOLIC PANEL: CPT

## 2021-09-15 NOTE — TELEPHONE ENCOUNTER
Isabella Haynes from the lab called asking if pt needs a CBC  I spoke with Dr Peterson and he states pt will need a CBC, Type and screen  Can you please put these order's in           Isabella call back # [phone]

## 2021-09-17 ENCOUNTER — PREP FOR PROCEDURE (OUTPATIENT)
Dept: OBGYN CLINIC | Facility: CLINIC | Age: 26
End: 2021-09-17

## 2021-09-20 ENCOUNTER — PREP FOR PROCEDURE (OUTPATIENT)
Dept: OBGYN CLINIC | Facility: CLINIC | Age: 26
End: 2021-09-20

## 2021-09-21 NOTE — PRE-PROCEDURE INSTRUCTIONS
No reported medications  Have you had / have a sore throat? no  Have you had / have a cough less than 1 week? no  Have you had / have a fever greater than 100 0 - 100  4? no  Are you experiencing any shortness of breath? No  Not vaccinated  Reviewed with patient, in detail, instructions from "My Surgical Experience"  Instructed to avoid all ASA and OTC Vit/Supp 1 week prior to surgery and to avoid NSAIDs 7 days prior to surgery per anesthesia instructions  Tylenol ok to take prn  Advised patient that [facility] will call with surgery arrival time and hospital directions the business day prior to surgery  Advised patient nothing eat or drink after midnight  Patient verbalized understanding and knows to call surgeon's office with any additional questions prior to surgery  Pt  Verbalized understanding of current visitor restrictions

## 2021-09-24 ENCOUNTER — ANESTHESIA EVENT (OUTPATIENT)
Dept: PERIOP | Facility: HOSPITAL | Age: 26
End: 2021-09-24
Payer: COMMERCIAL

## 2021-09-24 ENCOUNTER — ANESTHESIA (OUTPATIENT)
Dept: PERIOP | Facility: HOSPITAL | Age: 26
End: 2021-09-24
Payer: COMMERCIAL

## 2021-09-24 ENCOUNTER — HOSPITAL ENCOUNTER (OUTPATIENT)
Facility: HOSPITAL | Age: 26
Setting detail: OUTPATIENT SURGERY
Discharge: HOME | End: 2021-09-24
Attending: OBSTETRICS & GYNECOLOGY | Admitting: OBSTETRICS & GYNECOLOGY
Payer: COMMERCIAL

## 2021-09-24 VITALS
TEMPERATURE: 98.3 F | RESPIRATION RATE: 16 BRPM | WEIGHT: 223 LBS | DIASTOLIC BLOOD PRESSURE: 77 MMHG | HEART RATE: 65 BPM | BODY MASS INDEX: 33.8 KG/M2 | OXYGEN SATURATION: 99 % | HEIGHT: 68 IN | SYSTOLIC BLOOD PRESSURE: 113 MMHG

## 2021-09-24 DIAGNOSIS — Z30.09 UNWANTED FERTILITY: ICD-10-CM

## 2021-09-24 DIAGNOSIS — Z30.2 ENCOUNTER FOR STERILIZATION: ICD-10-CM

## 2021-09-24 DIAGNOSIS — Z90.79 STATUS POST BILATERAL SALPINGECTOMY: ICD-10-CM

## 2021-09-24 DIAGNOSIS — G89.18 POST-OP PAIN: Primary | ICD-10-CM

## 2021-09-24 LAB
EXT PREGNANCY TEST URINE: NEGATIVE
EXT. CONTROL: NORMAL

## 2021-09-24 PROCEDURE — 88302 TISSUE EXAM BY PATHOLOGIST: CPT | Performed by: PATHOLOGY

## 2021-09-24 PROCEDURE — 58661 LAPAROSCOPY REMOVE ADNEXA: CPT | Performed by: OBSTETRICS & GYNECOLOGY

## 2021-09-24 PROCEDURE — 81025 URINE PREGNANCY TEST: CPT | Performed by: OBSTETRICS & GYNECOLOGY

## 2021-09-24 RX ORDER — ROCURONIUM BROMIDE 10 MG/ML
INJECTION, SOLUTION INTRAVENOUS AS NEEDED
Status: DISCONTINUED | OUTPATIENT
Start: 2021-09-24 | End: 2021-09-24

## 2021-09-24 RX ORDER — ACETAMINOPHEN 325 MG/1
650 TABLET ORAL EVERY 6 HOURS PRN
Status: DISCONTINUED | OUTPATIENT
Start: 2021-09-24 | End: 2021-09-24 | Stop reason: HOSPADM

## 2021-09-24 RX ORDER — OXYCODONE HYDROCHLORIDE 5 MG/1
5 TABLET ORAL EVERY 4 HOURS PRN
Status: DISCONTINUED | OUTPATIENT
Start: 2021-09-24 | End: 2021-09-24 | Stop reason: HOSPADM

## 2021-09-24 RX ORDER — NEOSTIGMINE METHYLSULFATE 1 MG/ML
INJECTION INTRAVENOUS AS NEEDED
Status: DISCONTINUED | OUTPATIENT
Start: 2021-09-24 | End: 2021-09-24

## 2021-09-24 RX ORDER — IBUPROFEN 600 MG/1
600 TABLET ORAL EVERY 6 HOURS PRN
Qty: 30 TABLET | Refills: 0 | Status: SHIPPED | OUTPATIENT
Start: 2021-09-24 | End: 2022-07-25 | Stop reason: ALTCHOICE

## 2021-09-24 RX ORDER — IBUPROFEN 600 MG/1
600 TABLET ORAL EVERY 6 HOURS PRN
Status: DISCONTINUED | OUTPATIENT
Start: 2021-09-24 | End: 2021-09-24 | Stop reason: HOSPADM

## 2021-09-24 RX ORDER — MEPERIDINE HYDROCHLORIDE 25 MG/ML
12.5 INJECTION INTRAMUSCULAR; INTRAVENOUS; SUBCUTANEOUS
Status: DISCONTINUED | OUTPATIENT
Start: 2021-09-24 | End: 2021-09-24 | Stop reason: HOSPADM

## 2021-09-24 RX ORDER — ONDANSETRON 2 MG/ML
INJECTION INTRAMUSCULAR; INTRAVENOUS AS NEEDED
Status: DISCONTINUED | OUTPATIENT
Start: 2021-09-24 | End: 2021-09-24

## 2021-09-24 RX ORDER — OXYCODONE HYDROCHLORIDE 5 MG/1
10 TABLET ORAL EVERY 4 HOURS PRN
Status: DISCONTINUED | OUTPATIENT
Start: 2021-09-24 | End: 2021-09-24 | Stop reason: HOSPADM

## 2021-09-24 RX ORDER — GLYCOPYRROLATE 0.2 MG/ML
INJECTION INTRAMUSCULAR; INTRAVENOUS AS NEEDED
Status: DISCONTINUED | OUTPATIENT
Start: 2021-09-24 | End: 2021-09-24

## 2021-09-24 RX ORDER — ONDANSETRON 2 MG/ML
4 INJECTION INTRAMUSCULAR; INTRAVENOUS ONCE AS NEEDED
Status: DISCONTINUED | OUTPATIENT
Start: 2021-09-24 | End: 2021-09-24 | Stop reason: HOSPADM

## 2021-09-24 RX ORDER — HYDROMORPHONE HCL IN WATER/PF 6 MG/30 ML
0.2 PATIENT CONTROLLED ANALGESIA SYRINGE INTRAVENOUS
Status: DISCONTINUED | OUTPATIENT
Start: 2021-09-24 | End: 2021-09-24 | Stop reason: HOSPADM

## 2021-09-24 RX ORDER — FENTANYL CITRATE/PF 50 MCG/ML
25 SYRINGE (ML) INJECTION
Status: DISCONTINUED | OUTPATIENT
Start: 2021-09-24 | End: 2021-09-24 | Stop reason: HOSPADM

## 2021-09-24 RX ORDER — BUPIVACAINE HYDROCHLORIDE 2.5 MG/ML
INJECTION, SOLUTION EPIDURAL; INFILTRATION; INTRACAUDAL AS NEEDED
Status: DISCONTINUED | OUTPATIENT
Start: 2021-09-24 | End: 2021-09-24 | Stop reason: HOSPADM

## 2021-09-24 RX ORDER — SODIUM CHLORIDE, SODIUM LACTATE, POTASSIUM CHLORIDE, CALCIUM CHLORIDE 600; 310; 30; 20 MG/100ML; MG/100ML; MG/100ML; MG/100ML
50 INJECTION, SOLUTION INTRAVENOUS CONTINUOUS
Status: DISCONTINUED | OUTPATIENT
Start: 2021-09-24 | End: 2021-09-24 | Stop reason: HOSPADM

## 2021-09-24 RX ORDER — LIDOCAINE HYDROCHLORIDE 10 MG/ML
INJECTION, SOLUTION EPIDURAL; INFILTRATION; INTRACAUDAL; PERINEURAL AS NEEDED
Status: DISCONTINUED | OUTPATIENT
Start: 2021-09-24 | End: 2021-09-24

## 2021-09-24 RX ORDER — FENTANYL CITRATE 50 UG/ML
INJECTION, SOLUTION INTRAMUSCULAR; INTRAVENOUS AS NEEDED
Status: DISCONTINUED | OUTPATIENT
Start: 2021-09-24 | End: 2021-09-24

## 2021-09-24 RX ORDER — KETOROLAC TROMETHAMINE 30 MG/ML
INJECTION, SOLUTION INTRAMUSCULAR; INTRAVENOUS AS NEEDED
Status: DISCONTINUED | OUTPATIENT
Start: 2021-09-24 | End: 2021-09-24

## 2021-09-24 RX ORDER — PROPOFOL 10 MG/ML
INJECTION, EMULSION INTRAVENOUS AS NEEDED
Status: DISCONTINUED | OUTPATIENT
Start: 2021-09-24 | End: 2021-09-24

## 2021-09-24 RX ORDER — PROMETHAZINE HYDROCHLORIDE 25 MG/ML
25 INJECTION, SOLUTION INTRAMUSCULAR; INTRAVENOUS ONCE AS NEEDED
Status: DISCONTINUED | OUTPATIENT
Start: 2021-09-24 | End: 2021-09-24 | Stop reason: HOSPADM

## 2021-09-24 RX ORDER — OXYCODONE HYDROCHLORIDE AND ACETAMINOPHEN 5; 325 MG/1; MG/1
1-2 TABLET ORAL EVERY 6 HOURS PRN
Qty: 8 TABLET | Refills: 0 | Status: SHIPPED | OUTPATIENT
Start: 2021-09-24 | End: 2021-10-04

## 2021-09-24 RX ORDER — MIDAZOLAM HYDROCHLORIDE 2 MG/2ML
INJECTION, SOLUTION INTRAMUSCULAR; INTRAVENOUS AS NEEDED
Status: DISCONTINUED | OUTPATIENT
Start: 2021-09-24 | End: 2021-09-24

## 2021-09-24 RX ADMIN — GLYCOPYRROLATE 0.8 MG: 0.2 INJECTION, SOLUTION INTRAMUSCULAR; INTRAVENOUS at 13:43

## 2021-09-24 RX ADMIN — FENTANYL CITRATE 50 MCG: 50 INJECTION INTRAMUSCULAR; INTRAVENOUS at 14:03

## 2021-09-24 RX ADMIN — NEOSTIGMINE METHYLSULFATE 5 MG: 1 INJECTION INTRAVENOUS at 13:43

## 2021-09-24 RX ADMIN — FENTANYL CITRATE 100 MCG: 50 INJECTION INTRAMUSCULAR; INTRAVENOUS at 12:47

## 2021-09-24 RX ADMIN — LIDOCAINE HYDROCHLORIDE 50 MG: 10 INJECTION, SOLUTION EPIDURAL; INFILTRATION; INTRACAUDAL; PERINEURAL at 12:46

## 2021-09-24 RX ADMIN — KETOROLAC TROMETHAMINE 30 MG: 30 INJECTION, SOLUTION INTRAMUSCULAR; INTRAVENOUS at 13:41

## 2021-09-24 RX ADMIN — PROPOFOL 200 MG: 10 INJECTION, EMULSION INTRAVENOUS at 12:48

## 2021-09-24 RX ADMIN — ROCURONIUM BROMIDE 50 MG: 50 INJECTION, SOLUTION INTRAVENOUS at 12:49

## 2021-09-24 RX ADMIN — Medication 25 MCG: at 14:23

## 2021-09-24 RX ADMIN — ONDANSETRON 4 MG: 2 INJECTION INTRAMUSCULAR; INTRAVENOUS at 13:41

## 2021-09-24 RX ADMIN — MIDAZOLAM 2 MG: 1 INJECTION INTRAMUSCULAR; INTRAVENOUS at 12:37

## 2021-09-24 RX ADMIN — SODIUM CHLORIDE, SODIUM LACTATE, POTASSIUM CHLORIDE, AND CALCIUM CHLORIDE: .6; .31; .03; .02 INJECTION, SOLUTION INTRAVENOUS at 12:37

## 2021-09-24 NOTE — DISCHARGE INSTRUCTIONS
Salpingectomy   WHAT YOU NEED TO KNOW:   A salpingectomy is surgery to remove one or both of your fallopian tubes  The fallopian tubes carry eggs from the ovaries to the uterus  They are part of a woman's reproductive system  A salpingectomy may be done to treat an ectopic pregnancy, cancer, endometriosis, or an infection  It may also be done to prevent pregnancy or some types of cancer  DISCHARGE INSTRUCTIONS:   Call your local emergency number (911 in the [address]) for any of the following:   · You feel lightheaded, short of breath, and have chest pain  · You cough up blood  · You have trouble breathing  Seek care immediately if:   · Your arm or leg feels warm, tender, and painful  It may look swollen and red  · Blood soaks through your bandage  · Your stitches come apart  · You soak through 1 sanitary pad in 1 hour  · You have trouble urinating or cannot urinate at all  Call your doctor or surgeon if:   · You have a fever or chills  · Your wound is red, swollen, or draining pus  · You have pus or a foul-smelling odor coming from your vagina  · Your pain does not get better after you take your medicine  · You have nausea or are vomiting  · Your skin is itchy, swollen, or you have a rash  · You have questions or concerns about your condition or care  Medicines: You may need any of the following:  · NSAIDs , such as ibuprofen, help decrease swelling, pain, and fever  NSAIDs can cause stomach bleeding or kidney problems in certain people  If you take blood thinner medicine, always ask your healthcare provider if NSAIDs are safe for you  Always read the medicine label and follow directions  · Prescription pain medicine  may be given  Ask your healthcare provider how to take this medicine safely  Some prescription pain medicines contain acetaminophen  Do not take other medicines that contain acetaminophen without talking to your healthcare provider  Too much acetaminophen may cause liver damage  Prescription pain medicine may cause constipation  Ask your healthcare provider how to prevent or treat constipation  · Take your medicine as directed  Contact your healthcare provider if you think your medicine is not helping or if you have side effects  Tell him or her if you are allergic to any medicine  Keep a list of the medicines, vitamins, and herbs you take  Include the amounts, and when and why you take them  Bring the list or the pill bottles to follow-up visits  Carry your medicine list with you in case of an emergency  Care for your wound as directed:  Ask your healthcare provider when your wound can get wet  Do not take a bath until your healthcare provider says it is okay  Take a shower only  Carefully wash around the wound with soap and water  Let the soap and water gently run over your incision  Do not  scrub your incision  Dry the area and put on new, clean bandages as directed  Change your bandages when they get wet or dirty  If you have strips of medical tape, let them fall off on their own  Activity:  Ask your healthcare provider when you can return to your normal activities  Do not douche, use tampons, or have sex until your healthcare provider says it is okay  These activities may cause infection  Do not exercise or lift anything heavy until your healthcare provider says it is okay  This may put too much stress on your incision  Follow up with your doctor or surgeon as directed:  Write down your questions so you remember to ask them during your visits  © Copyright [employer] 2021 Information is for End User's use only and may not be sold, redistributed or otherwise used for commercial purposes  All illustrations and images included in CareNotes® are the copyrighted property of [employer]  or Derik Haney  The above information is an  only  It is not intended as medical advice for individual conditions or treatments   Talk to your doctor, nurse or pharmacist before following any medical regimen to see if it is safe and effective for you

## 2021-09-24 NOTE — ANESTHESIA PREPROCEDURE EVALUATION
Procedure:  SALPINGECTOMY, LAPAROSCOPIC (Bilateral Abdomen)    Relevant Problems   ANESTHESIA (within normal limits)      CARDIO (within normal limits)   (+) Pre-eclampsia during pregnancy in third trimester, antepartum (Resolved)      ENDO   (-) Diabetes mellitus, type 2 (HCC)   (-) Hyperthyroidism   (-) Hypothyroidism      GI/HEPATIC   (-) Gastroesophageal reflux disease      /RENAL (within normal limits)      GYN (within normal limits)      HEMATOLOGY (within normal limits)      MUSCULOSKELETAL (within normal limits)      NEURO/PSYCH   (+) History of 2019 novel coronavirus disease (COVID-19)   (-) CVA (cerebral vascular accident) ([facility] [address] )   (-) [facility]      PULMONARY   (-) Asthma   (-) Chronic obstructive pulmonary disease (HCC)   (-) Sleep apnea        Physical Exam    Airway    Mallampati score: II  TM Distance: >3 FB  Neck ROM: full     Dental   No notable dental hx     Cardiovascular  Rhythm: regular, Rate: normal, No murmur,     Pulmonary  Breath sounds clear to auscultation,     Other Findings        Anesthesia Plan  ASA Score- 2     Anesthesia Type- general with ASA Monitors  Additional Monitors:   Airway Plan: ETT  Plan Factors-Exercise tolerance (METS): >4 METS  Chart reviewed  EKG reviewed  Existing labs reviewed  Patient summary reviewed  Patient is not a current smoker  Induction- intravenous  Postoperative Plan- Plan for postoperative opioid use  Informed Consent- Anesthetic plan and risks discussed with patient  I personally reviewed this patient with the CRNA  Discussed and agreed on the Anesthesia Plan with the CRNA  Roberta Dalton

## 2021-09-24 NOTE — INTERVAL H&P NOTE
H&P reviewed  After examining the patient I find no changes in the patients condition since the H&P had been written  LMP earlier in the month  She had intercourse 4 days ago using condoms  She is aware that a pregnancy test would not detect a pregnancy if she conceived then  Postoperative instructions were given  RTO 1-2 wks  Percocet 8 prn pain, then Advil  Vasectomy was declined as an option      Vitals:    09/24/21 1031   BP: 119/84   Pulse: 76   Resp: 18   Temp: (!) 97 3 °F (36 3 °C)   SpO2: 98%

## 2021-09-24 NOTE — ANESTHESIA POSTPROCEDURE EVALUATION
Post-Op Assessment Note    CV Status:  Stable  Pain Score: 8    Pain management: adequate     Mental Status:  Alert and awake   Hydration Status:  Stable   PONV Controlled:  Controlled   Airway Patency:  Patent      Post Op Vitals Reviewed: Yes      Staff: CRNA         No complications documented      BP   110/72   Temp  97 8   Pulse  71   Resp   10   SpO2   100

## 2021-09-24 NOTE — OP NOTE
OPERATIVE REPORT  PATIENT NAME: Anoop Fallon    :  1995  MRN: [id number]  Pt Location: [facility] OR ROOM 03    SURGERY DATE: 2021    Surgeon(s) and Role:     * Kaitlyn Chow MD - Primary     * Taya Horta MD - Assisting    Preop Diagnosis:  1  Multiparity  2  Desire for permanent sterilization  3  Obesity - BMI 33 9    Post-Op Diagnosis Codes:  1  Same as above  2  Incidental finding of endometriosis    Procedure(s) (LRB):  SALPINGECTOMY, LAPAROSCOPIC (Bilateral)    Specimen(s):  ID Type Source Tests Collected by Time Destination   1 : bilateral tubes together Tissue Fallopian Tubes, Bilateral TISSUE EXAM Kaitlyn Chow MD 2021 1326        Estimated Blood Loss:   5cc    Drains:  None    Anesthesia Type:   General endotracheal    Operative Indications:  1  Multiparity  2  Desire for permanent sterilization    Operative Findings:  1  Normal external female genitalia  2  Anteverted, mobile uterus about 7w in size  No adnexal masses palpated on bimanual exam  3  No adhesions to the anterior abdominal wall from previous surgeries  4  Normal appearing liver edge and upper abdomen  5  Right sided ovarian cyst  Normal appearing left ovary  6  Normal appearing fallopian tubes bilaterally  7  Endometriosis implant below the uterosacral ligament  [employer] window of the left pelvic sidewall along the course of the ureter  8  Cecum appeared larger than normal and was found at the level of the right ovary upon entry into the abdomen  Visualization of appendix attempted but was unsuccessful    Complications:   None apparent    Procedure and Technique: All risks, benefits, and alternatives to the procedure were discussed with the patient and she had the opportunity to ask questions  The risk of regret of procedure was also addressed with the patient and options for LARC and partner vasectomy were discussed  Patient expressed desire to continue with tubal sterilization  Informed consent was obtained  The correct patient and procedure were identified in pre-op holding and again in the operating room  General endotracheal anesthesia (GET) was administered and the patient was positioned on the OR table in the dorsal lithotomy position  All pressure points were padded and a theresa hugger was placed to maintain control of core body temperature  A bimanual exam was performed and the uterus was noted to be anteverted, normal in size and consistency with no palpable adnexal masses or fullness  Betadine was used to prep the vagina and perineum  The abdomen as prepped with Chloraprep  The patient was then draped in the usual sterile fashion  A surgical timeout was performed and all were in agreement  A red rubber catheter was used to drain the bladder of 150cc of clear yellow urine  A  Sponge stick was inserted into the vagina to aid in uterine manipulation  Sterile gloves were then exchanged and attention was turned to the abdomen  Following infiltration with local anesthetic, a vertical 5mm incision was made at the inferior edge of the umbilicus (using previous surgical scar from cholecystectomy) for introduction of a 5mm trocar  Trocar was introduced under direct visualization  Pneumoperitoneum was then established to a maximum of 15mmHg  There was no evidence of injury directly below the trocar insertion site  The upper abdomen was inspected  The liver edge appeared normal       Attention was then turned to the pelvis  Patient was placed in Trendelenburg and the uterus was elevated to visualize the fallopian tubes  Two additional port sites were selected in the left and right lower abdomen approximately 2cm superior and medial to the iliac crests  Following infiltration with local anesthetic, a 5mm incision was made for introduction of a 5mm trocar under direct visualization at each site  The fallopian tubes appeared normal bilaterally  There was a right sided ovarian cysts and a normal appearing left ovary  A small endometriotic implant was noted below the uterosacral ligament and [employer] window was noted on the left pelvic sidewall along the course of the ureter  The left fallopian tube was grasped at its fimbriated end with a blunt grasper and elevated to visualize the mesosalpinx  The Enseal device was used to ligate along the mesosalpinx, working proximally and taking care to avoid ovarian vasculature  Approximately 2cm from the cornua, the Enseal was used to amputate fallopian tube  This was then withdrawn from the abdominal cavity and sent for pathology  Attention was then turned to the contralateral tube, which was amputated in similar fashion  Good hemostasis was confirmed following salpingectomy  The entire abdomen and pelvis was inspected and there was no evidence of injury to bowel, bladder, vasculature, or other structures  Pneumoperitoneum was allowed to escape  Adequate hemostasis was visualized including at the trocar sites  The inferior trocars were removed  The laparoscope was withdrawn from the abdomen, followed by its trocar sleeve at the umbilicus  Skin incisions were closed with 4-0 Monocryl and Exofin  The sponge stick was removed from the vagina  At the conclusion of the procedure, all needle, sponge, and instrument counts were noted to be correct x2  Patient tolerated the procedure well and was transferred to PACU in stable condition prior to discharge with follow up in 1-2 weeks  Dr Dottie Johnson was present and participated in all key portions of the case      Patient Disposition:  PACU     SIGNATURE: Alfredo Worley MD  DATE: September 24, 2021  TIME: 2:20 PM

## 2021-10-08 ENCOUNTER — OFFICE VISIT (OUTPATIENT)
Dept: OBGYN CLINIC | Facility: CLINIC | Age: 26
End: 2021-10-08

## 2021-10-08 VITALS
HEART RATE: 73 BPM | WEIGHT: 231 LBS | HEIGHT: 68 IN | BODY MASS INDEX: 35.01 KG/M2 | SYSTOLIC BLOOD PRESSURE: 118 MMHG | DIASTOLIC BLOOD PRESSURE: 80 MMHG

## 2021-10-08 DIAGNOSIS — Z48.89 POSTOPERATIVE VISIT: Primary | ICD-10-CM

## 2021-10-08 DIAGNOSIS — Z90.79 STATUS POST BILATERAL SALPINGECTOMY: ICD-10-CM

## 2021-10-08 PROCEDURE — 99024 POSTOP FOLLOW-UP VISIT: CPT | Performed by: OBSTETRICS & GYNECOLOGY

## 2021-11-05 ENCOUNTER — TELEPHONE (OUTPATIENT)
Dept: NEUROLOGY | Facility: CLINIC | Age: 26
End: 2021-11-05

## 2021-11-09 ENCOUNTER — DOCUMENTATION (OUTPATIENT)
Dept: DENTISTRY | Facility: CLINIC | Age: 26
End: 2021-11-09

## 2021-11-12 ENCOUNTER — OFFICE VISIT (OUTPATIENT)
Dept: NEUROLOGY | Facility: CLINIC | Age: 26
End: 2021-11-12
Payer: COMMERCIAL

## 2021-11-12 VITALS
HEART RATE: 78 BPM | HEIGHT: 68 IN | WEIGHT: 245 LBS | SYSTOLIC BLOOD PRESSURE: 119 MMHG | DIASTOLIC BLOOD PRESSURE: 67 MMHG | BODY MASS INDEX: 37.13 KG/M2

## 2021-11-12 DIAGNOSIS — G93.2 IIH (IDIOPATHIC INTRACRANIAL HYPERTENSION): ICD-10-CM

## 2021-11-12 DIAGNOSIS — G43.019 INTRACTABLE MIGRAINE WITHOUT AURA AND WITHOUT STATUS MIGRAINOSUS: ICD-10-CM

## 2021-11-12 DIAGNOSIS — G43.009 MIGRAINE WITHOUT AURA AND WITHOUT STATUS MIGRAINOSUS, NOT INTRACTABLE: Primary | ICD-10-CM

## 2021-11-12 PROCEDURE — 96372 THER/PROPH/DIAG INJ SC/IM: CPT | Performed by: PSYCHIATRY & NEUROLOGY

## 2021-11-12 PROCEDURE — 99215 OFFICE O/P EST HI 40 MIN: CPT | Performed by: PSYCHIATRY & NEUROLOGY

## 2021-11-12 RX ORDER — TOPIRAMATE 25 MG/1
TABLET ORAL
Qty: 120 TABLET | Refills: 4 | Status: SHIPPED | OUTPATIENT
Start: 2021-11-12 | End: 2022-02-18 | Stop reason: SDUPTHER

## 2021-11-12 RX ORDER — RIZATRIPTAN BENZOATE 10 MG/1
10 TABLET ORAL ONCE AS NEEDED
Qty: 9 TABLET | Refills: 6 | Status: SHIPPED | OUTPATIENT
Start: 2021-11-12 | End: 2022-07-25 | Stop reason: ALTCHOICE

## 2021-11-12 RX ORDER — KETOROLAC TROMETHAMINE 30 MG/ML
60 INJECTION, SOLUTION INTRAMUSCULAR; INTRAVENOUS ONCE
Status: COMPLETED | OUTPATIENT
Start: 2021-11-12 | End: 2021-11-12

## 2021-11-12 RX ADMIN — KETOROLAC TROMETHAMINE 60 MG: 30 INJECTION, SOLUTION INTRAMUSCULAR; INTRAVENOUS at 14:36

## 2021-12-06 ENCOUNTER — TELEPHONE (OUTPATIENT)
Dept: NEUROLOGY | Facility: CLINIC | Age: 26
End: 2021-12-06

## 2021-12-29 ENCOUNTER — TELEMEDICINE (OUTPATIENT)
Dept: INTERNAL MEDICINE CLINIC | Facility: CLINIC | Age: 26
End: 2021-12-29

## 2021-12-29 DIAGNOSIS — R11.2 NAUSEA AND VOMITING, INTRACTABILITY OF VOMITING NOT SPECIFIED, UNSPECIFIED VOMITING TYPE: ICD-10-CM

## 2021-12-29 DIAGNOSIS — Z20.822 SUSPECTED COVID-19 VIRUS INFECTION: Primary | ICD-10-CM

## 2021-12-29 PROCEDURE — G2012 BRIEF CHECK IN BY MD/QHP: HCPCS | Performed by: INTERNAL MEDICINE

## 2021-12-29 RX ORDER — ONDANSETRON 4 MG/1
4 TABLET, FILM COATED ORAL EVERY 8 HOURS PRN
Qty: 20 TABLET | Refills: 0 | Status: SHIPPED | OUTPATIENT
Start: 2021-12-29 | End: 2022-07-25 | Stop reason: ALTCHOICE

## 2021-12-29 RX ORDER — SENNOSIDES 8.6 MG
650 CAPSULE ORAL EVERY 6 HOURS PRN
Qty: 30 TABLET | Refills: 1 | Status: SHIPPED | OUTPATIENT
Start: 2021-12-29 | End: 2022-07-25 | Stop reason: ALTCHOICE

## 2022-01-10 ENCOUNTER — TELEPHONE (OUTPATIENT)
Dept: NEUROLOGY | Facility: CLINIC | Age: 27
End: 2022-01-10

## 2022-01-10 DIAGNOSIS — G43.009 MIGRAINE WITHOUT AURA AND WITHOUT STATUS MIGRAINOSUS, NOT INTRACTABLE: Primary | ICD-10-CM

## 2022-01-10 DIAGNOSIS — G43.019 INTRACTABLE MIGRAINE WITHOUT AURA AND WITHOUT STATUS MIGRAINOSUS: ICD-10-CM

## 2022-01-10 DIAGNOSIS — G43.009 MIGRAINE WITHOUT AURA AND WITHOUT STATUS MIGRAINOSUS, NOT INTRACTABLE: ICD-10-CM

## 2022-01-10 RX ORDER — DEXAMETHASONE 2 MG/1
2 TABLET ORAL
Qty: 5 TABLET | Refills: 0 | Status: SHIPPED | OUTPATIENT
Start: 2022-01-10 | End: 2022-01-15

## 2022-01-10 NOTE — TELEPHONE ENCOUNTER
pt left vm stating that med is not helping  [phone]    called pt, states that topiramte and rizatriptan is not helping  taking topiramate 25mg 2 tabs bid  rizatriptan does not help either   has not noticed any change in migraines since being on topiramate    no side effects  [phone] to leave detailed message

## 2022-01-10 NOTE — TELEPHONE ENCOUNTER
She has only been on the medication a few months and therefor I would continue it at this dose as it is more likely to work over time  I do not think it is unreasonable though to add a better medication if we can get it approved  I recommend emgality and will send in  How often is she currently having headaches and migraines? If there is a current persistent migraine certainly could do Decadron trial for 5 days if needed  *Also patient was to send ophthalmology evaluation notes so that I can see documentation that papilledema/IIH has resolved  Our medical assistant offered to help her obtain these after last visit and she declined, I do not see that these are uploaded yet  Emgality/Galcanezumab - the 1st dose is 240 mg loading dose of 2 consecutive 120 mg injections  Thereafter, 120 mg injections every 30 days     READ INSTRUCTIONS that come with the medication  REFRIGERATE  Keep out of direct sunlight  Prior to administration, allow to come to room temperature for 30 minutes  Do not warm using a heat source (eg, microwave or hot water)  Do not shake  Administer in preferably abdomen (avoiding 2 inches around the navel), thigh, upper arm, or buttocks avoiding areas of skin that are tender, bruised, red or hard  Deliver entire contents of single-use prefilled pen or syringe

## 2022-01-10 NOTE — TELEPHONE ENCOUNTER
Called and advised pt of all of the below  She verbalized clear understanding of all instructions and agreeable  Pt states that she gets daily HA  It can last for few hours and sometimes >5 hours where she had to take a nap  +sensitivity to light and sound  States that she just spoke w/ her ophthalmology today  They will faxing her evaluation to [phone]  Agreeable to try decadron trial and emgality  She is aware that emgality requires PA  Can take up to 72 hours to get final determination  Will initiate PA   See another enc

## 2022-01-12 NOTE — TELEPHONE ENCOUNTER
emgality approved   2 pens from 1/10/22-7/10/22  1 pen from 1/10/22-7/10/22    Script must be sent to [facility]  Scripts entered, please sign off    Pt made aware    Gave her [facility] phone number

## 2022-01-13 NOTE — TELEPHONE ENCOUNTER
skyler Benitez from [facility] specialty pharmacy called and states that she called this pt to schedule emgality delivery but pt told her that she did not realize it emgality is an inj  Called pt and states that she did not know that she has to inject emgality at home  Advised that emgality is auto injector  Inject at home every 30 days  She verbalized understanding and agreeable  She just needs education on how to inject emgality    She will call the pharmacy to schedule delivery  Advised pt once she gets the med  She needs to refrigerate  Keep out of direct sunlight  Call our office to schedule emgality education  She verbalized understanding

## 2022-01-21 ENCOUNTER — CLINICAL SUPPORT (OUTPATIENT)
Dept: NEUROLOGY | Facility: CLINIC | Age: 27
End: 2022-01-21

## 2022-01-21 ENCOUNTER — TELEPHONE (OUTPATIENT)
Dept: NEUROLOGY | Facility: CLINIC | Age: 27
End: 2022-01-21

## 2022-01-21 DIAGNOSIS — G43.009 MIGRAINE WITHOUT AURA AND WITHOUT STATUS MIGRAINOSUS, NOT INTRACTABLE: Primary | ICD-10-CM

## 2022-01-21 NOTE — TELEPHONE ENCOUNTER
Fyi:     Pt called and states that she received her emgality inj (loading dose) last week  She followed the instruction that came w/ the medication  Refrigerated  She is aware to keep out of direct sunlight  She would like to schedule emgality inj education today  Preferred [facility] location  Appt scheduled at 2:30 pm  She is aware that she must bring the med (2 pens) w/ her

## 2022-01-21 NOTE — PROGRESS NOTES
Provided printed patient education regarding Emgality and injections  Allowed her to utilize training pen and demonstrated use  Patient demonstrated clear understanding of instructions and injected Emgality loading dose herself  She tolerated the injection well with no side effects  Answered all her questions  At the end of the visit, the patient has no other questions or concerns at this time  She is aware to call our office with any questions or concerns

## 2022-02-04 ENCOUNTER — TELEPHONE (OUTPATIENT)
Dept: NEUROLOGY | Facility: CLINIC | Age: 27
End: 2022-02-04

## 2022-02-04 NOTE — TELEPHONE ENCOUNTER
Called and spoke to patient - confirmed upcoming appointment with Dr Jaycob Perez  Provided patient with apt date, time and location  Informed patient that check in is at least 15 minutes prior to apt time

## 2022-02-17 NOTE — PROGRESS NOTES
[facility] Consult - Follow up   PATIENT:  Thea Fermin  MRN:  [id number]  :  1995  DATE OF SERVICE:  2022  REFERRED BY: Carole Justin PA-C  PMD: Steve Blackman PA-C    Assessment/Plan:   Thea Fermin is a very pleasant  23 y o  female with a past medical history of preeclampsia, chronic neck and back pain (referred to pain management),  questionable chiari malformation type 1, idiopathic intracranial hypertension, papilledema, status post tubal ligation  referred here for evaluation of headache  Initial evaluation 2019     Migraine without aura   Idiopathic intracranial hypertension - currently resolved  Possible Chiari malformation type I ([facility] [address] ) - per report, I do not have MRI results except for as documented   She reports headaches started around age 17, but did not become bad until age 22  Patient is a very poor historian therefore history as obtained primarily through extensive medical record review  In 2017 she presented to ED multiple times for diffuse headache, photophobia  In 10/06/2017 she was evaluated by Neurology for documented results that I do not see in chart: "An outside MRI of the brain was completed which demonstrated questionable protrusion of the left optic disc into posterior aspect of left orbit correlating with reported history of papilledema, transverse dural venous sinus stenosis is present with findings consistent with idiopathic intracranial hypertension   In the ED she had a lumbar puncture with opening pressure 55 cm H20 and closing pressure 22 5 cm H20 with relief of her symptoms after  "Per record review she was started on multiple medications including at 1 point acetazolamide in the past, but due to no insurance she stop taking any these medications  - as of 2019:  Chronic daily headache for the past approximately 6 months  Prior to that on and off  Has about 3 headache-free days per month    She describes bifrontal, bilateral retro-orbital, apex pulsating, pressure, stabbing  She denies aura  She does have associated photophobia, phonophobia  - as of 11/07/2019: Jerry Omalley returns reporting daily mild headaches, worse 4 times a week  Unfortunately she has been noncompliant with my recommendations, has only been taking acetazolamide 2-3 days a week, took indomethacin 50 mg daily for 2 weeks despite explicit instructions  Did not follow up with Ophthalmology, did not get blood work and did not get an MRI brain  Had her  come in and re-explained everything as well as the severe possible complications including vision loss if she is not compliant  We will try to make it easier acetazolamide 500 mg b i d , dexamethasone for the next 5 days as she is very worried about current headache indomethacin as abortive  She is very focused on trouble sleeping when she has about headache and asking for an option to treat pain and sleep  We will try cyclobenzaprine   - as of 7/9/2021:  Please see HPI for details  She has been noncompliant with recommendations despite understanding the risk of losing her vision  At this time she has had no headaches since giving birth 3 weeks ago she denies any vision issues whatsoever  She has upcoming follow-up with Ophthalmology in a few weeks and plan will be determined from there depending on whether she has papilledema  We discussed treatment would be Diamox which she has been instructed to start many many times in the past as per my notes and recent inpatient neurology notes  She is breast-feeding at this time, therefore will not prophylactically treat with Diamox due to risk for fetus  - as of 11/12/2021: Jerry Omalley returns for headaches and migraines   She reports she was seen by Ophthalmology soon after last visit and was told that she did not have papilledema, she did not have records sent to us and I asked again to get them just to make sure no IIH, will also have our staff reach out to them  Follow up with them next month she reports  She has been doing well without many headaches much until the past few weeks, but now it is frequent enough that she would like to start a prescription preventative  We discussed trial of topiramate which could help with migraines and if she had return of IIH  Trial of rizatriptan for abortive  Toradol IM today for migraine    - as of 2/18/2022: Ophthalmology note showed no papilledema  Still recommended sleep study due to this history  She is taking emgality just one month so far and due for next shot  Taking topiramate 25 mg BID since last visit and we discussed gradual increase to 50 mg BID to try and help with migraine and weight  Rizatriptan helps for abortive       Workup:  - 10/2017:  Per [facility] Neu[facility] resident note - I do not have the official results, but requested patient obtain  "An outside MRI of the brain was completed which demonstrated questionable protrusion of the left optic disc into posterior aspect of left orbit correlating with reported history of papilledema, transverse dural venous sinus stenosis is present with findings consistent with idiopathic intracranial hypertension " (no mention of Chiari)  - 10/2017: at  ED she had a lumbar puncture with opening pressure 55 cm H20 and closing pressure 22 5 cm H20 with relief of her symptoms after  -  lumbar puncture 01/27/2021 opening pressure 11    - 11/12/21 will hold off on repeat imaging at this time due to no new symptoms, no red flags, no significant worsening, no visual changes, but would have low threshold for repeat MRI brain if needed in the future for any new or concerning features      Preventative:  - we discussed headache hygiene and lifestyle factors that may improve headaches  -topiramate with gradual titration up to 50 mg b i d  and room to increase further in the future if needed  Discussed proper use, possible side effects and risks    - continue emgality  Discussed proper use, possible side effects and risks  - past:  Propranolol, topiramate, acetazolamide-patient does not recall how long she was on any of these or what affect had  - future options: alternative CGRP med     Abortive:  - discussed not taking over-the-counter or prescription pain medications more than 3 days per week to prevent medication overuse/rebound headache  -     rizatriptan (MAXALT) 10 MG tablet; Take 1 tablet (10 mg total) by mouth once as needed for migraine May repeat in 2 hours if needed  Max 2/24 hours, 9/month  Discussed proper use, possible side effects and risks  - past:  Sumatriptan, Fioricet, methocarbamol-patient does not recall how long she was on any of these or what affect had, in the past prescribed dexamethasone, cyclobenzaprine, indomethacin with Carafate prior and is unclear if patient ever took these  - future options: Alternative Triptan, prochlorperazine, Toradol IM or p o , could consider trial for 5 days of Depakote or dexamethasone for prolonged migraine, ubrelvy, reyvow, nurtec    BMI 35 0-35 9,adult  -     Ambulatory Referral to Weight Management; Future    Insomnia  -     Ambulatory referral to Sleep Medicine; Future       Patient instructions     Referral to bariatrics per your request    Referral to sleep medicine as well, as sleep apnea can impact weight and headaches and [address] [place] [address] medical records if they have the MRI of her brain and if they do please get on CD so I can review the images and we can upload them to our system     If any worsening or new or concerning symptoms especially if any vision loss, go to the emergency department where I recommend a consider a lumbar puncture/spinal tap like you had before     Headache/migraine treatment:   Abortive medications (for immediate treatment of a headache):    It is ok to take ibuprofen, acetaminophen or naproxen (Advil, Tylenol,  Aleve, Excedrin) if they help your headaches you should limit these to No more than 3 times a week to avoid medication overuse/rebound headaches       For your more moderate to severe migraines take this medication early   Maxalt (rizatriptan) 10mg tabs - take one at the onset of headache  May repeat one time after 1-2 hours if pain has not resolved  (Max 2 a day and 9 a month)     Prescription preventive medications for headaches/migraines   (to take every day to help prevent headaches - not to take at the time of headache):  [x] - Topiramate - increase to 25 mg in a m  And 50 mg in p m  For 1 week, then take 50 mg in a m  and 50 mg in p m  And continue    - generally the common side effects improve as your body gets used to the medication  If we need to spread out a more gradual increase of the medication on a longer scale we can, just call if any questions or concerns  - if necessary, if the a m  dose is causing side effects we can always have you take the full dose at night instead    - important to know per data, this medication may, but typically does not affect birth control unless you are taking 200 mg daily or more and I highly recommend being on birth control while on this medication due to possible significant detrimental effects to fetus if you were to get pregnant     The medication you are taking may impact pregnancy  It has been associated with birth defects and learning problems if taken during pregnancy  Thus, it is important to avoid unplanned pregnancy while taking this medication  In the future, if you plan to become pregnant, then you should discuss this with your neurologist since medication adjustments may be indicated  [x] -  Emgality/Galcanezumab -  120 mg injections every 30 days     READ INSTRUCTIONS that come with the medication  REFRIGERATE  Keep out of direct sunlight  Prior to administration, allow to come to room temperature for 30 minutes  Do not warm using a heat source (eg, microwave or hot water)  Do not shake   Administer in preferably abdomen (avoiding 2 inches around the navel), thigh, upper arm, or buttocks avoiding areas of skin that are tender, bruised, red or hard  Deliver entire contents of single-use prefilled pen or syringe  Lifestyle Recommendations:  [x] SLEEP - Maintain a regular sleep schedule: Adults need at least 7-8 hours of uninterrupted a night  Maintain good sleep hygiene:  Going to bed and waking up at consistent times, avoiding excessive daytime naps, avoiding caffeinated beverages in the evening, avoid excessive stimulation in the evening and generally using bed primarily for sleeping  One hour before bedtime would recommend turning lights down lower, decreasing your activity (may read quietly, listen to music at a low volume)  When you get into bed, should eliminate all technology (no texting, emailing, playing with your phone, iPad or tablet in bed)  [x] HYDRATION - Maintain good hydration  Drink  2L of fluid a day (4 typical small water bottles)  [x] DIET - Maintain good nutrition  In particular don't skip meals and try and eat healthy balanced meals regularly  [x] TRIGGERS - Look for other triggers and avoid them: Limit caffeine to 1-2 cups a day or less  Avoid dietary triggers that you have noticed bring on your headaches (this could include aged cheese, peanuts, MSG, aspartame and nitrates)  [x] EXERCISE - physical exercise as we all know is good for you in many ways, and not only is good for your heart, but also is beneficial for your mental health, cognitive health and  chronic pain/headaches  I would encourage at the least 5 days of physical exercise weekly for at least 30 minutes       Education and Follow-up  [x] Please call with any questions or concerns  Of course if any new concerning symptoms go to the emergency department  [x] Follow up 3 months, sooner if needed            CC: We had the pleasure of evaluating Yimi Jody in neurological consultation today   Yimi Vera is a right handed female who presents today for evaluation of headaches       History obtained from patient as well as available medical record review  Patient is a poor historian   Interpretor used on phone      History of Present Illness:   Interval history as of 2/18/2022  - denies any new or concerning neurologic symptoms since last visit   - ophthalmology note uploaded 01/14/2022 documented no papilledema  - requesting FMLA which she will bring in     Headaches and migraines   Improved to 2 migraines per week     Preventative:    - trial of topiramate - side effects-  25 mg BID  - trial of emgality - started 1/21/22 Denies bothersome side effects     Abortive:   - trial of rizatriptan - helps  - trial of decadron 1/10/22 - does not remember      Interval History as of 11/12/2021:  - she did not bring an MRI brain images on CD for my review or ophthalmology report for my review  She has had 5 headaches this last week  Before this week was not having headaches very often she says   Currently has a headache, started around noon today, throbbing in whole head  Currently 7/10  No nausea  + photophobia  Headaches are bifrontal and bioccipital    Went to the Ophthalmologist (Dr Ck Michael in [facility]) - gave prescription for the some glasses  They did not see papilledema per patient  Sleeping okay  Baby is sleeping through the night  5 months old, not breast feeding   Drinking a lot of water    s/p sterilization  Abortive:  Taking Tylenol or ibuprofen at least 3-4 times per week     Preventative:  - not on diamox  - interested in preventative       Interval history as of 7/9/2021   - She was to follow-up in 4 weeks since last visit (no show 12/12/19) and she is now following up 2 years later   - lumbar puncture 01/27/2021 opening pressure 11    Please see EMR for details  - had baby 6/15/21, preeclampsia, breastfeeding  - Neuro saw her inpatient 6/17/21 - see EMR    Denies vision loss or other visual symptoms  - has upcoming visit in 7/28/2021 with  ophthalmology    Headaches and migraines   - headaches prior to pregnancy, worse while pregnant especially first trimester  - no headaches since delivery 3 weeks ago    Preventative: nothing   - not on diamox - per data - "Due to the potential for serious adverse reactions in the  infant, the  recommends a decision be made whether to discontinue breastfeeding or to discontinue the drug, taking into account the importance of treatment to the mother "      Interval history as of 11/07/2019  - She has not followed up with nutrition as recommended the setting of IIH - although she has upcoming appointment  - She has not followed up with Ophthalmology as recommended for IIH   - she did not obtain blood work as ordered  - she did not get MRI brain on CD for my review    She reports no improvement in headaches  - daily headaches, worse 4 times a week   - acetazolamide 500 mg b i d  - forgets most of the time, only taking 2-3 days a week 250 mg TID for some reason   - trial of indomethacin 50 mg - took daily for 2 weeks     History of initial visit 09/12/2019  Headaches started around 9/2017  - was evaluated at  ED 09/22/2017 and 09/23/2017 - diffuse headache, photophobia, no vision changes  - ED 10/06/2017 where she was evaluated by Neurology for headache, eye pain for months  An outside MRI of the brain was completed which demonstrated questionable protrusion of the left optic disc into posterior aspect of left orbit correlating with reported history of papilledema, transverse dural venous sinus stenosis is present with findings consistent with idiopathic intracranial hypertension  In the ED she had a lumbar puncture with opening pressure 55 cm H20 and closing pressure 22 5 cm H20 with relief of her symptoms after   - she was started on topiramate 25 mg b i d   And recommended outpatient Neurology follow-up  - 05/24/2018 return to ED with possible migraine  - ED 05/27/2018  - 06/05/2018 follow up with primary care prescribed acetazolamide and topiramate      Previously [facility] with [facility]  Last visit 05/2018  Previously on acetazolamide - only took 1 month and then stop feeling due to did not have insurance     Last visit with eye doctor was around 09/2018 - per patient vision was perfect and she did not have to come back  - she reports they did not mention papilledema then (and she does recall that they dilated her eyes), that it was the neurologist previously who had      *Today denies vision symptoms           Headaches started at what age? Started age 17 and came bad 22years old  How often do the headaches occur?   - as of 9/12/2019: daily for about 6 months  3 headache free days a month only   What time of the day do the headaches start? Sometimes wakes with it, Afternoon   How long do the headaches last? 1-2  hours  Are you ever headache free? rarely     Aura? without aura     Last eye exam: around 9/2018 she thinks      Where is your headache located and pain quality?   - bifrontal and apex, bilateral retro-orbital - pulsating, pressure, stabbing   What is the intensity of pain? Average: 7/10, worst 9/10  Associated symptoms:   [] Nausea       [] Vomiting        [] Diarrhea  [x] Insomnia    [] Stiff or sore neck   [x] Problems with concentration  [x] Photophobia     [x]Phonophobia      [] Osmophobia  [] Blurred vision   [x] Prefer quiet, dark room  [x] Light-headed or dizzy     [] Tinnitus   [] Hands or feet tingle or feel numb/paresthesias       [] Red ear      [] Ptosis   [] Facial droop  [] Lacrimation  [] Nasal congestion/rhinorrhea   [] Flushing of face  [] Change in pupil size     Number of days missed per month because of headaches:  Work (or school) days: 3     Headache triggers:  Exercise, softball, standing up quickly, wakes with it     Have you seen someone else for headaches or pain?  Yes, C/ Re[facility]do [facility] [address]  Have you had trigger point injection performed and how often? No  Have you had Botox injection performed and how often? No   Have you had epidural injections or transforaminal injections performed? No  Are you current pregnant or planning on getting pregnant? No, IUD - now had surgery   Have you ever had any Brain imaging? yes     What medications do you take or have you taken for your headaches? ABORTIVE:    OTC medications have been ineffective   Ibuprofen -   - as of 9/12/19: 3 times day - 5 days a week, helps a little      Past Per chart  - pt does not remember  sumatriptan 25 mg - she does not remember   fioricet 6/2018  Methocarbamol      PREVENTIVE:   Nothing currently         Past per chart - pt does not remember  - propranolol 20 mg 9/2017  - topiramate 25 mg BID 9/2017  - acetazolamide 250 mg - 6/2018     Alternative therapies used in the past for headaches? no other headache interventions have been tried     Neck pain?: chronic     LIFESTYLE  Sleep   - averages: 7 hours  - does not think she snores  - wakes 2 times a night  Problems falling asleep?:   Yes  Problems staying asleep?:  Yes        Physical activity: softball in summer, gym 3 days a week      Water: 2 bottles  per day  Caffeine: none per day  Diet:  Do you ever skip meals?   No     Mood:   Denies history of anxiety or depression or other diagnosed mood disorder     The following portions of the patient's history were reviewed and updated as appropriate: allergies, current medications, past family history, past medical history, past social history, past surgical history and problem list      Pertinent family history:  Family history of headaches:  no known family members with significant headaches  Any family history of aneurysms - No     Pertinent social history:  Work:  In a [employer] for [employer]  Education: 12th  Lives with  Mom, sister, daughter (3) Jerod Mauricio     Illicit Drugs: denies  Alcohol/tobacco: Denies tobacco use, alcohol intake: social drinker    Past Medical History:     Past Medical History:   Diagnosis Date    31 weeks gestation of pregnancy 2021    GBS bacteruria- needs PCN intrapartum    Chiari malformation type I ([facility] [address] )     Chronic fatigue     last assessed 16    COVID-19 2020    Fever and chills 2020    High risk pregnancy with high inhibin 3/29/2021    Hyperemesis gravidarum 2021    IV fluid infusions ordered Phenergan ordered     Hyperlipidemia     resolved 17    Hypertension     pre-eclampsia 2016    Idiopathic intracranial hypertension     Intrauterine growth restriction affecting antepartum care of mother in third trimester 6/3/2021    Pre-eclampsia     with severe features     Varicella     pt unsure    Visual impairment     glasses       Patient Active Problem List   Diagnosis    IIH (idiopathic intracranial hypertension)    Papilledema    Questionable Chiari malformation type I ([facility] [address] [facility]    History of  novel coronavirus disease (COVID-19)    GBS bacteriuria    Status post primary low transverse  section    Encounter for sterilization    Status post bilateral salpingectomy       Medications:      Current Outpatient Medications   Medication Sig Dispense Refill    acetaminophen (TYLENOL) 650 mg CR tablet Take 1 tablet (650 mg total) by mouth every 6 (six) hours as needed for mild pain 30 tablet 1    Galcanezumab-gnlm 120 MG/ML SOAJ Inject 120 mg under the skin every 30 (thirty) days Following the first month loading dose of 2 pens  1 mL 11    ibuprofen (MOTRIN) 600 mg tablet Take 1 tablet (600 mg total) by mouth every 6 (six) hours as needed for mild pain 30 tablet 0    ondansetron (ZOFRAN) 4 mg tablet Take 1 tablet (4 mg total) by mouth every 8 (eight) hours as needed for nausea or vomiting 20 tablet 0    rizatriptan (MAXALT) 10 MG tablet Take 1 tablet (10 mg total) by mouth once as needed for migraine May repeat in 2 hours if needed  Max 2/24 hours, 9/month   9 tablet 6    topiramate (TOPAMAX) 25 mg tablet Increase to 1 tab QAM and 2 tabs QHS for 1 week and finish at 2 tabs BID  120 tablet 4    Menthol (Cepacol Sore Throat) 5 4 MG LOZG Apply 1 lozenge (5 4 mg total) to the mouth or throat every 2 (two) hours as needed (sore throat) 30 lozenge 1     No current facility-administered medications for this visit          Allergies:    No Known Allergies    Family History:     Family History   Problem Relation Age of Onset    Leukemia Maternal Grandfather     No Known Problems Mother     Other Father         MVA    No Known Problems Sister     Hypertension Maternal Grandmother     No Known Problems Daughter     No Known Problems Sister        Social History:     Social History     Socioeconomic History    Marital status: Single     Spouse name: Not on file    Number of children: 1    Years of education: 15    Highest education level: High school graduate   Occupational History    Not on file   Tobacco Use    Smoking status: Never Smoker    Smokeless tobacco: Never Used   Vaping Use    Vaping Use: Never used   Substance and Sexual Activity    Alcohol use: Not Currently    Drug use: Never    Sexual activity: Yes     Partners: Male     Birth control/protection: None, Condom Male   Other Topics Concern    Not on file   Social History Narrative    Always uses seatbelt    High school student    Younger sister    Lives with mother (single parent)     Social Determinants of Health     Financial Resource Strain: Not on file   Food Insecurity: Not on file   Transportation Needs: Not on file   Physical Activity: Not on file   Stress: Not on file   Social Connections: Not on file   Intimate Partner Violence: Not on file   Housing Stability: Not on file         Objective:       Physical Exam:                                                                 Vitals:            Constitutional:    /69 (BP Location: Left arm, Patient Position: Sitting, Cuff Size: Large)   Pulse 80   Ht 5' 8" (1 727 m)   Wt 108 kg (239 lb)   BMI 36 34 kg/m²   BP Readings from Last 3 Encounters:   02/18/22 127/69   11/12/21 119/67   10/08/21 118/80     Pulse Readings from Last 3 Encounters:   02/18/22 80   11/12/21 78   10/08/21 73         Well developed, well nourished, well groomed  No dysmorphic features  HEENT:  Normocephalic atraumatic  See neuro exam   Chest:  Respirations appear regular and unlabored  Cardiovascular:  no observed significant swelling  Musculoskeletal:  (see below under neurologic exam for evaluation of motor function and gait)   Skin:  warm and dry, not diaphoretic  Psychiatric:  Normal behavior and appropriate affect        Neurological Examination:     Mental status/cognitive function:   Recent and remote memory intact  Attention span and concentration as well as fund of knowledge are appropriate for age  Normal language and spontaneous speech  Cranial Nerves:  III, IV, VI-Pupils were equal, round  Extraocular movements were full and conjugate   VII-facial expression symmetric  Motor Exam: symmetric bulk throughout  no atrophy, fasciculations or abnormal movements noted     Coordination:  no apparent dysmetria, ataxia or tremor noted  Gait: steady casual gait        Pertinent lab results:   Please see EMR for recent labs   09/15/2021 CMP and CBC unremarkable         Per our system  09/29/2016:  CMP and CBC unremarkable   TSH 3 3     Imaging:   No head imaging in our system but she has had an MRI brain per report at outside hospital  -[facility] Neurology resident note:     - ED 10/06/2017 where she was evaluated by Neurology for headache, eye pain for months   An outside MRI of the brain was completed which demonstrated questionable protrusion of the left optic disc into posterior aspect of left orbit correlating with reported history of papilledema, transverse dural venous sinus stenosis is present with findings consistent with idiopathic intracranial hypertension   In the ED she had a lumbar puncture with opening pressure 55 cm H20 and closing pressure 22 5 cm H20 with relief of her symptoms after      -  lumbar puncture 01/27/2021 opening pressure 11   Please see EMR for details  Review of Systems:   ROS obtained by medical assistant Personally reviewed and updated if indicated  I recommended PCP follow up for non neurologic problems  Review of Systems   Constitutional: Negative  Negative for appetite change and fever  HENT: Negative  Negative for hearing loss, tinnitus, trouble swallowing and voice change  Eyes: Negative  Negative for photophobia and pain  Respiratory: Negative  Negative for shortness of breath  Cardiovascular: Negative  Negative for palpitations  Gastrointestinal: Negative  Negative for nausea and vomiting  Endocrine: Negative  Negative for cold intolerance  Genitourinary: Negative  Negative for dysuria, frequency and urgency  Musculoskeletal: Negative  Negative for myalgias and neck pain  Skin: Negative  Negative for rash  Neurological: Positive for headaches (2 per week)  Negative for dizziness, tremors, seizures, syncope, facial asymmetry, speech difficulty, weakness, light-headedness and numbness  Hematological: Negative  Does not bruise/bleed easily  Psychiatric/Behavioral: Negative  Negative for confusion, hallucinations and sleep disturbance    I have spent 30 minutes with Patient  today in which greater than 50% of this time was spent in counseling/coordination of care  I also spent 12 minutes non face to face for this patient the same day         Author:  Michael Perez MD 2/18/2022 1:57 PM

## 2022-02-18 ENCOUNTER — OFFICE VISIT (OUTPATIENT)
Dept: NEUROLOGY | Facility: CLINIC | Age: 27
End: 2022-02-18
Payer: COMMERCIAL

## 2022-02-18 VITALS
BODY MASS INDEX: 36.22 KG/M2 | HEIGHT: 68 IN | DIASTOLIC BLOOD PRESSURE: 69 MMHG | WEIGHT: 239 LBS | SYSTOLIC BLOOD PRESSURE: 127 MMHG | HEART RATE: 80 BPM

## 2022-02-18 DIAGNOSIS — G93.2 IIH (IDIOPATHIC INTRACRANIAL HYPERTENSION): ICD-10-CM

## 2022-02-18 DIAGNOSIS — G47.00 INSOMNIA: ICD-10-CM

## 2022-02-18 DIAGNOSIS — G43.009 MIGRAINE WITHOUT AURA AND WITHOUT STATUS MIGRAINOSUS, NOT INTRACTABLE: Primary | ICD-10-CM

## 2022-02-18 PROCEDURE — 99215 OFFICE O/P EST HI 40 MIN: CPT | Performed by: PSYCHIATRY & NEUROLOGY

## 2022-02-18 RX ORDER — TOPIRAMATE 25 MG/1
TABLET ORAL
Qty: 120 TABLET | Refills: 4 | Status: SHIPPED | OUTPATIENT
Start: 2022-02-18 | End: 2022-05-18 | Stop reason: SDUPTHER

## 2022-02-18 NOTE — PATIENT INSTRUCTIONS
Referral to bariatrics per your request    Referral to sleep medicine as well, as sleep apnea can impact weight and headaches and [facility] medical records if they have the MRI of her brain and if they do please get on CD so I can review the images and we can upload them to our system     If any worsening or new or concerning symptoms especially if any vision loss, go to the emergency department where I recommend a consider a lumbar puncture/spinal tap like you had before     Headache/migraine treatment:   Abortive medications (for immediate treatment of a headache): It is ok to take ibuprofen, acetaminophen or naproxen (Advil, Tylenol,  Aleve, Excedrin) if they help your headaches you should limit these to No more than 3 times a week to avoid medication overuse/rebound headaches       For your more moderate to severe migraines take this medication early   Maxalt (rizatriptan) 10mg tabs - take one at the onset of headache  May repeat one time after 1-2 hours if pain has not resolved  (Max 2 a day and 9 a month)     Prescription preventive medications for headaches/migraines   (to take every day to help prevent headaches - not to take at the time of headache):  [x] - Topiramate - increase to 25 mg in a m  And 50 mg in p m  For 1 week, then take 50 mg in a m  and 50 mg in p m  And continue    - generally the common side effects improve as your body gets used to the medication    If we need to spread out a more gradual increase of the medication on a longer scale we can, just call if any questions or concerns  - if necessary, if the a m  dose is causing side effects we can always have you take the full dose at night instead    - important to know per data, this medication may, but typically does not affect birth control unless you are taking 200 mg daily or more and I highly recommend being on birth control while on this medication due to possible significant detrimental effects to fetus if you were to get pregnant     The medication you are taking may impact pregnancy  It has been associated with birth defects and learning problems if taken during pregnancy  Thus, it is important to avoid unplanned pregnancy while taking this medication  In the future, if you plan to become pregnant, then you should discuss this with your neurologist since medication adjustments may be indicated  [x] -  Emgality/Galcanezumab -  120 mg injections every 30 days     READ INSTRUCTIONS that come with the medication  REFRIGERATE  Keep out of direct sunlight  Prior to administration, allow to come to room temperature for 30 minutes  Do not warm using a heat source (eg, microwave or hot water)  Do not shake  Administer in preferably abdomen (avoiding 2 inches around the navel), thigh, upper arm, or buttocks avoiding areas of skin that are tender, bruised, red or hard  Deliver entire contents of single-use prefilled pen or syringe  Lifestyle Recommendations:  [x] SLEEP - Maintain a regular sleep schedule: Adults need at least 7-8 hours of uninterrupted a night  Maintain good sleep hygiene:  Going to bed and waking up at consistent times, avoiding excessive daytime naps, avoiding caffeinated beverages in the evening, avoid excessive stimulation in the evening and generally using bed primarily for sleeping  One hour before bedtime would recommend turning lights down lower, decreasing your activity (may read quietly, listen to music at a low volume)  When you get into bed, should eliminate all technology (no texting, emailing, playing with your phone, iPad or tablet in bed)  [x] HYDRATION - Maintain good hydration  Drink  2L of fluid a day (4 typical small water bottles)  [x] DIET - Maintain good nutrition  In particular don't skip meals and try and eat healthy balanced meals regularly  [x] TRIGGERS - Look for other triggers and avoid them: Limit caffeine to 1-2 cups a day or less   Avoid dietary triggers that you have noticed bring on your headaches (this could include aged cheese, peanuts, MSG, aspartame and nitrates)  [x] EXERCISE - physical exercise as we all know is good for you in many ways, and not only is good for your heart, but also is beneficial for your mental health, cognitive health and  chronic pain/headaches  I would encourage at the least 5 days of physical exercise weekly for at least 30 minutes       Education and Follow-up  [x] Please call with any questions or concerns  Of course if any new concerning symptoms go to the emergency department    [x] Follow up 3 months, sooner if needed

## 2022-02-18 NOTE — PROGRESS NOTES
Review of Systems   Constitutional: Negative  Negative for appetite change and fever  HENT: Negative  Negative for hearing loss, tinnitus, trouble swallowing and voice change  Eyes: Negative  Negative for photophobia and pain  Respiratory: Negative  Negative for shortness of breath  Cardiovascular: Negative  Negative for palpitations  Gastrointestinal: Negative  Negative for nausea and vomiting  Endocrine: Negative  Negative for cold intolerance  Genitourinary: Negative  Negative for dysuria, frequency and urgency  Musculoskeletal: Negative  Negative for myalgias and neck pain  Skin: Negative  Negative for rash  Neurological: Positive for headaches (2 per week)  Negative for dizziness, tremors, seizures, syncope, facial asymmetry, speech difficulty, weakness, light-headedness and numbness  Hematological: Negative  Does not bruise/bleed easily  Psychiatric/Behavioral: Negative  Negative for confusion, hallucinations and sleep disturbance

## 2022-02-21 ENCOUNTER — OFFICE VISIT (OUTPATIENT)
Dept: INTERNAL MEDICINE CLINIC | Facility: CLINIC | Age: 27
End: 2022-02-21

## 2022-02-21 VITALS
HEART RATE: 79 BPM | HEIGHT: 68 IN | DIASTOLIC BLOOD PRESSURE: 78 MMHG | WEIGHT: 245 LBS | TEMPERATURE: 98.3 F | SYSTOLIC BLOOD PRESSURE: 115 MMHG | BODY MASS INDEX: 37.13 KG/M2

## 2022-02-21 DIAGNOSIS — Z11.59 NEED FOR HEPATITIS C SCREENING TEST: ICD-10-CM

## 2022-02-21 DIAGNOSIS — Z00.00 ANNUAL PHYSICAL EXAM: Primary | ICD-10-CM

## 2022-02-21 PROCEDURE — 99395 PREV VISIT EST AGE 18-39: CPT | Performed by: INTERNAL MEDICINE

## 2022-02-21 NOTE — PROGRESS NOTES
[address]    NAME: Victorino Corley  AGE: 23 y o  SEX: female  : 1995     DATE: 2022     Assessment and Plan:     Problem List Items Addressed This Visit     None      Visit Diagnoses     Annual physical exam    -  Primary    Need for hepatitis C screening test        Relevant Orders    Hepatitis C antibody          Immunizations and preventive care screenings were discussed with patient today  Appropriate education was printed on patient's after visit summary  Counseling:  Alcohol/drug use: discussed moderation in alcohol intake, the recommendations for healthy alcohol use, and avoidance of illicit drug use  Dental Health: discussed importance of regular tooth brushing, flossing, and dental visits  Sexual health: discussed sexually transmitted diseases, partner selection, use of condoms, avoidance of unintended pregnancy, and contraceptive alternatives  · Exercise: the importance of regular exercise/physical activity was discussed  Recommend exercise 3-5 times per week for at least 30 minutes  BMI Counseling: Body mass index is 37 25 kg/m²  The BMI is above normal  Nutrition recommendations include decreasing portion sizes, encouraging healthy choices of fruits and vegetables, decreasing fast food intake, consuming healthier snacks, limiting drinks that contain sugar, moderation in carbohydrate intake, increasing intake of lean protein, reducing intake of saturated and trans fat and reducing intake of cholesterol  Exercise recommendations include exercising 3-5 times per week  Rationale for BMI follow-up plan is due to patient being overweight or obese  Depression Screening and Follow-up Plan: Patient was screened for depression during today's encounter  They screened negative with a PHQ-2 score of 0      Obesity:   - reprinted weight management referral for patient  - reprinted sleep medicine referral for patient    HCM:  - refused COVID-19, influenza and HPV vaccine  - agreeable to Hep C screening  - follow-up with OBGYN for pap smear    Return in about 1 year (around 2/21/2023), or if symptoms worsen or fail to improve, for Annual physical      Chief Complaint:     Chief Complaint   Patient presents with    Annual Exam     referral to weight management      History of Present Illness:     Adult Annual Physical   Patient here for a comprehensive physical exam  The patient reports persistent migraines for which she sees neurology  Diet and Physical Activity  · Diet/Nutrition: well balanced diet, limited junk food, low fat diet, low carb diet and consuming 3-5 servings of fruits/vegetables daily  · Exercise: 3-4 times a week on average  Depression Screening  PHQ-2/9 Depression Screening    Little interest or pleasure in doing things: 0 - not at all  Feeling down, depressed, or hopeless: 0 - not at all  PHQ-2 Score: 0  PHQ-2 Interpretation: Negative depression screen       General Health  · Sleep: sleeps well, gets 7-8 hours of sleep on average and snores loudly  · Hearing: normal - bilateral   · Vision: most recent eye exam <1 year ago and wears glasses  · Dental: regular dental visits, no dental visits for >1 year and brushes teeth twice daily  /GYN Health  · Last menstrual period: 2/5/2022, regular, normal, last 4 days  · Contraceptive method: history of tube ligation  · History of STDs?: no      Review of Systems:     Review of Systems   All other systems reviewed and are negative       Past Medical History:     Past Medical History:   Diagnosis Date    31 weeks gestation of pregnancy 1/5/2021    GBS bacteruria- needs PCN intrapartum    [facility]     Chronic fatigue     last assessed 9/26/16    COVID-19 07/25/2020    Fever and chills 7/25/2020    High risk pregnancy with high inhibin 3/29/2021    Hyperemesis gravidarum 1/30/2021    IV fluid infusions ordered Phenergan ordered     Hyperlipidemia     resolved 17    Hypertension     pre-eclampsia     Idiopathic intracranial hypertension     Intrauterine growth restriction affecting antepartum care of mother in third trimester 6/3/2021    Pre-eclampsia     with severe features     Varicella     pt unsure    Visual impairment     glasses      Past Surgical History:     Past Surgical History:   Procedure Laterality Date     SECTION      CHOLECYSTECTOMY      CHOLECYSTECTOMY LAPAROSCOPIC N/A 2016    Procedure: CHOLECYSTECTOMY LAPAROSCOPIC possible open;  Surgeon: Sandra Jaeger MD;  [facility];  Service:     TN  DELIVERY ONLY N/A 2021    Procedure:  SECTION ();   Surgeon: Cassidy Medina DO;  [facility];  Service: Obstetrics    TN LAP,RMV  ADNEXAL STRUCTURE Bilateral 2021    Procedure: SALPINGECTOMY, LAPAROSCOPIC;  Surgeon: Abdoul Martinez MD;  [facility];  Service: Gynecology    REDUCTION MAMMAPLASTY        Social History:     Social History     Socioeconomic History    Marital status: Single     Spouse name: None    Number of children: 1    Years of education: 15    Highest education level: High school graduate   Occupational History    None   Tobacco Use    Smoking status: Never Smoker    Smokeless tobacco: Never Used   Vaping Use    Vaping Use: Never used   Substance and Sexual Activity    Alcohol use: Not Currently    Drug use: Never    Sexual activity: Yes     Partners: Male     Birth control/protection: None, Condom Male   Other Topics Concern    None   Social History Narrative    Always uses seatbelt    High school student    Younger sister    Lives with mother (single parent)     Social Determinants of Health     Financial Resource Strain: Low Risk     Difficulty of Paying Living Expenses: Not hard at all   Food Insecurity: No Food Insecurity    Worried About [address] in the Last Year: Never true    Ran Out of Food in the Last Year: Never true   Transportation Needs: No Transportation Needs    Lack of Transportation (Medical): No    Lack of Transportation (Non-Medical): No   Physical Activity: Not on file   Stress: Not on file   Social Connections: Not on file   Intimate Partner Violence: Not on file   Housing Stability: Low Risk     Unable to Pay for Housing in the Last Year: No    Number of Places Lived in the Last Year: 1    Unstable Housing in the Last Year: No      Family History:     Family History   Problem Relation Age of Onset    Leukemia Maternal Grandfather     No Known Problems Mother     Other Father         MVA    No Known Problems Sister     Hypertension Maternal Grandmother     No Known Problems Daughter     No Known Problems Sister       Current Medications:     Current Outpatient Medications   Medication Sig Dispense Refill    acetaminophen (TYLENOL) 650 mg CR tablet Take 1 tablet (650 mg total) by mouth every 6 (six) hours as needed for mild pain 30 tablet 1    Galcanezumab-gnlm 120 MG/ML SOAJ Inject 120 mg under the skin every 30 (thirty) days Following the first month loading dose of 2 pens  1 mL 11    ibuprofen (MOTRIN) 600 mg tablet Take 1 tablet (600 mg total) by mouth every 6 (six) hours as needed for mild pain 30 tablet 0    Menthol (Cepacol Sore Throat) 5 4 MG LOZG Apply 1 lozenge (5 4 mg total) to the mouth or throat every 2 (two) hours as needed (sore throat) 30 lozenge 1    rizatriptan (MAXALT) 10 MG tablet Take 1 tablet (10 mg total) by mouth once as needed for migraine May repeat in 2 hours if needed  Max 2/24 hours, 9/month   9 tablet 6    topiramate (TOPAMAX) 25 mg tablet Increase to 1 tab QAM and 2 tabs QHS for 1 week and finish at 2 tabs BID  120 tablet 4    ondansetron (ZOFRAN) 4 mg tablet Take 1 tablet (4 mg total) by mouth every 8 (eight) hours as needed for nausea or vomiting 20 tablet 0     No current facility-administered medications for this visit  Allergies:     No Known Allergies   Physical Exam:     /78 (BP Location: Right arm, Patient Position: Sitting, Cuff Size: Large)   Pulse 79   Temp 98 3 °F (36 8 °C) (Temporal)   Ht 5' 8" (1 727 m)   Wt 111 kg (245 lb)   BMI 37 25 kg/m²     Physical Exam  Vitals reviewed  Constitutional:       General: She is not in acute distress  Appearance: Normal appearance  She is not ill-appearing  HENT:      Head: Normocephalic and atraumatic  Mouth/Throat:      Mouth: Mucous membranes are moist       Pharynx: Oropharynx is clear  Eyes:      General: No scleral icterus  Extraocular Movements: Extraocular movements intact  Conjunctiva/sclera: Conjunctivae normal    Cardiovascular:      Rate and Rhythm: Normal rate and regular rhythm  Heart sounds: Normal heart sounds  No murmur heard  Pulmonary:      Effort: Pulmonary effort is normal  No respiratory distress  Breath sounds: Normal breath sounds  No wheezing or rales  Abdominal:      General: Bowel sounds are normal       Palpations: Abdomen is soft  Tenderness: There is no abdominal tenderness  Musculoskeletal:         General: Normal range of motion  Cervical back: Normal range of motion  Right lower leg: No edema  Left lower leg: No edema  Skin:     General: Skin is warm  Neurological:      Mental Status: She is alert and oriented to person, place, and time     Psychiatric:         Mood and Affect: Mood normal          Behavior: Behavior normal           Brianna Bar DO   [address]

## 2022-02-21 NOTE — PATIENT INSTRUCTIONS

## 2022-03-14 ENCOUNTER — TELEMEDICINE (OUTPATIENT)
Dept: INTERNAL MEDICINE CLINIC | Facility: CLINIC | Age: 27
End: 2022-03-14

## 2022-03-14 DIAGNOSIS — R19.7 DIARRHEA, UNSPECIFIED TYPE: Primary | ICD-10-CM

## 2022-03-14 PROCEDURE — 99213 OFFICE O/P EST LOW 20 MIN: CPT | Performed by: HOSPITALIST

## 2022-03-14 NOTE — LETTER
March 14, 2022     Patient: Viral Cardona   YOB: 1995   Date of Visit: 3/14/2022       To Whom it May Concern:    Viral Cardona is under my professional care  She was seen in my office on 3/14/2022  She may return to work on 3/16/2022  If you have any questions or concerns, please don't hesitate to call           Sincerely,          Ovidio Mckeon MD        CC: Kenneth Pena, DO done

## 2022-03-14 NOTE — PROGRESS NOTES
Virtual Regular Visit    Verification of patient location:    Patient is located in the following state in which I hold an active license [place]      Assessment/Plan:    Problem List Items Addressed This Visit        Other    Diarrhea - Primary        Gastroenteritis    Likely viral  Upto 5 episodes of non-bloody, water stools a day since yday  Came from [place] on Saturday  Poor oral intake but denies abdominal pain, nausea or vomiting  Endorse adequate hydration  Denies any fever or chills    Recommend hydration and PO intake as tolerated  Likely self resolving  Recommended to call if develops fever or chills or blood in stools  Will follow up in 48 hours  Reason for visit is   Chief Complaint   Patient presents with    Diarrhea     started yesterday 3/13/2022    Fatigue        Encounter provider West Ji MD    Provider located at [address] [place] [id number] [phone]      Recent Visits  No visits were found meeting these conditions  Showing recent visits within past 7 days and meeting all other requirements  Today's Visits  Date Type Provider Dept   03/14/22 Telemedicine West Ji MD  [address] today's visits and meeting all other requirements  Future Appointments  No visits were found meeting these conditions  Showing future appointments within next 150 days and meeting all other requirements       The patient was identified by name and date of birth  Claudette Makos was informed that this is a telemedicine visit and that the visit is being conducted through [facility] and patient was informed this is a secure, HIPAA-complaint platform  She agrees to proceed     My office door was closed  No one else was in the room  She acknowledged consent and understanding of privacy and security of the video platform   The patient has agreed to participate and understands they can discontinue the visit at any time  Patient is aware this is a billable service  Adam Goodman is a 23 y o  female who presents for same day visit for diarrhea for 2 days  Pt just came back from [place] on Saturday and experiencing watery stools upto 4-5 episodes a day  Denies any blood in the stool or fever or chills  Denies any other family member being sick  Did have outside food as she was on vacation  Endorse poor PO intake as she has diarrhea right after  Denies any nausea, or vomiting  Does endorse adequate hydration  HPI     Past Medical History:   Diagnosis Date    31 weeks gestation of pregnancy 2021    GBS bacteruria- needs PCN intrapartum    Chiari malformation type I ([facility] [address] )     Chronic fatigue     last assessed 16    COVID-19 2020    Fever and chills 2020    High risk pregnancy with high inhibin 3/29/2021    Hyperemesis gravidarum 2021    IV fluid infusions ordered Phenergan ordered     Hyperlipidemia     resolved 17    Hypertension     pre-eclampsia     Idiopathic intracranial hypertension     Intrauterine growth restriction affecting antepartum care of mother in third trimester 6/3/2021    Pre-eclampsia     with severe features     Varicella     pt unsure    Visual impairment     glasses       Past Surgical History:   Procedure Laterality Date     SECTION      CHOLECYSTECTOMY      CHOLECYSTECTOMY LAPAROSCOPIC N/A 2016    Procedure: CHOLECYSTECTOMY LAPAROSCOPIC possible open;  Surgeon: Antoine Nunes MD;  Location: [facility] MAIN [facility];  Service:     [place]  DELIVERY ONLY N/A 2021    Procedure:  SECTION ();   Surgeon: Eddie Malik DO;  Location: [facility] LD;  Service: Obstetrics    MT LAP,RMV  ADNEXAL STRUCTURE Bilateral 2021    Procedure: SALPINGECTOMY, LAPAROSCOPIC;  Surgeon: Mateo Epperson MD;  Location: [facility];  Service: Gynecology    REDUCTION MAMMAPLASTY         Current Outpatient Medications   Medication Sig Dispense Refill    acetaminophen (TYLENOL) 650 mg CR tablet Take 1 tablet (650 mg total) by mouth every 6 (six) hours as needed for mild pain 30 tablet 1    Galcanezumab-gnlm 120 MG/ML SOAJ Inject 120 mg under the skin every 30 (thirty) days Following the first month loading dose of 2 pens  1 mL 11    ibuprofen (MOTRIN) 600 mg tablet Take 1 tablet (600 mg total) by mouth every 6 (six) hours as needed for mild pain 30 tablet 0    topiramate (TOPAMAX) 25 mg tablet Increase to 1 tab QAM and 2 tabs QHS for 1 week and finish at 2 tabs BID  120 tablet 4    Menthol (Cepacol Sore Throat) 5 4 MG LOZG Apply 1 lozenge (5 4 mg total) to the mouth or throat every 2 (two) hours as needed (sore throat) (Patient not taking: Reported on 3/14/2022 ) 30 lozenge 1    ondansetron (ZOFRAN) 4 mg tablet Take 1 tablet (4 mg total) by mouth every 8 (eight) hours as needed for nausea or vomiting (Patient not taking: Reported on 3/14/2022 ) 20 tablet 0    rizatriptan (MAXALT) 10 MG tablet Take 1 tablet (10 mg total) by mouth once as needed for migraine May repeat in 2 hours if needed  Max 2/24 hours, 9/month  (Patient not taking: Reported on 3/14/2022 ) 9 tablet 6     No current facility-administered medications for this visit  No Known Allergies    Review of Systems   Constitutional: Positive for appetite change  Negative for fatigue and unexpected weight change  HENT: Negative for drooling and voice change  Eyes: Negative for visual disturbance  Respiratory: Negative for shortness of breath and wheezing  Cardiovascular: Negative for chest pain, palpitations and leg swelling  Gastrointestinal: Positive for diarrhea  Negative for abdominal pain, anal bleeding, blood in stool, nausea and vomiting  Endocrine: Negative for polydipsia and polyuria  Genitourinary: Negative for decreased urine volume, dysuria and vaginal pain  Neurological: Positive for weakness  Negative for numbness  Psychiatric/Behavioral: Negative for confusion and suicidal ideas  Video Exam    Vitals:       Physical Exam  Constitutional:       General: She is not in acute distress  Appearance: She is obese  She is not ill-appearing, toxic-appearing or diaphoretic  HENT:      Head: Atraumatic  Neurological:      Mental Status: She is alert  I spent 30 minutes directly with the patient during this visit    [address] verbally agrees to participate in [employer]  Pt is aware that [employer] could be limited without vital signs or the ability to perform a full hands-on physical exam  Bella Zepeda understands she or the provider may request at any time to terminate the video visit and request the patient to seek care or treatment in person

## 2022-03-16 ENCOUNTER — TELEMEDICINE (OUTPATIENT)
Dept: INTERNAL MEDICINE CLINIC | Facility: CLINIC | Age: 27
End: 2022-03-16

## 2022-03-16 DIAGNOSIS — R19.7 DIARRHEA IN ADULT PATIENT: Primary | ICD-10-CM

## 2022-03-16 PROCEDURE — 99212 OFFICE O/P EST SF 10 MIN: CPT | Performed by: INTERNAL MEDICINE

## 2022-03-16 NOTE — PROGRESS NOTES
Virtual Regular Visit    Verification of patient location:    Patient is located in the following state in which I hold an active license [place]      Assessment/Plan:    # Diarrhea in adult patient:   Seen for telemedicine visit on 3/14/2022 for diarrhea that occurred following return home from vacation to [place]  Otherwise asymptomatic  Since last visit, diarrhea has resolved and patient without complaints  Plan:  · Encouraged to continue to maintain appropriate PO hydration   · Advised to call the office if symptoms       Problem List Items Addressed This Visit     None      Visit Diagnoses     Diarrhea in adult patient    -  Primary               Reason for visit is   Chief Complaint   Patient presents with    Virtual Regular Visit        Encounter provider Janet Jo DO    Provider located at [address] [place] [id number] [phone]      Recent Visits  Date Type Provider Dept   03/14/22 Telemedicine Sergei Coronel MD [facility]   Showing recent visits within past 7 days and meeting all other requirements  Today's Visits  Date Type Provider Dept   03/16/22 Telemedicine Alexandra Cooper, [facility] today's visits and meeting all other requirements  Future Appointments  No visits were found meeting these conditions  Showing future appointments within next 150 days and meeting all other requirements       The patient was identified by name and date of birth  Jonna Spivey was informed that this is a telemedicine visit and that the visit is being conducted through [address] and patient was informed this is a secure, HIPAA-complaint platform  She agrees to proceed     My office door was closed  No one else was in the room  She acknowledged consent and understanding of privacy and security of the video platform   The patient has agreed to participate and understands they can discontinue the visit at any time  Patient is aware this is a billable service  Carlyle Gee is a 23 y o  female with a PMHx of HLD, HTN, chiari malformation type I, and idiopathic intracranial HTN who presents today, 3/16/2022, for a follow up telemedicine visit  Patient previously seen for a telemedicine visit on 3/14/22 with the complaint of diarrhea x2 days after returning from [place] a few days prior  Denies any additional sick contacts but had restaurant exposure  At time of telemedicine visit, patient was advised to continue oral hydration and follow up with the clinic in a 48 hours days  Patient seen today and is asymptomatic  Diarrhea has resolved and she is now having solid stools 1-2x daily  No additional symptoms or complaints  HPI     Past Medical History:   Diagnosis Date    31 weeks gestation of pregnancy 2021    GBS bacteruria- needs PCN intrapartum    [facility]ari malformation type I ([facility] [address]     Chronic fatigue     last assessed 16    COVID-19 2020    Fever and chills 2020    High risk pregnancy with high inhibin 3/29/2021    Hyperemesis gravidarum 2021    IV fluid infusions ordered Phenergan ordered     Hyperlipidemia     resolved 17    Hypertension     pre-eclampsia 2016    Idiopathic intracranial hypertension     Intrauterine growth restriction affecting antepartum care of mother in third trimester 6/3/2021    Pre-eclampsia     with severe features     Varicella     pt unsure    Visual impairment     glasses       Past Surgical History:   Procedure Laterality Date     SECTION      CHOLECYSTECTOMY      CHOLECYSTECTOMY LAPAROSCOPIC N/A 2016    Procedure: CHOLECYSTECTOMY LAPAROSCOPIC possible open;  Surgeon: Eliseo Sarkar MD;  Location: [facility];  Service:     [place]  DELIVERY ONLY N/A 2021    Procedure:  SECTION ();   Surgeon: Ruthie Gan DO;  Location: [facility] ;  Service: Obstetrics    [place] LAP,RMV  ADNEXAL STRUCTURE Bilateral 9/24/2021    Procedure: SALPINGECTOMY, LAPAROSCOPIC;  Surgeon: Frandy Arroyo MD;  Location: [facility];  Service: Gynecology    REDUCTION MAMMAPLASTY         Current Outpatient Medications   Medication Sig Dispense Refill    acetaminophen (TYLENOL) 650 mg CR tablet Take 1 tablet (650 mg total) by mouth every 6 (six) hours as needed for mild pain 30 tablet 1    Galcanezumab-gnlm 120 MG/ML SOAJ Inject 120 mg under the skin every 30 (thirty) days Following the first month loading dose of 2 pens  1 mL 11    ibuprofen (MOTRIN) 600 mg tablet Take 1 tablet (600 mg total) by mouth every 6 (six) hours as needed for mild pain 30 tablet 0    Menthol (Cepacol Sore Throat) 5 4 MG LOZG Apply 1 lozenge (5 4 mg total) to the mouth or throat every 2 (two) hours as needed (sore throat) (Patient not taking: Reported on 3/14/2022 ) 30 lozenge 1    ondansetron (ZOFRAN) 4 mg tablet Take 1 tablet (4 mg total) by mouth every 8 (eight) hours as needed for nausea or vomiting (Patient not taking: Reported on 3/14/2022 ) 20 tablet 0    rizatriptan (MAXALT) 10 MG tablet Take 1 tablet (10 mg total) by mouth once as needed for migraine May repeat in 2 hours if needed  Max 2/24 hours, 9/month  (Patient not taking: Reported on 3/14/2022 ) 9 tablet 6    topiramate (TOPAMAX) 25 mg tablet Increase to 1 tab QAM and 2 tabs QHS for 1 week and finish at 2 tabs BID  120 tablet 4     No current facility-administered medications for this visit  No Known Allergies    Review of Systems   Constitutional: Negative for activity change, appetite change, chills, fatigue and fever  Respiratory: Negative for shortness of breath  Cardiovascular: Negative for palpitations  Gastrointestinal: Negative for abdominal distention, abdominal pain, constipation, diarrhea and vomiting  Neurological: Negative for dizziness, weakness, light-headedness and headaches         Video Exam    There were no vitals filed for this visit  Physical Exam  Constitutional:       General: She is not in acute distress  Appearance: She is not ill-appearing or toxic-appearing  HENT:      Nose: Nose normal  No congestion  Eyes:      General: No scleral icterus  Pulmonary:      Effort: Pulmonary effort is normal    Musculoskeletal:         General: Normal range of motion  Cervical back: Normal range of motion  Skin:     Coloration: Skin is not jaundiced or pale  Neurological:      Mental Status: She is alert and oriented to person, place, and time  Psychiatric:         Mood and Affect: Mood normal          Behavior: Behavior normal           I spent 10 minutes directly with the patient during this visit    [address] verbally agrees to participate in [employer]  Pt is aware that [employer] could be limited without vital signs or the ability to perform a full hands-on physical exam  Bella Peguero understands she or the provider may request at any time to terminate the video visit and request the patient to seek care or treatment in person

## 2022-03-31 ENCOUNTER — ANNUAL EXAM (OUTPATIENT)
Dept: OBGYN CLINIC | Facility: CLINIC | Age: 27
End: 2022-03-31

## 2022-03-31 VITALS
HEIGHT: 68 IN | WEIGHT: 246 LBS | BODY MASS INDEX: 37.28 KG/M2 | DIASTOLIC BLOOD PRESSURE: 79 MMHG | HEART RATE: 83 BPM | SYSTOLIC BLOOD PRESSURE: 116 MMHG

## 2022-03-31 DIAGNOSIS — Z11.3 SCREENING FOR STDS (SEXUALLY TRANSMITTED DISEASES): ICD-10-CM

## 2022-03-31 DIAGNOSIS — Z01.419 WELL WOMAN EXAM WITH ROUTINE GYNECOLOGICAL EXAM: Primary | ICD-10-CM

## 2022-03-31 PROCEDURE — 87591 N.GONORRHOEAE DNA AMP PROB: CPT | Performed by: OBSTETRICS & GYNECOLOGY

## 2022-03-31 PROCEDURE — 87491 CHLMYD TRACH DNA AMP PROBE: CPT | Performed by: OBSTETRICS & GYNECOLOGY

## 2022-03-31 PROCEDURE — 99213 OFFICE O/P EST LOW 20 MIN: CPT | Performed by: OBSTETRICS & GYNECOLOGY

## 2022-03-31 PROCEDURE — G0145 SCR C/V CYTO,THINLAYER,RESCR: HCPCS | Performed by: OBSTETRICS & GYNECOLOGY

## 2022-03-31 NOTE — PROGRESS NOTES
[address] [facility]  Name: Daly Garrison  MRN: [id number]  : 1995      ASSESSMENT & PLAN:   Daly Garrison is a 23 y o  [id number] with normal gynecologic exam     - Routine well woman exam done today  - Pap collected today  Current ASCCP Guidelines reviewed   - GC/Chlamydia collected  - Contraception: salpingectomy   - The following were reviewed in today's visit: breast self exam, STD testing, exercise and healthy diet  - Patient to return to office in 12 months for annual exam      All questions have been answered to her satisfaction  CC:  Annual Gynecologic Examination    SUBJECTIVE:   Daly Garrison is a 23 y o  [id number] who presents for annual gynecologic examination  She has the following concerns:  None  Health Maintenance:    She does not perform regular monthly self breast exams  She feels safe at home  She does follow a balanced diet      She does not use tobacco    Past Medical History:   Diagnosis Date    31 weeks gestation of pregnancy 2021    GBS bacteruria- needs PCN intrapartum    [facility]ari malformation type I ([facility] [address]     Chronic fatigue     last assessed 16    COVID-19 2020    Fever and chills 2020    High risk pregnancy with high inhibin 3/29/2021    Hyperemesis gravidarum 2021    IV fluid infusions ordered Phenergan ordered     Hyperlipidemia     resolved 17    Hypertension     pre-eclampsia     Idiopathic intracranial hypertension     Intrauterine growth restriction affecting antepartum care of mother in third trimester 6/3/2021    Pre-eclampsia     with severe features     Varicella     pt unsure    Visual impairment     glasses       Past Surgical History:   Procedure Laterality Date     SECTION      CHOLECYSTECTOMY      CHOLECYSTECTOMY LAPAROSCOPIC N/A 2016    Procedure: CHOLECYSTECTOMY LAPAROSCOPIC possible open;  Surgeon: Dale Brock MD;  Location: [facility];  Service:    Suraj Ojeda  DELIVERY ONLY N/A 2021    Procedure:  SECTION (); Surgeon: Dane Flores DO;  Location: [facility] LD;  Service: Obstetrics    [place] LAP,RMV  ADNEXAL STRUCTURE Bilateral 2021    Procedure: SALPINGECTOMY, LAPAROSCOPIC;  Surgeon: Julius Campbell MD;  Location: [facility];  Service: Gynecology    REDUCTION MAMMAPLASTY         Past OB/Gyn History:   Patient's last menstrual period was 2022  History of sexually transmitted infection: No  Patient is not currently sexually active  Future fertility: is not desired      Last Pap in 2019 :  no abnormalities    OB History    Para Term  AB Living   3 2 0 2 1 2   SAB IAB Ectopic Multiple Live Births   1 0 0 0 2      # Outcome Date GA Lbr Jani/2nd Weight Sex Delivery Anes PTL Lv   3  21 31w1d   F CS-Classical Spinal  OSCAR      Name: Jared Willoughby ([address] [place] [address])      Apgar1: 4  Apgar5: 9   2 SAB 2020 8w0d    SAB      1  16 35w3d  1745 g (3 lb 13 6 oz) F Vag-Spont EPI Y OSCAR      Apgar1: 9  Apgar5: 9       Family History:  Family History   Problem Relation Age of Onset    Leukemia Maternal Grandfather     No Known Problems Mother     Other Father         MVA    No Known Problems Sister     Hypertension Maternal Grandmother     No Known Problems Daughter     No Known Problems Sister        Social History:  Social History     Socioeconomic History    Marital status: Single     Spouse name: Not on file    Number of children: 1    Years of education: 15    Highest education level: High school graduate   Occupational History    Not on file   Tobacco Use    Smoking status: Never Smoker    Smokeless tobacco: Never Used   Vaping Use    Vaping Use: Never used   Substance and Sexual Activity    Alcohol use: Not Currently    Drug use: Never    Sexual activity: Not Currently     Partners: Male     Birth control/protection: None, Condom Male, Female Sterilization   Other Topics Concern    Not on file   Social History Narrative    Always uses seatbelt    High school student    Younger sister    Lives with mother (single parent)     Social Determinants of Health     Financial Resource Strain: Low Risk     Difficulty of Paying Living Expenses: Not hard at all   Food Insecurity: No Food Insecurity    Worried About Running Out of Food in the Last Year: Never true    Marva of Food in the Last Year: Never true   Transportation Needs: No Transportation Needs    Lack of Transportation (Medical): No    Lack of Transportation (Non-Medical): No   Physical Activity: Not on file   Stress: Not on file   Social Connections: Not on file   Intimate Partner Violence: Not on file   Housing Stability: Low Risk     Unable to Pay for Housing in the Last Year: No    Number of Places Lived in the Last Year: 1    Unstable Housing in the Last Year: No     Allergies:  No Known Allergies    Medications:    Current Outpatient Medications:     rizatriptan (MAXALT) 10 MG tablet, Take 1 tablet (10 mg total) by mouth once as needed for migraine May repeat in 2 hours if needed  Max 2/24 hours, 9/month , Disp: 9 tablet, Rfl: 6    topiramate (TOPAMAX) 25 mg tablet, Increase to 1 tab QAM and 2 tabs QHS for 1 week and finish at 2 tabs BID , Disp: 120 tablet, Rfl: 4    acetaminophen (TYLENOL) 650 mg CR tablet, Take 1 tablet (650 mg total) by mouth every 6 (six) hours as needed for mild pain (Patient not taking: Reported on 3/31/2022 ), Disp: 30 tablet, Rfl: 1    Galcanezumab-gnlm 120 MG/ML SOAJ, Inject 120 mg under the skin every 30 (thirty) days Following the first month loading dose of 2 pens   (Patient not taking: Reported on 3/31/2022 ), Disp: 1 mL, Rfl: 11    ibuprofen (MOTRIN) 600 mg tablet, Take 1 tablet (600 mg total) by mouth every 6 (six) hours as needed for mild pain (Patient not taking: Reported on 3/31/2022 ), Disp: 30 tablet, Rfl: 0    Menthol (Cepacol Sore Throat) 5 4 MG LOZG, Apply 1 lozenge (5 4 mg total) to the mouth or throat every 2 (two) hours as needed (sore throat) (Patient not taking: Reported on 3/14/2022 ), Disp: 30 lozenge, Rfl: 1    ondansetron (ZOFRAN) 4 mg tablet, Take 1 tablet (4 mg total) by mouth every 8 (eight) hours as needed for nausea or vomiting (Patient not taking: Reported on 3/14/2022 ), Disp: 20 tablet, Rfl: 0    Review of Systems:  Denies fevers, chills, unintentional weight loss, excessive fatigue, chest pain, shortness of breath, abdominal pain, nausea, vomiting, urinary incontinence, urinary frequency, vaginal bleeding, vaginal discharge  All other systems negative unless otherwise stated  OBJECTIVE:  /79   Pulse 83   Ht 5' 8" (1 727 m)   Wt 112 kg (246 lb)   LMP 03/01/2022   Breastfeeding No   BMI 37 40 kg/m²  Body mass index is 37 4 kg/m²  GEN: The patient was alert and oriented x3, pleasant well-appearing female in no acute distress  CV:  Regular rate and rhythm, normal S1 and S2, no murmurs  RESP:  Clear to auscultation bilaterally, no wheezes, rales or rhonchi  BREAST:  Symmetric breasts with no palpable breast masses or obvious breast lesions  She has no retractions or nipple discharge  She has no axillary abnormalities or palpable masses  GI:  Soft, nontender, non-distended  MSK: bilateral lower extremities are nontender, no edema  : Normal appearing external female genitalia, normal appearing urethral meatus  On sterile speculum exam, normal appearing vaginal epithelium, no vaginal discharge, no bleeding, grossly normal appearing cervix  On bimanual exam, no cervical motion tenderness; uterus is smooth, mobile, nontender non-gravid in size  No tenderness or fullness in the bilateral adnexa         Dottie Conrad MD  OB/GYN PGY-4  3/31/2022  4:03 PM

## 2022-04-02 LAB
C TRACH DNA SPEC QL NAA+PROBE: NEGATIVE
N GONORRHOEA DNA SPEC QL NAA+PROBE: NEGATIVE

## 2022-04-05 ENCOUNTER — TELEPHONE (OUTPATIENT)
Dept: NEUROLOGY | Facility: CLINIC | Age: 27
End: 2022-04-05

## 2022-04-05 NOTE — TELEPHONE ENCOUNTER
[facility] regarding to emgality  states that pt called them for refill but states that they received a request to cancel script feb 18,  i made her aware aware that dr Patricia Kwan dc'd the loading dose in the chart on 2/18 but maintenance dose script is active  she will check with pharmacist to see if they still have mantenace dose script active    if they need a new script, she will call our office back

## 2022-04-07 LAB
LAB AP GYN PRIMARY INTERPRETATION: NORMAL
Lab: NORMAL

## 2022-05-11 ENCOUNTER — TELEPHONE (OUTPATIENT)
Dept: NEUROLOGY | Facility: CLINIC | Age: 27
End: 2022-05-11

## 2022-05-18 ENCOUNTER — OFFICE VISIT (OUTPATIENT)
Dept: NEUROLOGY | Facility: CLINIC | Age: 27
End: 2022-05-18
Payer: COMMERCIAL

## 2022-05-18 VITALS
HEART RATE: 74 BPM | BODY MASS INDEX: 38.16 KG/M2 | WEIGHT: 251 LBS | DIASTOLIC BLOOD PRESSURE: 75 MMHG | SYSTOLIC BLOOD PRESSURE: 131 MMHG

## 2022-05-18 DIAGNOSIS — G43.009 MIGRAINE WITHOUT AURA AND WITHOUT STATUS MIGRAINOSUS, NOT INTRACTABLE: Primary | ICD-10-CM

## 2022-05-18 PROCEDURE — 99214 OFFICE O/P EST MOD 30 MIN: CPT | Performed by: PSYCHIATRY & NEUROLOGY

## 2022-05-18 RX ORDER — TOPIRAMATE 25 MG/1
TABLET ORAL
Qty: 120 TABLET | Refills: 4 | Status: SHIPPED | OUTPATIENT
Start: 2022-05-18

## 2022-05-18 NOTE — PROGRESS NOTES
[facility] Consult - Follow up   PATIENT:  Akhil Hilton  MRN:  [id number]  :  1995  DATE OF SERVICE:  2022  REFERRED BY: Gurinder Doyle PA-C  PMD: Laura Peter DO    Assessment/Plan:   Akhil Hilton is a very pleasant  23 y o  female with a past medical history of preeclampsia, chronic neck and back pain (referred to pain management),  questionable chiari malformation type 1 (per report, I do not have MRI results except for as documented), idiopathic intracranial hypertension, papilledema, status post tubal ligation  referred here for evaluation of headache  Initial evaluation 2019     Migraine without aura   Idiopathic intracranial hypertension - currently resolved  She reports headaches started around age 17, but did not become bad until age 22  Patient is a very poor historian therefore history as obtained primarily through extensive medical record review  In 2017 she presented to ED multiple times for diffuse headache, photophobia  In 10/06/2017 she was evaluated by Neurology for documented results that I do not see in chart: "An outside MRI of the brain was completed which demonstrated questionable protrusion of the left optic disc into posterior aspect of left orbit correlating with reported history of papilledema, transverse dural venous sinus stenosis is present with findings consistent with idiopathic intracranial hypertension   In the ED she had a lumbar puncture with opening pressure 55 cm H20 and closing pressure 22 5 cm H20 with relief of her symptoms after  "Per record review she was started on multiple medications including at 1 point acetazolamide in the past, but due to no insurance she stop taking any these medications  - as of 2019:  Chronic daily headache for the past approximately 6 months  Prior to that on and off  Has about 3 headache-free days per month    She describes bifrontal, bilateral retro-orbital, apex pulsating, pressure, stabbing  She denies aura  She does have associated photophobia, phonophobia  - as of 11/07/2019: Danii Iyer returns reporting daily mild headaches, worse 4 times a week  Unfortunately she has been noncompliant with my recommendations, has only been taking acetazolamide 2-3 days a week, took indomethacin 50 mg daily for 2 weeks despite explicit instructions  Did not follow up with Ophthalmology, did not get blood work and did not get an MRI brain  Had her  come in and re-explained everything as well as the severe possible complications including vision loss if she is not compliant  We will try to make it easier acetazolamide 500 mg b i d , dexamethasone for the next 5 days as she is very worried about current headache indomethacin as abortive  She is very focused on trouble sleeping when she has about headache and asking for an option to treat pain and sleep  We will try cyclobenzaprine   - as of 7/9/2021:  Please see HPI for details  She has been noncompliant with recommendations despite understanding the risk of losing her vision  At this time she has had no headaches since giving birth 3 weeks ago she denies any vision issues whatsoever  She has upcoming follow-up with Ophthalmology in a few weeks and plan will be determined from there depending on whether she has papilledema  We discussed treatment would be Diamox which she has been instructed to start many many times in the past as per my notes and recent inpatient neurology notes  She is breast-feeding at this time, therefore will not prophylactically treat with Diamox due to risk for fetus  - as of 11/12/2021: Danii Iyer returns for headaches and migraines  She reports she was seen by Ophthalmology soon after last visit and was told that she did not have papilledema, she did not have records sent to us and I asked again to get them just to make sure no IIH, will also have our staff reach out to them   Follow up with them next month she reports  She has been doing well without many headaches much until the past few weeks, but now it is frequent enough that she would like to start a prescription preventative  We discussed trial of topiramate which could help with migraines and if she had return of IIH  Trial of rizatriptan for abortive  Toradol IM today for migraine    - as of 2/18/2022: Ophthalmology note showed no papilledema  Still recommended sleep study due to this history  She is taking emgality just one month so far and due for next shot  Taking topiramate 25 mg BID since last visit and we discussed gradual increase to 50 mg BID to try and help with migraine and weight  Rizatriptan helps for abortive  - as of 5/18/2022: She reports she has been improving on emgality (with some wearing off effect at the end) and topiramate 25 mg b i d  With about 3 migraines per week that responds to rizatriptan  She was diagnosed with mild sleep apnea since last visit in plans to treat this outside of our system which will also help with migraines  We discussed she could increase topiramate to 50 mg b i d  If she would like as this may help more with migraines      Workup:  - 10/2017:  Per [facility] Neurology resident note - I do not have the official results, but requested patient obtain   "An outside MRI of the brain was completed which demonstrated questionable protrusion of the left optic disc into posterior aspect of left orbit correlating with reported history of papilledema, transverse dural venous sinus stenosis is present with findings consistent with idiopathic intracranial hypertension " (no mention of Chiari)  - 10/2017: at  ED she had a lumbar puncture with opening pressure 55 cm H20 and closing pressure 22 5 cm H20 with relief of her symptoms after  -  lumbar puncture 01/27/2021 opening pressure 11    - 11/12/21 will hold off on repeat imaging at this time due to no new symptoms, no red flags, no significant worsening, no visual changes, but would have low threshold for repeat MRI brain if needed in the future for any new or concerning features      Preventative:  - we discussed headache hygiene and lifestyle factors that may improve headaches  -topiramate with gradual titration up to 50 mg b i d  and room to increase further in the future if needed  Discussed proper use, possible side effects and risks  - continue emgality  Discussed proper use, possible side effects and risks  - past:  Propranolol, topiramate, acetazolamide-patient does not recall how long she was on any of these or what affect had  - future options: alternative CGRP med     Abortive:  - discussed not taking over-the-counter or prescription pain medications more than 3 days per week to prevent medication overuse/rebound headache  -     rizatriptan (MAXALT) 10 MG tablet; Take 1 tablet (10 mg total) by mouth once as needed for migraine May repeat in 2 hours if needed  Max 2/24 hours, 9/month  Discussed proper use, possible side effects and risks  - past:  Sumatriptan, Fioricet, methocarbamol-patient does not recall how long she was on any of these or what affect had, in the past prescribed dexamethasone, cyclobenzaprine, indomethacin with Carafate prior and is unclear if patient ever took these  - future options: Alternative Triptan, prochlorperazine, Toradol IM or p o , could consider trial for 5 days of Depakote or dexamethasone for prolonged migraine, ubrelvy, reyvow, nurtec    Patient instructions     Routine labs already ordered by PCP per patient    Follow-up with Sleep Medicine regarding sleep apnea treatment     If any worsening or new or concerning symptoms especially if any vision loss, go to the emergency department where I recommend a consider a lumbar puncture/spinal tap like you had before     Headache/migraine treatment:   Abortive medications (for immediate treatment of a headache):    It is ok to take ibuprofen, acetaminophen or naproxen (Advil, Tylenol,  Aleve, Excedrin) if they help your headaches you should limit these to No more than 3 times a week to avoid medication overuse/rebound headaches       For your more moderate to severe migraines take this medication early   Maxalt (rizatriptan) 10mg tabs - take one at the onset of headache  May repeat one time after 1-2 hours if pain has not resolved  (Max 2 a day and 9 a month)     Prescription preventive medications for headaches/migraines   (to take every day to help prevent headaches - not to take at the time of headache):  [x] - Topiramate - increase to 25 mg in a m  And 50 mg in p m  For 1 week, then take 50 mg in a m  and 50 mg in p m  And continue    - generally the common side effects improve as your body gets used to the medication  If we need to spread out a more gradual increase of the medication on a longer scale we can, just call if any questions or concerns  - if necessary, if the a m  dose is causing side effects we can always have you take the full dose at night instead    - important to know per data, this medication may, but typically does not affect birth control unless you are taking 200 mg daily or more and I highly recommend being on birth control while on this medication due to possible significant detrimental effects to fetus if you were to get pregnant     The medication you are taking may impact pregnancy  It has been associated with birth defects and learning problems if taken during pregnancy  Thus, it is important to avoid unplanned pregnancy while taking this medication  In the future, if you plan to become pregnant, then you should discuss this with your neurologist since medication adjustments may be indicated  [x] -  Emgality/Galcanezumab -  120 mg injections every 30 days     READ INSTRUCTIONS that come with the medication  REFRIGERATE  Keep out of direct sunlight  Prior to administration, allow to come to room temperature for 30 minutes   Do not warm using a heat source (eg, microwave or hot water)  Do not shake  Administer in preferably abdomen (avoiding 2 inches around the navel), thigh, upper arm, or buttocks avoiding areas of skin that are tender, bruised, red or hard  Deliver entire contents of single-use prefilled pen or syringe  Lifestyle Recommendations:  [x] SLEEP - Maintain a regular sleep schedule: Adults need at least 7-8 hours of uninterrupted a night  Maintain good sleep hygiene:  Going to bed and waking up at consistent times, avoiding excessive daytime naps, avoiding caffeinated beverages in the evening, avoid excessive stimulation in the evening and generally using bed primarily for sleeping  One hour before bedtime would recommend turning lights down lower, decreasing your activity (may read quietly, listen to music at a low volume)  When you get into bed, should eliminate all technology (no texting, emailing, playing with your phone, iPad or tablet in bed)  [x] HYDRATION - Maintain good hydration  Drink  2L of fluid a day (4 typical small water bottles)  [x] DIET - Maintain good nutrition  In particular don't skip meals and try and eat healthy balanced meals regularly  [x] TRIGGERS - Look for other triggers and avoid them: Limit caffeine to 1-2 cups a day or less  Avoid dietary triggers that you have noticed bring on your headaches (this could include aged cheese, peanuts, MSG, aspartame and nitrates)  [x] EXERCISE - physical exercise as we all know is good for you in many ways, and not only is good for your heart, but also is beneficial for your mental health, cognitive health and  chronic pain/headaches  I would encourage at the least 5 days of physical exercise weekly for at least 30 minutes       Education and Follow-up  [x] Please call with any questions or concerns  Of course if any new concerning symptoms go to the emergency department  [x] Follow up 3 months, sooner if needed            CC:    We had the pleasure of evaluating Yas Diaz in neurological consultation today  Yas Diaz is a   right handed female who presents today for evaluation of headaches       History obtained from patient as well as available medical record review  Patient is a poor historian regarding history at times   Refused Interpretor today     History of Present Illness:   Interval history as of 5/18/2022  - denies any new or concerning neurologic symptoms since last visit   - following with bariatrics and they also sent her for sleep study + for [facility] sleep report from 03/21/2022 which showed mild degree of sleep fragmentation and mild obstructive sleep apnea  Sleep efficiency was only 58% so the AHI may be underestimated  (AHI 8 16)  Plan is for scheduling CPAP titration study  Headaches and migraines   She reports she continues to have improvement on emgality approximately 3 migraines per week, some wearing off when due for next dose     Preventative:   -  topiramate 25 mg b i d   -trial of emgality     Abortive:   Rizatriptan  Denies bothersome side effects     Interval history as of 2/18/2022  - denies any new or concerning neurologic symptoms since last visit   - ophthalmology note uploaded 01/14/2022 documented no papilledema  - requesting FMLA which she will bring in     Headaches and migraines   Improved to 2 migraines per week     Preventative:    - trial of topiramate - side effects-  25 mg BID  - trial of emgality - started 1/21/22 Denies bothersome side effects     Abortive:   - trial of rizatriptan - helps  - trial of decadron 1/10/22 - does not remember      Interval History as of 11/12/2021:  - she did not bring an MRI brain images on CD for my review or ophthalmology report for my review  She has had 5 headaches this last week  Before this week was not having headaches very often she says   Currently has a headache, started around noon today, throbbing in whole head  Currently 7/10  No nausea  + photophobia     Headaches are bifrontal and bioccipital    Went to the Ophthalmologist (Dr Sanches Never in \[facility] [place] [facility]") - gave prescription for the some glasses  They did not see papilledema per patient  Sleeping okay  Baby is sleeping through the night  5 months old, not breast feeding   Drinking a lot of water    s/p sterilization  Abortive:  Taking Tylenol or ibuprofen at least 3-4 times per week     Preventative:  - not on diamox  - interested in preventative       Interval history as of 7/9/2021   - She was to follow-up in 4 weeks since last visit (no show 12/12/19) and she is now following up 2 years later   - lumbar puncture 01/27/2021 opening pressure 11    Please see EMR for details  - had baby 6/15/21, preeclampsia, breastfeeding  - Neuro saw her inpatient 6/17/21 - see EMR    Denies vision loss or other visual symptoms  - has upcoming visit in 7/28/2021 with  ophthalmology    Headaches and migraines   - headaches prior to pregnancy, worse while pregnant especially first trimester  - no headaches since delivery 3 weeks ago    Preventative: nothing   - not on diamox - per data - "Due to the potential for serious adverse reactions in the  infant, the  recommends a decision be made whether to discontinue breastfeeding or to discontinue the drug, taking into account the importance of treatment to the mother "      Interval history as of 11/07/2019  - She has not followed up with nutrition as recommended the setting of IIH - although she has upcoming appointment  - She has not followed up with Ophthalmology as recommended for IIH   - she did not obtain blood work as ordered  - she did not get MRI brain on CD for my review    She reports no improvement in headaches  - daily headaches, worse 4 times a week   - acetazolamide 500 mg b i d  - forgets most of the time, only taking 2-3 days a week 250 mg TID for some reason   - trial of indomethacin 50 mg - took daily for 2 weeks     History of initial visit 09/12/2019  Headaches started around 9/2017  - was evaluated at  ED 09/22/2017 and 09/23/2017 - diffuse headache, photophobia, no vision changes  - ED 10/06/2017 where she was evaluated by Neurology for headache, eye pain for months  An outside MRI of the brain was completed which demonstrated questionable protrusion of the left optic disc into posterior aspect of left orbit correlating with reported history of papilledema, transverse dural venous sinus stenosis is present with findings consistent with idiopathic intracranial hypertension  In the ED she had a lumbar puncture with opening pressure 55 cm H20 and closing pressure 22 5 cm H20 with relief of her symptoms after   - she was started on topiramate 25 mg b i d  And recommended outpatient Neurology follow-up  - 05/24/2018 return to ED with possible migraine  - ED 05/27/2018  - 06/05/2018 follow up with primary care prescribed acetazolamide and topiramate      [facility]  Last visit 05/2018  Previously on acetazolamide - only took 1 month and then stop feeling due to did not have insurance     Last visit with eye doctor was around 09/2018 - per patient vision was perfect and she did not have to come back  - she reports they did not mention papilledema then (and she does recall that they dilated her eyes), that it was the neurologist previously who had      *Today denies vision symptoms           Headaches started at what age? Started age 17 and came bad 22years old  How often do the headaches occur?   - as of 9/12/2019: daily for about 6 months  3 headache free days a month only   What time of the day do the headaches start? Sometimes wakes with it, Afternoon   How long do the headaches last? 1-2  hours  Are you ever headache free?  rarely     Aura? without aura     Last eye exam: around 9/2018 she thinks      Where is your headache located and pain quality?   - bifrontal and apex, bilateral retro-orbital - pulsating, pressure, stabbing What is the intensity of pain? Average: 7/10, worst 9/10  Associated symptoms:   [] Nausea       [] Vomiting        [] Diarrhea  [x] Insomnia    [] Stiff or sore neck   [x] Problems with concentration  [x] Photophobia     [x]Phonophobia      [] Osmophobia  [] Blurred vision   [x] Prefer quiet, dark room  [x] Light-headed or dizzy     [] Tinnitus   [] Hands or feet tingle or feel numb/paresthesias       [] Red ear      [] Ptosis   [] Facial droop  [] Lacrimation  [] Nasal congestion/rhinorrhea   [] Flushing of face  [] Change in pupil size     Number of days missed per month because of headaches:  Work (or school) days: 3     Headache triggers:  Exercise, softball, standing up quickly, wakes with it     Have you seen someone else for headaches or pain? Yes, [employer]  Have you had trigger point injection performed and how often? No  Have you had Botox injection performed and how often? No   Have you had epidural injections or transforaminal injections performed? No  Are you current pregnant or planning on getting pregnant? No, IUD - now had surgery   Have you ever had any Brain imaging? yes     What medications do you take or have you taken for your headaches?    ABORTIVE:    OTC medications have been ineffective   Ibuprofen -   - as of 9/12/19: 3 times day - 5 days a week, helps a little      Past Per chart  - pt does not remember  sumatriptan 25 mg - she does not remember   fioricet 6/2018  Methocarbamol      PREVENTIVE:   Nothing currently         Past per chart - pt does not remember  - propranolol 20 mg 9/2017  - topiramate 25 mg BID 9/2017  - acetazolamide 250 mg - 6/2018     Alternative therapies used in the past for headaches? no other headache interventions have been tried     Neck pain?: chronic     LIFESTYLE  Sleep   - averages: 7 hours  - does not think she snores  - wakes 2 times a night  Problems falling asleep?:   Yes  Problems staying asleep?:  Yes        Physical activity: softball in summer, gym 3 days a week      Water: 2 bottles  per day  Caffeine: none per day  Diet:  Do you ever skip meals?   No     Mood:   Denies history of anxiety or depression or other diagnosed mood disorder     The following portions of the patient's history were reviewed and updated as appropriate: allergies, current medications, past family history, past medical history, past social history, past surgical history and problem list      Pertinent family history:  Family history of headaches:  no known family members with significant headaches  Any family history of aneurysms - No     Pertinent social history:  Work:  In a [employer] for [employer]  Education: 12th  Lives with  Mom, sister, daughter (3) Adline Son     Illicit Drugs: denies  Alcohol/tobacco: Denies tobacco use, alcohol intake: social drinker    Past Medical History:     Past Medical History:   Diagnosis Date    31 weeks gestation of pregnancy 2021    GBS bacteruria- needs PCN intrapartum    Chiari malformation type I ([email or web address] 75 )     Chronic fatigue     last assessed 16    COVID-19 2020    Fever and chills 2020    High risk pregnancy with high inhibin 3/29/2021    Hyperemesis gravidarum 2021    IV fluid infusions ordered Phenergan ordered     Hyperlipidemia     resolved 17    Hypertension     pre-eclampsia 2016    Idiopathic intracranial hypertension     Intrauterine growth restriction affecting antepartum care of mother in third trimester 6/3/2021    Pre-eclampsia     with severe features     Varicella     pt unsure    Visual impairment     glasses       Patient Active Problem List   Diagnosis    IIH (idiopathic intracranial hypertension)    Papilledema    Questionable Chiari malformation type I ([facility] )    History of  novel coronavirus disease (COVID-19)    GBS bacteriuria    Status post primary low transverse  section    Encounter for sterilization    Status post bilateral salpingectomy    Diarrhea Medications:      Current Outpatient Medications   Medication Sig Dispense Refill    Galcanezumab-gnlm 120 MG/ML SOAJ Inject 120 mg under the skin every 30 (thirty) days Following the first month loading dose of 2 pens  1 mL 11    rizatriptan (MAXALT) 10 MG tablet Take 1 tablet (10 mg total) by mouth once as needed for migraine May repeat in 2 hours if needed  Max 2/24 hours, 9/month  9 tablet 6    topiramate (TOPAMAX) 25 mg tablet Increase to 1 tab QAM and 2 tabs QHS for 1 week and finish at 2 tabs BID  (Patient taking differently: 25 mg  in the morning and 25 mg in the evening ) 120 tablet 4    acetaminophen (TYLENOL) 650 mg CR tablet Take 1 tablet (650 mg total) by mouth every 6 (six) hours as needed for mild pain (Patient not taking: No sig reported) 30 tablet 1    ibuprofen (MOTRIN) 600 mg tablet Take 1 tablet (600 mg total) by mouth every 6 (six) hours as needed for mild pain (Patient not taking: No sig reported) 30 tablet 0    Menthol (Cepacol Sore Throat) 5 4 MG LOZG Apply 1 lozenge (5 4 mg total) to the mouth or throat every 2 (two) hours as needed (sore throat) (Patient not taking: No sig reported) 30 lozenge 1    ondansetron (ZOFRAN) 4 mg tablet Take 1 tablet (4 mg total) by mouth every 8 (eight) hours as needed for nausea or vomiting (Patient not taking: Reported on 3/14/2022 ) 20 tablet 0     No current facility-administered medications for this visit          Allergies:    No Known Allergies    Family History:     Family History   Problem Relation Age of Onset    Leukemia Maternal Grandfather     No Known Problems Mother     Other Father         MVA    No Known Problems Sister     Hypertension Maternal Grandmother     No Known Problems Daughter     No Known Problems Sister        Social History:     Social History     Socioeconomic History    Marital status: Single     Spouse name: Not on file    Number of children: 1    Years of education: 12    Highest education level: High school graduate   Occupational History    Not on file   Tobacco Use    Smoking status: Never Smoker    Smokeless tobacco: Never Used   Vaping Use    Vaping Use: Never used   Substance and Sexual Activity    Alcohol use: Not Currently    Drug use: Never    Sexual activity: Not Currently     Partners: Male     Birth control/protection: None, Condom Male, Female Sterilization   Other Topics Concern    Not on file   Social History Narrative    Always uses seatbelt    High school student    Younger sister    Lives with mother (single parent)     Social Determinants of Health     Financial Resource Strain: Low Risk     Difficulty of Paying Living Expenses: Not hard at all   Food Insecurity: No Food Insecurity    Worried About Running Out of Food in the Last Year: Never true    Marva of Food in the Last Year: Never true   Transportation Needs: No Transportation Needs    Lack of Transportation (Medical): No    Lack of Transportation (Non-Medical): No   Physical Activity: Not on file   Stress: Not on file   Social Connections: Not on file   Intimate Partner Violence: Not on file   Housing Stability: Low Risk     Unable to Pay for Housing in the Last Year: No    Number of Places Lived in the Last Year: 1    Unstable Housing in the Last Year: No         Objective:       Physical Exam:                                                                 Vitals:            Constitutional:    /75 (BP Location: Left arm, Patient Position: Sitting, Cuff Size: Large)   Pulse 74   Wt 114 kg (251 lb)   BMI 38 16 kg/m²   BP Readings from Last 3 Encounters:   05/18/22 131/75   03/31/22 116/79   02/21/22 115/78     Pulse Readings from Last 3 Encounters:   05/18/22 74   03/31/22 83   02/21/22 79         Well developed, well nourished, well groomed  No dysmorphic features  HEENT:  Normocephalic atraumatic  See neuro exam   Chest:  Respirations appear regular and unlabored      Cardiovascular:  no observed significant swelling  Musculoskeletal:  (see below under neurologic exam for evaluation of motor function and gait)   Skin:  warm and dry, not diaphoretic  Psychiatric:  Normal behavior and appropriate affect        Neurological Examination:     Mental status/cognitive function:   Recent and remote memory intact  Attention span and concentration as well as fund of knowledge are appropriate for age  Normal language and spontaneous speech  Cranial Nerves:  III, IV, VI-Pupils were equal, round  Extraocular movements were full and conjugate   VII-facial expression symmetric  VIII-hearing grossly intact bilaterally   Motor Exam: symmetric bulk throughout  no atrophy, fasciculations or abnormal movements noted  Coordination:  no apparent dysmetria, ataxia or tremor noted  Gait: steady casual gait         Pertinent lab results:   Please see EMR for recent labs   09/15/2021 CMP and CBC unremarkable   Per our system  09/29/2016:  CMP and CBC unremarkable   TSH 3 3     Imaging:   No head imaging in our system but she has had an MRI brain per report at outside hospital  -[facility] Neurology resident note:  - ED 10/06/2017 where she was evaluated by Neurology for headache, eye pain for months   An outside MRI of the brain was completed which demonstrated questionable protrusion of the left optic disc into posterior aspect of left orbit correlating with reported history of papilledema, transverse dural venous sinus stenosis is present with findings consistent with idiopathic intracranial hypertension   In the ED she had a lumbar puncture with opening pressure 55 cm H20 and closing pressure 22 5 cm H20 with relief of her symptoms after      -  lumbar puncture 01/27/2021 opening pressure 11   Please see EMR for details  Review of Systems:   ROS obtained by medical assistant Personally reviewed and updated if indicated  I recommended PCP follow up for non neurologic problems      Review of Systems - this moment Constitutional: Negative  HENT: Negative  Eyes: Negative  Respiratory: Negative  Cardiovascular: Negative  Gastrointestinal: Negative  Endocrine: Negative  Genitourinary: Negative  Musculoskeletal: Negative  Skin: Negative  Allergic/Immunologic: Negative  Neurological: Negative  Hematological: Negative  Psychiatric/Behavioral: Negative        I have spent 29 minutes with Patient  today in which greater than 50% of this time was spent in counseling/coordination of care  I also spent 7 minutes non face to face for this patient the same day         Author:  Juliane De La Fuente MD 5/18/2022 3:32 PM

## 2022-05-18 NOTE — PATIENT INSTRUCTIONS
Routine labs already ordered by PCP per patient    Follow-up with Sleep Medicine regarding sleep apnea treatment     If any worsening or new or concerning symptoms especially if any vision loss, go to the emergency department where I recommend a consider a lumbar puncture/spinal tap like you had before     Headache/migraine treatment:   Abortive medications (for immediate treatment of a headache): It is ok to take ibuprofen, acetaminophen or naproxen (Advil, Tylenol,  Aleve, Excedrin) if they help your headaches you should limit these to No more than 3 times a week to avoid medication overuse/rebound headaches  For your more moderate to severe migraines take this medication early   Maxalt (rizatriptan) 10mg tabs - take one at the onset of headache  May repeat one time after 1-2 hours if pain has not resolved  (Max 2 a day and 9 a month)     Prescription preventive medications for headaches/migraines   (to take every day to help prevent headaches - not to take at the time of headache):  [x] - Topiramate - increase to 25 mg in a m  And 50 mg in p m  For 1 week, then take 50 mg in a m  and 50 mg in p m  And continue    - generally the common side effects improve as your body gets used to the medication  If we need to spread out a more gradual increase of the medication on a longer scale we can, just call if any questions or concerns  - if necessary, if the a m  dose is causing side effects we can always have you take the full dose at night instead    - important to know per data, this medication may, but typically does not affect birth control unless you are taking 200 mg daily or more and I highly recommend being on birth control while on this medication due to possible significant detrimental effects to fetus if you were to get pregnant     The medication you are taking may impact pregnancy  It has been associated with birth defects and learning problems if taken during pregnancy   Thus, it is important to avoid unplanned pregnancy while taking this medication  In the future, if you plan to become pregnant, then you should discuss this with your neurologist since medication adjustments may be indicated  [x] -  Emgality/Galcanezumab -  120 mg injections every 30 days     READ INSTRUCTIONS that come with the medication  REFRIGERATE  Keep out of direct sunlight  Prior to administration, allow to come to room temperature for 30 minutes  Do not warm using a heat source (eg, microwave or hot water)  Do not shake  Administer in preferably abdomen (avoiding 2 inches around the navel), thigh, upper arm, or buttocks avoiding areas of skin that are tender, bruised, red or hard  Deliver entire contents of single-use prefilled pen or syringe  Lifestyle Recommendations:  [x] SLEEP - Maintain a regular sleep schedule: Adults need at least 7-8 hours of uninterrupted a night  Maintain good sleep hygiene:  Going to bed and waking up at consistent times, avoiding excessive daytime naps, avoiding caffeinated beverages in the evening, avoid excessive stimulation in the evening and generally using bed primarily for sleeping  One hour before bedtime would recommend turning lights down lower, decreasing your activity (may read quietly, listen to music at a low volume)  When you get into bed, should eliminate all technology (no texting, emailing, playing with your phone, iPad or tablet in bed)  [x] HYDRATION - Maintain good hydration  Drink  2L of fluid a day (4 typical small water bottles)  [x] DIET - Maintain good nutrition  In particular don't skip meals and try and eat healthy balanced meals regularly  [x] TRIGGERS - Look for other triggers and avoid them: Limit caffeine to 1-2 cups a day or less  Avoid dietary triggers that you have noticed bring on your headaches (this could include aged cheese, peanuts, MSG, aspartame and nitrates)    [x] EXERCISE - physical exercise as we all know is good for you in many ways, and not only is good for your heart, but also is beneficial for your mental health, cognitive health and  chronic pain/headaches  I would encourage at the least 5 days of physical exercise weekly for at least 30 minutes  Education and Follow-up  [x] Please call with any questions or concerns  Of course if any new concerning symptoms go to the emergency department    [x] Follow up 3 months, sooner if needed

## 2022-05-26 ENCOUNTER — TELEPHONE (OUTPATIENT)
Dept: NEUROLOGY | Facility: CLINIC | Age: 27
End: 2022-05-26

## 2022-05-26 NOTE — TELEPHONE ENCOUNTER
I would defer to the surgeons and anesthesiology team for any clearance for surgery  There is no contraindication to any surgery due to her migraine history

## 2022-05-26 NOTE — TELEPHONE ENCOUNTER
Patient calling in requesting a letter from Dr Win Sampson giving her clearance  for her upcoming Bariatric Surgery in August at [facility]  If agreeable patient would like letter sent through her My Chart      # [phone]

## 2022-05-31 NOTE — TELEPHONE ENCOUNTER
Called and advised pt of the below  She verbalized clear understanding  States that she needs a letter from Dr Allison Sesay that states that states there is no contraindication to bariatric surgery due to her migraines  Ok to generate? Mynor Cowan is under my professional care  There is no contraindication to any surgery due to her migraine history       If you have questions, please do not hesitate to call me

## 2022-06-10 ENCOUNTER — OFFICE VISIT (OUTPATIENT)
Dept: DENTISTRY | Facility: CLINIC | Age: 27
End: 2022-06-10

## 2022-06-10 VITALS — SYSTOLIC BLOOD PRESSURE: 128 MMHG | TEMPERATURE: 98.7 F | DIASTOLIC BLOOD PRESSURE: 82 MMHG | HEART RATE: 76 BPM

## 2022-06-10 DIAGNOSIS — Z01.21 ENCOUNTER FOR DENTAL EXAMINATION AND CLEANING WITH ABNORMAL FINDINGS: Primary | ICD-10-CM

## 2022-06-10 PROCEDURE — D0120 PERIODIC ORAL EVALUATION - ESTABLISHED PATIENT: HCPCS

## 2022-06-10 PROCEDURE — D0274 BITEWINGS - 4 RADIOGRAPHIC IMAGES: HCPCS

## 2022-06-10 PROCEDURE — D1110 PROPHYLAXIS - ADULT: HCPCS

## 2022-06-10 NOTE — PROGRESS NOTES
Prophy    Dental procedures in this visit     - PROPHYLAXIS - ADULT (Completed)     [facility] [address] [place] [address]     Billing provider: Bijal Magaña DDS     - PERIODIC ORAL EVALUATION - ESTABLISHED PATIENT (Completed)     Service provider: [facility], [address] [place] [address]     Billing provider: Bijal Magaña, [address]  - BITEWINGS - 4 RADIOGRAPHIC IMAGES (Completed)     [facility] [address] [place] [address]     Billing provider: Bijal Magaña DDS     Rev med hist in [address] [place] [address] Rd  ASA-I  Pts CC: Nothing at this time  Method Used:  · Prophy Method Used: Hand Scaling  · Polished  · Flossed    Radiographs Taken:  · Bitewings x4    Intra/Extra Oral Cancer Screening:  · Within normal limits      Oral Hygiene:  · Fair    Plaque:  · Generalized  · Moderate    Calculus:  · Generalized  · Light  · Lower anteriors  · Upper anteriors  · Posteriors    Bleeding:  · Bleeding on probing: No periodontal exam for this visit  · Localized  · Light    Stain:  · None    Periodontal Charting:  · Spot probing-Watch rec on samson post    Periodontal Classification:  · Localized  · Moderate  · Gingivitis      Nutritional Counseling:  · N/A    OHI: Reinforced daily flossing with Pt  Changed TB tech to modified bass or electric TB due to recession  Isaiah Marcum    No orders of the defined types were placed in this encounter  Periodic Exam:Dr Winchester  -Continue to monitor for occl guard  -No decay detected      NV:6 mths prophy with periodic exam

## 2022-06-23 ENCOUNTER — APPOINTMENT (OUTPATIENT)
Dept: LAB | Facility: HOSPITAL | Age: 27
End: 2022-06-23
Payer: COMMERCIAL

## 2022-06-23 DIAGNOSIS — Z01.818 PREOP EXAMINATION: ICD-10-CM

## 2022-06-23 DIAGNOSIS — Z11.59 NEED FOR HEPATITIS C SCREENING TEST: ICD-10-CM

## 2022-06-23 LAB — HCV AB SER QL: NORMAL

## 2022-06-23 PROCEDURE — 86803 HEPATITIS C AB TEST: CPT

## 2022-06-23 PROCEDURE — 36415 COLL VENOUS BLD VENIPUNCTURE: CPT

## 2022-07-25 ENCOUNTER — CONSULT (OUTPATIENT)
Dept: INTERNAL MEDICINE CLINIC | Facility: CLINIC | Age: 27
End: 2022-07-25

## 2022-07-25 VITALS
OXYGEN SATURATION: 98 % | BODY MASS INDEX: 35.83 KG/M2 | HEIGHT: 68 IN | WEIGHT: 236.4 LBS | HEART RATE: 64 BPM | SYSTOLIC BLOOD PRESSURE: 115 MMHG | TEMPERATURE: 97.6 F | DIASTOLIC BLOOD PRESSURE: 78 MMHG

## 2022-07-25 DIAGNOSIS — G93.2 IIH (IDIOPATHIC INTRACRANIAL HYPERTENSION): ICD-10-CM

## 2022-07-25 DIAGNOSIS — E66.9 OBESITY (BMI 30-39.9): ICD-10-CM

## 2022-07-25 DIAGNOSIS — Z01.818 PREOPERATIVE CLEARANCE: Primary | ICD-10-CM

## 2022-07-25 PROCEDURE — 99213 OFFICE O/P EST LOW 20 MIN: CPT | Performed by: INTERNAL MEDICINE

## 2022-07-25 NOTE — PROGRESS NOTES
ASSESSMENT/PLAN:  Diagnoses and all orders for this visit:    Preoperative Cardiac Risk Assessment Prior to Non-Cardiac Surgery:     Surgery: Bariatric Surgery (Patient unable to specify type)   Surgeon: Kendra Hogan MD  Date: 8/16/2022      1  Presence of High risk conditions: Patient has no past or present history of high risk cardiac conditions including: decompensated heart failure, recent myocardial infarction, sustained tachyarrhythmias, and unstable angina  2  Assessment of Cardiac Functional Status/Capacity: 4-10 METS  3  RCRI Risk Assessment: (see below)     Risk Factor Score (Yes=1, No=0)   Hx of TIA/CVA 0   Hx of prior ischemic heart disease (AMI, unstable angina, Q waves on EKG, CABG) 0   Hx of congestive heart failure 0   Serum Creatinine >2 mg/dl 0   Insulin dependent diabetes mellitus 0   Total Score 0     Risk of MACE (30-day)     Points Risk % (95% CI), Hieu 2017 Risk % (95% CI) Darrin, 1999   0 3 9 (2 8-5 4) 0 4 (0 05-1 5)   1 6 (4 9-7 4) 0 9 (0 3-2 1)   2 10 1 (8 1-12 6) 6 6 (3 9-10 3)   3 or more 15 (11 1-20) >11 (5 8-18  4)     At this time, patient is low risk for cardiac complications for upcoming non-cardiac, bariatic surgery given lack of high risk cardiac conditions, appropriate functional status, and low RCRI score, no further testing, intervention, or optimization is required prior to surgical intervention  Not on any insulin or anticoagulants/antiplatelets  However, patient has been advised to hold Topomax medications per neurology recommendations  This information was discussed with the patient during today's visit  Patient verbalized understanding  Obesity (BMI 30-39  9):  History of obesity  Weight today 239 pounds  BMI 35 94  Patient currently follows with bariatric surgery at [facility]  Scheduled for bariatric surgery on 08/16/2022  Now presenting for preoperative clearance    · Continue follow-up with bariatric surgery; medically optimized for upcoming procedure  · Advised lifestyle modifications including diet and exercise    IIH (idiopathic intracranial hypertension):  History of migraines secondary to idiopathic intracranial hypertension  Patient currently follows with Neurology non regimen of Topamax 25 mg BID along with Galcanezumab-gnlm SC injection every 30 days  Per patient, advise by neurology to hold Topomax 1 week prior to bariatric procedure  · Continue routine follow up with neurology     CHIEF COMPLAINT: Presurgical Clearance     HISTORY OF PRESENT ILLNESS:  Ms Basil Starks is a 70-year-old female with past medical history of obesity to tumor who presents to the office today, 2022, for preoperative surgical clearance  Patient follows with bariatric surgery at [facility] [place] and is scheduled to undergo bariatric surgery on 2022  Patient asymptomatic no complaints during today's visit  Denies chest pain, palpitations, shortness of breath, or lower extremity swelling  At her baseline, patient states that she can run for approximately 10 minutes before having to stop due to fatigue  Denies any prior cardiac or pulmonary conditions  No history of diabetes  Patient with past surgical history including cholecystectomy, breast reduction, and   She has tolerated general anesthesia in the past with no adverse reactions  The following portions of the patient's history were reviewed and updated as appropriate: allergies, current medications, past family history, past medical history, past social history, past surgical history and problem list     Review of Systems   Constitutional: Negative for activity change, appetite change, fatigue and fever  Eyes: Negative for visual disturbance  Respiratory: Negative for cough, chest tightness, shortness of breath and wheezing  Cardiovascular: Negative for chest pain, palpitations and leg swelling  Gastrointestinal: Negative for abdominal pain, constipation, diarrhea, nausea and vomiting     Endocrine: Negative for polydipsia, polyphagia and polyuria  Genitourinary: Negative for dysuria  Musculoskeletal: Negative for arthralgias and back pain  Skin: Negative for color change and pallor  Neurological: Negative for dizziness, weakness, light-headedness and headaches  Psychiatric/Behavioral: Negative for agitation and confusion  OBJECTIVE:  Vitals:    07/25/22 1456   BP: 115/78   BP Location: Left arm   Patient Position: Sitting   Cuff Size: Standard   Pulse: 64   Temp: 97 6 °F (36 4 °C)   TempSrc: Temporal   SpO2: 98%   Weight: 107 kg (236 lb 6 4 oz)   Height: 5' 8" (1 727 m)     Physical Exam  Constitutional:       General: She is not in acute distress  Appearance: She is obese  She is not ill-appearing or toxic-appearing  HENT:      Head: Normocephalic  Nose: Nose normal  No congestion  Mouth/Throat:      Mouth: Mucous membranes are moist       Pharynx: Oropharynx is clear  No oropharyngeal exudate  Eyes:      General: No scleral icterus  Conjunctiva/sclera: Conjunctivae normal    Cardiovascular:      Rate and Rhythm: Normal rate and regular rhythm  Pulses: Normal pulses  Heart sounds: Normal heart sounds  No murmur heard  Pulmonary:      Effort: Pulmonary effort is normal  No respiratory distress  Breath sounds: No wheezing, rhonchi or rales  Abdominal:      General: Abdomen is flat  Bowel sounds are normal  There is no distension  Palpations: Abdomen is soft  Tenderness: There is no abdominal tenderness  There is no guarding or rebound  Musculoskeletal:         General: Normal range of motion  Cervical back: Normal range of motion  Right lower leg: No edema  Left lower leg: No edema  Skin:     General: Skin is warm  Capillary Refill: Capillary refill takes less than 2 seconds  Coloration: Skin is not pale  Neurological:      Mental Status: She is alert and oriented to person, place, and time   Mental status is at baseline  Motor: No weakness  Gait: Gait normal    Psychiatric:         Mood and Affect: Mood normal          Behavior: Behavior normal          Current Outpatient Medications:     Galcanezumab-gnlm 120 MG/ML SOAJ, Inject 120 mg under the skin every 30 (thirty) days Following the first month loading dose of 2 pens  , Disp: 1 mL, Rfl: 11    topiramate (TOPAMAX) 25 mg tablet, Increase to 1 tab QAM and 2 tabs QHS for 1 week and finish at 2 tabs BID , Disp: 120 tablet, Rfl: 4    acetaminophen (TYLENOL) 650 mg CR tablet, Take 1 tablet (650 mg total) by mouth every 6 (six) hours as needed for mild pain (Patient not taking: No sig reported), Disp: 30 tablet, Rfl: 1    ibuprofen (MOTRIN) 600 mg tablet, Take 1 tablet (600 mg total) by mouth every 6 (six) hours as needed for mild pain (Patient not taking: No sig reported), Disp: 30 tablet, Rfl: 0    Menthol (Cepacol Sore Throat) 5 4 MG LOZG, Apply 1 lozenge (5 4 mg total) to the mouth or throat every 2 (two) hours as needed (sore throat) (Patient not taking: No sig reported), Disp: 30 lozenge, Rfl: 1    ondansetron (ZOFRAN) 4 mg tablet, Take 1 tablet (4 mg total) by mouth every 8 (eight) hours as needed for nausea or vomiting (Patient not taking: No sig reported), Disp: 20 tablet, Rfl: 0    rizatriptan (MAXALT) 10 MG tablet, Take 1 tablet (10 mg total) by mouth once as needed for migraine May repeat in 2 hours if needed  Max 2/24 hours, 9/month   (Patient not taking: Reported on 7/25/2022), Disp: 9 tablet, Rfl: 6    Past Medical History:   Diagnosis Date    31 weeks gestation of pregnancy 1/5/2021    GBS bacteruria- needs PCN intrapartum    Chiari malformation type I ([facility] [address] )     Chronic fatigue     last assessed 9/26/16    COVID-19 07/25/2020    Fever and chills 7/25/2020    High risk pregnancy with high inhibin 3/29/2021    Hyperemesis gravidarum 1/30/2021    IV fluid infusions ordered Phenergan ordered     Hyperlipidemia     resolved 9/29/17    Hypertension     pre-eclampsia     Idiopathic intracranial hypertension     Intrauterine growth restriction affecting antepartum care of mother in third trimester 6/3/2021    Pre-eclampsia     with severe features     Varicella     pt unsure    Visual impairment     glasses     Past Surgical History:   Procedure Laterality Date     SECTION      CHOLECYSTECTOMY      CHOLECYSTECTOMY LAPAROSCOPIC N/A 2016    Procedure: CHOLECYSTECTOMY LAPAROSCOPIC possible open;  Surgeon: Angelika Pham MD;  [facility];  Service:     TX  DELIVERY ONLY N/A 2021    Procedure:  SECTION ();   Surgeon: Mendez Hill DO;  Location: [facility];  Service: Obstetrics    TX LAP,RMV  ADNEXAL STRUCTURE Bilateral 2021    Procedure: SALPINGECTOMY, LAPAROSCOPIC;  Surgeon: Sadie Hurtado MD;  [facility];  Service: Gynecology    REDUCTION MAMMAPLASTY       Social History     Socioeconomic History    Marital status: Single     Spouse name: Not on file    Number of children: 1    Years of education: 15    Highest education level: High school graduate   Occupational History    Not on file   Tobacco Use    Smoking status: Never Smoker    Smokeless tobacco: Never Used   Vaping Use    Vaping Use: Never used   Substance and Sexual Activity    Alcohol use: Not Currently    Drug use: Never    Sexual activity: Not Currently     Partners: Male     Birth control/protection: None, Condom Male, Female Sterilization   Other Topics Concern    Not on file   Social History Narrative    Always uses seatbelt    High school student    Younger sister    Lives with mother (single parent)     Social Determinants of Health     Financial Resource Strain: Low Risk     Difficulty of Paying Living Expenses: Not hard at all   Food Insecurity: No Food Insecurity    Worried About Running Out of Food in the Last Year: Never true    Marva of Food in the Last Year: Never true Transportation Needs: No Transportation Needs    Lack of Transportation (Medical): No    Lack of Transportation (Non-Medical): No   Physical Activity: Not on file   Stress: Not on file   Social Connections: Not on file   Intimate Partner Violence: Not on file   Housing Stability: Low Risk     Unable to Pay for Housing in the Last Year: No    Number of Places Lived in the Last Year: 1    Unstable Housing in the Last Year: No     Family History   Problem Relation Age of Onset    Leukemia Maternal Grandfather     No Known Problems Mother     Other Father         MVA    No Known Problems Sister     Hypertension Maternal Grandmother     No Known Problems Daughter     No Known Problems Sister        ==  DO Kalee Hammond [facility] Internal Medicine PGY-3    [facility]   [address] [facility] , [address] [address] [facility] [address] [facility]  Office: [phone]  Fax: [phone]

## 2022-07-26 ENCOUNTER — TELEPHONE (OUTPATIENT)
Dept: INTERNAL MEDICINE CLINIC | Facility: CLINIC | Age: 27
End: 2022-07-26

## 2022-07-26 NOTE — TELEPHONE ENCOUNTER
----- Message from DO Delbert sent at 7/25/2022  5:49 PM EDT -----  Regarding: Preoperative clearance  Hello  Patient was seen examined and are office today on 07/25/2022  Patient is to undergo bariatric surgery on 08/16/2022  Please send a copy of today's note to bariatric surgery office as listed below  Thank you very much       Kaci Saxena MD    [facility] [address]   [phone] Tiffanie Hart   [phone] (Fax)

## 2022-07-28 NOTE — TELEPHONE ENCOUNTER
Please print out office note now that it is cosigned and please fax to number listed below   Thank you

## 2022-07-29 ENCOUNTER — TELEPHONE (OUTPATIENT)
Dept: NEUROLOGY | Facility: CLINIC | Age: 27
End: 2022-07-29

## 2022-07-29 NOTE — TELEPHONE ENCOUNTER
Patient called to get the status of the Progress Notes for the Appt on 7/25/22    Which are signed and Patient wanted me to send them to Zo Gaxiola her Coordinator  At [phone]

## 2022-07-29 NOTE — TELEPHONE ENCOUNTER
Pt left a vm today at 1:35 re: bariatric clearance letter  It has to say-  Pravin Velazco is under my professional care  There is no contraindication to bariatric surgery due to her migraine history       If you have questions, please do not hesitate to call me    Ok to generate?       See enc 5/26/22      thanks

## 2022-07-29 NOTE — TELEPHONE ENCOUNTER
Yes I Faxed Notes and received a confirmation that it went through  Will scan confirmation into chart

## 2022-07-30 ENCOUNTER — TELEPHONE (OUTPATIENT)
Dept: NEUROLOGY | Facility: CLINIC | Age: 27
End: 2022-07-30

## 2022-07-30 NOTE — TELEPHONE ENCOUNTER
Emgality PA  on 7/10/22    Initiated on [facility]    (Key: [id number])    Awaiting determination

## 2022-08-15 ENCOUNTER — TELEPHONE (OUTPATIENT)
Dept: NEUROLOGY | Facility: CLINIC | Age: 27
End: 2022-08-15

## 2022-08-15 NOTE — TELEPHONE ENCOUNTER
Dr Patty Barnhart Anesthesiologist from [facility] would like a returned call to discuss IEP and Chiari symptoms as patient is scheduled for laprascopic gastrectomy surgery tomorrow  She can be reached back on her cell phone at [phone]

## 2022-08-16 NOTE — TELEPHONE ENCOUNTER
Hi I called and spoke with Dr Amos Trevino just now and if possible, could you fax the procedure note for the lumbar puncture on 1/27/21 to [facility] in the morning? She didn't have fax number unfortunately  Also could you fax my last neurology note with it for her?

## 2022-08-30 NOTE — PROGRESS NOTES
AMY spoke with 25 y/o- S- G3:P1:AB3-  [place] Speaking mom for pre juliana assessment  Pt resides with mom and 3 y/o daughter  Reported pregnancy was not intended but welcomed  Pt reported FOB is involved and supportive  Pt denies denies any usage of drug, alcohol or smoking  Pt denies  Nay mental health or domestic violence history  Pt is employed full time as   Denies financial concern at this time  Pt has own transportation  Has MA and need to call [facility] for appointment  Pt claimed has good support form Mom and FOB  Pt main concern is the nausea and vomiting with headaches  Pt is taking her medications and feels a bit better  SW recommended to also try to eat small frequent meals  Pt also concern due to prior high risk pregnancy  SW advice to call provider at any time needed  SW explained her role and gave contact information  Pt was encouraged to call SW at any time needed 
Detail Level: Zone
Quality 431: Preventive Care And Screening: Unhealthy Alcohol Use - Screening: Patient not identified as an unhealthy alcohol user when screened for unhealthy alcohol use using a systematic screening method
Quality 226: Preventive Care And Screening: Tobacco Use: Screening And Cessation Intervention: Patient screened for tobacco use and is an ex/non-smoker
Quality 110: Preventive Care And Screening: Influenza Immunization: Influenza Immunization Administered during Influenza season
Quality 111:Pneumonia Vaccination Status For Older Adults: Pneumococcal vaccine administered on or after patient’s 60th birthday and before the end of the measurement period

## 2022-10-25 ENCOUNTER — TELEPHONE (OUTPATIENT)
Dept: NEUROLOGY | Facility: CLINIC | Age: 27
End: 2022-10-25

## 2022-10-25 NOTE — TELEPHONE ENCOUNTER
Called and spoke to patient to confirm their upcoming appointment with Dr Claire Liu  Informed patient about arriving in the [place] location 15 minutes prior to their appointment to get checked in and going over chart

## 2022-10-28 DIAGNOSIS — G43.011 INTRACTABLE MIGRAINE WITHOUT AURA AND WITH STATUS MIGRAINOSUS: Primary | ICD-10-CM

## 2022-11-04 ENCOUNTER — OFFICE VISIT (OUTPATIENT)
Dept: NEUROLOGY | Facility: CLINIC | Age: 27
End: 2022-11-04

## 2022-11-04 VITALS
BODY MASS INDEX: 32.08 KG/M2 | DIASTOLIC BLOOD PRESSURE: 80 MMHG | SYSTOLIC BLOOD PRESSURE: 111 MMHG | HEART RATE: 69 BPM | WEIGHT: 211 LBS | TEMPERATURE: 98.2 F

## 2022-11-04 DIAGNOSIS — G43.011 INTRACTABLE MIGRAINE WITHOUT AURA AND WITH STATUS MIGRAINOSUS: ICD-10-CM

## 2022-11-04 DIAGNOSIS — G43.009 MIGRAINE WITHOUT AURA AND WITHOUT STATUS MIGRAINOSUS, NOT INTRACTABLE: ICD-10-CM

## 2022-11-04 RX ORDER — ACETAMINOPHEN 325 MG/1
650 TABLET ORAL EVERY 6 HOURS PRN
COMMUNITY

## 2022-11-04 NOTE — PATIENT INSTRUCTIONS
Follow-up with Sleep Medicine regarding sleep apnea treatment        Headache/migraine treatment:   Abortive medications (for immediate treatment of a headache): It is ok to take  acetaminophen if they help your headaches you should limit these to No more than 3 times a week to avoid medication overuse/rebound headaches  For your more moderate to severe migraines take this medication early   Maxalt (rizatriptan) 10mg tabs - take one at the onset of headache  May repeat one time after 1-2 hours if pain has not resolved  (Max 2 a day and 9 a month)     Prescription preventive medications for headaches/migraines   (to take every day to help prevent headaches - not to take at the time of headache):  [x] - Topiramate - decrease to 25 mg in am and in pm for 1-2 weeks and then if migraines are controlled can continue to wean off, going to 25 mg at night for 1-2 weeks and then stopping  Always can re-add if needed    - generally the common side effects improve as your body gets used to the medication  If we need to spread out a more gradual increase of the medication on a longer scale we can, just call if any questions or concerns  - if necessary, if the a m  dose is causing side effects we can always have you take the full dose at night instead    - important to know per data, this medication may, but typically does not affect birth control unless you are taking 200 mg daily or more and I highly recommend being on birth control while on this medication due to possible significant detrimental effects to fetus if you were to get pregnant     The medication you are taking may impact pregnancy  It has been associated with birth defects and learning problems if taken during pregnancy  Thus, it is important to avoid unplanned pregnancy while taking this medication  In the future, if you plan to become pregnant, then you should discuss this with your neurologist since medication adjustments may be indicated      [x] - Emgality/Galcanezumab -  120 mg injections every 30 days     READ INSTRUCTIONS that come with the medication  REFRIGERATE  Keep out of direct sunlight  Prior to administration, allow to come to room temperature for 30 minutes  Do not warm using a heat source (eg, microwave or hot water)  Do not shake  Administer in preferably abdomen (avoiding 2 inches around the navel), thigh, upper arm, or buttocks avoiding areas of skin that are tender, bruised, red or hard  Deliver entire contents of single-use prefilled pen or syringe  Lifestyle Recommendations:  [x] SLEEP - Maintain a regular sleep schedule: Adults need at least 7-8 hours of uninterrupted a night  Maintain good sleep hygiene:  Going to bed and waking up at consistent times, avoiding excessive daytime naps, avoiding caffeinated beverages in the evening, avoid excessive stimulation in the evening and generally using bed primarily for sleeping  One hour before bedtime would recommend turning lights down lower, decreasing your activity (may read quietly, listen to music at a low volume)  When you get into bed, should eliminate all technology (no texting, emailing, playing with your phone, iPad or tablet in bed)  [x] HYDRATION - Maintain good hydration  Drink  2L of fluid a day (4 typical small water bottles)  [x] DIET - Maintain good nutrition  In particular don't skip meals and try and eat healthy balanced meals regularly  [x] TRIGGERS - Look for other triggers and avoid them: Limit caffeine to 1-2 cups a day or less  Avoid dietary triggers that you have noticed bring on your headaches (this could include aged cheese, peanuts, MSG, aspartame and nitrates)  [x] EXERCISE - physical exercise as we all know is good for you in many ways, and not only is good for your heart, but also is beneficial for your mental health, cognitive health and  chronic pain/headaches  I would encourage at the least 5 days of physical exercise weekly for at least 30 minutes  Education and Follow-up  [x] Please call with any questions or concerns  Of course if any new concerning symptoms go to the emergency department    [x] Follow up 6 months, sooner if needed

## 2022-11-04 NOTE — PROGRESS NOTES
[address] [facility] Consult - Follow up   PATIENT:  Carlos Rendon  MRN:  [id number]  :  1995  DATE OF SERVICE:  2022  REFERRED BY: Francisco Montanez DO  PMD: Roro Miranda DO    Assessment/Plan:   Carlos Rendon is a very pleasant  23 y o  female with a past medical history of preeclampsia, chronic neck and back pain (referred to pain management),  questionable chiari malformation type 1 (per report, I do not have MRI results except for as documented), idiopathic intracranial hypertension, papilledema, status post tubal ligation  referred here for evaluation of headache  Initial evaluation 2019     Migraine without aura   Idiopathic intracranial hypertension - resolved  She reports headaches started around age 17, but did not become bad until age 22  Patient is a very poor historian therefore history as obtained primarily through extensive medical record review  In 2017 she presented to ED multiple times for diffuse headache, photophobia  In 10/06/2017 she was evaluated by Neurology for documented results that I do not see in chart: "An outside MRI of the brain was completed which demonstrated questionable protrusion of the left optic disc into posterior aspect of left orbit correlating with reported history of papilledema, transverse dural venous sinus stenosis is present with findings consistent with idiopathic intracranial hypertension   In the ED she had a lumbar puncture with opening pressure 55 cm H20 and closing pressure 22 5 cm H20 with relief of her symptoms after  "Per record review she was started on multiple medications including at 1 point acetazolamide in the past, but due to no insurance she stop taking any these medications  - as of 2019:  Chronic daily headache for the past approximately 6 months  Prior to that on and off  Has about 3 headache-free days per month    She describes bifrontal, bilateral retro-orbital, apex pulsating, pressure, stabbing  She denies aura  She does have associated photophobia, phonophobia  - as of 11/07/2019: Nabor Vines returns reporting daily mild headaches, worse 4 times a week  Unfortunately she has been noncompliant with my recommendations, has only been taking acetazolamide 2-3 days a week, took indomethacin 50 mg daily for 2 weeks despite explicit instructions  Did not follow up with Ophthalmology, did not get blood work and did not get an MRI brain  Had her  come in and re-explained everything as well as the severe possible complications including vision loss if she is not compliant  We will try to make it easier acetazolamide 500 mg b i d , dexamethasone for the next 5 days as she is very worried about current headache indomethacin as abortive  She is very focused on trouble sleeping when she has about headache and asking for an option to treat pain and sleep  We will try cyclobenzaprine   - as of 7/9/2021:  Please see HPI for details  She has been noncompliant with recommendations despite understanding the risk of losing her vision  At this time she has had no headaches since giving birth 3 weeks ago she denies any vision issues whatsoever  She has upcoming follow-up with Ophthalmology in a few weeks and plan will be determined from there depending on whether she has papilledema  We discussed treatment would be Diamox which she has been instructed to start many many times in the past as per my notes and recent inpatient neurology notes  She is breast-feeding at this time, therefore will not prophylactically treat with Diamox due to risk for fetus  - as of 11/12/2021: Nabor Vines returns for headaches and migraines  She reports she was seen by Ophthalmology soon after last visit and was told that she did not have papilledema, she did not have records sent to us and I asked again to get them just to make sure no IIH, will also have our staff reach out to them   Follow up with them next month she reports  She has been doing well without many headaches much until the past few weeks, but now it is frequent enough that she would like to start a prescription preventative  We discussed trial of topiramate which could help with migraines and if she had return of IIH  Trial of rizatriptan for abortive  Toradol IM today for migraine    - as of 2/18/2022: Ophthalmology note showed no papilledema  Still recommended sleep study due to this history  She is taking emgality just one month so far and due for next shot  Taking topiramate 25 mg BID since last visit and we discussed gradual increase to 50 mg BID to try and help with migraine and weight  Rizatriptan helps for abortive  - as of 5/18/2022: She reports she has been improving on emgality (with some wearing off effect at the end) and topiramate 25 mg b i d  With about 3 migraines per week that responds to rizatriptan  She was diagnosed with mild sleep apnea since last visit in plans to treat this outside of our system which will also help with migraines  We discussed she could increase topiramate to 50 mg b i d  If she would like as this may help more with migraines  - as of 11/4/2022: She reports significant improvement with 1-2 migraines month on emgality and topiramate 25/50  This is much better than last visit, and less likely related to increasing topiramate by 25 mg, more likely related to bariatric surgery in September causing significant weight reduction and likely improvement in sleep apnea (mild/untreated)  She reports migraines typically improved with Tylenol and she does not recall rizatriptan which we can always re-add if needed in the future  She would like to wean off topiramate and therefore will start slow wean, but discussed watch out for increasing symptoms coming off as this is likely helping with multiple other issues       Workup:  - 10/2017:  Per [facility] resident note - I do not have the official results, but requested patient obtain  "An outside MRI of the brain was completed which demonstrated questionable protrusion of the left optic disc into posterior aspect of left orbit correlating with reported history of papilledema, transverse dural venous sinus stenosis is present with findings consistent with idiopathic intracranial hypertension " (no mention of Chiari)  - 10/2017: at  ED she had a lumbar puncture with opening pressure 55 cm H20 and closing pressure 22 5 cm H20 with relief of her symptoms after  -  lumbar puncture 01/27/2021 opening pressure 11    - 11/12/21 will hold off on repeat imaging at this time due to no new symptoms, no red flags, no significant worsening, no visual changes, but would have low threshold for repeat MRI brain if needed in the future for any new or concerning features      Preventative:  - we discussed headache hygiene and lifestyle factors that may improve headaches  -per patient request we will gradually wean off topiramate taking off 25 mg ever 1-2 weeks as tolerated watching out for increasing headaches, appetite or vision changes  Discussed proper use, possible side effects and risks  - continue emgality monthly  Discussed proper use, possible side effects and risks  - past:  Propranolol, topiramate, acetazolamide-patient does not recall how long she was on any of these or what affect had  - future options: alternative CGRP med     Abortive:  - discussed not taking over-the-counter or prescription pain medications more than 3 days per week to prevent medication overuse/rebound headache  -     rizatriptan (MAXALT) 10 MG tablet; Take 1 tablet (10 mg total) by mouth once as needed for migraine May repeat in 2 hours if needed  Max 2/24 hours, 9/month  Discussed proper use, possible side effects and risks    - past:  Sumatriptan, Fioricet, methocarbamol-patient does not recall how long she was on any of these or what affect had, in the past prescribed dexamethasone, cyclobenzaprine, indomethacin with Carafate prior and is unclear if patient ever took these  - future options: Alternative Triptan, prochlorperazine, Toradol IM or p o , could consider trial for 5 days of Depakote or dexamethasone for prolonged migraine, [place], reyvow, nurtec      Patient instructions       Follow-up with Sleep Medicine regarding sleep apnea treatment        Headache/migraine treatment:   Abortive medications (for immediate treatment of a headache): It is ok to take  acetaminophen if they help your headaches you should limit these to No more than 3 times a week to avoid medication overuse/rebound headaches       For your more moderate to severe migraines take this medication early   Maxalt (rizatriptan) 10mg tabs - take one at the onset of headache  May repeat one time after 1-2 hours if pain has not resolved  (Max 2 a day and 9 a month)     Prescription preventive medications for headaches/migraines   (to take every day to help prevent headaches - not to take at the time of headache):  [x] - Topiramate - decrease to 25 mg in am and in pm for 1-2 weeks and then if migraines are controlled can continue to wean off, going to 25 mg at night for 1-2 weeks and then stopping  Always can re-add if needed    - generally the common side effects improve as your body gets used to the medication  If we need to spread out a more gradual increase of the medication on a longer scale we can, just call if any questions or concerns  - if necessary, if the a m  dose is causing side effects we can always have you take the full dose at night instead    - important to know per data, this medication may, but typically does not affect birth control unless you are taking 200 mg daily or more and I highly recommend being on birth control while on this medication due to possible significant detrimental effects to fetus if you were to get pregnant     The medication you are taking may impact pregnancy   It has been associated with birth defects and learning problems if taken during pregnancy  Thus, it is important to avoid unplanned pregnancy while taking this medication  In the future, if you plan to become pregnant, then you should discuss this with your neurologist since medication adjustments may be indicated  [x] -  Emgality/Galcanezumab -  120 mg injections every 30 days     READ INSTRUCTIONS that come with the medication  REFRIGERATE  Keep out of direct sunlight  Prior to administration, allow to come to room temperature for 30 minutes  Do not warm using a heat source (eg, microwave or hot water)  Do not shake  Administer in preferably abdomen (avoiding 2 inches around the navel), thigh, upper arm, or buttocks avoiding areas of skin that are tender, bruised, red or hard  Deliver entire contents of single-use prefilled pen or syringe  Lifestyle Recommendations:  [x] SLEEP - Maintain a regular sleep schedule: Adults need at least 7-8 hours of uninterrupted a night  Maintain good sleep hygiene:  Going to bed and waking up at consistent times, avoiding excessive daytime naps, avoiding caffeinated beverages in the evening, avoid excessive stimulation in the evening and generally using bed primarily for sleeping  One hour before bedtime would recommend turning lights down lower, decreasing your activity (may read quietly, listen to music at a low volume)  When you get into bed, should eliminate all technology (no texting, emailing, playing with your phone, iPad or tablet in bed)  [x] HYDRATION - Maintain good hydration  Drink  2L of fluid a day (4 typical small water bottles)  [x] DIET - Maintain good nutrition  In particular don't skip meals and try and eat healthy balanced meals regularly  [x] TRIGGERS - Look for other triggers and avoid them: Limit caffeine to 1-2 cups a day or less   Avoid dietary triggers that you have noticed bring on your headaches (this could include aged cheese, peanuts, MSG, aspartame and nitrates)  [x] EXERCISE - physical exercise as we all know is good for you in many ways, and not only is good for your heart, but also is beneficial for your mental health, cognitive health and  chronic pain/headaches  I would encourage at the least 5 days of physical exercise weekly for at least 30 minutes       Education and Follow-up  [x] Please call with any questions or concerns  Of course if any new concerning symptoms go to the emergency department  [x] Follow up 6 months, sooner if needed            CC: We had the pleasure of evaluating Telma Mendes in neurological consultation today  Telma Mendes is a   right handed female who presents today for evaluation of headaches       History obtained from patient as well as available medical record review  Patient is a poor historian regarding history at times      History of Present Illness:   Interval history as of 11/4/2022  - denies any new or concerning neurologic symptoms since last visit   - sleeping 7-8 hours nightly and well   - low stress, no new symptoms, no vision changes   - bariatric surgery since last visit on 9/19/22 - lost 27 lbs already     Headaches and migraines   1-2 migraines a month that respond to tylenol   Night and day improvement from the past     Preventative: topiramate 25mg/50 mg   - emgality monthly    Abortive: tylenol   Rizatriptan - doesn't know if she ever took or when but doesn't need now  Denies bothersome side effects      Interval history as of 5/18/2022  - denies any new or concerning neurologic symptoms since last visit   - following with bariatrics and they also sent her for sleep study + for [facility] sleep report from 03/21/2022 which showed mild degree of sleep fragmentation and mild obstructive sleep apnea  Sleep efficiency was only 58% so the AHI may be underestimated  (AHI 8 16)  Plan is for scheduling CPAP titration study      Headaches and migraines   She reports she continues to have improvement on emgality approximately 3 migraines per week, some wearing off when due for next dose     Preventative:   -  topiramate 25 mg b i d   -trial of emgality -     Abortive:   Rizatriptan - doesn't know   Denies bothersome side effects     Interval history as of 2/18/2022  - denies any new or concerning neurologic symptoms since last visit   - ophthalmology note uploaded 01/14/2022 documented no papilledema  - requesting FMLA which she will bring in     Headaches and migraines   Improved to 2 migraines per week     Preventative:    - trial of topiramate - side effects-  25 mg BID  - trial of emgality - started 1/21/22 Denies bothersome side effects     Abortive:   - trial of rizatriptan - helps  - trial of decadron 1/10/22 - does not remember      Interval History as of 11/12/2021:  - she did not bring an MRI brain images on CD for my review or ophthalmology report for my review  She has had 5 headaches this last week  Before this week was not having headaches very often she says   Currently has a headache, started around noon today, throbbing in whole head  Currently 7/10  No nausea  + photophobia  Headaches are bifrontal and bioccipital    Went to the Ophthalmologist (Dr Tremaine Jefferson in [facility]) - gave prescription for the some glasses  They did not see papilledema per patient  Sleeping okay  Baby is sleeping through the night  5 months old, not breast feeding   Drinking a lot of water    s/p sterilization  Abortive:  Taking Tylenol or ibuprofen at least 3-4 times per week     Preventative:  - not on diamox  - interested in preventative       Interval history as of 7/9/2021   - She was to follow-up in 4 weeks since last visit (no show 12/12/19) and she is now following up 2 years later   - lumbar puncture 01/27/2021 opening pressure 11    Please see EMR for details  - had baby 6/15/21, preeclampsia, breastfeeding  - Neuro saw her inpatient 6/17/21 - see EMR    Denies vision loss or other visual symptoms  - has upcoming visit in 7/28/2021 with  ophthalmology    Headaches and migraines   - headaches prior to pregnancy, worse while pregnant especially first trimester  - no headaches since delivery 3 weeks ago    Preventative: nothing   - not on diamox - per data - "Due to the potential for serious adverse reactions in the  infant, the  recommends a decision be made whether to discontinue breastfeeding or to discontinue the drug, taking into account the importance of treatment to the mother "      Interval history as of 11/07/2019  - She has not followed up with nutrition as recommended the setting of IIH - although she has upcoming appointment  - She has not followed up with Ophthalmology as recommended for II   - she did not obtain blood work as ordered  - she did not get MRI brain on CD for my review    She reports no improvement in headaches  - daily headaches, worse 4 times a week   - acetazolamide 500 mg b i d  - forgets most of the time, only taking 2-3 days a week 250 mg TID for some reason   - trial of indomethacin 50 mg - took daily for 2 weeks     History of initial visit 09/12/2019  Headaches started around 9/2017  - was evaluated at  ED 09/22/2017 and 09/23/2017 - diffuse headache, photophobia, no vision changes  - ED 10/06/2017 where she was evaluated by Neurology for headache, eye pain for months  An outside MRI of the brain was completed which demonstrated questionable protrusion of the left optic disc into posterior aspect of left orbit correlating with reported history of papilledema, transverse dural venous sinus stenosis is present with findings consistent with idiopathic intracranial hypertension  In the ED she had a lumbar puncture with opening pressure 55 cm H20 and closing pressure 22 5 cm H20 with relief of her symptoms after   - she was started on topiramate 25 mg b i d   And recommended outpatient Neurology follow-up  - 05/24/2018 return to ED with possible migraine  - ED 05/27/2018  - 06/05/2018 follow up with primary care prescribed acetazolamide and topiramate      Previously followed with [facility]  Last visit 05/2018  Previously on acetazolamide - only took 1 month and then stop feeling due to did not have insurance     Last visit with eye doctor was around 09/2018 - per patient vision was perfect and she did not have to come back  - she reports they did not mention papilledema then (and she does recall that they dilated her eyes), that it was the neurologist previously who had      *Today denies vision symptoms           Headaches started at what age? Started age 17 and came bad 22years old  How often do the headaches occur?   - as of 9/12/2019: daily for about 6 months  3 headache free days a month only   What time of the day do the headaches start? Sometimes wakes with it, Afternoon   How long do the headaches last? 1-2  hours  Are you ever headache free? rarely     Aura? without aura     Last eye exam: around 9/2018 she thinks      Where is your headache located and pain quality?   - bifrontal and apex, bilateral retro-orbital - pulsating, pressure, stabbing   What is the intensity of pain? Average: 7/10, worst 9/10  Associated symptoms:   [] Nausea       [] Vomiting        [] Diarrhea  [x] Insomnia    [] Stiff or sore neck   [x] Problems with concentration  [x] Photophobia     [x]Phonophobia      [] Osmophobia  [] Blurred vision   [x] Prefer quiet, dark room  [x] Light-headed or dizzy     [] Tinnitus   [] Hands or feet tingle or feel numb/paresthesias       [] Red ear      [] Ptosis   [] Facial droop  [] Lacrimation  [] Nasal congestion/rhinorrhea   [] Flushing of face  [] Change in pupil size     Number of days missed per month because of headaches:  Work (or school) days: 3     Headache triggers:  Exercise, softball, standing up quickly, wakes with it     Have you seen someone else for headaches or pain?  Yes, [facility]/ [facility] [address]  Have you had trigger point injection performed and how often? No  Have you had Botox injection performed and how often? No   Have you had epidural injections or transforaminal injections performed? No  Are you current pregnant or planning on getting pregnant? No, IUD - now had surgery   Have you ever had any Brain imaging? yes     What medications do you take or have you taken for your headaches? ABORTIVE:    OTC medications have been ineffective   Ibuprofen -   - as of 9/12/19: 3 times day - 5 days a week, helps a little      Past Per chart  - pt does not remember  sumatriptan 25 mg - she does not remember   fioricet 6/2018  Methocarbamol      PREVENTIVE:   Nothing currently         Past per chart - pt does not remember  - propranolol 20 mg 9/2017  - topiramate 25 mg BID 9/2017  - acetazolamide 250 mg - 6/2018     Alternative therapies used in the past for headaches? no other headache interventions have been tried     Neck pain?: chronic     LIFESTYLE  Sleep   - averages: 7 hours  - does not think she snores  - wakes 2 times a night  Problems falling asleep?:   Yes  Problems staying asleep?:  Yes        Physical activity: softball in summer, gym 3 days a week      Water: 2 bottles  per day  Caffeine: none per day  Diet:  Do you ever skip meals?   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 [employer]  Education: 12th  Lives with  Mom, sister, daughter (3) Dionicio Read     Illicit Drugs: denies  Alcohol/tobacco: Denies tobacco use, alcohol intake: social drinker    Past Medical History: Past Medical History:   Diagnosis Date   • 31 weeks gestation of pregnancy 2021    GBS bacteruria- needs PCN intrapartum   • Chiari malformation type I ([facility] [address] )    • Chronic fatigue     last assessed 16   • COVID-19 2020   • Fever and chills 2020   • High risk pregnancy with high inhibin 3/29/2021   • Hyperemesis gravidarum 2021    IV fluid infusions ordered Phenergan ordered    • Hyperlipidemia     resolved 17   • Hypertension     pre-eclampsia 2016   • Idiopathic intracranial hypertension    • Intrauterine growth restriction affecting antepartum care of mother in third trimester 6/3/2021   • Pre-eclampsia     with severe features    • Varicella     pt unsure   • Visual impairment     glasses       Patient Active Problem List   Diagnosis   • IIH (idiopathic intracranial hypertension)   • Papilledema   • Questionable [facility]   • History of 2019 novel coronavirus disease (COVID-19)   • GBS bacteriuria   • Status post primary low transverse  section   • Encounter for sterilization   • Status post bilateral salpingectomy   • Diarrhea       Medications:      Current Outpatient Medications   Medication Sig Dispense Refill   • acetaminophen (TYLENOL) 325 mg tablet Take 650 mg by mouth every 6 (six) hours as needed     • Galcanezumab-gnlm 120 MG/ML SOAJ Inject 120 mg under the skin every 30 (thirty) days Following the first month loading dose of 2 pens  1 mL 11   • topiramate (TOPAMAX) 25 mg tablet Increase to 1 tab QAM and 2 tabs QHS for 1 week and finish at 2 tabs BID  120 tablet 4     No current facility-administered medications for this visit          Allergies:    No Known Allergies    Family History:     Family History   Problem Relation Age of Onset   • Leukemia Maternal Grandfather    • No Known Problems Mother    • Other Father         MVA   • No Known Problems Sister    • Hypertension Maternal Grandmother    • No Known Problems Daughter    • No Known Problems Sister        Social History:     Social History     Socioeconomic History   • Marital status: Single     Spouse name: Not on file   • Number of children: 1   • Years of education: 12   • Highest education level: High school graduate   Occupational History   • Not on file   Tobacco Use   • Smoking status: Never Smoker   • Smokeless tobacco: Never Used   Vaping Use   • Vaping Use: Never used   Substance and Sexual Activity   • Alcohol use: Not Currently   • Drug use: Never   • Sexual activity: Not Currently     Partners: Male     Birth control/protection: None, Condom Male, Female Sterilization   Other Topics Concern   • Not on file   Social History Narrative    Always uses seatbelt    High school student    Younger sister    Lives with mother (single parent)     Social Determinants of Health     Financial Resource Strain: Low Risk    • Difficulty of Paying Living Expenses: Not hard at all   Food Insecurity: No Food Insecurity   • Worried About Running Out of Food in the Last Year: Never true   • Ran Out of Food in the Last Year: Never true   Transportation Needs: No Transportation Needs   • Lack of Transportation (Medical): No   • Lack of Transportation (Non-Medical):  No   Physical Activity: Not on file   Stress: Not on file   Social Connections: Not on file   Intimate Partner Violence: Not on file   Housing Stability: Low Risk    • Unable to Pay for Housing in the Last Year: No   • Number of Places Lived in the Last Year: 1   • Unstable Housing in the Last Year: No         Objective:       Physical Exam:                                                                 Vitals:            Constitutional:    /80 (BP Location: Left arm, Patient Position: Sitting, Cuff Size: Standard)   Pulse 69   Temp 98 2 °F (36 8 °C) (Tympanic)   Wt 95 7 kg (211 lb)   BMI 32 08 kg/m²   BP Readings from Last 3 Encounters:   11/04/22 111/80   07/25/22 115/78   06/10/22 128/82     Pulse Readings from Last 3 Encounters: 11/04/22 69   07/25/22 64   06/10/22 76         Well developed, well nourished, well groomed  No dysmorphic features  HEENT:  Normocephalic atraumatic  See neuro exam   Chest:  Respirations appear regular and unlabored  Cardiovascular:  no observed significant swelling  Musculoskeletal:  (see below under neurologic exam for evaluation of motor function and gait)   Skin:  warm and dry, not diaphoretic  Psychiatric:  Normal behavior and appropriate affect        Neurological Examination:     Mental status/cognitive function:   Recent and remote memory intact  Attention span and concentration as well as fund of knowledge are appropriate for age  Normal language and spontaneous speech  Cranial Nerves:  VII-facial expression symmetric  Motor Exam: symmetric bulk throughout  no atrophy, fasciculations or abnormal movements noted     Coordination:  no apparent dysmetria, ataxia or tremor noted  Gait: steady casual gait            Pertinent lab results:   Please see EMR for recent labs   09/15/2021 CMP and CBC unremarkable   Per our system  09/29/2016:  CMP and CBC unremarkable   TSH 3 3     Imaging:   No head imaging in our system but she has had an MRI brain per report at outside hospital  -[facility] Neurology resident note:  - ED 10/06/2017 where she was evaluated by Neurology for headache, eye pain for months   An outside MRI of the brain was completed which demonstrated questionable protrusion of the left optic disc into posterior aspect of left orbit correlating with reported history of papilledema, transverse dural venous sinus stenosis is present with findings consistent with idiopathic intracranial hypertension   In the ED she had a lumbar puncture with opening pressure 55 cm H20 and closing pressure 22 5 cm H20 with relief of her symptoms after      -  lumbar puncture 01/27/2021 opening pressure 11   Please see EMR for details    Review of Systems:   ROS obtained by medical assistant Personally reviewed and updated if indicated  I recommended PCP follow up for non neurologic problems  Review of Systems   Constitutional: Negative for appetite change and fever  HENT: Negative  Negative for hearing loss, tinnitus, trouble swallowing and voice change  Eyes: Negative  Negative for photophobia and pain  Respiratory: Negative  Negative for shortness of breath  Cardiovascular: Negative  Negative for palpitations  Gastrointestinal: Negative  Negative for nausea and vomiting  Endocrine: Negative  Negative for cold intolerance  Genitourinary: Negative  Negative for dysuria, frequency and urgency  Musculoskeletal: Negative  Negative for myalgias and neck pain  Skin: Negative  Negative for rash  Neurological: Negative  Negative for dizziness, tremors, seizures, syncope, facial asymmetry, speech difficulty, weakness, light-headedness, numbness and headaches  Hematological: Negative  Does not bruise/bleed easily  Psychiatric/Behavioral: Negative  Negative for confusion, hallucinations and sleep disturbance  All other systems reviewed and are negative  I have spent 14 minutes with Patient and family today in which greater than 50% of this time was spent in counseling/coordination of care  I also spent 8 minutes non face to face for this patient the same day         Author:  Elizabet Addison 11/4/2022 10:13 AM

## 2022-12-16 ENCOUNTER — OFFICE VISIT (OUTPATIENT)
Dept: DENTISTRY | Facility: CLINIC | Age: 27
End: 2022-12-16

## 2022-12-16 VITALS — SYSTOLIC BLOOD PRESSURE: 110 MMHG | HEART RATE: 57 BPM | TEMPERATURE: 98.9 F | DIASTOLIC BLOOD PRESSURE: 76 MMHG

## 2022-12-16 DIAGNOSIS — Z01.21 ENCOUNTER FOR DENTAL EXAMINATION AND CLEANING WITH ABNORMAL FINDINGS: Primary | ICD-10-CM

## 2022-12-16 NOTE — PROGRESS NOTES
Prophy    Dental procedures in this visit   •  - PROPHYLAXIS - ADULT (Completed)     Service provider: Apple Devi [address], [facility]     Billing provider: Benson Murphy DDS   •  - PERIODIC ORAL EVALUATION - ESTABLISHED PATIENT (Completed)     Service provider: Roseann Beckwith DDS     Billing provider: Benson Murphy DDS      Completion details   •  - PROPHYLAXIS - ADULT (Completed)    See notes     •  - PERIODIC ORAL EVALUATION - ESTABLISHED PATIENT (Completed)    See notes     Reviewed medical history with patient from [address] [place] [address] Rd  ASA-II  Pts Pain: 0/10    Pts CC: Nothing at this time      Method Used:  · Prophy Method Used: Hand Scaling  · Polished  · Flossed    Radiographs Taken:  · None    Intra/Extra Oral Cancer Screening:  · Within normal limits      Oral Hygiene:  · Fair    Plaque:  · Generalized  · Moderate    Calculus:  · Localized  · Moderate  · Lower anteriors    Bleeding:  · Bleeding on probing: No periodontal exam for this visit  · Localized  · Moderate    Stain:  · Localized  · Moderate    Periodontal Charting:  · Spot probing    Periodontal Classification:  · Generalized  · Moderate  · Gingivitis      Nutritional Counseling:  · N/A    OHI: Stressed daily flossing as patient currently is not doing so  Nahed Rincon orders of the defined types were placed in this encounter            Periodic Exam:Dr Caraballo  -OCS/No findings  -Watch #21-O  -Watch #28-O  -Continue with [facility]

## 2022-12-22 ENCOUNTER — OFFICE VISIT (OUTPATIENT)
Dept: INTERNAL MEDICINE CLINIC | Facility: CLINIC | Age: 27
End: 2022-12-22

## 2022-12-22 VITALS
TEMPERATURE: 98 F | OXYGEN SATURATION: 98 % | SYSTOLIC BLOOD PRESSURE: 109 MMHG | BODY MASS INDEX: 29.25 KG/M2 | HEIGHT: 68 IN | WEIGHT: 193 LBS | DIASTOLIC BLOOD PRESSURE: 75 MMHG | HEART RATE: 62 BPM

## 2022-12-22 DIAGNOSIS — A04.8 H. PYLORI INFECTION: Primary | ICD-10-CM

## 2022-12-22 DIAGNOSIS — Z90.3 S/P GASTRIC SLEEVE PROCEDURE: ICD-10-CM

## 2022-12-22 RX ORDER — PANTOPRAZOLE SODIUM 40 MG/1
40 TABLET, DELAYED RELEASE ORAL 2 TIMES DAILY
Qty: 20 TABLET | Refills: 0 | Status: SHIPPED | OUTPATIENT
Start: 2022-12-22 | End: 2023-01-01

## 2022-12-22 NOTE — PROGRESS NOTES
[facility]  INTERNAL MEDICINE  [address] Louie Tajraysaamarjit [address] [place] [address]      NAME: Demetrice Fam  AGE: 33 y o  SEX: female    DATE OF ENCOUNTER: 12/22/2022    ASSESSMENT AND PLAN     1  H  pylori infection  Pathology report from laparoscopic gastric sleeve shows chronic active gastritis with H  pylori identified  No prior history of H  pylori  Currently asymptomatic  We will treat patient with quad therapy  We will check stool H  pylori antigen testing after completion of antibiotic therapy  - start bismuth-metronidazole-tetracycline (PYLERA) 140-125-125 MG per capsule; Take 3 capsules by mouth 4 (four) times a day (before meals and at bedtime) for 10 days  Dispense: 120 capsule; Refill: 0  - start pantoprazole (PROTONIX) 40 mg tablet; Take 1 tablet (40 mg total) by mouth 2 (two) times a day for 10 days  Dispense: 20 tablet; Refill: 0  - H  pylori antigen, stool; Future    2  S/P gastric sleeve procedure  Status post laparoscopic gastric sleeve on 9/16/2022  Approximately 41 pound weight loss thus far  Educated patient about maintaining diet and exercises to avoid rebound weight gain in the future  Return to clinic as scheduled in February 2023  Orders Placed This Encounter   Procedures   • H  pylori antigen, stool       CHIEF COMPLAINT     Chief Complaint   Patient presents with   • Follow-up     Gastric sleeve       HISTORY OF PRESENT ILLNESS     Demetrice Fam is a 31-year-old female with past medical history of obesity, migraines, mild SHARYN not on CPAP who presents to the office for follow-up  Patient had recent laparoscopic gastric sleeve surgery in 9/2022 at [facility]  Patient reports she weighed 234 lbs on day of surgery and today she weighs 193 lbs (41 pound weight loss)  Reports she has changed her diet significantly since surgery    Intraoperatively, patient was noted to have enlarged liver and therefore liver biopsy was performed which was unremarkable  Partial gastrectomy pathology also showed chronic active gastritis with H  pylori organism  Patient is currently asymptomatic and denies any symptoms of GERD or PUD  Denies any history of H  pylori  No recent antibiotic use  Denies any allergies to any antibiotics  Not currently pregnant  The following portions of the patient's history were reviewed and updated as appropriate: allergies, current medications, past family history, past medical history, past social history, past surgical history and problem list     REVIEW OF SYSTEMS     Review of Systems   Constitutional: Negative for chills and fever  HENT: Negative for congestion and sore throat  Eyes: Negative for pain and visual disturbance  Cardiovascular: Negative for chest pain and dyspnea on exertion  Respiratory: Negative for cough and shortness of breath  Skin: Negative for rash and suspicious lesions  Musculoskeletal: Negative for arthritis and back pain  Gastrointestinal: Negative for abdominal pain, constipation, diarrhea, nausea and vomiting  Genitourinary: Negative for dysuria and flank pain  Neurological: Negative for headaches and light-headedness  Psychiatric/Behavioral: Negative for altered mental status and depression           All other ROS negative except per HPI    ACTIVE PROBLEM LIST     Patient Active Problem List   Diagnosis   • IIH (idiopathic intracranial hypertension)   • Papilledema   • Questionable Chiari malformation type I ([address] )   • History of 2019 novel coronavirus disease (COVID-19)   • GBS bacteriuria   • Status post primary low transverse  section   • Encounter for sterilization   • Status post bilateral salpingectomy   • Diarrhea       Past Medical History:   Diagnosis Date   • 31 weeks gestation of pregnancy 2021    GBS bacteruria- needs PCN intrapartum   • Chiari malformation type I ([address] )    • Chronic fatigue     last assessed 16   • COVID-19 2020   • Fever and chills 2020   • High risk pregnancy with high inhibin 3/29/2021   • Hyperemesis gravidarum 2021    IV fluid infusions ordered Phenergan ordered    • Hyperlipidemia     resolved 17   • Hypertension     pre-eclampsia    • Idiopathic intracranial hypertension    • Intrauterine growth restriction affecting antepartum care of mother in third trimester 6/3/2021   • Pre-eclampsia     with severe features    • Varicella     pt unsure   • Visual impairment     glasses       CURRENT MEDICATIONS       Current Outpatient Medications:   •  acetaminophen (TYLENOL) 325 mg tablet, Take 650 mg by mouth every 6 (six) hours as needed, Disp: , Rfl:   •  bismuth-metronidazole-tetracycline (PYLERA) 140-125-125 MG per capsule, Take 3 capsules by mouth 4 (four) times a day (before meals and at bedtime) for 10 days, Disp: 120 capsule, Rfl: 0  •  Galcanezumab-gnlm 120 MG/ML SOAJ, Inject 120 mg under the skin every 30 (thirty) days Following the first month loading dose of 2 pens  , Disp: 1 mL, Rfl: 11  •  pantoprazole (PROTONIX) 40 mg tablet, Take 1 tablet (40 mg total) by mouth 2 (two) times a day for 10 days, Disp: 20 tablet, Rfl: 0  •  topiramate (TOPAMAX) 25 mg tablet, Increase to 1 tab QAM and 2 tabs QHS for 1 week and finish at 2 tabs BID  (Patient not taking: Reported on 2022), Disp: 120 tablet, Rfl: 4    No Known Allergies    Social History   Past Surgical History:   Procedure Laterality Date   •  SECTION     • CHOLECYSTECTOMY     • CHOLECYSTECTOMY LAPAROSCOPIC N/A 2016    Procedure: CHOLECYSTECTOMY LAPAROSCOPIC possible open;  Surgeon: Stephanie Varner MD;  [facility];  Service:    • NM  DELIVERY ONLY N/A 2021    Procedure:  SECTION ();   Surgeon: Bishop Murdock DO;  Location: [facility] ;  Service: Obstetrics   • NM LAP,RMV  ADNEXAL STRUCTURE Bilateral 2021    Procedure: SALPINGECTOMY, LAPAROSCOPIC;  Surgeon: Severa Mulligan, MD;  Location: [facility]; Service: Gynecology   • REDUCTION MAMMAPLASTY       Family History   Problem Relation Age of Onset   • Leukemia Maternal Grandfather    • No Known Problems Mother    • Other Father         MVA   • No Known Problems Sister    • Hypertension Maternal Grandmother    • No Known Problems Daughter    • No Known Problems Sister        OBJECTIVE     /75 (BP Location: Right arm, Patient Position: Sitting, Cuff Size: Large)   Pulse 62   Temp 98 °F (36 7 °C) (Temporal)   Ht 5' 8" (1 727 m)   Wt 87 5 kg (193 lb)   SpO2 98%   BMI 29 35 kg/m²     Physical Exam  Vitals reviewed  Constitutional:       General: She is not in acute distress  Appearance: Normal appearance  She is not ill-appearing  HENT:      Head: Normocephalic and atraumatic  Eyes:      General: No scleral icterus  Conjunctiva/sclera: Conjunctivae normal    Cardiovascular:      Rate and Rhythm: Normal rate and regular rhythm  Heart sounds: Normal heart sounds  No murmur heard  Pulmonary:      Effort: Pulmonary effort is normal  No respiratory distress  Breath sounds: Normal breath sounds  No wheezing or rales  Abdominal:      General: Bowel sounds are normal       Palpations: Abdomen is soft  Tenderness: There is no abdominal tenderness  Comments: Well-healed surgical scars  Musculoskeletal:      Right lower leg: No edema  Left lower leg: No edema  Skin:     General: Skin is warm and dry  Neurological:      Mental Status: She is alert and oriented to person, place, and time  Mental status is at baseline  Psychiatric:         Mood and Affect: Mood normal          Behavior: Behavior normal         Pertinent Laboratory/Diagnostic Studies:     No results found      Images and diagnostics reviewed     HEALTH MAINTENANCE     Health Maintenance   Topic Date Due   • Dental X-Ray: Full Mouth  Never done   • BMI: Followup Plan  02/21/2023   • Annual Physical  02/21/2023   • Depression Screening  03/31/2023   • HPV Vaccine (2 - 3-dose series) 2023 (Originally 2013)   • COVID-19 Vaccine (1) 2023 (Originally 1997)   • Influenza Vaccine (1) 2023 (Originally 2022)   • Dental X-Ray: Bitewings  2023   • Dental Periodic Exam  2023   • Dental Prophylaxis  2023   • BMI: Adult  2023   • Cervical Cancer Screening  2025   • DTaP,Tdap,and Td Vaccines (4 - Td or Tdap) 2031   • HIV Screening  Completed   • Hepatitis C Screening  Completed   • Hepatitis B Vaccine  Completed   • Pneumococcal Vaccine: Pediatrics (0 to 5 Years) and At-Risk Patients (6 to 59 Years)  Aged Out   • HIB Vaccine  Aged Out   • IPV Vaccine  Aged Out   • Hepatitis A Vaccine  Aged Out   • Meningococcal ACWY Vaccine  Aged Out   • Chlamydia Screening  Discontinued     Immunization History   Administered Date(s) Administered   • DTaP 5 2013   • HPV Quadrivalent 10/10/2013   • Hep B, adult 2013, 10/10/2013, 2016   • INFLUENZA 2016   • IPV 2013, 2016   • Influenza Quadrivalent Preservative Free 3 years and older IM 11/10/2015, 2016   • Influenza Quadrivalent, 6-35 Months IM 10/24/2016, 2017   • MMRV 2013, 10/10/2013   • Meningococcal, Unknown Serogroups 2013   • Tdap 2016, 2016, 2021     SURGICAL PATHOLOGY  Specimen:  Tissue - Tissue specimen from stomach (specimen), Tissue section (specimen) - Liver, wedge biopsy    Component 3 mo ago   Surgical Pathology  [facility]   [address] [place] [address]   [phone]       MR#:             [id number]   Patient:         BRENDA England   /Sex:         1995  F   Provider:       Carlos Wong   Location:        [facility] ()   Collect Date:    2022   [id number]     DIAGNOSIS  :   A  PORTION OF STOMACH, PARTIAL GASTRECTOMY:   Portion of stomach showing chronic active gastritis  H  pylori organisms are identified by H&E stain       B  LIVER, BIOPSY: Histologically unremarkable liver parenchyma  I globally spent 20 minutes face-to-face with the patient and with chart review       Radha Diana DO  Internal Medicine PGY-3

## 2023-03-24 ENCOUNTER — OFFICE VISIT (OUTPATIENT)
Dept: INTERNAL MEDICINE CLINIC | Facility: CLINIC | Age: 28
End: 2023-03-24

## 2023-03-24 VITALS
HEART RATE: 59 BPM | WEIGHT: 175 LBS | TEMPERATURE: 97.9 F | BODY MASS INDEX: 26.52 KG/M2 | HEIGHT: 68 IN | SYSTOLIC BLOOD PRESSURE: 105 MMHG | DIASTOLIC BLOOD PRESSURE: 72 MMHG

## 2023-03-24 DIAGNOSIS — L71.0 PERIORAL DERMATITIS: Primary | ICD-10-CM

## 2023-03-24 DIAGNOSIS — L71.0 PERIORAL DERMATITIS: ICD-10-CM

## 2023-03-24 RX ORDER — METRONIDAZOLE 10 MG/G
GEL TOPICAL DAILY
Qty: 45 G | Refills: 0 | Status: SHIPPED | OUTPATIENT
Start: 2023-03-24

## 2023-03-24 RX ORDER — PIMECROLIMUS 10 MG/G
CREAM TOPICAL 2 TIMES DAILY
Qty: 30 G | Refills: 0 | Status: SHIPPED | OUTPATIENT
Start: 2023-03-24

## 2023-03-24 NOTE — TELEPHONE ENCOUNTER
Ordered as 1% gel instead of cream  I will be on vacation next week so please reach out to who is covering if this issue persists into Monday (eg, going through 2-3 more scripts)       Thank you,  Ciro Colorado MD   PGY-2 Internal Medicine  [facility] reviewed no history of anesthetic complications:   Airway: Mallampati: I  TM distance: >3 FB   Neck ROM: full  Mouth opening: > = 3 FB Dental: normal exam         Pulmonary:   (+) asthma (last episode Jan. 2019, no issues since):     (-) not a current smoker                           Cardiovascular:Negative CV ROS  Exercise tolerance: good (>4 METS),           Rhythm: regular  Rate: normal           Beta Blocker:  Not on Beta Blocker         Neuro/Psych:   (+) seizures (childhood seizures, does not take any meds for this): well controlled,             GI/Hepatic/Renal: Neg GI/Hepatic/Renal ROS            Endo/Other: Negative Endo/Other ROS                    Abdominal:           Vascular: negative vascular ROS. Anesthesia Plan      general and TIVA     ASA 2       Induction: intravenous. Anesthetic plan and risks discussed with patient. Plan discussed with CRNA.                   TANA Ramsey - CRNA   9/4/2020

## 2023-03-24 NOTE — PROGRESS NOTES
[address] Visit Note  aBsil Starks 33 y o  female   MRN: [id number]    Assessment and Plan    Diagnoses and all orders for this visit:    Perioral dermatitis  Ddx angular cheilitis vs  eczematous cheilitis  Appears as a plaque appx 1 5 in diameter bilaterally  No granulomatous appearance to suggest an etiology such as sarcoid   No groin involvement to suggest autoimmune (eg Bechet) vs  STI  If fails to responds to below, can consider coverage for candida and staph aureus for angular cheilitis or low-mid potency topical CS for eczematous cheilitis  -     metroNIDAZOLE (NORITRATE) 1 % cream; Apply topically daily ---> NOT covered by insurance  Re-ordered as 1% gel  Advised to avoid placing any other lotions or creams or balm on the lips/face  Recommended against hydrogen peroxide that patient had tried 2 weeks ago      Health Maintenance/Care gaps addressed today:  PHQ-2/9 Depression Screening    Little interest or pleasure in doing things: 0 - not at all  Feeling down, depressed, or hopeless: 0 - not at all  PHQ-2 Score: 0  PHQ-2 Interpretation: Negative depression screen      The ASCVD Risk score (Heena MOREIRA, et al , 2019) failed to calculate for the following reasons:     The 2019 ASCVD risk score is only valid for ages 36 to 78  Lab Results   Component Value Date    HGBA1C 5 4 07/22/2022      Lab Results   Component Value Date    HEPCAB Non-reactive 06/23/2022      Most Recent Immunizations   Administered Date(s) Administered   • DTaP 5 08/05/2013   • HPV Quadrivalent 10/10/2013   • Hep B, adult 03/29/2016   • INFLUENZA 09/26/2016   • IPV 03/29/2016   • Influenza Quadrivalent Preservative Free 3 years and older IM 09/27/2016   • Influenza Quadrivalent, 6-35 Months IM 09/29/2017   • MMRV 10/10/2013   • Meningococcal, Unknown Serogroups 08/05/2013   • Tdap 06/04/2021    Not on file 07/22/2022 No components found for: HIV1X2            Follow up in our clinic in 6 weeks to f/u perioral dermatitis (ensure resolution)  Subjective   CHIEF COMPLAINT:   Chief Complaint   Patient presents with   • Follow-up     Chapped lips started in january      HISTORY OF PRESENT ILLNESS:  Lazarus Confer is a 33 y o  yo female with pmhx of idiopathic intracranial HTN presenting to clinic today for chapped lips x 3 months  Symptoms first started 3 months ago  Wakes up in AM and corners of mouth feel sharp/dry  Severity/quality is describes as a "paper cut "   Timing is constant  Never went away  Overall it is the same  There is no rash on other parts of the body  It is improved with nothing  No change with aquaphor and cerave creams  No change with using a humidifier at home  It is worsened by menthol-chapstick  Denies any joint swelling or stiffness  Denies any rashes elsewhere on the body  Per patient, interior of mouth feels moist  Does not need to suck on candy or drink extra water to increase secretions  Sexually active last in 12/2022, but patient states partner does not have any history of STIs  Is not aware of any herpetic infection or transmission  Review of Systems   Constitutional: Negative for chills, fatigue, fever and unexpected weight change  Respiratory: Negative for shortness of breath  Cardiovascular: Negative for chest pain and palpitations  Gastrointestinal: Negative for constipation, diarrhea, nausea and vomiting  Skin: Positive for color change (at corner of lips but nowhere else)  Negative for rash  Neurological: Negative for weakness and numbness  Objective     Vitals:    03/24/23 0914   BP: 105/72   BP Location: Right arm   Patient Position: Sitting   Cuff Size: Large   Pulse: 59   Temp: 97 9 °F (36 6 °C)   TempSrc: Temporal   Weight: 79 4 kg (175 lb)   Height: 5' 8" (1 727 m)     Physical Exam  Vitals reviewed  Constitutional:       General: She is not in acute distress    HENT:      Head: Normocephalic and atraumatic  Mouth/Throat:      Mouth: Mucous membranes are moist       Pharynx: Oropharynx is clear  Comments: Minor buccal lacerations along bilateral 2nd molars and well-healing  Good dentition  No oral ulcerations or other compromise of oral mucosa  No plaques or changes in pigmentation  Tongue without plaques or discoloration  Eyes:      General: No scleral icterus  Extraocular Movements: Extraocular movements intact  Cardiovascular:      Rate and Rhythm: Normal rate and regular rhythm  Heart sounds: No murmur heard  No friction rub  No gallop  Pulmonary:      Effort: Pulmonary effort is normal  No respiratory distress  Breath sounds: No stridor  No wheezing or rales  Abdominal:      General: Bowel sounds are normal  There is no distension  Palpations: There is no mass  Tenderness: There is no abdominal tenderness  There is no guarding  Musculoskeletal:         General: Normal range of motion  Skin:     General: Skin is warm and dry  Findings: No rash  Neurological:      Mental Status: She is alert and oriented to person, place, and time  Sensory: No sensory deficit  Psychiatric:         Mood and Affect: Mood normal          Behavior: Behavior normal          Thought Content: Thought content normal            History     Current Outpatient Medications:   •  acetaminophen (TYLENOL) 325 mg tablet, Take 650 mg by mouth every 6 (six) hours as needed, Disp: , Rfl:   •  bismuth-metronidazole-tetracycline (PYLERA) 140-125-125 MG per capsule, Take 3 capsules by mouth 4 (four) times a day (before meals and at bedtime) for 10 days (Patient not taking: Reported on 3/24/2023), Disp: 120 capsule, Rfl: 0  •  Galcanezumab-gnlm 120 MG/ML SOAJ, Inject 120 mg under the skin every 30 (thirty) days Following the first month loading dose of 2 pens   (Patient not taking: Reported on 3/24/2023), Disp: 1 mL, Rfl: 11  •  pantoprazole (PROTONIX) 40 mg tablet, Take 1 tablet (40 mg total) by mouth 2 (two) times a day for 10 days (Patient not taking: Reported on 3/24/2023), Disp: 20 tablet, Rfl: 0  •  topiramate (TOPAMAX) 25 mg tablet, Increase to 1 tab QAM and 2 tabs QHS for 1 week and finish at 2 tabs BID  (Patient not taking: Reported on 2022), Disp: 120 tablet, Rfl: 4  Past Medical History:   Diagnosis Date   • 31 weeks gestation of pregnancy 2021    GBS bacteruria- needs PCN intrapartum   • Chiari malformation type I ([address] )    • Chronic fatigue     last assessed 16   • COVID-19 2020   • Fever and chills 2020   • High risk pregnancy with high inhibin 3/29/2021   • Hyperemesis gravidarum 2021    IV fluid infusions ordered Phenergan ordered    • Hyperlipidemia     resolved 17   • Hypertension     pre-eclampsia    • Idiopathic intracranial hypertension    • Intrauterine growth restriction affecting antepartum care of mother in third trimester 6/3/2021   • Pre-eclampsia     with severe features    • Varicella     pt unsure   • Visual impairment     glasses     Past Surgical History:   Procedure Laterality Date   •  SECTION     • CHOLECYSTECTOMY     • CHOLECYSTECTOMY LAPAROSCOPIC N/A 2016    Procedure: CHOLECYSTECTOMY LAPAROSCOPIC possible open;  Surgeon: Eric Sanchez MD;  [facility];  Service:    • CT  DELIVERY ONLY N/A 2021    Procedure:  SECTION ();   Surgeon: Joann Ortiz DO;  Location: [facility] ;  Service: Obstetrics   • CT LAPAROSCOPY W/RMVL ADNEXAL STRUCTURES Bilateral 2021    Procedure: SALPINGECTOMY, LAPAROSCOPIC;  Surgeon: Mercedes Porter MD;  [facility];  Service: Gynecology   • REDUCTION MAMMAPLASTY       Social History     Socioeconomic History   • Marital status: Single     Spouse name: Not on file   • Number of children: 1   • Years of education: 15   • Highest education level: High school graduate   Occupational History   • Not on file   Tobacco Use   • Smoking status: Never   • Smokeless tobacco: Never   Vaping Use   • Vaping Use: Never used   Substance and Sexual Activity   • Alcohol use: Yes     Comment: socially   • Drug use: Never   • Sexual activity: Not Currently     Partners: Male     Birth control/protection: None, Condom Male, Female Sterilization   Other Topics Concern   • Not on file   Social History Narrative    Always uses seatbelt    High school student    Younger sister    Lives with mother (single parent)     Social Determinants of Health     Financial Resource Strain: Low Risk    • Difficulty of Paying Living Expenses: Not hard at all   Food Insecurity: No Food Insecurity   • Worried About Running Out of Food in the Last Year: Never true   • Ran Out of Food in the Last Year: Never true   Transportation Needs: No Transportation Needs   • Lack of Transportation (Medical): No   • Lack of Transportation (Non-Medical): No   Physical Activity: Not on file   Stress: Not on file   Social Connections: Not on file   Intimate Partner Violence: Not on file   Housing Stability: Not on file     Family History   Problem Relation Age of Onset   • Leukemia Maternal Grandfather    • No Known Problems Mother    • Other Father         MVA   • No Known Problems Sister    • Hypertension Maternal Grandmother    • No Known Problems Daughter    • No Known Problems Sister          Zeyad Roe MD  [facility] Internal Medicine PGY-2  [facility] [address] [facility]      PLEASE NOTE:  This encounter was completed utilizing the [employer] Direct Speech Voice Recognition Software  Grammatical errors, random word insertions, pronoun errors and incomplete sentences are occasional consequences of the system due to software limitations, ambient noise and hardware issues  These may be missed by proof reading prior to affixing electronic signature   Any questions or concerns about the content, text or information contained within the body of this dictation should be directly addressed to the physician for clarification  Please do not hesitate to call me directly if you have any any questions or concerns

## 2023-03-24 NOTE — TELEPHONE ENCOUNTER
I ordered pimecrolimus as well in the event that metronidazole GEL is not covered  Please remind patient that she is only to use ONE cream or gel until we see her back / skin issue resolves      Thanks,  Zeyad Roe MD   PGY-2 Internal Medicine  [facility]

## 2023-03-29 ENCOUNTER — OFFICE VISIT (OUTPATIENT)
Dept: INTERNAL MEDICINE CLINIC | Facility: CLINIC | Age: 28
End: 2023-03-29

## 2023-03-29 VITALS
BODY MASS INDEX: 26.24 KG/M2 | TEMPERATURE: 97.8 F | HEART RATE: 91 BPM | SYSTOLIC BLOOD PRESSURE: 108 MMHG | DIASTOLIC BLOOD PRESSURE: 75 MMHG | WEIGHT: 172.6 LBS | OXYGEN SATURATION: 99 %

## 2023-03-29 DIAGNOSIS — A08.4 VIRAL GASTROENTERITIS: Primary | ICD-10-CM

## 2023-03-29 RX ORDER — LOPERAMIDE HYDROCHLORIDE 2 MG/1
2 TABLET ORAL 4 TIMES DAILY PRN
Qty: 30 TABLET | Refills: 0 | Status: SHIPPED | OUTPATIENT
Start: 2023-03-29

## 2023-03-29 NOTE — LETTER
March 29, 2023     Patient: Alyssia Ca  YOB: 1995  Date of Visit: 3/29/2023      To Whom it May Concern:    Alyssia Ca is under my professional care  Llana Trail was seen in my office on 3/29/2023  Llana Trail may return to work on 4/3/23       If you have any questions or concerns, please don't hesitate to call           Sincerely,          James Abarca DO        CC: No Recipients

## 2023-03-29 NOTE — PROGRESS NOTES
[facility]  INTERNAL MEDICINE OFFICE VISIT     PATIENT INFORMATION     Mitra Don   33 y o  female   MRN: [id number]    ASSESSMENT/PLAN     Problem List Items Addressed This Visit    None  Visit Diagnoses     Viral gastroenteritis    -  Primary  Sudden onset nonmelanotic, nonbloody, watery BMs this morning  Had 10 BMs  Associated with bloated feeling, nausea, decreased PO intake, dizziness  Notes coworker with similar symptoms  No recent travel or PO antibiotics, fever, vomiting, URI, or urinary symptoms  Abdomen soft and nontender  · C  Diff ordered given multiple watery bowel movements - unlikely C  Diff given no recent PO Abx  · Enteric panel ordered  · Symptom control with loperimide  · Encouraged patient to stay hydrated and eat bland foods like rice and avoid spicy foods  · Advised patient to wash her hands frequently and clean commonly touched surfaces like sink handles and doorknobs to prevent daughters from becoming sick  · If symptoms worsen/do not improve, develops fever or vomiting, she will call the office for further evaluation    Relevant Medications    loperamide (IMODIUM A-D) 2 MG tablet    Other Relevant Orders    Stool Enteric Bacterial Panel by PCR    Clostridium difficile toxin by PCR        Schedule a follow-up appointment in PRN or at next appointment in June 2023      HEALTH MAINTENANCE     Immunization History   Administered Date(s) Administered   • DTaP 5 08/05/2013   • HPV Quadrivalent 10/10/2013   • Hep B, adult 08/05/2013, 10/10/2013, 03/29/2016   • INFLUENZA 09/26/2016   • IPV 08/05/2013, 03/29/2016   • Influenza Quadrivalent Preservative Free 3 years and older IM 11/10/2015, 09/27/2016   • Influenza Quadrivalent, 6-35 Months IM 10/24/2016, 09/29/2017   • MMRV 08/05/2013, 10/10/2013   • Meningococcal, Unknown Serogroups 08/05/2013   • Tdap 03/23/2016, 03/23/2016, 06/04/2021     CHIEF COMPLAINT     Chief Complaint   Patient presents with   • Dizziness     Started today 3/29/2023   • Diarrhea   • Weakness - Generalized   • Chills      HISTORY OF PRESENT ILLNESS     Henna Joseph is a 33 y o  female with a history of bilateral salpingectomy, laproscopic sleeve in 9/2022, H  Pylori infection s/p quadruple therapy in late December 2022, migraine here for same day appointment for diarrhea  Started this morning  Had 10 nonbloody, nonmelanotic BMs  Associated with dizziness, fatigue, chills  Feels bloated and nauseous  Took 2 Tylenol to help with headache with provided some relief  Drank Gatorade and ate 2 crackers today  Had watery diarrhea that alternated with formed BM in December that lasted 4-5 days and was otherwise asymptomatic  Notes work friend also diarrhea and dizziness  Works at [employer]  Not vaccinated against Covid  No recent travel or antibiotic use  No OTC meds besides Tylenol  [address] [place] [address]  REVIEW OF SYSTEMS     Review of Systems   Constitutional: Positive for appetite change and chills  Negative for diaphoresis and fever  HENT: Negative for congestion, ear pain, rhinorrhea and sore throat  Eyes: Negative for visual disturbance  Respiratory: Negative for cough and shortness of breath  Cardiovascular: Negative for chest pain and leg swelling  Gastrointestinal: Positive for diarrhea and nausea  Negative for abdominal pain, blood in stool, constipation and vomiting  Bloated feeling   Genitourinary: Negative for difficulty urinating, dysuria and hematuria  Musculoskeletal: Negative for back pain  Skin: Negative for rash  Neurological: Positive for dizziness and headaches  Negative for weakness, light-headedness and numbness  OBJECTIVE     Vitals:    03/29/23 1136   BP: 108/75   BP Location: Right arm   Patient Position: Sitting   Cuff Size: Large   Pulse: 91   Temp: 97 8 °F (36 6 °C)   TempSrc: Temporal   SpO2: 99%   Weight: 78 3 kg (172 lb 9 6 oz)     Physical Exam  Vitals reviewed  Constitutional:       General: She is not in acute distress  HENT:      Head: Normocephalic and atraumatic  Nose: No rhinorrhea  Eyes:      General: No scleral icterus  Cardiovascular:      Rate and Rhythm: Normal rate and regular rhythm  Pulses: Normal pulses  Heart sounds: Normal heart sounds  No murmur heard  No friction rub  No gallop  Pulmonary:      Effort: Pulmonary effort is normal  No respiratory distress  Breath sounds: Normal breath sounds  No wheezing, rhonchi or rales  Abdominal:      General: Bowel sounds are normal  There is no distension  Palpations: Abdomen is soft  There is no mass  Tenderness: There is no abdominal tenderness  There is no right CVA tenderness, left CVA tenderness, guarding or rebound  Negative signs include Graham's sign  Hernia: No hernia is present  Comments: Surgical scars   Musculoskeletal:      Right lower leg: No edema  Left lower leg: No edema  Skin:     General: Skin is warm and dry  Capillary Refill: Capillary refill takes less than 2 seconds  Coloration: Skin is not jaundiced  Neurological:      Mental Status: She is alert  Cranial Nerves: No dysarthria  Comments: CN 2-12 grossly intact, moves all 4 extremities   Psychiatric:         Mood and Affect: Mood normal          Behavior: Behavior normal        CURRENT MEDICATIONS     Current Outpatient Medications:   •  acetaminophen (TYLENOL) 325 mg tablet, Take 650 mg by mouth every 6 (six) hours as needed, Disp: , Rfl:   •  pimecrolimus (ELIDEL) 1 % cream, Apply topically 2 (two) times a day, Disp: 30 g, Rfl: 0  •  Galcanezumab-gnlm 120 MG/ML SOAJ, Inject 120 mg under the skin every 30 (thirty) days Following the first month loading dose of 2 pens   (Patient not taking: Reported on 3/24/2023), Disp: 1 mL, Rfl: 11  •  metroNIDAZOLE (METROGEL) 1 % gel, Apply topically daily (Patient not taking: Reported on 3/29/2023), Disp: 45 g, Rfl: 0    PAST MEDICAL & SURGICAL HISTORY     Past Medical History:   Diagnosis Date   • 31 weeks gestation of pregnancy 2021    GBS bacteruria- needs PCN intrapartum   • Chiari malformation type I ([facility] [address] )    • Chronic fatigue     last assessed 16   • COVID-19 2020   • Fever and chills 2020   • High risk pregnancy with high inhibin 3/29/2021   • Hyperemesis gravidarum 2021    IV fluid infusions ordered Phenergan ordered    • Hyperlipidemia     resolved 17   • Hypertension     pre-eclampsia    • Idiopathic intracranial hypertension    • Intrauterine growth restriction affecting antepartum care of mother in third trimester 6/3/2021   • Pre-eclampsia     with severe features    • Varicella     pt unsure   • Visual impairment     glasses     Past Surgical History:   Procedure Laterality Date   •  SECTION     • CHOLECYSTECTOMY     • CHOLECYSTECTOMY LAPAROSCOPIC N/A 2016    Procedure: CHOLECYSTECTOMY LAPAROSCOPIC possible open;  Surgeon: Quita Gonzales MD;  [facility];  Service:    • TN  DELIVERY ONLY N/A 2021    Procedure:  SECTION ();   Surgeon: Sharon Dumont DO;  Location: [facility] ;  Service: Obstetrics   • TN LAPAROSCOPY W/RMVL ADNEXAL STRUCTURES Bilateral 2021    Procedure: SALPINGECTOMY, LAPAROSCOPIC;  Surgeon: Margy Kirk MD;  [facility];  Service: Gynecology   • REDUCTION MAMMAPLASTY       SOCIAL & FAMILY HISTORY     Social History     Socioeconomic History   • Marital status: Single     Spouse name: Not on file   • Number of children: 1   • Years of education: 15   • Highest education level: High school graduate   Occupational History   • Not on file   Tobacco Use   • Smoking status: Never   • Smokeless tobacco: Never   Vaping Use   • Vaping Use: Never used   Substance and Sexual Activity   • Alcohol use: Yes     Comment: socially   • Drug use: Never   • Sexual activity: Not Currently     Partners: Male     Birth control/protection: None, Condom Male, Female Sterilization   Other Topics Concern   • Not on file   Social History Narrative    Always uses seatbelt    High school student    Younger sister    Lives with mother (single parent)     Social Determinants of Health     Financial Resource Strain: Low Risk    • Difficulty of Paying Living Expenses: Not hard at all   Food Insecurity: No Food Insecurity   • Worried About Running Out of Food in the Last Year: Never true   • Ran Out of Food in the Last Year: Never true   Transportation Needs: No Transportation Needs   • Lack of Transportation (Medical): No   • Lack of Transportation (Non-Medical): No   Physical Activity: Not on file   Stress: Not on file   Social Connections: Not on file   Intimate Partner Violence: Not on file   Housing Stability: Not on file     Social History     Substance and Sexual Activity   Alcohol Use Yes    Comment: socially     Social History     Substance and Sexual Activity   Drug Use Never     Social History     Tobacco Use   Smoking Status Never   Smokeless Tobacco Never     Family History   Problem Relation Age of Onset   • Leukemia Maternal Grandfather    • No Known Problems Mother    • Other Father         MVA   • No Known Problems Sister    • Hypertension Maternal Grandmother    • No Known Problems Daughter    • No Known Problems Sister          ==  Shara Gottlieb,   Internal Medicine Resident, PGY-2  [address] Internal [facility] [address]   [facility] , [address] [address] [facility] [address] [facility]  Office: [phone]  Fax: [phone]

## 2023-03-30 ENCOUNTER — APPOINTMENT (OUTPATIENT)
Dept: LAB | Facility: CLINIC | Age: 28
End: 2023-03-30

## 2023-03-30 DIAGNOSIS — A08.4 VIRAL GASTROENTERITIS: ICD-10-CM

## 2023-03-31 LAB
C DIFF TOX GENS STL QL NAA+PROBE: NEGATIVE
CAMPYLOBACTER DNA SPEC NAA+PROBE: NORMAL
SALMONELLA DNA SPEC QL NAA+PROBE: NORMAL
SHIGA TOXIN STX GENE SPEC NAA+PROBE: NORMAL
SHIGELLA DNA SPEC QL NAA+PROBE: NORMAL

## 2023-04-02 PROBLEM — Z30.2 ENCOUNTER FOR STERILIZATION: Chronic | Status: RESOLVED | Noted: 2021-09-13 | Resolved: 2023-04-02

## 2023-04-02 PROBLEM — R82.71 GBS BACTERIURIA: Status: RESOLVED | Noted: 2021-01-18 | Resolved: 2023-04-02

## 2023-04-02 PROBLEM — Z86.16 HISTORY OF 2019 NOVEL CORONAVIRUS DISEASE (COVID-19): Status: RESOLVED | Noted: 2020-12-08 | Resolved: 2023-04-02

## 2023-04-03 ENCOUNTER — ANNUAL EXAM (OUTPATIENT)
Dept: OBGYN CLINIC | Facility: CLINIC | Age: 28
End: 2023-04-03

## 2023-04-03 VITALS
BODY MASS INDEX: 25.82 KG/M2 | DIASTOLIC BLOOD PRESSURE: 62 MMHG | WEIGHT: 170.4 LBS | SYSTOLIC BLOOD PRESSURE: 108 MMHG | HEART RATE: 77 BPM | HEIGHT: 68 IN

## 2023-04-03 DIAGNOSIS — Z72.51 HIGH RISK HETEROSEXUAL BEHAVIOR: Primary | ICD-10-CM

## 2023-04-03 DIAGNOSIS — Z01.419 WELL WOMAN EXAM WITH ROUTINE GYNECOLOGICAL EXAM: ICD-10-CM

## 2023-04-03 NOTE — PROGRESS NOTES
"OB/GYN  ANNUAL VISIT  Jasmine Rivers  4/3/2023  9:07 AM  Dr Darlene Álvarez MD     Subjective      Jasmine Rivers is a 33 y o  female who presents for annual well woman exam  Periods are regular every 28-30 days, lasting 4 days  No intermenstrual bleeding, spotting, or discharge  GYN:  · Denies vaginal discharge, labial erythema or lesions, dyspareunia  · Menarche at 6yp  · Menses are regular, q 28 days, lasting 4 days  · Contraception: BTL  · Patient is  sexually active with one partner  · Last Pap smear in 3/31/22, negative , next in   · Gynecologic surgeries : C/S x1 and BTL     OB:  · [id number] female  · Pregnancies were complicated by  delivery due to severe Pree  :  · Denies dysuria, urinary frequency or urgency  · Denies hematuria, flank pain, incontinence  Breast:  · Denies breast mass, skin changes, dimpling, reddening, nipple retraction  · Denies breast discharge  · Patient does not have a family history of breast, endometrial, or ovarian ca  General:  · Diet: avoid carbohydrates  · Exercise: no  · Work: yes  · ETOH use: no  · Tobacco use: no  · Recreational drug use: no    Screening:  · Cervical cancer:  Last Pap smear in 3/31/22, negative , next in   · STD screening: patient is interested in HPV vaccination series    Review of Systems  Pertinent items are noted in HPI  Objective      /62   Pulse 77   Ht 5' 8\" (1 727 m)   Wt 77 3 kg (170 lb 6 4 oz)   LMP 2023   BMI 25 91 kg/m²     Physical Exam  Cardio-pulmonar  Normal vesicular murmur, no rales,  nonlabored breathing , normal S1/S2 no cardiac murmurs , no gallops   Abdomen  Soft , no tender, no signs or peritoneal irritation   Extremities  mobiles no edemas    Pelvic examination  Normal appearing external genitals, no skin lesions in vulvar area, anteverted  Uterus  No pain with mobilization  No evidence of vaginal discharge or bleeding  No masses       Breast Examination  No breast " masses, no nipple discharge or retraction, no skin changes, no axillar adenopathies  Assessment/Plan     Marcie Cornell is a 33 y o  female who presents for annual well woman exam  - In this visit she states desire for STD, screening, we ordered  - Last Pap smear in 3/31/22, negative , next in 2025  - Anticonceptive method: BTL   - Breast examination WNL  - Reviewed topics of safe sex practices, depression, and domestic violence  - Reviewed screening guidelines for pap smears  Pap cytology are every 3 years and Pap/HPV cotesting is due very 5 years  - Emphasized importance of annual pelvic and breast exams  Family history reviewed regarding genetically inherited cancers  - All questions have been answered to her satisfaction       Patient d/ with Dr Lucia Sagastume MD

## 2023-04-05 LAB
C TRACH DNA SPEC QL NAA+PROBE: NEGATIVE
N GONORRHOEA DNA SPEC QL NAA+PROBE: NEGATIVE

## 2023-04-20 ENCOUNTER — TELEPHONE (OUTPATIENT)
Dept: INTERNAL MEDICINE CLINIC | Facility: CLINIC | Age: 28
End: 2023-04-20

## 2023-04-20 NOTE — TELEPHONE ENCOUNTER
Prior auth started for patients metronidazole 1% gel through covermymeds   Will f/u in 2 business days    KEY: [id number]

## 2023-04-25 NOTE — TELEPHONE ENCOUNTER
PA was denied b/c medication does not take part in the medicaid drug rebate program  Please advise, no alternatives listed

## 2023-04-27 NOTE — TELEPHONE ENCOUNTER
Called and spoke to patient, patient made aware as per result notes  Patient understood and had no questions at this time      Just an FYI- patient was able to pick Pimecrolimus cream and has been using it with no problems

## 2023-05-04 ENCOUNTER — TELEPHONE (OUTPATIENT)
Dept: NEUROLOGY | Facility: CLINIC | Age: 28
End: 2023-05-04

## 2023-05-04 NOTE — TELEPHONE ENCOUNTER
Pt called to r/s her 5/11 appt  Next available on a Monday or Tuesday way 9/19 @ 9:30 in CV with Dr Bernardino Jones

## 2023-07-12 ENCOUNTER — TELEPHONE (OUTPATIENT)
Dept: NEUROLOGY | Facility: CLINIC | Age: 28
End: 2023-07-12

## 2023-07-14 NOTE — TELEPHONE ENCOUNTER
[facility]  ID: [id number]      Key: [facility]    PA submitted, awaiting determination.

## 2023-07-18 NOTE — TELEPHONE ENCOUNTER
PA has been approved through 7/14/24    Called [facility], spoke to [facility] and made aware of the approval.

## 2023-08-22 ENCOUNTER — OFFICE VISIT (OUTPATIENT)
Dept: INTERNAL MEDICINE CLINIC | Facility: CLINIC | Age: 28
End: 2023-08-22

## 2023-08-22 ENCOUNTER — TELEPHONE (OUTPATIENT)
Dept: INTERNAL MEDICINE CLINIC | Facility: CLINIC | Age: 28
End: 2023-08-22

## 2023-08-22 VITALS
TEMPERATURE: 98 F | HEART RATE: 60 BPM | BODY MASS INDEX: 23.42 KG/M2 | OXYGEN SATURATION: 98 % | DIASTOLIC BLOOD PRESSURE: 72 MMHG | SYSTOLIC BLOOD PRESSURE: 109 MMHG | WEIGHT: 154 LBS

## 2023-08-22 DIAGNOSIS — Z00.00 ANNUAL PHYSICAL EXAM: Primary | ICD-10-CM

## 2023-08-22 DIAGNOSIS — K13.0 CHEILOSIS: ICD-10-CM

## 2023-08-22 PROCEDURE — 99395 PREV VISIT EST AGE 18-39: CPT | Performed by: INTERNAL MEDICINE

## 2023-08-22 NOTE — PROGRESS NOTES
[address] [facility] [place]    NAME: Ang Zamudio  AGE: 33 y.o. SEX: female  : 1995     DATE: 2023     Assessment and Plan:     Problem List Items Addressed This Visit    None  Visit Diagnoses     Annual physical exam    -  Primary    Cheilosis        Relevant Orders    Iron Panel (Includes Ferritin, Iron Sat%, Iron, and TIBC)        #Cheilosis  #Perioral dermatitis  -Patient states the problem started in January and she was seen in the office in 2023 when she was diagnosed with perioral dermatitis. She was originally prescribed Metronidazole gel, but was switched to Elidel due to insurance coverage.   -Patient has been using the Elidel twice daily since March and states she hasn't seen improvement.   -The severity fluctuates every few days and she endorses a burning pain. Changes not in relation to menstrual cycle. Additionally, she has "cracks in the corners of her mouth" which makes it difficult to open her mouth and has pain when eating.   -On exam today, cheilosis present bilaterally with some perioral dermatitis surrounding mouth     Plan:  -Discontinue use of Elidel cream   -Continue taking Centrum daily multivitamin   -Start taking a B complex vitamin  -Avoid using Cerave ointment on lips at night to avoid over moisturizing  -Can consider using Beeswax based chapstick  -Will check iron panel to ensure no iron deficiencies     Immunizations and preventive care screenings were discussed with patient today. Appropriate education was printed on patient's after visit summary. Counseling:  Alcohol/drug use: discussed moderation in alcohol intake, the recommendations for healthy alcohol use, and avoidance of illicit drug use. Dental Health: discussed importance of regular tooth brushing, flossing, and dental visits.   Injury prevention: discussed safety/seat belts, safety helmets, smoke detectors, carbon dioxide detectors, and smoking near bedding or upholstery. Sexual health: discussed sexually transmitted diseases, partner selection, use of condoms, avoidance of unintended pregnancy, and contraceptive alternatives. · Exercise: the importance of regular exercise/physical activity was discussed. Recommend exercise 3-5 times per week for at least 30 minutes. Return in about 1 year (around 8/22/2024), or or if symptoms worsen or fail to improve, for Annual physical.     Chief Complaint:     Chief Complaint   Patient presents with   • Physical Exam   • Follow-up     Dermatitis , no improvement      History of Present Illness:     Adult Annual Physical  Ms. Km Brito is a 27-year-old female with no significant PMHx who presents today for a comprehensive physical exam and follow-up on perioral dermatitis. Patient states the problem started in January and she was seen in the office in March 2023 when she was diagnosed with perioral dermatitis. She was originally prescribed Metronidazole gel, but was switched to Elidel due to insurance coverage. Patient has been using the Elidel twice daily since March and states she hasn't seen improvement. The severity fluctuates every few days and she endorses a burning pain. Additionally, she has "cracks in the corners of her mouth" which makes it difficult to open her mouth and has pain when eating. She denies any other symptoms or rashes. She denies excessive drooling or noticeable saliva production. Patient endorses taking Centrum multivitamin daily and drinks OJ daily. She uses chapstick and Cerave ointment at night on her lips. Otherwise, patient has been doing well and has no additional complaints. Denies tobacco and drug use and endorses drinking socially. She currently lives at home with her 2 daughters. Diet and Physical Activity  · Diet/Nutrition: well balanced diet, limited junk food and consuming 3-5 servings of fruits/vegetables daily.    · Exercise: walking, moderate cardiovascular exercise and 3-4 times a week on average. Depression Screening  PHQ-2/9 Depression Screening         General Health  · Sleep: sleeps well and gets 7-8 hours of sleep on average. · Hearing: normal - bilateral.  · Vision: no vision problems, goes for regular eye exams, most recent eye exam <1 year ago and wears glasses. · Dental: regular dental visits, brushes teeth twice daily and flosses teeth occasionally. /GYN Health  · Last menstrual period: Beginning of the month   · Contraceptive method: no.  · History of STDs?: no.     Review of Systems:     Review of Systems   Constitutional: Negative for activity change, chills, diaphoresis, fatigue and unexpected weight change. HENT: Negative for congestion, postnasal drip, sneezing, sore throat, trouble swallowing and voice change. Eyes: Negative for visual disturbance. Respiratory: Negative for cough, chest tightness, shortness of breath and wheezing. Cardiovascular: Negative for chest pain, palpitations and leg swelling. Gastrointestinal: Negative for abdominal pain, constipation, diarrhea, nausea and vomiting. Endocrine: Negative for polyphagia and polyuria. Genitourinary: Negative for difficulty urinating. Musculoskeletal: Negative for arthralgias. Skin: Positive for rash. Neurological: Negative for dizziness, weakness, light-headedness and headaches. Psychiatric/Behavioral: Negative for confusion. All other systems reviewed and are negative.      Past Medical History:     Past Medical History:   Diagnosis Date   • 31 weeks gestation of pregnancy 1/5/2021    GBS bacteruria- needs PCN intrapartum   • Chiari malformation type I ([address])    • Chronic fatigue     last assessed 9/26/16   • COVID-19 07/25/2020   • Fever and chills 7/25/2020   • High risk pregnancy with high inhibin 3/29/2021   • Hyperemesis gravidarum 1/30/2021    IV fluid infusions ordered Phenergan ordered    • Hyperlipidemia     resolved 17   • Hypertension     pre-eclampsia 2016   • Idiopathic intracranial hypertension    • Intrauterine growth restriction affecting antepartum care of mother in third trimester 6/3/2021   • Pre-eclampsia     with severe features    • Varicella     pt unsure   • Visual impairment     glasses      Past Surgical History:     Past Surgical History:   Procedure Laterality Date   •  SECTION     • CHOLECYSTECTOMY     • CHOLECYSTECTOMY LAPAROSCOPIC N/A 2016    Procedure: CHOLECYSTECTOMY LAPAROSCOPIC possible open;  Surgeon: Shelby Arias MD;  [facility];  Service:    • PA  DELIVERY ONLY N/A 2021    Procedure:  SECTION ();   Surgeon: Celia Robbins DO;  Location: [facility] ;  Service: Obstetrics   • PA LAPAROSCOPY W/RMVL ADNEXAL STRUCTURES Bilateral 2021    Procedure: SALPINGECTOMY, LAPAROSCOPIC;  Surgeon: Danyel Aden MD;  [facility];  Service: Gynecology   • REDUCTION MAMMAPLASTY        Social History:     Social History     Socioeconomic History   • Marital status: Single     Spouse name: None   • Number of children: 1   • Years of education: 12   • Highest education level: High school graduate   Occupational History   • None   Tobacco Use   • Smoking status: Never   • Smokeless tobacco: Never   Vaping Use   • Vaping Use: Never used   Substance and Sexual Activity   • Alcohol use: Not Currently     Comment: socially   • Drug use: Never   • Sexual activity: Not Currently     Partners: Male     Birth control/protection: None, Condom Male, Female Sterilization   Other Topics Concern   • None   Social History Narrative    Always uses seatbelt    High school student    Younger sister    Lives with mother (single parent)     Social Determinants of Health     Financial Resource Strain: Low Risk  (3/24/2023)    Overall Financial Resource Strain (CARDIA)    • Difficulty of Paying Living Expenses: Not hard at all   Food Insecurity: No Food Insecurity (3/24/2023) Hunger Vital Sign    • Worried About Running Out of Food in the Last Year: Never true    • Ran Out of Food in the Last Year: Never true   Transportation Needs: No Transportation Needs (3/24/2023)    PRAPARE - Transportation    • Lack of Transportation (Medical): No    • Lack of Transportation (Non-Medical): No   Physical Activity: Inactive (1/13/2021)    Exercise Vital Sign    • Days of Exercise per Week: 0 days    • Minutes of Exercise per Session: 0 min   Stress: No Stress Concern Present (1/13/2021)    [facility]    • Feeling of Stress : Not at all   Social Connections:  Moderately Isolated (1/13/2021)    Social Connection and Isolation Panel [NHANES]    • Frequency of Communication with Friends and Family: More than three times a week    • Frequency of Social Gatherings with Friends and Family: Once a week    • Attends Hoahaoism Services: Never    • Active Member of Clubs or Organizations: No    • Attends Club or Organization Meetings: Never    • Marital Status: Living with partner   Intimate Partner Violence: Not At Risk (1/13/2021)    Humiliation, Afraid, Rape, and Kick questionnaire    • Fear of Current or Ex-Partner: No    • Emotionally Abused: No    • Physically Abused: No    • Sexually Abused: No   Housing Stability: Low Risk  (4/3/2023)    Housing Stability Vital Sign    • Unable to Pay for Housing in the Last Year: No    • Number of Places Lived in the Last Year: 1    • Unstable Housing in the Last Year: No      Family History:     Family History   Problem Relation Age of Onset   • Leukemia Maternal Grandfather    • No Known Problems Mother    • Other Father         MVA   • No Known Problems Sister    • Hypertension Maternal Grandmother    • No Known Problems Daughter    • No Known Problems Sister       Current Medications:     Current Outpatient Medications   Medication Sig Dispense Refill   • acetaminophen (TYLENOL) 325 mg tablet Take 650 mg by mouth every 6 (six) hours as needed     • Galcanezumab-gnlm 120 MG/ML SOAJ Inject 120 mg under the skin every 30 (thirty) days Following the first month loading dose of 2 pens. (Patient not taking: Reported on 3/24/2023) 1 mL 11   • loperamide (IMODIUM A-D) 2 MG tablet Take 1 tablet (2 mg total) by mouth 4 (four) times a day as needed for diarrhea (Patient not taking: Reported on 4/3/2023) 30 tablet 0   • metroNIDAZOLE (METROGEL) 1 % gel Apply topically daily (Patient not taking: Reported on 3/29/2023) 45 g 0     No current facility-administered medications for this visit. Allergies:     No Known Allergies   Physical Exam:     /72 (BP Location: Right arm, Patient Position: Sitting, Cuff Size: Standard)   Pulse 60   Temp 98 °F (36.7 °C) (Temporal)   Wt 69.9 kg (154 lb)   SpO2 98%   BMI 23.42 kg/m²     Physical Exam  Vitals and nursing note reviewed. Constitutional:       General: She is not in acute distress. Appearance: Normal appearance. She is normal weight. She is not ill-appearing or diaphoretic. HENT:      Head: Atraumatic. Right Ear: Tympanic membrane, ear canal and external ear normal. There is no impacted cerumen. Left Ear: Tympanic membrane, ear canal and external ear normal. There is no impacted cerumen. Nose: Nose normal. No congestion. Mouth/Throat:      Mouth: Mucous membranes are moist.      Pharynx: Oropharynx is clear. No oropharyngeal exudate. Comments: Angular cheilosis present bilaterally    Eyes:      General: No scleral icterus. Extraocular Movements: Extraocular movements intact. Conjunctiva/sclera: Conjunctivae normal.      Pupils: Pupils are equal, round, and reactive to light. Cardiovascular:      Rate and Rhythm: Normal rate and regular rhythm. Pulses: Normal pulses. Heart sounds: Normal heart sounds. No murmur heard. Pulmonary:      Effort: Pulmonary effort is normal. No respiratory distress.       Breath sounds: Normal breath sounds. No wheezing. Abdominal:      General: Abdomen is flat. Bowel sounds are normal. There is no distension. Palpations: Abdomen is soft. Tenderness: There is no abdominal tenderness. Musculoskeletal:         General: No swelling or tenderness. Normal range of motion. Cervical back: Normal range of motion. Right lower leg: No edema. Left lower leg: No edema. Skin:     General: Skin is warm. Capillary Refill: Capillary refill takes less than 2 seconds. Coloration: Skin is not jaundiced. Findings: Rash present. No bruising. Comments: Some perioral dermatitis present bilaterally surrounding mouth   Neurological:      General: No focal deficit present. Mental Status: She is alert and oriented to person, place, and time.    Psychiatric:         Mood and Affect: Mood normal.         Behavior: Behavior normal.          Leticia Delaney MD   [facility]

## 2023-08-24 ENCOUNTER — OFFICE VISIT (OUTPATIENT)
Dept: DENTISTRY | Facility: CLINIC | Age: 28
End: 2023-08-24

## 2023-08-24 VITALS — DIASTOLIC BLOOD PRESSURE: 66 MMHG | SYSTOLIC BLOOD PRESSURE: 107 MMHG | HEART RATE: 54 BPM

## 2023-08-24 DIAGNOSIS — Z01.20 ENCOUNTER FOR DENTAL EXAMINATION AND CLEANING WITHOUT ABNORMAL FINDINGS: Primary | ICD-10-CM

## 2023-08-24 PROCEDURE — D0274 BITEWINGS - 4 RADIOGRAPHIC IMAGES: HCPCS

## 2023-08-24 PROCEDURE — D0120 PERIODIC ORAL EVALUATION - ESTABLISHED PATIENT: HCPCS

## 2023-08-24 PROCEDURE — D1110 PROPHYLAXIS - ADULT: HCPCS

## 2023-08-24 NOTE — DENTAL PROCEDURE DETAILS
Heron Matthew presents for a Periodic exam. Verbal consent for treatment given in addition to the forms. Reviewed health history - Patient is ASA II  Consents signed: Yes     Perio: Slight bleeding and Gingivitis  Pain Scale: 0  Caries Assessment: Low  Radiographs: Bitewings x4     Oral Hygiene instruction reviewed and given. Recommended Hygiene recall visits with the [facility]. Prophy    Dental procedures in this visit     - PROPHYLAXIS - ADULT (Completed)     Service provider: Lorie Schilling     Billing provider: Clary Dawson DDS     - PERIODIC ORAL EVALUATION - ESTABLISHED PATIENT (Completed)     Service provider: Lorie Schilling     Billing provider: Clary Dawson DDS     - BITEWINGS - 4 RADIOGRAPHIC IMAGES (Completed)     Service provider: Lorie Schilling     Billing provider: Clary Dawson DDS       CC: None  Reviewed Medical History  ASA: II  Translation line used: no    Method Used:  Prophy Method Used: Hand Scaling  Polished  Flossed    Radiographs Taken:  Bitewings x4    Intra/Extra Oral Cancer Screening:  Within normal limits      Oral Hygiene:  Fair    Plaque:  Generalized  Light    Calculus:  Generalized  Light  Lower anteriors  Posteriors    Bleeding:  Bleeding on probing: 3.0 %  Generalized  Light    Stain:  None    Periodontal Charting:  Full probing    Periodontal Classification:  Localized  Mild  Gingivitis      Nutritional Counseling:  N/A    OHI: Stressed importance of brushing 2x/day and flossing daily. Recommended daily mouthrinse. Pt is currently brushing 2x/day and flossing 1x/day. Lorie Schilling [facility]     No orders of the defined types were placed in this encounter. Periodic Exam:  Dr. Pablo Allen   did exam:    Decay present: no  Noted left side TMJ click- asymptomatic  OCS-neg    Pt dismissed in good health, no complications and all questions answered. NV: 6 mrc, periodic exam with hygiene.

## 2023-09-11 ENCOUNTER — OFFICE VISIT (OUTPATIENT)
Dept: OBGYN CLINIC | Facility: CLINIC | Age: 28
End: 2023-09-11

## 2023-09-11 VITALS
WEIGHT: 155.4 LBS | DIASTOLIC BLOOD PRESSURE: 75 MMHG | HEIGHT: 68 IN | SYSTOLIC BLOOD PRESSURE: 109 MMHG | BODY MASS INDEX: 23.55 KG/M2

## 2023-09-11 DIAGNOSIS — R19.00 ABDOMINAL WALL BULGE: Primary | ICD-10-CM

## 2023-09-11 DIAGNOSIS — K43.2 INCISIONAL HERNIA, WITHOUT OBSTRUCTION OR GANGRENE: ICD-10-CM

## 2023-09-11 PROCEDURE — 99213 OFFICE O/P EST LOW 20 MIN: CPT | Performed by: NURSE PRACTITIONER

## 2023-09-11 NOTE — PROGRESS NOTES
PROBLEM GYNECOLOGICAL VISIT    Ralph Appiah is a 33 y.o. female who presents today with complaint of abdominal bulge. Her general medical history has been reviewed and she reports it as follows:    Past Medical History:   Diagnosis Date   • 31 weeks gestation of pregnancy 2021    GBS bacteruria- needs PCN intrapartum   • Chiari malformation type I ([address])    • Chronic fatigue     last assessed 16   • COVID-19 2020   • Fever and chills 2020   • High risk pregnancy with high inhibin 3/29/2021   • Hyperemesis gravidarum 2021    IV fluid infusions ordered Phenergan ordered    • Hyperlipidemia     resolved 17   • Hypertension     pre-eclampsia    • Idiopathic intracranial hypertension    • Intrauterine growth restriction affecting antepartum care of mother in third trimester 6/3/2021   • Pre-eclampsia     with severe features    • Varicella     pt unsure   • Visual impairment     glasses     Past Surgical History:   Procedure Laterality Date   •  SECTION     • CHOLECYSTECTOMY     • CHOLECYSTECTOMY LAPAROSCOPIC N/A 2016    Procedure: CHOLECYSTECTOMY LAPAROSCOPIC possible open;  Surgeon: Corey Bright MD;  [facility];  Service:    • MN  DELIVERY ONLY N/A 2021    Procedure:  SECTION ();   Surgeon: Rose Coles DO;  Location: [facility];  Service: Obstetrics   • MN LAPAROSCOPY W/RMVL ADNEXAL STRUCTURES Bilateral 2021    Procedure: SALPINGECTOMY, LAPAROSCOPIC;  Surgeon: Lorie Vasquez MD;  [facility];  Service: Gynecology   • REDUCTION MAMMAPLASTY       OB History        3    Para   2    Term           2    AB   1    Living   2       SAB   1    IAB        Ectopic        Multiple   0    Live Births   2               Social History     Tobacco Use   • Smoking status: Never   • Smokeless tobacco: Never   Vaping Use   • Vaping Use: Never used   Substance Use Topics   • Alcohol use: Not Currently     Comment: socially   • Drug use: Never     Social History     Substance and Sexual Activity   Sexual Activity Yes   • Partners: Male   • Birth control/protection: None, Condom Male, Female Sterilization       Current Outpatient Medications   Medication Instructions   • acetaminophen (TYLENOL) 650 mg, Oral, Every 6 hours PRN   • Galcanezumab-gnlm 120 mg, Subcutaneous, Every 30 days, Following the first month loading dose of 2 pens. • loperamide (IMODIUM A-D) 2 mg, Oral, 4 times daily PRN   • metroNIDAZOLE (METROGEL) 1 % gel Topical, Daily       History of Present Illness:   Arla Holstein presents today reporting that about two weeks ago she noticed a small lump near the edge of her  incision. The lump is soft, non-tender, and seems deep in the tissue. There is no redness or drainage. Her last  and bilateral salpingectomy was performed 21. She denies any other symptoms or concerns today. Review of Systems:  Review of Systems   Constitutional: Negative. Gastrointestinal: Negative for abdominal pain, constipation, diarrhea, nausea and vomiting. Small lump on lower abd at the right edge of  scar   Genitourinary: Negative. Physical Exam:  /75 (BP Location: Right arm)   Ht 5' 8" (1.727 m)   Wt 70.5 kg (155 lb 6.4 oz)   LMP 2023 (Exact Date)   BMI 23.63 kg/m²   Physical Exam  Constitutional:       General: She is not in acute distress. Appearance: Normal appearance. Abdominal:      General: Abdomen is flat. A surgical scar is present. Palpations: Abdomen is soft. Neurological:      Mental Status: She is alert. Skin:     General: Skin is warm and dry. Psychiatric:         Mood and Affect: Mood normal.         Behavior: Behavior normal.   Vitals reviewed. Assessment:   1. Abdominal wall bulge   2. Incisional hernia without strangulation     Plan:   1. Discussed suspicion for incisional hernia. 2. Warning signs reviewed.  No evidence of strangulation. 3. Orders placed for abdominal US. 4. Will contact after imaging     Reviewed with patient that test results are available in MyChart immediately, but that they will not necessarily be reviewed by me immediately. Explained that I will review results at my earliest opportunity and contact patient appropriately.

## 2023-09-12 ENCOUNTER — HOSPITAL ENCOUNTER (OUTPATIENT)
Dept: RADIOLOGY | Age: 28
Discharge: HOME | End: 2023-09-12
Payer: COMMERCIAL

## 2023-09-12 DIAGNOSIS — R19.00 ABDOMINAL WALL BULGE: ICD-10-CM

## 2023-09-12 DIAGNOSIS — K43.2 INCISIONAL HERNIA, WITHOUT OBSTRUCTION OR GANGRENE: ICD-10-CM

## 2023-09-12 PROCEDURE — 76705 ECHO EXAM OF ABDOMEN: CPT

## 2023-09-12 NOTE — LETTER
[address] [place] [address]  [address] [place] [id number]      September 22, 2023    MRN: [id number]     Phone: [phone]     Dear Ms. Reji Huggins recently had a(n) Ultrasound performed on 9/12/2023 at  [address] [place] [address] that was requested by ROXANNE Braun. The study was reviewed by a radiologist, which is a physician who specializes in medical imaging. The radiologist issued a report describing his or her findings. In that report there was a finding that the radiologist felt warranted further discussion with your health care provider and that discussion would be beneficial to you. The results were sent to [address] [place] [address] on 09/18/2023  8:51 PM. We recommend that you contact [address] [place] [address] at [phone] or set up an appointment to discuss the results of the imaging test. If you have already heard from Maday Parker, [address] [place] [address] regarding the results of your study, you can disregard this letter. This letter is not meant to alarm you, but intended to encourage you to follow-up on your results with the provider that sent you for the imaging study. In addition, we have enclosed answers to frequently asked questions by other patients who have also received a letter to review results with their health care provider (see page two). Thank you for choosing [address] [place] [address] for your medical imaging needs. FREQUENTLY ASKED QUESTIONS    Why am I receiving this letter? [facility] requires us to notify patients who have findings on imaging exams that may require more testing or follow-up with a health professional within the next 3 months.         How serious is the finding on the imaging test?  This letter is sent to all patients who may need follow-up or more testing Pt took off the BiPAP mask. Called and said he doesn't want it. 5 l/m Nasal cannula  Hooked up. Educated on the importance of BiPAP when sleeping. Sats stable. Monitoring pt.  Will resume when able within the next 3 months. Receiving this letter does not necessarily mean you have a life-threatening imaging finding or disease. Recommendations in the radiologist’s imaging report are general in nature and it is up to your healthcare provider to say whether those recommendations make sense for your situation. You are strongly encouraged to talk to your health care provider about the results and ask whether additional steps need to be taken. Where can I get a copy of the final report for my recent radiology exam?  To get a full copy of the report you can access your records online at [email or web address] or please contact [facility] Medical Records Department at [phone] Monday through Friday between 8 am and 6 pm.         What do I need to do now? Please contact your health care provider who requested the imaging study to discuss what further actions (if any) are needed. You may have already heard from (your ordering provider) in regard to this test in which case you can disregard this letter. NOTICE IN ACCORDANCE WITH THE [place] “PATIENT TEST RESULT INFORMATION ACT OF 2018”    You are receiving this notice as a result of a determination by your diagnostic imaging service that further discussions of your test results are warranted and would be beneficial to you. The complete results of your test or tests have been or will be sent to the health care practitioner that ordered the test or tests. It is recommended that you contact your health care practitioner to discuss your results as soon as possible.

## 2023-09-18 ENCOUNTER — APPOINTMENT (OUTPATIENT)
Dept: LAB | Age: 28
End: 2023-09-18
Payer: COMMERCIAL

## 2023-09-18 DIAGNOSIS — K13.0 CHEILOSIS: ICD-10-CM

## 2023-09-18 LAB
FERRITIN SERPL-MCNC: 9 NG/ML (ref 11–307)
IRON SATN MFR SERPL: 27 % (ref 15–50)
IRON SERPL-MCNC: 95 UG/DL (ref 50–212)
TIBC SERPL-MCNC: 348 UG/DL (ref 250–450)
UIBC SERPL-MCNC: 253 UG/DL (ref 155–355)

## 2023-09-18 PROCEDURE — 83540 ASSAY OF IRON: CPT

## 2023-09-18 PROCEDURE — 82728 ASSAY OF FERRITIN: CPT

## 2023-09-18 PROCEDURE — 36415 COLL VENOUS BLD VENIPUNCTURE: CPT

## 2023-09-18 PROCEDURE — 83550 IRON BINDING TEST: CPT

## 2023-09-19 ENCOUNTER — OFFICE VISIT (OUTPATIENT)
Dept: NEUROLOGY | Facility: CLINIC | Age: 28
End: 2023-09-19
Payer: COMMERCIAL

## 2023-09-19 VITALS
HEIGHT: 68 IN | TEMPERATURE: 97.8 F | DIASTOLIC BLOOD PRESSURE: 72 MMHG | SYSTOLIC BLOOD PRESSURE: 112 MMHG | WEIGHT: 156.6 LBS | BODY MASS INDEX: 23.73 KG/M2 | HEART RATE: 63 BPM

## 2023-09-19 DIAGNOSIS — G43.009 MIGRAINE WITHOUT AURA AND WITHOUT STATUS MIGRAINOSUS, NOT INTRACTABLE: Primary | ICD-10-CM

## 2023-09-19 DIAGNOSIS — G47.33 OSA (OBSTRUCTIVE SLEEP APNEA): ICD-10-CM

## 2023-09-19 PROCEDURE — 99215 OFFICE O/P EST HI 40 MIN: CPT | Performed by: PSYCHIATRY & NEUROLOGY

## 2023-09-19 RX ORDER — MULTIVITAMIN
1 TABLET ORAL DAILY
COMMUNITY

## 2023-09-19 NOTE — PROGRESS NOTES
[facility] Concussion[facility]Headache [facility] Consult - Follow up   PATIENT:  Edilson Elizalde  MRN:  [id number]  :  1995  DATE OF SERVICE:  2023  REFERRED BY: No ref. provider found  PMD: Marci Pack MD    Assessment/Plan:   Edilson Elizalde is a very pleasant  33 y.o. 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 Initial evaluation 2019     Migraine without aura   SHARYN not on CPAP (PSG 3/21/22 AHI 8.16, REM 15, Supine 10) - will recheck  History of idiopathic intracranial hypertension-currently no papilledema/resolved  She reports headaches started around age 17, but did not become bad until age 22. Patient is a very poor historian therefore history as obtained primarily through extensive medical record review. In 2017 she presented to ED multiple times for diffuse headache, photophobia. In 10/06/2017 she was evaluated by Neurology for documented results that I do not see in chart: "An outside MRI of the brain was completed which demonstrated questionable protrusion of the left optic disc into posterior aspect of left orbit correlating with reported history of papilledema, transverse dural venous sinus stenosis is present with findings consistent with idiopathic intracranial hypertension.  In the ED she had a lumbar puncture with opening pressure 55 cm H20 and closing pressure 22.5 cm H20 with relief of her symptoms after. "Per record review she was started on multiple medications including at 1 point acetazolamide in the past, but due to no insurance she stop taking any these medications. - as of 2019:  Chronic daily headache for the past approximately 6 months. Prior to that on and off. Has about 3 headache-free days per month. She describes bifrontal, bilateral retro-orbital, apex pulsating, pressure, stabbing. She denies aura. She does have associated photophobia, phonophobia. - as of 11/07/2019: Ayush Murdock returns reporting daily mild headaches, worse 4 times a week. Unfortunately she has been noncompliant with my recommendations, has only been taking acetazolamide 2-3 days a week, took indomethacin 50 mg daily for 2 weeks despite explicit instructions. Did not follow up with Ophthalmology, did not get blood work and did not get an MRI brain. Had her  come in and re-explained everything as well as the severe possible complications including vision loss if she is not compliant. We will try to make it easier acetazolamide 500 mg b.i.d., dexamethasone for the next 5 days as she is very worried about current headache indomethacin as abortive. She is very focused on trouble sleeping when she has about headache and asking for an option to treat pain and sleep. We will try cyclobenzaprine.  - as of 7/9/2021:  Please see HPI for details. She has been noncompliant with recommendations despite understanding the risk of losing her vision. At this time she has had no headaches since giving birth 3 weeks ago she denies any vision issues whatsoever. She has upcoming follow-up with Ophthalmology in a few weeks and plan will be determined from there depending on whether she has papilledema. We discussed treatment would be Diamox which she has been instructed to start many many times in the past as per my notes and recent inpatient neurology notes. She is breast-feeding at this time, therefore will not prophylactically treat with Diamox due to risk for fetus. - as of 11/12/2021: Ayush Murdock returns for headaches and migraines.  She reports she was seen by Ophthalmology soon after last visit and was told that she did not have papilledema, she did not have records sent to us and I asked again to get them just to make sure no IIH, will also have our staff reach out to them. Follow up with them next month she reports. She has been doing well without many headaches much until the past few weeks, but now it is frequent enough that she would like to start a prescription preventative. We discussed trial of topiramate which could help with migraines and if she had return of IIH. Trial of rizatriptan for abortive. Toradol IM today for migraine.   - as of 2/18/2022: Ophthalmology note showed no papilledema. Still recommended sleep study due to this history. She is taking emgality just one month so far and due for next shot. Taking topiramate 25 mg BID since last visit and we discussed gradual increase to 50 mg BID to try and help with migraine and weight. Rizatriptan helps for abortive. - as of 5/18/2022: She reports she has been improving on emgality (with some wearing off effect at the end) and topiramate 25 mg b.i.d. With about 3 migraines per week that responds to rizatriptan. She was diagnosed with mild sleep apnea since last visit in plans to treat this outside of our system which will also help with migraines. We discussed she could increase topiramate to 50 mg b.i.d. If she would like as this may help more with migraines. - as of 11/4/2022: She reports significant improvement with 1-2 migraines month on emgality and topiramate 25/50. This is much better than last visit, and less likely related to increasing topiramate by 25 mg, more likely related to bariatric surgery in September causing significant weight reduction and likely improvement in sleep apnea (mild/untreated). She reports migraines typically improved with Tylenol and she does not recall rizatriptan which we can always re-add if needed in the future.   She would like to wean off topiramate and therefore will start slow wean, but discussed watch out for increasing symptoms coming off as this is likely helping with multiple other issues. - as of 9/19/2023: She reports she continues to do very well and even better than last visit, even after weaning off topiramate, with no significant migraines on Emgality monthly, no side effects and not needing migraine rescue medications. No signs of papilledema on last eye exam.  Reviewed sleep study results that I was not aware of from 3/20/2022 which showed AHI 8.16 prior to bariatric surgery and discussed how the weight gain may have been related to untreated SHARYN and we will recheck to make sure this is not still present due to her history of IIH and risk for this returning in the setting of untreated SHARYN potentially. Workup:  -MRI brain with without contrast 10/6/2017:  1. No acute intracranial abnormality. Specifically, no acute infarction,   intracranial hemorrhage, intracranial mass or mass effect. 2. Questionable protrusion of the LEFT optic disc into posterior aspect of LEFT   orbit, correlating with the reported history of papilledema. Bilateral   transverse dural venous sinus stenosis is present. Findings can be seen in the   setting of idiopathic intracranial hypertension. 3. No evidence of dural venous sinus thrombosis.    - 10/2017:  Per [facility] Neurology resident note  "An outside MRI of the brain was completed which demonstrated questionable protrusion of the left optic disc into posterior aspect of left orbit correlating with reported history of papilledema, transverse dural venous sinus stenosis is present with findings consistent with idiopathic intracranial hypertension." (no mention of Chiari)   *As of my retrospective review she does have partially empty sella, cerebellar tonsils overlying foramen magnum without Chiari, prominence of left optic nerve greater than right with some tortuosity, appears to have posterior globe flattening bilaterally left greater than right  - 10/2017: at  ED she had a lumbar puncture with opening pressure 55 cm H20 and closing pressure 22.5 cm H20 with relief of her symptoms after  -  lumbar puncture 01/27/2021 opening pressure 11.   - 11/12/21 will hold off on repeat imaging at this time due to no new symptoms, no red flags, no significant worsening, no visual changes, but would have low threshold for repeat MRI brain if needed in the future for any new or concerning features.     Preventative:  - we discussed headache hygiene and lifestyle factors that may improve headaches  - continue emgality monthly. Discussed proper use, possible side effects and risks. - past:  Propranolol, topiramate, acetazolamide-patient does not recall how long she was on any of these or what affect had  - future options: Resuming acetazolamide/Diamox or topiramate, alternative CGRP med     Abortive:  - discussed not taking over-the-counter or prescription pain medications more than 3 days per week to prevent medication overuse/rebound headache  -She is not interested in prescription abortive for headache at this time, acetaminophen typically helps  -Prescribed in the past and never taken:   Rizatriptan (MAXALT) 10 MG tablet; Take 1 tablet (10 mg total) by mouth once as needed for migraine May repeat in 2 hours if needed. Max 2/24 hours, 9/month. Discussed proper use, possible side effects and risks. - past:  Sumatriptan, Fioricet, methocarbamol-patient does not recall how long she was on any of these or what affect had, in the past prescribed dexamethasone, cyclobenzaprine, indomethacin with Carafate prior and is unclear if patient ever took these  - future options: Alternative Triptan, prochlorperazine, Toradol IM or p.o., could consider trial for 5 days of Depakote or dexamethasone for prolonged migraine, ubrelvy, reyvow, nurtec    SHARYN (obstructive sleep apnea)  -     Diagnostic Sleep Study;  Future      Patient instructions       Follow-up with Sleep Medicine regarding sleep apnea treatment   -Or if you would prefer I could order repeat sleep study  - Regardless try and sleep on your side rather than your back        I am placing a referral for a sleep study and if it is abnormal we will place one to the sleep medicine specialist team to further evaluate your sleep issues. Please call [phone] to schedule the sleep study and afterwards if needed I recommend you see one of the following providers:  Ezio Lee PA-C       Headache/migraine treatment:   Abortive medications (for immediate treatment of a headache): It is ok to take  acetaminophen if they help your headaches you should limit these to No more than 3 times a week to avoid medication overuse/rebound headaches.          Prescription preventive medications for headaches/migraines   (to take every day to help prevent headaches - not to take at the time of headache):    [x] -  Emgality/Galcanezumab -  120 mg injections every 28 days     READ INSTRUCTIONS that come with the medication. REFRIGERATE. Keep out of direct sunlight. Prior to administration, allow to come to room temperature for 30 minutes. Do not warm using a heat source (eg, microwave or hot water). Do not shake. Administer in preferably abdomen (avoiding 2 inches around the navel), thigh, upper arm, or buttocks avoiding areas of skin that are tender, bruised, red or hard. Deliver entire contents of single-use prefilled pen or syringe. Lifestyle Recommendations:  [x] SLEEP - Maintain a regular sleep schedule: Adults need at least 7-8 hours of uninterrupted a night. Maintain good sleep hygiene:  Going to bed and waking up at consistent times, avoiding excessive daytime naps, avoiding caffeinated beverages in the evening, avoid excessive stimulation in the evening and generally using bed primarily for sleeping.   One hour before bedtime would recommend turning lights down lower, decreasing your activity (may read quietly, listen to music at a low volume). When you get into bed, should eliminate all technology (no texting, emailing, playing with your phone, iPad or tablet in bed). [x] HYDRATION - Maintain good hydration. Drink  2L of fluid a day (4 typical small water bottles)  [x] DIET - Maintain good nutrition. In particular don't skip meals and try and eat healthy balanced meals regularly. [x] TRIGGERS - Look for other triggers and avoid them: Limit caffeine to 1-2 cups a day or less. Avoid dietary triggers that you have noticed bring on your headaches (this could include aged cheese, peanuts, MSG, aspartame and nitrates). [x] EXERCISE - physical exercise as we all know is good for you in many ways, and not only is good for your heart, but also is beneficial for your mental health, cognitive health and  chronic pain/headaches. I would encourage at the least 5 days of physical exercise weekly for at least 30 minutes.      Education and Follow-up  [x] Please call with any questions or concerns. Of course if any new concerning symptoms go to the emergency department. [x] Follow up 6 months, sooner if needed            CC: We had the pleasure of evaluating Barbie Corbin in neurological consultation today. Barbie Corbin is a   right handed female who presents today for evaluation of headaches.      History obtained from patient as well as available medical record review.    Patient is a poor historian regarding history at times      History of Present Illness:   Interval history as of 9/19/2023  - no significant new or concerning neurologic symptoms since last visit   - bariatric surgery- lost 74 lbs over past year    - sleep is good, 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 -she did have a sleep study in 3/2022    - eye doctor - switched to [facility] and last saw 9/16/23 - no change in prescription, something added to fix something in right eye - does not remember details, no papilledema "everything was perfect" she denies vision changes  - has sleep study prior to surgery 8/2022 in [place] - mild sleep apnea     Headaches and migraines     No significant migraines on emgality  Milder casual headaches once a week max with stress and improves with tylenol     Preventative:    topiramate 25mg/50 mg - weaned off on her own around Jan since not needed anymore  - emgality monthly      Abortive:    tylenol   Denies bothersome side effects       Interval history as of 11/4/2022  - denies any new or concerning neurologic symptoms since last visit   - sleeping 7-8 hours nightly and well   - low stress, no new symptoms, no vision changes   - bariatric surgery since last visit on 9/19/22 - lost 27 lbs already     Headaches and migraines   1-2 migraines a month that respond to tylenol   Night and day improvement from the past     Preventative: topiramate 25mg/50 mg   - emgality monthly    Abortive: tylenol   Rizatriptan - doesn't know if she ever took or when but doesn't need now  Denies bothersome side effects      Interval history as of 5/18/2022  - denies any new or concerning neurologic symptoms since last visit   - following with bariatrics and they also sent her for sleep study + for SHARYN sleep report from 03/21/2022 which showed mild degree of sleep fragmentation and mild obstructive sleep apnea. Sleep efficiency was only 58% so the AHI may be underestimated. (AHI 8.16)  Plan is for scheduling CPAP titration study.     Headaches and migraines   She reports she continues to have improvement on emgality approximately 3 migraines per week, some wearing off when due for next dose     Preventative:   -  topiramate 25 mg b.i.d.  -trial of emgality -     Abortive:   Rizatriptan - doesn't know   Denies bothersome side effects     Interval history as of 2/18/2022  - denies any new or concerning neurologic symptoms since last visit   - ophthalmology note uploaded 01/14/2022 documented no papilledema  - requesting FMLA which she will bring in     Headaches and migraines   Improved to 2 migraines per week     Preventative:    - trial of topiramate - side effects-  25 mg BID  - trial of emgality - started 1/21/22 Denies bothersome side effects     Abortive:   - trial of rizatriptan - helps  - trial of decadron 1/10/22 - does not remember      Interval History as of 11/12/2021:  - she did not bring an MRI brain images on CD for my review or ophthalmology report for my review  She has had 5 headaches this last week. Before this week was not having headaches very often she says   Currently has a headache, started around noon today, throbbing in whole head. Currently 7/10. No nausea. + photophobia. Headaches are bifrontal and bioccipital.   Went to the Ophthalmologist (Dr. Claire Wilkinson in [facility]) - gave prescription for the some glasses. They did not see papilledema per patient. Sleeping okay. Baby is sleeping through the night. 5 months old, not breast feeding   Drinking a lot of water    s/p sterilization. Abortive:  Taking Tylenol or ibuprofen at least 3-4 times per week     Preventative:  - not on diamox  - interested in preventative       Interval history as of 7/9/2021   - She was to follow-up in 4 weeks since last visit (no show 12/12/19) and she is now following up 2 years later   - lumbar puncture 01/27/2021 opening pressure 11.   Please see EMR for details  - had baby 6/15/21, preeclampsia, breastfeeding  - Neuro saw her inpatient 6/17/21 - see EMR    Denies vision loss or other visual symptoms  - has upcoming visit in 7/28/2021 with  ophthalmology    Headaches and migraines   - headaches prior to pregnancy, worse while pregnant especially first trimester  - no headaches since delivery 3 weeks ago    Preventative: nothing   - not on diamox - per data - "Due to the potential for serious adverse reactions in the  infant, the  recommends a decision be made whether to discontinue breastfeeding or to discontinue the drug, taking into account the importance of treatment to the mother."      Interval history as of 11/07/2019  - She has not followed up with nutrition as recommended the setting of IIH - although she has upcoming appointment  - She has not followed up with Ophthalmology as recommended for II   - she did not obtain blood work as ordered  - she did not get MRI brain on CD for my review    She reports no improvement in headaches  - daily headaches, worse 4 times a week   - acetazolamide 500 mg b.i.d. - forgets most of the time, only taking 2-3 days a week 250 mg TID for some reason   - trial of indomethacin 50 mg - took daily for 2 weeks     History of initial visit 09/12/2019  Headaches started around 9/2017  - was evaluated at  ED 09/22/2017 and 09/23/2017 - diffuse headache, photophobia, no vision changes  - ED 10/06/2017 where she was evaluated by Neurology for headache, eye pain for months. 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 In the ED she had a lumbar puncture with opening pressure 55 cm H20 and closing pressure 22.5 cm H20 with relief of her symptoms after.  - she was started on topiramate 25 mg b.i.d.  And recommended outpatient Neurology follow-up  - 05/24/2018 return to ED with possible migraine  - ED 05/27/2018  - 06/05/2018 follow up with primary care prescribed acetazolamide and topiramate.     Previously followed with [facility] Neurology  Last visit 05/2018  Previously on acetazolamide - only took 1 month and then stop feeling due to did not have insurance     Last visit with eye doctor was around 09/2018 - per patient vision was perfect and she did not have to come back  - she reports they did not mention papilledema then (and she does recall that they dilated her eyes), that it was the neurologist previously who had      *Today denies vision symptoms           Headaches started at what age? Started age 17 and came bad 22years old  How often do the headaches occur?   - as of 9/12/2019: daily for about 6 months  3 headache free days a month only   What time of the day do the headaches start? Sometimes wakes with it, Afternoon   How long do the headaches last? 1-2  hours  Are you ever headache free? rarely     Aura? without aura     Last eye exam: around 9/2018 she thinks      Where is your headache located and pain quality?   - bifrontal and apex, bilateral retro-orbital - pulsating, pressure, stabbing   What is the intensity of pain? Average: 7/10, worst 9/10  Associated symptoms:   [] Nausea       [] Vomiting        [] Diarrhea  [x] Insomnia    [] Stiff or sore neck   [x] Problems with concentration  [x] Photophobia     [x]Phonophobia      [] Osmophobia  [] Blurred vision   [x] Prefer quiet, dark room  [x] Light-headed or dizzy     [] Tinnitus   [] Hands or feet tingle or feel numb/paresthesias       [] Red ear      [] Ptosis   [] Facial droop  [] Lacrimation  [] Nasal congestion/rhinorrhea   [] Flushing of face  [] Change in pupil size     Number of days missed per month because of headaches:  Work (or school) days: 3     Headache triggers:  Exercise, softball, standing up quickly, wakes with it     Have you seen someone else for headaches or pain? Yes, [address]  Have you had trigger point injection performed and how often? No  Have you had Botox injection performed and how often? No   Have you had epidural injections or transforaminal injections performed? No  Are you current pregnant or planning on getting pregnant? No, IUD - now had surgery   Have you ever had any Brain imaging? yes     What medications do you take or have you taken for your headaches?    ABORTIVE:    OTC medications have been ineffective   Ibuprofen -   - as of 9/12/19: 3 times day - 5 days a week, helps a little      Past Per chart  - pt does not remember  sumatriptan 25 mg - she does not remember   fioricet 6/2018  Methocarbamol      PREVENTIVE:   Nothing currently         Past per chart - pt does not remember  - propranolol 20 mg 9/2017  - topiramate 25 mg BID 9/2017  - acetazolamide 250 mg - 6/2018     Alternative therapies used in the past for headaches? no other headache interventions have been tried     Neck pain?: chronic     LIFESTYLE  Sleep   - averages: 7 hours  - does not think she snores  - wakes 2 times a night  Problems falling asleep?:   Yes  Problems staying asleep?:  Yes        Physical activity: softball in summer, gym 3 days a week      Water: 2 bottles  per day  Caffeine: none per day  Diet:  Do you ever skip meals?   No     Mood:   Denies history of anxiety or depression or other diagnosed mood disorder     The following portions of the patient's history were reviewed and updated as appropriate: allergies, current medications, past family history, past medical history, past social history, past surgical history and problem list.     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   Education: 12th, studying accounting and [place]   Lives with  Mom, sister, daughter (9) [facility] and and Cici Slipper is 3 (9/49/27)     Illicit Drugs: denies  Alcohol/tobacco: Denies tobacco use, alcohol intake: social drinker    Past Medical History:     Past Medical History:   Diagnosis Date   • 31 weeks gestation of pregnancy 1/5/2021    GBS bacteruria- needs PCN intrapartum   • Chiari malformation type I ([address])    • Chronic fatigue     last assessed 9/26/16   • COVID-19 07/25/2020   • Fever and chills 7/25/2020   • High risk pregnancy with high inhibin 3/29/2021   • Hyperemesis gravidarum 2021    IV fluid infusions ordered Phenergan ordered    • Hyperlipidemia     resolved 17   • Hypertension     pre-eclampsia 2016   • Idiopathic intracranial hypertension    • Intrauterine growth restriction affecting antepartum care of mother in third trimester 6/3/2021   • Pre-eclampsia     with severe features    • Varicella     pt unsure   • Visual impairment     glasses       Patient Active Problem List   Diagnosis   • IIH (idiopathic intracranial hypertension)   • Papilledema   • Questionable Chiari malformation type I ([address])   • Status post primary low transverse  section   • Status post bilateral salpingectomy   • Diarrhea       Medications:      Current Outpatient Medications   Medication Sig Dispense Refill   • acetaminophen (TYLENOL) 325 mg tablet Take 650 mg by mouth every 6 (six) hours as needed     • Galcanezumab-gnlm 120 MG/ML SOAJ Inject 120 mg under the skin every 28 days 1 mL 11   • loperamide (IMODIUM A-D) 2 MG tablet Take 1 tablet (2 mg total) by mouth 4 (four) times a day as needed for diarrhea 30 tablet 0   • Multiple Vitamin (multivitamin) tablet Take 1 tablet by mouth daily     • metroNIDAZOLE (METROGEL) 1 % gel Apply topically daily (Patient not taking: Reported on 3/29/2023) 45 g 0     No current facility-administered medications for this visit.         Allergies:    No Known Allergies    Family History:     Family History   Problem Relation Age of Onset   • Leukemia Maternal Grandfather    • No Known Problems Mother    • Other Father         MVA   • No Known Problems Sister    • Hypertension Maternal Grandmother    • No Known Problems Daughter    • No Known Problems Sister        Social History:     Social History     Socioeconomic History   • Marital status: Single     Spouse name: Not on file   • Number of children: 1   • Years of education: 12   • Highest education level: High school graduate   Occupational History   • Not on file   Tobacco Use   • Smoking status: Never • Smokeless tobacco: Never   Vaping Use   • Vaping Use: Never used   Substance and Sexual Activity   • Alcohol use: Not Currently     Comment: socially   • Drug use: Never   • Sexual activity: Yes     Partners: Male     Birth control/protection: None, Condom Male, Female Sterilization   Other Topics Concern   • Not on file   Social History Narrative    Always uses seatbelt    High school student    Younger sister    Lives with mother (single parent)     Social Determinants of Health     Financial Resource Strain: Low Risk  (3/24/2023)    Overall Financial Resource Strain (CARDIA)    • Difficulty of Paying Living Expenses: Not hard at all   Food Insecurity: No Food Insecurity (3/24/2023)    Hunger Vital Sign    • Worried About Running Out of Food in the Last Year: Never true    • Ran Out of Food in the Last Year: Never true   Transportation Needs: No Transportation Needs (3/24/2023)    PRAPARE - Transportation    • Lack of Transportation (Medical): No    • Lack of Transportation (Non-Medical): No   Physical Activity: Inactive (1/13/2021)    Exercise Vital Sign    • Days of Exercise per Week: 0 days    • Minutes of Exercise per Session: 0 min   Stress: No Stress Concern Present (1/13/2021)    [address] [place] [address]    • Feeling of Stress : Not at all   Social Connections:  Moderately Isolated (1/13/2021)    Social Connection and Isolation Panel [NHANES]    • Frequency of Communication with Friends and Family: More than three times a week    • Frequency of Social Gatherings with Friends and Family: Once a week    • Attends Yarsanism Services: Never    • Active Member of Clubs or Organizations: No    • Attends Club or Organization Meetings: Never    • Marital Status: Living with partner   Intimate Partner Violence: Not At Risk (1/13/2021)    Humiliation, Afraid, Rape, and Kick questionnaire    • Fear of Current or Ex-Partner: No    • Emotionally Abused: No    • Physically Abused: No    • Sexually Abused: No   Housing Stability: Low Risk  (4/3/2023)    Housing Stability Vital Sign    • Unable to Pay for Housing in the Last Year: No    • Number of Places Lived in the Last Year: 1    • Unstable Housing in the Last Year: No         Objective:       Physical Exam:                                                                 Vitals:            Constitutional:    /72 (BP Location: Left arm, Patient Position: Sitting, Cuff Size: Standard)   Pulse 63   Temp 97.8 °F (36.6 °C) (Temporal)   Ht 5' 8" (1.727 m)   Wt 71 kg (156 lb 9.6 oz)   LMP 08/28/2023 (Exact Date)   BMI 23.81 kg/m²   BP Readings from Last 3 Encounters:   09/19/23 112/72   09/11/23 109/75   08/24/23 107/66     Pulse Readings from Last 3 Encounters:   09/19/23 63   08/24/23 (!) 54   08/22/23 60         Well developed, well nourished, well groomed. Psychiatric:  Normal behavior and appropriate affect        Neurological Examination:     Mental status/cognitive function:   Recent and remote memory appear intact. Attention span and concentration as well as fund of knowledge are appropriate for age. Normal language and spontaneous speech. Cranial Nerves:   VII-facial expression symmetric  Motor Exam: symmetric bulk throughout. no atrophy, fasciculations or abnormal movements noted.    Coordination:  no apparent dysmetria, ataxia or tremor noted  Gait: steady casual gait           Pertinent lab results:   Please see EMR for recent labs   09/15/2021 CMP and CBC unremarkable   Per our system  09/29/2016:  CMP and CBC unremarkable   TSH 3.3     Imaging:   No head imaging in our system but she has had an MRI brain per report at outside hospital  -[facility] Neurology resident note:  - ED 10/06/2017 where she was evaluated by Neurology for headache, eye pain for months.  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  In the ED she had a lumbar puncture with opening pressure 55 cm H20 and closing pressure 22.5 cm H20 with relief of her symptoms after.     -  lumbar puncture 01/27/2021 opening pressure 11.  Please see EMR for details  Review of Systems:   ROS obtained by medical assistant and personally reviewed, but if any symptoms listed below say negative, does not mean patient has not had this symptom since last visit, please see HPI for details of symptoms discussed this visit. I recommended PCP follow up for non neurologic problems. Review of Systems   Constitutional: Negative. HENT: Negative. Eyes: Negative. Respiratory: Negative. Cardiovascular: Negative. Gastrointestinal: Negative. Endocrine: Negative. Genitourinary: Negative. Musculoskeletal: Negative. Skin: Negative. Allergic/Immunologic: Negative. Neurological: Positive for headaches. Hematological: Negative. Psychiatric/Behavioral: Negative. I have spent 28 minutes with Patient  today in which greater than 50% of this time was spent in counseling/coordination of care regarding Diagnostic results, Prognosis, Risks and benefits of tx options, Instructions for management, Patient education, Importance of tx compliance, Risk factor reductions, Impressions, Counseling / Coordination of care, Documenting in the medical record, Reviewing / ordering tests, medicine, procedures   and Obtaining or reviewing history  . I also spent 12 minutes non face to face for this patient the same day.        Author:  Gillian Solis MD 9/19/2023 10:16 AM

## 2023-09-19 NOTE — PATIENT INSTRUCTIONS
Follow-up with Sleep Medicine regarding sleep apnea treatment   -Or if you would prefer I could order repeat sleep study  - Regardless try and sleep on your side rather than your back        I am placing a referral for a sleep study and if it is abnormal we will place one to the sleep medicine specialist team to further evaluate your sleep issues. Please call [phone] to schedule the sleep study and afterwards if needed I recommend you see one of the following providers:  Mark Anthony Segundo PA-C       Headache/migraine treatment:   Abortive medications (for immediate treatment of a headache): It is ok to take  acetaminophen if they help your headaches you should limit these to No more than 3 times a week to avoid medication overuse/rebound headaches. Prescription preventive medications for headaches/migraines   (to take every day to help prevent headaches - not to take at the time of headache):    [x] -  Emgality/Galcanezumab -  120 mg injections every 28 days     READ INSTRUCTIONS that come with the medication. REFRIGERATE. Keep out of direct sunlight. Prior to administration, allow to come to room temperature for 30 minutes. Do not warm using a heat source (eg, microwave or hot water). Do not shake. Administer in preferably abdomen (avoiding 2 inches around the navel), thigh, upper arm, or buttocks avoiding areas of skin that are tender, bruised, red or hard. Deliver entire contents of single-use prefilled pen or syringe. Lifestyle Recommendations:  [x] SLEEP - Maintain a regular sleep schedule: Adults need at least 7-8 hours of uninterrupted a night.  Maintain good sleep hygiene:  Going to bed and waking up at consistent times, avoiding excessive daytime naps, avoiding caffeinated beverages in the evening, avoid excessive stimulation in the evening and generally using bed primarily for sleeping. One hour before bedtime would recommend turning lights down lower, decreasing your activity (may read quietly, listen to music at a low volume). When you get into bed, should eliminate all technology (no texting, emailing, playing with your phone, iPad or tablet in bed). [x] HYDRATION - Maintain good hydration. Drink  2L of fluid a day (4 typical small water bottles)  [x] DIET - Maintain good nutrition. In particular don't skip meals and try and eat healthy balanced meals regularly. [x] TRIGGERS - Look for other triggers and avoid them: Limit caffeine to 1-2 cups a day or less. Avoid dietary triggers that you have noticed bring on your headaches (this could include aged cheese, peanuts, MSG, aspartame and nitrates). [x] EXERCISE - physical exercise as we all know is good for you in many ways, and not only is good for your heart, but also is beneficial for your mental health, cognitive health and  chronic pain/headaches. I would encourage at the least 5 days of physical exercise weekly for at least 30 minutes. Education and Follow-up  [x] Please call with any questions or concerns. Of course if any new concerning symptoms go to the emergency department.   [x] Follow up 6 months, sooner if needed

## 2023-09-20 DIAGNOSIS — R19.00 ABDOMINAL WALL BULGE: Primary | ICD-10-CM

## 2023-09-20 NOTE — RESULT ENCOUNTER NOTE
Call placed to patient to discuss US results and recommend follow up MRI. Unable to reach patient and voicemail box not set up. Will attempt to reach patient again.
Eyeglasses

## 2023-10-08 ENCOUNTER — HOSPITAL ENCOUNTER (OUTPATIENT)
Dept: RADIOLOGY | Facility: HOSPITAL | Age: 28
Discharge: HOME | End: 2023-10-08
Payer: COMMERCIAL

## 2023-10-08 DIAGNOSIS — R19.00 ABDOMINAL WALL BULGE: ICD-10-CM

## 2023-10-08 PROCEDURE — G1004 CDSM NDSC: HCPCS

## 2023-10-08 PROCEDURE — 74183 MRI ABD W/O CNTR FLWD CNTR: CPT

## 2023-10-08 PROCEDURE — A9585 GADOBUTROL INJECTION: HCPCS | Performed by: NURSE PRACTITIONER

## 2023-10-08 RX ORDER — GADOBUTROL 604.72 MG/ML
7 INJECTION INTRAVENOUS
Status: COMPLETED | OUTPATIENT
Start: 2023-10-08 | End: 2023-10-08

## 2023-10-08 RX ADMIN — GADOBUTROL 7 ML: 604.72 INJECTION INTRAVENOUS at 08:42

## 2024-03-19 ENCOUNTER — OFFICE VISIT (OUTPATIENT)
Dept: NEUROLOGY | Facility: CLINIC | Age: 29
End: 2024-03-19
Payer: COMMERCIAL

## 2024-03-19 VITALS
SYSTOLIC BLOOD PRESSURE: 118 MMHG | WEIGHT: 175 LBS | DIASTOLIC BLOOD PRESSURE: 84 MMHG | BODY MASS INDEX: 26.52 KG/M2 | HEART RATE: 83 BPM | HEIGHT: 68 IN | TEMPERATURE: 98.1 F

## 2024-03-19 DIAGNOSIS — G43.009 MIGRAINE WITHOUT AURA AND WITHOUT STATUS MIGRAINOSUS, NOT INTRACTABLE: ICD-10-CM

## 2024-03-19 PROCEDURE — 99214 OFFICE O/P EST MOD 30 MIN: CPT | Performed by: PSYCHIATRY & NEUROLOGY

## 2024-03-19 RX ORDER — TOPIRAMATE 25 MG/1
TABLET ORAL
Qty: 120 TABLET | Refills: 4 | Status: SHIPPED | OUTPATIENT
Start: 2024-03-19

## 2024-03-19 NOTE — PROGRESS NOTES
"[facility]rology Concussion[facility]Headache [facility] Consult - Follow up   PATIENT:  Bella Mott  MRN:  [id number]  :  1995  DATE OF SERVICE:  3/19/2024  REFERRED BY: No ref. provider found  PMD: Gina Trevino MD    Assessment/Plan:   Bella Mott is a very pleasant  28 y.o. 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  Initial evaluation 2019     Migraine without aura   SHARYN not on CPAP (PSG 3/21/22 AHI 8.16, REM 15, Supine 10) - will recheck  History of idiopathic intracranial hypertension-currently no papilledema/resolved  She reports headaches started around age 18, but did not become bad until age 21. Patient is a very poor historian therefore history as obtained primarily through extensive medical record review.  In 2017 she presented to ED multiple times for diffuse headache, photophobia.  In 10/06/2017 she was evaluated by Neurology for documented results that I do not see in chart: \"An outside MRI of the brain was completed which demonstrated questionable protrusion of the left optic disc into posterior aspect of left orbit correlating with reported history of papilledema, transverse dural venous sinus stenosis is present with findings consistent with idiopathic intracranial hypertension.  In the ED she had a lumbar puncture with opening pressure 55 cm H20 and closing pressure 22.5 cm H20 with relief of her symptoms after.\"Per record review she was started on multiple medications including at 1 point acetazolamide in the past, but due to no insurance she stop taking any these medications.  - as of 2019:  Chronic daily headache for the past approximately 6 months. "  Prior to that on and off.  Has about 3 headache-free days per month.  She describes bifrontal, bilateral retro-orbital, apex pulsating, pressure, stabbing.  She denies aura.  She does have associated photophobia, phonophobia.    - as of 11/07/2019: Bella returns reporting daily mild headaches, worse 4 times a week.  Unfortunately she has been noncompliant with my recommendations, has only been taking acetazolamide 2-3 days a week, took indomethacin 50 mg daily for 2 weeks despite explicit instructions.  Did not follow up with Ophthalmology, did not get blood work and did not get an MRI brain.  Had her  come in and re-explained everything as well as the severe possible complications including vision loss if she is not compliant.  We will try to make it easier acetazolamide 500 mg b.i.d., dexamethasone for the next 5 days as she is very worried about current headache indomethacin as abortive.  She is very focused on trouble sleeping when she has about headache and asking for an option to treat pain and sleep.  We will try cyclobenzaprine.  - as of 7/9/2021:  Please see HPI for details.  She has been noncompliant with recommendations despite understanding the risk of losing her vision.  At this time she has had no headaches since giving birth 3 weeks ago she denies any vision issues whatsoever.  She has upcoming follow-up with Ophthalmology in a few weeks and plan will be determined from there depending on whether she has papilledema.  We discussed treatment would be Diamox which she has been instructed to start many many times in the past as per my notes and recent inpatient neurology notes.  She is breast-feeding at this time, therefore will not prophylactically treat with Diamox due to risk for fetus.  - as of 11/12/2021: Bella returns for headaches and migraines. She reports she was seen by Ophthalmology soon after last visit and was told that she did not have papilledema, she did not have records  sent to us and I asked again to get them just to make sure no IIH, will also have our staff reach out to them. Follow up with them next month she reports. She has been doing well without many headaches much until the past few weeks, but now it is frequent enough that she would like to start a prescription preventative. We discussed trial of topiramate which could help with migraines and if she had return of IIH. Trial of rizatriptan for abortive. Toradol IM today for migraine.   - as of 2/18/2022: Ophthalmology note showed no papilledema. Still recommended sleep study due to this history. She is taking emgality just one month so far and due for next shot. Taking topiramate 25 mg BID since last visit and we discussed gradual increase to 50 mg BID to try and help with migraine and weight. Rizatriptan helps for abortive.   - as of 5/18/2022: She reports she has been improving on emgality (with some wearing off effect at the end) and topiramate 25 mg b.i.d. With about 3 migraines per week that responds to rizatriptan.  She was diagnosed with mild sleep apnea since last visit in plans to treat this outside of our system which will also help with migraines.  We discussed she could increase topiramate to 50 mg b.i.d. If she would like as this may help more with migraines.  - as of 11/4/2022: She reports significant improvement with 1-2 migraines month on emgality and topiramate 25/50. This is much better than last visit, and less likely related to increasing topiramate by 25 mg, more likely related to bariatric surgery in September causing significant weight reduction and likely improvement in sleep apnea (mild/untreated).  She reports migraines typically improved with Tylenol and she does not recall rizatriptan which we can always re-add if needed in the future.  She would like to wean off topiramate and therefore will start slow wean, but discussed watch out for increasing symptoms coming off as this is likely helping  with multiple other issues.   - as of 9/19/2023: She reports she continues to do very well and even better than last visit, even after weaning off topiramate, with no significant migraines on Emgality monthly, no side effects and not needing migraine rescue medications.  No signs of papilledema on last eye exam.  Reviewed sleep study results that I was not aware of from 3/20/2022 which showed AHI 8.16 prior to bariatric surgery and discussed how the weight gain may have been related to untreated SHARYN and we will recheck to make sure this is not still present due to her history of IIH and risk for this returning in the setting of untreated SHARYN potentially.  - as of 3/19/2024: She reports she forgot to schedule the follow-up sleep study and is agreeable to do so now as I suspect she still may have underlying sleep apnea and she reports she has gained a little weight back which I think could potentially be related.  She also never received the Emgality that I resent 6 months ago, I asked her to let us know next time as it appears that went to [facility] and therefore I sent to [facility] specialty pharmacy today to resume.  She would also like to resume topiramate and we will start at 25 mg twice daily with gradual titration up to 50 mg twice daily or she could stay at 25/50 mg like she previously took.   Despite being off prescription preventative she is only having 2 migraines a month and they typically resolve with acetaminophen or Aleve. We discussed she does not need both of the preventatives, but she would prefer to start both now hoping it also helps with weight loss.  No papilledema on last eye exam 9/16/2023.     Workup:  -MRI brain with without contrast 10/6/2017:  1. No acute intracranial abnormality. Specifically, no acute infarction,   intracranial hemorrhage, intracranial mass or mass effect.   2. Questionable protrusion of the LEFT optic disc into posterior aspect of LEFT   orbit, correlating with the reported  "history of papilledema. Bilateral   transverse dural venous sinus stenosis is present. Findings can be seen in the   setting of idiopathic intracranial hypertension.   3. No evidence of dural venous sinus thrombosis.   - 10/2017:  Per [facility] Neurology resident note  \"An outside MRI of the brain was completed which demonstrated questionable protrusion of the left optic disc into posterior aspect of left orbit correlating with reported history of papilledema, transverse dural venous sinus stenosis is present with findings consistent with idiopathic intracranial hypertension.\" (no mention of Chiari) *As of my retrospective review she does have partially empty sella, cerebellar tonsils overlying foramen magnum without Chiari, prominence of left optic nerve greater than right with some tortuosity, appears to have posterior globe flattening bilaterally left greater than right  - 10/2017: at  ED she had a lumbar puncture with opening pressure 55 cm H20 and closing pressure 22.5 cm H20 with relief of her symptoms after  -  lumbar puncture 01/27/2021 opening pressure 11.   - 11/12/21 will hold off on repeat imaging at this time due to no new symptoms, no red flags, no significant worsening, no visual changes, but would have low threshold for repeat MRI brain if needed in the future for any new or concerning features.     Preventative:  - we discussed headache hygiene and lifestyle factors that may improve headaches  - resume    Galcanezumab-gnlm 120 MG/ML SOAJ; Inject 120 mg under the skin every 28 days. Discussed proper use, possible side effects and risks.  -   resume  topiramate (TOPAMAX) 25 mg tablet; 1 PO tab BID for 1 week, then 1 tab QAM and 2 tabs QHS for 1 week and finish at 2 tabs BID. Discussed proper use, possible side effects and risks.  - past:  Propranolol, topiramate, acetazolamide-patient does not recall how long she was on any of these or what affect had  - future options: Resuming " acetazolamide/Diamox, alternative CGRP med     Abortive:  - discussed not taking over-the-counter or prescription pain medications more than 3 days per week to prevent medication overuse/rebound headache  - She is not interested in prescription abortive for headache at this time, acetaminophen typically helps - asked again 3/19/24  -Prescribed in the past and never taken:   Rizatriptan (MAXALT) 10 MG tablet; Take 1 tablet (10 mg total) by mouth once as needed for migraine May repeat in 2 hours if needed. Max 2/24 hours, 9/month. Discussed proper use, possible side effects and risks.  - past:  Sumatriptan, Fioricet, methocarbamol-patient does not recall how long she was on any of these or what affect had, in the past prescribed dexamethasone, cyclobenzaprine, indomethacin with Carafate prior and is unclear if patient ever took these  - future options:  Alternative Triptan, prochlorperazine, Toradol IM or p.o., could consider trial for 5 days of Depakote or dexamethasone for prolonged migraine, ubrelvy, reyvow, nurtec    SHARYN (obstructive sleep apnea)  -     Diagnostic Sleep Study; Future      Patient instructions         I am placing a referral for a sleep study and if it is abnormal we will place one to the sleep medicine specialist team to further evaluate your sleep issues.  Please call [phone] to schedule the sleep study and afterwards if needed I recommend you see one of the following providers:  Louie Canchola PA-C       Headache/migraine treatment:   Abortive medications (for immediate treatment of a headache):   It is ok to take  acetaminophen if they help your headaches you should limit these to No more than 3 times a week to avoid medication overuse/rebound headaches.          Prescription preventive medications for headaches/migraines   (to take every day to help prevent headaches -  not to take at the time of headache):    [x] -  Emgality/Galcanezumab -  120 mg injections every 28 days     READ INSTRUCTIONS that come with the medication. REFRIGERATE. Keep out of direct sunlight. Prior to administration, allow to come to room temperature for 30 minutes. Do not warm using a heat source (eg, microwave or hot water). Do not shake. Administer in preferably abdomen (avoiding 2 inches around the navel), thigh, upper arm, or buttocks avoiding areas of skin that are tender, bruised, red or hard. Deliver entire contents of single-use prefilled pen or syringe.    -----------  - Topiramate  25 mg in a.m. And 25 mg in p.m. For 1 week, then take 25 mg in a.m. And 50 mg in p.m. For 1 week, then take 50 mg in a.m. and 50 mg in p.m. And continue      - generally the common side effects improve as your body gets used to the medication.  If we need to spread out a more gradual increase of the medication on a longer scale we can, just call if any questions or concerns  - if necessary, if the a.m. dose is causing side effects we can always have you take the full dose at night instead        Lifestyle Recommendations:  [x] SLEEP - Maintain a regular sleep schedule: Adults need at least 7-8 hours of uninterrupted a night. Maintain good sleep hygiene:  Going to bed and waking up at consistent times, avoiding excessive daytime naps, avoiding caffeinated beverages in the evening, avoid excessive stimulation in the evening and generally using bed primarily for sleeping.  One hour before bedtime would recommend turning lights down lower, decreasing your activity (may read quietly, listen to music at a low volume). When you get into bed, should eliminate all technology (no texting, emailing, playing with your phone, iPad or tablet in bed).  [x] HYDRATION - Maintain good hydration.  Drink  2L of fluid a day (4 typical small water bottles)  [x] DIET - Maintain good nutrition. In particular don't skip meals and try and eat  "healthy balanced meals regularly.  [x] TRIGGERS - Look for other triggers and avoid them: Limit caffeine to 1-2 cups a day or less. Avoid dietary triggers that you have noticed bring on your headaches (this could include aged cheese, peanuts, MSG, aspartame and nitrates).  [x] EXERCISE - physical exercise as we all know is good for you in many ways, and not only is good for your heart, but also is beneficial for your mental health, cognitive health and  chronic pain/headaches. I would encourage at the least 5 days of physical exercise weekly for at least 30 minutes.      Education and Follow-up  [x] Please call with any questions or concerns. Of course if any new concerning symptoms go to the emergency department.  [x] Follow up 3-6 months, sooner if needed      We discussed I would prefer to see you in 3 months to keep a closer eye on things and make sure you are doing better and tolerating the medications okay, if you would prefer to be seen in 6 months that is your choice but sometimes it is hard to get back in sooner if there are issues.      CC:   We had the pleasure of evaluating Bella Mott in neurological consultation today. Bella Mott is a   right handed female who presents today for evaluation of headaches.      History obtained from patient as well as available medical record review.   Patient is a poor historian regarding history at times      History of Present Illness:   Interval history as of 3/19/2024  - no significant new or concerning neurologic symptoms since last visit   - eye doctor - switched to [facility] and last saw 9/16/23 - no change in prescription, something added to fix something in right eye - does not remember details, no papilledema \"everything was perfect\" she denies vision changes     Headaches and migraines   On average 2 migraines a month and Tylenol or Aleve typically    Preventative:   - emgality - not gotten since last visit - 6 months  -PA is good through 7/14/2024 " "she just has not gotten since last visit since it was sent to [facility] and has to go to [facility] and neither of us were aware that was the issue    Abortive:   Tylenol or Aleve typically help  Denies bothersome side effects        Interval history as of 9/19/2023  - no significant new or concerning neurologic symptoms since last visit   - bariatric surgery- lost 74 lbs over past year    - sleep is good, 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 -she did have a sleep study in 3/2022    - eye doctor - switched to [facility] and last saw 9/16/23 - no change in prescription, something added to fix something in right eye - does not remember details, no papilledema \"everything was perfect\" she denies vision changes  - has sleep study prior to surgery 8/2022 in [place] - mild sleep apnea     Headaches and migraines     No significant migraines on emgality  Milder casual headaches once a week max with stress and improves with tylenol     Preventative:    topiramate 25mg/50 mg - weaned off on her own around Jan since not needed anymore  - emgality monthly      Abortive:    tylenol   Denies bothersome side effects       Interval history as of 11/4/2022  - denies any new or concerning neurologic symptoms since last visit   - sleeping 7-8 hours nightly and well   - low stress, no new symptoms, no vision changes   - bariatric surgery since last visit on 9/19/22 - lost 27 lbs already     Headaches and migraines   1-2 migraines a month that respond to tylenol   Night and day improvement from the past     Preventative: topiramate 25mg/50 mg   - emgality monthly    Abortive: tylenol   Rizatriptan - doesn't know if she ever took or when but doesn't need now  Denies bothersome side effects      Interval history as of 5/18/2022  - denies any new or concerning neurologic symptoms since last visit   - following with bariatrics and they also sent her for sleep study + for SHARYN sleep report from 03/21/2022 which showed mild degree of " sleep fragmentation and mild obstructive sleep apnea.  Sleep efficiency was only 58% so the AHI may be underestimated. (AHI 8.16)  Plan is for scheduling CPAP titration study.    Headaches and migraines   She reports she continues to have improvement on emgality approximately 3 migraines per week, some wearing off when due for next dose     Preventative:   -  topiramate 25 mg b.i.d.  -trial of emgality -     Abortive:   Rizatriptan - doesn't know   Denies bothersome side effects     Interval history as of 2/18/2022  - denies any new or concerning neurologic symptoms since last visit   - ophthalmology note uploaded 01/14/2022 documented no papilledema  - requesting FMLA which she will bring in     Headaches and migraines   Improved to 2 migraines per week     Preventative:    - trial of topiramate - side effects-  25 mg BID  - trial of emgality - started 1/21/22 Denies bothersome side effects     Abortive:   - trial of rizatriptan - helps  - trial of decadron 1/10/22 - does not remember      Interval History as of 11/12/2021:  - she did not bring an MRI brain images on CD for my review or ophthalmology report for my review  She has had 5 headaches this last week. Before this week was not having headaches very often she says   Currently has a headache, started around noon today, throbbing in whole head. Currently 7/10. No nausea. + photophobia.   Headaches are bifrontal and bioccipital.   Went to the Ophthalmologist (Dr. Mason in [place]) - gave prescription for the some glasses. They did not see papilledema per patient.  Sleeping okay. Baby is sleeping through the night. 5 months old, not breast feeding   Drinking a lot of water    s/p sterilization.     Abortive:  Taking Tylenol or ibuprofen at least 3-4 times per week     Preventative:  - not on diamox  - interested in preventative       Interval history as of 7/9/2021   - She was to follow-up in 4 weeks since last visit (no show 12/12/19) and she is now  "following up 2 years later   - lumbar puncture 01/27/2021 opening pressure 11.  Please see EMR for details  - had baby 6/15/21, preeclampsia, breastfeeding  - Neuro saw her inpatient 6/17/21 - see EMR    Denies vision loss or other visual symptoms  - has upcoming visit in 7/28/2021 with  ophthalmology    Headaches and migraines   - headaches prior to pregnancy, worse while pregnant especially first trimester  - no headaches since delivery 3 weeks ago    Preventative: nothing   - not on diamox - per data - \"Due to the potential for serious adverse reactions in the  infant, the  recommends a decision be made whether to discontinue breastfeeding or to discontinue the drug, taking into account the importance of treatment to the mother.\"      Interval history as of 11/07/2019  - She has not followed up with nutrition as recommended the setting of IIH - although she has upcoming appointment  - She has not followed up with Ophthalmology as recommended for IIH   - she did not obtain blood work as ordered  - she did not get MRI brain on CD for my review    She reports no improvement in headaches  - daily headaches, worse 4 times a week   - acetazolamide 500 mg b.i.d. - forgets most of the time, only taking 2-3 days a week 250 mg TID for some reason   - trial of indomethacin 50 mg - took daily for 2 weeks     History of initial visit 09/12/2019  Headaches started around 9/2017  - was evaluated at  ED 09/22/2017 and 09/23/2017 - diffuse headache, photophobia, no vision changes  - ED 10/06/2017 where she was evaluated by Neurology for headache, eye pain for months.  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  In the ED she had a lumbar " puncture with opening pressure 55 cm H20 and closing pressure 22.5 cm H20 with relief of her symptoms after.  - she was started on topiramate 25 mg b.i.d. And recommended outpatient Neurology follow-up  - 05/24/2018 return to ED with possible migraine  - ED 05/27/2018  - 06/05/2018 follow up with primary care prescribed acetazolamide and topiramate.     Previously followed with [facility]rology  Last visit 05/2018  Previously on acetazolamide - only took 1 month and then stop feeling due to did not have insurance     Last visit with eye doctor was around 09/2018 - per patient vision was perfect and she did not have to come back  - she reports they did not mention papilledema then (and she does recall that they dilated her eyes), that it was the neurologist previously who had      *Today denies vision symptoms           Headaches started at what age? Started age 18 and came bad 21 years old  How often do the headaches occur?   - as of 9/12/2019: daily for about 6 months  3 headache free days a month only   What time of the day do the headaches start? Sometimes wakes with it, Afternoon   How long do the headaches last? 1-2  hours  Are you ever headache free? rarely     Aura? without aura     Last eye exam: around 9/2018 she thinks      Where is your headache located and pain quality?   - bifrontal and apex, bilateral retro-orbital - pulsating, pressure, stabbing   What is the intensity of pain? Average: 7/10, worst 9/10  Associated symptoms:   [] Nausea       [] Vomiting        [] Diarrhea  [x] Insomnia    [] Stiff or sore neck   [x] Problems with concentration  [x] Photophobia     [x]Phonophobia      [] Osmophobia  [] Blurred vision   [x] Prefer quiet, dark room  [x] Light-headed or dizzy     [] Tinnitus   [] Hands or feet tingle or feel numb/paresthesias       [] Red ear      [] Ptosis   [] Facial droop  [] Lacrimation  [] Nasal congestion/rhinorrhea   [] Flushing of face  [] Change in pupil size     Number  of days missed per month because of headaches:  Work (or school) days: 3     Headache triggers:  Exercise, softball, standing up quickly, wakes with it     Have you seen someone else for headaches or pain? Yes, [facility]  Have you had trigger point injection performed and how often? No  Have you had Botox injection performed and how often? No   Have you had epidural injections or transforaminal injections performed? No  Are you current pregnant or planning on getting pregnant? No, IUD - now had surgery   Have you ever had any Brain imaging? yes     What medications do you take or have you taken for your headaches?   ABORTIVE:    OTC medications have been ineffective   Ibuprofen -   - as of 9/12/19: 3 times day - 5 days a week, helps a little      Past Per chart  - pt does not remember  sumatriptan 25 mg - she does not remember   fioricet 6/2018  Methocarbamol      PREVENTIVE:   Nothing currently         Past per chart - pt does not remember  - propranolol 20 mg 9/2017  - topiramate 25 mg BID 9/2017  - acetazolamide 250 mg - 6/2018     Alternative therapies used in the past for headaches? no other headache interventions have been tried     Neck pain?: chronic     LIFESTYLE  Sleep   - averages: 7 hours  - does not think she snores  - wakes 2 times a night  Problems falling asleep?:   Yes  Problems staying asleep?:  Yes        Physical activity: softball in summer, gym 3 days a week      Water: 2 bottles  per day  Caffeine: none per day  Diet:  Do you ever skip meals?  No     Mood:   Denies history of anxiety or depression or other diagnosed mood disorder     The following portions of the patient's history were reviewed and updated as appropriate: allergies, current medications, past family history, past medical history, past social history, past surgical history and problem list.     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 [employer]   Education: 12th, studying accounting and English   Lives with  Mom, sister, daughter (7) Meg and and Pepper is 3 (21)     Illicit Drugs: denies  Alcohol/tobacco: Denies tobacco use, alcohol intake: social drinker      Past Medical History:     Past Medical History:   Diagnosis Date    31 weeks gestation of pregnancy 2021    GBS bacteruria- needs PCN intrapartum    Chiari malformation type I (HCC)     Chronic fatigue     last assessed 17    COVID-19 2020    Fever and chills 2020    High risk pregnancy with high inhibin 3/29/2021    Hyperemesis gravidarum 2021    IV fluid infusions ordered Phenergan ordered     Hyperlipidemia     resolved 17    Hypertension     pre-eclampsia     Idiopathic intracranial hypertension     Intrauterine growth restriction affecting antepartum care of mother in third trimester 6/3/2021    Pre-eclampsia     with severe features     Varicella     pt unsure    Visual impairment     glasses       Patient Active Problem List   Diagnosis    IIH (idiopathic intracranial hypertension)    Papilledema    Questionable Chiari malformation type I (HCC)    Status post primary low transverse  section    Status post bilateral salpingectomy    Diarrhea       Medications:      Current Outpatient Medications   Medication Sig Dispense Refill    acetaminophen (TYLENOL) 325 mg tablet Take 650 mg by mouth every 6 (six) hours as needed      Galcanezumab-gnlm 120 MG/ML SOAJ Inject 120 mg under the skin every 28 days 1 mL 11    Multiple Vitamin (multivitamin) tablet Take 1 tablet by mouth daily      topiramate (TOPAMAX) 25 mg tablet 1 PO tab BID for 1 week, then 1 tab QAM and 2 tabs QHS for 1 week and finish at 2 tabs BID. 120 tablet 4    loperamide (IMODIUM A-D) 2 MG tablet Take 1 tablet (2 mg total) by mouth 4 (four) times a day as needed for diarrhea  (Patient not taking: Reported on 3/19/2024) 30 tablet 0    metroNIDAZOLE (METROGEL) 1 % gel Apply topically daily (Patient not taking: Reported on 3/29/2023) 45 g 0     No current facility-administered medications for this visit.        Allergies:    No Known Allergies    Family History:     Family History   Problem Relation Age of Onset    Leukemia Maternal Grandfather     No Known Problems Mother     Other Father         MVA    No Known Problems Sister     Hypertension Maternal Grandmother     No Known Problems Daughter     No Known Problems Sister        Social History:     Social History     Socioeconomic History    Marital status: Single     Spouse name: Not on file    Number of children: 1    Years of education: 12    Highest education level: High school graduate   Occupational History    Not on file   Tobacco Use    Smoking status: Never    Smokeless tobacco: Never   Vaping Use    Vaping status: Never Used   Substance and Sexual Activity    Alcohol use: Not Currently     Comment: socially    Drug use: Never    Sexual activity: Yes     Partners: Male     Birth control/protection: None, Condom Male, Female Sterilization   Other Topics Concern    Not on file   Social History Narrative    Always uses seatbelt    High school student    Younger sister    Lives with mother (single parent)     Social Determinants of Health     Financial Resource Strain: Low Risk  (3/24/2023)    Overall Financial Resource Strain (CARDIA)     Difficulty of Paying Living Expenses: Not hard at all   Food Insecurity: No Food Insecurity (3/24/2023)    Hunger Vital Sign     Worried About Running Out of Food in the Last Year: Never true     Ran Out of Food in the Last Year: Never true   Transportation Needs: No Transportation Needs (3/24/2023)    PRAPARE - Transportation     Lack of Transportation (Medical): No     Lack of Transportation (Non-Medical): No   Physical Activity: Inactive (1/13/2021)    Exercise Vital Sign     Days of Exercise  "per Week: 0 days     Minutes of Exercise per Session: 0 min   Stress: No Stress Concern Present (1/13/2021)    [place] [employer] - Occupational Stress Questionnaire     Feeling of Stress : Not at all   Social Connections: Moderately Isolated (1/13/2021)    Social Connection and Isolation Panel [NHANES]     Frequency of Communication with Friends and Family: More than three times a week     Frequency of Social Gatherings with Friends and Family: Once a week     Attends Jew Services: Never     Active Member of Clubs or Organizations: No     Attends Club or Organization Meetings: Never     Marital Status: Living with partner   Intimate Partner Violence: Not At Risk (1/13/2021)    Humiliation, Afraid, Rape, and Kick questionnaire     Fear of Current or Ex-Partner: No     Emotionally Abused: No     Physically Abused: No     Sexually Abused: No   Housing Stability: Low Risk  (4/3/2023)    Housing Stability Vital Sign     Unable to Pay for Housing in the Last Year: No     Number of Places Lived in the Last Year: 1     Unstable Housing in the Last Year: No         Objective:         Physical Exam:                                                                 Vitals:            Constitutional:    /84 (BP Location: Right arm, Patient Position: Sitting, Cuff Size: Standard)   Pulse 83   Temp 98.1 °F (36.7 °C) (Temporal)   Ht 5' 8\" (1.727 m)   Wt 79.4 kg (175 lb)   BMI 26.61 kg/m²   BP Readings from Last 3 Encounters:   03/19/24 118/84   09/19/23 112/72   09/11/23 109/75     Pulse Readings from Last 3 Encounters:   03/19/24 83   09/19/23 63   08/24/23 (!) 54         Well developed, well nourished, well groomed.        Psychiatric:  Normal behavior and appropriate affect        Neurological Examination:     Mental status/cognitive function:   Recent and remote memory appear intact. Attention span and concentration as well as fund of knowledge are appropriate for age. Normal language and " spontaneous speech.  Cranial Nerves:   VII-facial expression symmetric  Motor Exam: symmetric bulk throughout. no atrophy, fasciculations or abnormal movements noted.   Coordination:  no apparent dysmetria, ataxia or tremor noted  Gait: steady casual gait              Pertinent lab results:   Please see EMR for recent labs   09/15/2021 CMP and CBC unremarkable   Per our system  09/29/2016:  CMP and CBC unremarkable   TSH 3.3     Imaging:   No head imaging in our system but she has had an MRI brain per report at outside hospital  - her [facility] Neurology resident note:  - ED 10/06/2017 where she was evaluated by Neurology for headache, eye pain for months.  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  In the ED she had a lumbar puncture with opening pressure 55 cm H20 and closing pressure 22.5 cm H20 with relief of her symptoms after.     -  lumbar puncture 01/27/2021 opening pressure 11.  Please see EMR for details  Review of Systems:   ROS obtained by medical assistant and personally reviewed, but if any symptoms listed below say negative, does not mean patient has not had this symptom since last visit, please see HPI for details of symptoms discussed this visit. I recommended PCP follow up for non neurologic problems.    Review of Systems   Constitutional:  Negative for appetite change and fever.   HENT: Negative.  Negative for hearing loss, tinnitus, trouble swallowing and voice change.    Eyes: Negative.  Negative for photophobia and pain.   Respiratory: Negative.  Negative for shortness of breath.    Cardiovascular: Negative.  Negative for palpitations.   Gastrointestinal: Negative.  Negative for nausea and vomiting.   Endocrine: Negative.   Negative for cold intolerance.   Genitourinary: Negative.  Negative for dysuria, frequency and urgency.   Musculoskeletal: Negative.  Negative for myalgias and neck pain.   Skin: Negative.  Negative for rash.   Neurological:  Positive for headaches. Negative for dizziness, tremors, seizures, syncope, facial asymmetry, speech difficulty, weakness, light-headedness and numbness.   Hematological: Negative.  Does not bruise/bleed easily.   Psychiatric/Behavioral: Negative.  Negative for confusion, hallucinations and sleep disturbance.       I have spent 21 minutes with Patient  today in which greater than 50% of this time was spent in counseling/coordination of care regarding Prognosis, Risks and benefits of tx options, Instructions for management, Patient education, Importance of tx compliance, Risk factor reductions, Impressions, Counseling / Coordination of care, Documenting in the medical record, Obtaining or reviewing history  , and Communicating with other healthcare professionals . I also spent 14 minutes non face to face for this patient the same day.       Author:  Rocio Saleh MD 3/19/2024 11:57 AM

## 2024-03-19 NOTE — PATIENT INSTRUCTIONS
I am placing a referral for a sleep study and if it is abnormal we will place one to the sleep medicine specialist team to further evaluate your sleep issues.  Please call [phone] to schedule the sleep study and afterwards if needed I recommend you see one of the following providers:  Louie Canchola PA-C       Headache/migraine treatment:   Abortive medications (for immediate treatment of a headache):   It is ok to take  acetaminophen if they help your headaches you should limit these to No more than 3 times a week to avoid medication overuse/rebound headaches.          Prescription preventive medications for headaches/migraines   (to take every day to help prevent headaches - not to take at the time of headache):    [x] -  Emgality/Galcanezumab -  120 mg injections every 28 days     READ INSTRUCTIONS that come with the medication. REFRIGERATE. Keep out of direct sunlight. Prior to administration, allow to come to room temperature for 30 minutes. Do not warm using a heat source (eg, microwave or hot water). Do not shake. Administer in preferably abdomen (avoiding 2 inches around the navel), thigh, upper arm, or buttocks avoiding areas of skin that are tender, bruised, red or hard. Deliver entire contents of single-use prefilled pen or syringe.    -----------  - Topiramate  25 mg in a.m. And 25 mg in p.m. For 1 week, then take 25 mg in a.m. And 50 mg in p.m. For 1 week, then take 50 mg in a.m. and 50 mg in p.m. And continue      - generally the common side effects improve as your body gets used to the medication.  If we need to spread out a more gradual increase of the medication on a longer scale we can, just call if any questions or concerns  - if necessary, if the a.m. dose is causing side effects we can always have you take the full dose at night instead        Lifestyle  Recommendations:  [x] SLEEP - Maintain a regular sleep schedule: Adults need at least 7-8 hours of uninterrupted a night. Maintain good sleep hygiene:  Going to bed and waking up at consistent times, avoiding excessive daytime naps, avoiding caffeinated beverages in the evening, avoid excessive stimulation in the evening and generally using bed primarily for sleeping.  One hour before bedtime would recommend turning lights down lower, decreasing your activity (may read quietly, listen to music at a low volume). When you get into bed, should eliminate all technology (no texting, emailing, playing with your phone, iPad or tablet in bed).  [x] HYDRATION - Maintain good hydration.  Drink  2L of fluid a day (4 typical small water bottles)  [x] DIET - Maintain good nutrition. In particular don't skip meals and try and eat healthy balanced meals regularly.  [x] TRIGGERS - Look for other triggers and avoid them: Limit caffeine to 1-2 cups a day or less. Avoid dietary triggers that you have noticed bring on your headaches (this could include aged cheese, peanuts, MSG, aspartame and nitrates).  [x] EXERCISE - physical exercise as we all know is good for you in many ways, and not only is good for your heart, but also is beneficial for your mental health, cognitive health and  chronic pain/headaches. I would encourage at the least 5 days of physical exercise weekly for at least 30 minutes.      Education and Follow-up  [x] Please call with any questions or concerns. Of course if any new concerning symptoms go to the emergency department.  [x] Follow up 3-6 months, sooner if needed

## 2024-03-26 ENCOUNTER — OFFICE VISIT (OUTPATIENT)
Dept: DENTISTRY | Facility: CLINIC | Age: 29
End: 2024-03-26

## 2024-03-26 VITALS — SYSTOLIC BLOOD PRESSURE: 110 MMHG | DIASTOLIC BLOOD PRESSURE: 76 MMHG | HEART RATE: 56 BPM

## 2024-03-26 DIAGNOSIS — Z01.21 ENCOUNTER FOR DENTAL EXAMINATION AND CLEANING WITH ABNORMAL FINDINGS: Primary | ICD-10-CM

## 2024-03-26 PROCEDURE — D1110 PROPHYLAXIS - ADULT: HCPCS

## 2024-03-26 PROCEDURE — D0120 PERIODIC ORAL EVALUATION - ESTABLISHED PATIENT: HCPCS

## 2024-03-26 NOTE — DENTAL PROCEDURE DETAILS
Prophylaxis completed with ultrasonic  and hand instrumentation.  Soft plaque removed and supragingival calculus removed from all quads.  Polished with prophy cup and paste.  Flossed and provided Oral Health Instructions.  Demonstrated proper brushing and flossing technique.  Patient left satisfied and ambulatory.         PERIODIC EXAM, ADULT PROPHY    REVIEWED MED HX: meds, allergies, health changes reviewed in [facility]. All consents signed.  CHIEF CONCERN: Patient is experiencing sensitivity when eating candy on her Frank pre molars.  PAIN SCALE:  0  ASA CLASS:  II  PLAQUE:  mild     CALCULUS:   moderate   BLEEDING:   none    STAIN :   slight  ORAL HYGIENE:  fair    PERIO: WNL    Hand scaled, polished and flossed. Used Cavitron.     Oral Hygiene Instruction:  recommended brushing 2 x daily for 2 minutes MIN, recommended flossing daily, reviewed dietary precautions.    Dispensed: toothbrush, toothpaste and floss    Visual and Tactile Intraoral/ Extraoral evaluation: Oral and Oropharyngeal cancer evaluation. No findings     Dr. Gilliam  exam=   Reviewed with patient clinical and radiographic findings and patient verbalized understanding. All questions and concerns addressed.     REFERRALS: no referrals provided    CARIES FINDINGS:   #21 and #28 - O       TREATMENT  PLAN :   1) #21 and #28 O Possibly without anes.    Next Recall: 6 month recall with periodic exam and 4 BWX    Last BWX: 8/24/2023  Last  FMX : 8/2/2019

## 2024-03-27 ENCOUNTER — TELEPHONE (OUTPATIENT)
Dept: NEUROLOGY | Facility: CLINIC | Age: 29
End: 2024-03-27

## 2024-03-27 NOTE — TELEPHONE ENCOUNTER
VM 3/22 at 4:18 pm:    Meagan, this is Kianna with [facility]. Calling on a mutual patient, Bella Mott, date of birth [birth year], calling to inform you we've been unable to reach the patient to set up the delivery of her Emgality. If you could please give us a call back at [phone], option 2  _______________________________________________________________________    Called [facility] and spoke with Sharla. The pharmacy has been unable to contact the pt. Confirmed they have the correct phone number.     Spoke with pt. Gave her the number for [facility]. She stated that she would give them a call to set up the delivery.

## 2024-03-28 NOTE — TELEPHONE ENCOUNTER
VM 3/25 at 4:32 pm:    Message from [facility] calling to see if the office has an alternate phone number for pt. See previous message. This has been addressed.

## 2024-04-23 ENCOUNTER — HOSPITAL ENCOUNTER (OUTPATIENT)
Dept: SLEEP CENTER | Facility: CLINIC | Age: 29
Discharge: HOME | End: 2024-04-23
Payer: COMMERCIAL

## 2024-04-23 DIAGNOSIS — G47.33 OSA (OBSTRUCTIVE SLEEP APNEA): ICD-10-CM

## 2024-04-23 PROCEDURE — 95810 POLYSOM 6/> YRS 4/> PARAM: CPT

## 2024-04-24 PROBLEM — G47.33 OSA (OBSTRUCTIVE SLEEP APNEA): Status: ACTIVE | Noted: 2024-04-24

## 2024-04-24 NOTE — PROGRESS NOTES
Sleep Study Documentation    Pre-Sleep Study       Sleep testing procedure explained to patient:YES    Patient napped prior to study:NO    Caffeine:Dayshift worker after 12PM.  Caffeine use:NO    Alcohol:Dayshift workers after 5PM: Alcohol use:NO    Typical day for patient:YES       Study Documentation    Sleep Study Indications:  EDS, impaired concentration/memory.    Sleep Study: Diagnostic   Snore:None  Supplemental O2: no    O2 flow rate (L/min) range 0  O2 flow rate (L/min) final 0  Minimum SaO2  94.0 %  Baseline SaO2  97.6 %      EKG abnormalities: no     EEG abnormalities: yes:  ECG artifact throughout study.  Averaged Ms.    Were abnormal behaviors in sleep observed:NO    Is Total Sleep Study Recording Time < 2 hours: N/A    Is Total Sleep Study Recording Time > 2 hours but study is incomplete: N/A    Is Total Sleep Study Recording Time 6 hours or more but sleep was not obtained: NO        Post-Sleep Study    Medication used at bedtime or during sleep study:NO    Patient reports time it took to fall asleep:less than 20 minutes    Patient reports waking up during study:1 to 2 times.  Patient reports returning to sleep without difficulty.    Patient reports sleeping 2 to 4 hours without dreaming.    Does the Patient feel this is a typical night of sleep:typical    Patient rated sleepiness: Very sleepy or tired    PAP treatment:no.

## 2024-04-30 ENCOUNTER — OFFICE VISIT (OUTPATIENT)
Dept: DENTISTRY | Facility: CLINIC | Age: 29
End: 2024-04-30

## 2024-04-30 VITALS — SYSTOLIC BLOOD PRESSURE: 112 MMHG | HEART RATE: 60 BPM | DIASTOLIC BLOOD PRESSURE: 75 MMHG

## 2024-04-30 DIAGNOSIS — K02.9 INCIPIENT ENAMEL CARIES: Primary | ICD-10-CM

## 2024-04-30 PROCEDURE — D1206 TOPICAL APPLICATION OF FLUORIDE VARNISH: HCPCS

## 2024-04-30 NOTE — DENTAL PROCEDURE DETAILS
Fluoride Varnish Application     Bella Mott 28 y.o. female presents with self to [facility] for composite restoration  PMH reviewed, no changes, ASA II.   Pain level 0/10    Pt presents for resin restoration #21,28 Occlusal    Examined clinically and radiographically. Decay is extremely minor and barely penetrates enamel in mesial pits of teeth #21,28. Informed pt and discussed risks, benefits, alternatives. Advised against resin restorations- aggressive tx option given pt has excellent hygiene, no previous restorations, and decay is extremely incipient.     Pt agreed to apply fluoride varnish and monitor lesions for now.     Fluoride varnish applied to entire dentition, instructed pt not to eat or drink for 30 mins after application.     Patient dismissed ambulatory and alert.    Attending: Mor ZALDIVAR: recall

## 2024-05-30 ENCOUNTER — TELEPHONE (OUTPATIENT)
Dept: SLEEP CENTER | Facility: CLINIC | Age: 29
End: 2024-05-30

## 2024-05-30 NOTE — TELEPHONE ENCOUNTER
Called patient and advised sleep study resulted and does not show sleep apnea.  Patient will follow up with ordering provider Dr. Saleh, neurology.

## 2024-06-14 ENCOUNTER — TELEPHONE (OUTPATIENT)
Dept: INTERNAL MEDICINE CLINIC | Facility: CLINIC | Age: 29
End: 2024-06-14

## 2024-07-24 ENCOUNTER — TELEPHONE (OUTPATIENT)
Dept: NEUROLOGY | Facility: CLINIC | Age: 29
End: 2024-07-24

## 2024-07-24 NOTE — TELEPHONE ENCOUNTER
Called and spoke to patient to confirm their upcoming appointment with Dr. Saleh. Informed patient about arriving in the [place] location 15 minutes prior to their appointment to get checked in and going over chart.

## 2024-07-30 ENCOUNTER — OFFICE VISIT (OUTPATIENT)
Dept: NEUROLOGY | Facility: CLINIC | Age: 29
End: 2024-07-30
Payer: COMMERCIAL

## 2024-07-30 VITALS
TEMPERATURE: 97.9 F | SYSTOLIC BLOOD PRESSURE: 112 MMHG | HEART RATE: 70 BPM | OXYGEN SATURATION: 99 % | WEIGHT: 186.5 LBS | BODY MASS INDEX: 28.36 KG/M2 | DIASTOLIC BLOOD PRESSURE: 72 MMHG

## 2024-07-30 DIAGNOSIS — G43.009 MIGRAINE WITHOUT AURA AND WITHOUT STATUS MIGRAINOSUS, NOT INTRACTABLE: Primary | ICD-10-CM

## 2024-07-30 PROCEDURE — 99214 OFFICE O/P EST MOD 30 MIN: CPT | Performed by: PSYCHIATRY & NEUROLOGY

## 2024-07-30 NOTE — PROGRESS NOTES
"[facility]rology Conc[facility]Headache [facility] Consult - Follow up   PATIENT:  Bella Mott  MRN:  [id number]  :  1995  DATE OF SERVICE:  2024  REFERRED BY: No ref. provider found  PMD: Gina Trevino MD    Assessment/Plan:   Bella Mott is a very pleasant  28 y.o. 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  Initial evaluation 2019     Migraine without aura   Past SHARYN ( PSG, 24, no apneas on this study, was on topiramate 50 mg BID, 13.1% Stage N3 noted. 13.5% REM sleep noted,  lowest oxygen saturation was 94.0%, slept 21.8% of the evening in the supine position.--PSG 3/21/22 AHI 8.16, REM 15, Supine 10, )  History of idiopathic intracranial hypertension-currently no papilledema/resolved  She reports headaches started around age 18, but did not become bad until age 21. Patient is a very poor historian therefore history as obtained primarily through extensive medical record review.  In 2017 she presented to ED multiple times for diffuse headache, photophobia.  In 10/06/2017 she was evaluated by Neurology for documented results that I do not see in chart: \"An outside MRI of the brain was completed which demonstrated questionable protrusion of the left optic disc into posterior aspect of left orbit correlating with reported history of papilledema, transverse dural venous sinus stenosis is present with findings consistent with idiopathic intracranial hypertension.  In the ED she had a lumbar puncture with opening pressure 55 cm H20 and closing pressure 22.5 cm H20 with relief of her symptoms after.\"Per record review she was started on multiple medications including " at 1 point acetazolamide in the past, but due to no insurance she stop taking any these medications.  - as of 09/12/2019:  Chronic daily headache for the past approximately 6 months.  Prior to that on and off.  Has about 3 headache-free days per month.  She describes bifrontal, bilateral retro-orbital, apex pulsating, pressure, stabbing.  She denies aura.  She does have associated photophobia, phonophobia.    - as of 11/07/2019: Bella returns reporting daily mild headaches, worse 4 times a week.  Unfortunately she has been noncompliant with my recommendations, has only been taking acetazolamide 2-3 days a week, took indomethacin 50 mg daily for 2 weeks despite explicit instructions.  Did not follow up with Ophthalmology, did not get blood work and did not get an MRI brain.  Had her  come in and re-explained everything as well as the severe possible complications including vision loss if she is not compliant.  We will try to make it easier acetazolamide 500 mg b.i.d., dexamethasone for the next 5 days as she is very worried about current headache indomethacin as abortive.  She is very focused on trouble sleeping when she has about headache and asking for an option to treat pain and sleep.  We will try cyclobenzaprine.  - as of 7/9/2021:  Please see HPI for details.  She has been noncompliant with recommendations despite understanding the risk of losing her vision.  At this time she has had no headaches since giving birth 3 weeks ago she denies any vision issues whatsoever.  She has upcoming follow-up with Ophthalmology in a few weeks and plan will be determined from there depending on whether she has papilledema.  We discussed treatment would be Diamox which she has been instructed to start many many times in the past as per my notes and recent inpatient neurology notes.  She is breast-feeding at this time, therefore will not prophylactically treat with Diamox due to risk for fetus.  - as of 11/12/2021:  Bella returns for headaches and migraines. She reports she was seen by Ophthalmology soon after last visit and was told that she did not have papilledema, she did not have records sent to us and I asked again to get them just to make sure no IIH, will also have our staff reach out to them. Follow up with them next month she reports. She has been doing well without many headaches much until the past few weeks, but now it is frequent enough that she would like to start a prescription preventative. We discussed trial of topiramate which could help with migraines and if she had return of IIH. Trial of rizatriptan for abortive. Toradol IM today for migraine.   - as of 2/18/2022: Ophthalmology note showed no papilledema. Still recommended sleep study due to this history. She is taking emgality just one month so far and due for next shot. Taking topiramate 25 mg BID since last visit and we discussed gradual increase to 50 mg BID to try and help with migraine and weight. Rizatriptan helps for abortive.   - as of 5/18/2022: She reports she has been improving on emgality (with some wearing off effect at the end) and topiramate 25 mg b.i.d. With about 3 migraines per week that responds to rizatriptan.  She was diagnosed with mild sleep apnea since last visit in plans to treat this outside of our system which will also help with migraines.  We discussed she could increase topiramate to 50 mg b.i.d. If she would like as this may help more with migraines.  - as of 11/4/2022: She reports significant improvement with 1-2 migraines month on emgality and topiramate 25/50. This is much better than last visit, and less likely related to increasing topiramate by 25 mg, more likely related to bariatric surgery in September causing significant weight reduction and likely improvement in sleep apnea (mild/untreated).  She reports migraines typically improved with Tylenol and she does not recall rizatriptan which we can always re-add if  needed in the future.  She would like to wean off topiramate and therefore will start slow wean, but discussed watch out for increasing symptoms coming off as this is likely helping with multiple other issues.   - as of 9/19/2023: She reports she continues to do very well and even better than last visit, even after weaning off topiramate, with no significant migraines on Emgality monthly, no side effects and not needing migraine rescue medications.  No signs of papilledema on last eye exam.  Reviewed sleep study results that I was not aware of from 3/20/2022 which showed AHI 8.16 prior to bariatric surgery and discussed how the weight gain may have been related to untreated SHARYN and we will recheck to make sure this is not still present due to her history of IIH and risk for this returning in the setting of untreated SHARYN potentially.  - as of 3/19/2024: She reports she forgot to schedule the follow-up sleep study and is agreeable to do so now as I suspect she still may have underlying sleep apnea and she reports she has gained a little weight back which I think could potentially be related.  She also never received the Emgality that I resent 6 months ago, I asked her to let us know next time as it appears that went to [facility] and therefore I [facility] to resume.  She would also like to resume topiramate and we will start at 25 mg twice daily with gradual titration up to 50 mg twice daily or she could stay at 25/50 mg like she previously took.   Despite being off prescription preventative she is only having 2 migraines a month and they typically resolve with acetaminophen or Aleve. We discussed she does not need both of the preventatives, but she would prefer to start both now hoping it also helps with weight loss.  No papilledema on last eye exam 9/16/2023.   - as of 7/30/2024: She reports she continues to do very well on emgality with the addition of topiramate 50 mg BID since she had been  "doing worse last visit, with now on average once a week or less and self resolves typically with rest if not improved with acetaminophen or Aleve.  We discussed with headaches improving so significantly and no papilledema on last eye exam no recent vision changes and she did well for about a year and a half on just Emgality in the recent past completely up to her if she would like to wean off topiramate or continue it.  We discussed how to wean out if she would like.  I reviewed sleep study which showed no Apneas while on topiramate 50 mg BID and s/p weight loss thankfully.    Workup:  -MRI brain with without contrast 10/6/2017: No acute intracranial abnormality. Specifically, no acute infarction,   intracranial hemorrhage, intracranial mass or mass effect.  Questionable protrusion of the LEFT optic disc into posterior aspect of LEFT   orbit, correlating with the reported history of papilledema. Bilateral transverse dural venous sinus stenosis is present. Findings can be seen in the   setting of idiopathic intracranial hypertension.  No evidence of dural venous sinus thrombosis.   - 10/2017:  Per [facility] Neurology resident note  \"An outside MRI of the brain was completed which demonstrated questionable protrusion of the left optic disc into posterior aspect of left orbit correlating with reported history of papilledema, transverse dural venous sinus stenosis is present with findings consistent with idiopathic intracranial hypertension.\" (no mention of Chiari) *As of my retrospective review she does have partially empty sella, cerebellar tonsils overlying foramen magnum without Chiari, prominence of left optic nerve greater than right with some tortuosity, appears to have posterior globe flattening bilaterally left greater than right  - 10/2017: at  ED she had a lumbar puncture with opening pressure 55 cm H20 and closing pressure 22.5 cm H20 with relief of her symptoms after  -  lumbar puncture 01/27/2021 " opening pressure 11.   - 11/12/21 will hold off on repeat imaging at this time due to no new symptoms, no red flags, no significant worsening, no visual changes, but would have low threshold for repeat MRI brain if needed in the future for any new or concerning features.     Preventative:  - we discussed headache hygiene and lifestyle factors that may improve headaches  -  Galcanezumab-gnlm 120 MG/ML SOAJ; Inject 120 mg under the skin every 28 days. Discussed proper use, possible side effects and risks.  - As we discussed you can either continue topiramate 50 mg twice daily or you can gradually wean off as long as it is tolerated but we discussed if any new more headaches or migraines or vision changes especially could always just go back on it. Discussed proper use, possible side effects and risks.  - past:  Propranolol, topiramate, acetazolamide-patient does not recall how long she was on any of these or what affect had  - future options: Resuming acetazolamide/Diamox, alternative CGRP med     Abortive:  - discussed not taking over-the-counter or prescription pain medications more than 3 days per week to prevent medication overuse/rebound headache  - She is not interested in prescription abortive for headache at this time, acetaminophen or rest typically helps  -Prescribed in the past and never taken:   Rizatriptan (MAXALT) 10 MG tablet; Take 1 tablet (10 mg total) by mouth once as needed for migraine May repeat in 2 hours if needed. Max 2/24 hours, 9/month. Discussed proper use, possible side effects and risks.  - past:  Sumatriptan, Fioricet, methocarbamol-patient does not recall how long she was on any of these or what affect had, in the past prescribed dexamethasone, cyclobenzaprine, indomethacin with Carafate prior and is unclear if patient ever took these  - future options:  Alternative Triptan, prochlorperazine, Toradol IM or p.o., could consider trial for 5 days of Depakote or dexamethasone for prolonged  migraine, ubrelvy, reyvow, nurtec        Patient instructions     Continue to follow with your eye doctor closely and please try and have the note sent to me if you can.  Certainly absolutely call if they ever tell you papilledema again as we would have to change medications    After the visit I noted someone else ordered an iron panel and ferritin was 9 I would recommend following up with PCP and considering repletion if you are not already.  I recommend this because it can negatively impact sleep and thus headaches    If you are still on topiramate/Topamax at next visit I will order routine labs if no one else has     Headache/migraine treatment:   Abortive medications (for immediate treatment of a headache):   It is ok to take  acetaminophen if they help your headaches you should limit these to No more than 3 times a week to avoid medication overuse/rebound headaches.        Prescription preventive medications for headaches/migraines   (to take every day to help prevent headaches - not to take at the time of headache):    [x] -  Emgality/Galcanezumab -  120 mg injections every 28 days     READ INSTRUCTIONS that come with the medication. REFRIGERATE. Keep out of direct sunlight. Prior to administration, allow to come to room temperature for 30 minutes. Do not warm using a heat source (eg, microwave or hot water). Do not shake. Administer in preferably abdomen (avoiding 2 inches around the navel), thigh, upper arm, or buttocks avoiding areas of skin that are tender, bruised, red or hard. Deliver entire contents of single-use prefilled pen or syringe.    -----------  - Topiramate we discussed if you are happy with your current regimen and migraine frequency you can absolutely just continue it as it also can decrease pressure otherwise if you would like to optimize your medications you could see how you do as you pull back on topiramate/Topamax by taking 25 mg every week off the dose   -Basically you would take 25 mg  in a.m. and 50 mg in p.m. for a week and then if tolerated could decrease to 25 mg in a.m. and 25 mg in p.m. for a week and then could just discontinue but certainly if headaches or migraines worsened or if you had any new vision changes after stopping I would just resume as previous      - generally the common side effects improve as your body gets used to the medication.  If we need to spread out a more gradual increase of the medication on a longer scale we can, just call if any questions or concerns  - if necessary, if the a.m. dose is causing side effects we can always have you take the full dose at night instead      - important to know per data, this medication may, but typically does not affect birth control unless you are taking 200 mg daily or more and I highly recommend being on birth control while on this medication due to possible significant detrimental effects to fetus if you were to get pregnant     The medication you are taking may impact pregnancy. It has been associated with birth defects and learning problems if taken during pregnancy. Thus, it is important to avoid unplanned pregnancy while taking this medication. In the future, if you plan to become pregnant, then you should discuss this with your neurologist since medication adjustments may be indicated.      Lifestyle Recommendations:  [x] SLEEP - Maintain a regular sleep schedule: Adults need at least 7-8 hours of uninterrupted a night. Maintain good sleep hygiene:  Going to bed and waking up at consistent times, avoiding excessive daytime naps, avoiding caffeinated beverages in the evening, avoid excessive stimulation in the evening and generally using bed primarily for sleeping.  One hour before bedtime would recommend turning lights down lower, decreasing your activity (may read quietly, listen to music at a low volume). When you get into bed, should eliminate all technology (no texting, emailing, playing with your phone, iPad or tablet  "in bed).  [x] HYDRATION - Maintain good hydration.  Drink  2L of fluid a day (4 typical small water bottles)  [x] DIET - Maintain good nutrition. In particular don't skip meals and try and eat healthy balanced meals regularly.  [x] TRIGGERS - Look for other triggers and avoid them: Limit caffeine to 1-2 cups a day or less. Avoid dietary triggers that you have noticed bring on your headaches (this could include aged cheese, peanuts, MSG, aspartame and nitrates).  [x] EXERCISE - physical exercise as we all know is good for you in many ways, and not only is good for your heart, but also is beneficial for your mental health, cognitive health and  chronic pain/headaches. I would encourage at the least 5 days of physical exercise weekly for at least 30 minutes.      Education and Follow-up  [x] Please call with any questions or concerns. Of course if any new concerning symptoms go to the emergency department.  [x] Follow up 3-6 months, sooner if needed      We discussed I would prefer to see you in 3-4 months to keep a closer eye on things and make sure you are doing better and tolerating the medications okay, if you would prefer to be seen in 6 months that is your choice but sometimes it is hard to get back in sooner if there are issues.      CC:   We had the pleasure of evaluating Bella Mott in neurological consultation today. Bella Mott is a   right handed female who presents today for evaluation of headaches.      History obtained from patient as well as available medical record review.    History of Present Illness:   Interval history as of 7/30/2024  - no significant new or concerning neurologic symptoms since last visit   - eye doctor - switched to [facility] and last saw 9/16/23 - no change in prescription, something added to fix something in right eye - does not remember details, no papilledema \"everything was perfect\" she denies vision changes   - 7-8 hours a night   - no plans on pregnancy on " "topiramate  - work is cutting the hours and not stressful right now   -Recent labs reviewed    Headaches and migraines   Continues to do well on emgality   With on average one headache a week up to 8/10, relaxes, and it goes away on its own with nap usually    Preventative:   - emgality no issues - depends on pharmacy whether it is 28 or 30 days   - topiramate 50 mg BID - no SE     Abortive:   Usually self resolve  Tylenol or Aleve typically help  Denies bothersome side effects        Interval history as of 3/19/2024  - no significant new or concerning neurologic symptoms since last visit   - eye doctor - switched to [facility] and last saw 9/16/23 - no change in prescription, something added to fix something in right eye - does not remember details, no papilledema \"everything was perfect\" she denies vision changes     Headaches and migraines   On average 2 migraines a month and Tylenol or Aleve typically    Preventative:   - emgality - not gotten since last visit - 6 months  -PA is good through 7/14/2024 she just has not gotten since last visit since it was sent to [facility] and has to [facility] to [facility] and neither of us were aware that was the issue    Abortive:   Tylenol or Aleve typically help  Denies bothersome side effects        Interval history as of 9/19/2023  - no significant new or concerning neurologic symptoms since last visit   - bariatric surgery- lost 74 lbs over past year    - sleep is good, 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 -she did have a sleep study in 3/2022    - eye doctor - switched to [facility] and last saw 9/16/23 - no change in prescription, something added to fix something in right eye - does not remember details, no papilledema \"everything was perfect\" she denies vision changes  - has sleep study prior to surgery 8/2022 in [place] - mild sleep apnea     Headaches and migraines     No significant migraines on emgality  Milder casual headaches once a week max with stress and improves " with tylenol     Preventative:    topiramate 25mg/50 mg - weaned off on her own around [place] since not needed anymore  - emgality monthly      Abortive:    tylenol   Denies bothersome side effects       Interval history as of 11/4/2022  - denies any new or concerning neurologic symptoms since last visit   - sleeping 7-8 hours nightly and well   - low stress, no new symptoms, no vision changes   - bariatric surgery since last visit on 9/19/22 - lost 27 lbs already     Headaches and migraines   1-2 migraines a month that respond to tylenol   Night and day improvement from the past     Preventative: topiramate 25mg/50 mg   - emgality monthly    Abortive: tylenol   Rizatriptan - doesn't know if she ever took or when but doesn't need now  Denies bothersome side effects      Interval history as of 5/18/2022  - denies any new or concerning neurologic symptoms since last visit   - following with bariatrics and they also sent her for sleep study + for SHARYN sleep report from 03/21/2022 which showed mild degree of sleep fragmentation and mild obstructive sleep apnea.  Sleep efficiency was only 58% so the AHI may be underestimated. (AHI 8.16)  Plan is for scheduling CPAP titration study.    Headaches and migraines   She reports she continues to have improvement on emgality approximately 3 migraines per week, some wearing off when due for next dose     Preventative:   -  topiramate 25 mg b.i.d.  -trial of emgality -     Abortive:   Rizatriptan - doesn't know   Denies bothersome side effects     Interval history as of 2/18/2022  - denies any new or concerning neurologic symptoms since last visit   - ophthalmology note uploaded 01/14/2022 documented no papilledema  - requesting FMLA which she will bring in     Headaches and migraines   Improved to 2 migraines per week     Preventative:    - trial of topiramate - side effects-  25 mg BID  - trial of emgality - started 1/21/22 Denies bothersome side effects     Abortive:   - trial of  "rizatriptan - helps  - trial of decadron 1/10/22 - does not remember      Interval History as of 11/12/2021:  - she did not bring an MRI brain images on CD for my review or ophthalmology report for my review  She has had 5 headaches this last week. Before this week was not having headaches very often she says   Currently has a headache, started around noon today, throbbing in whole head. Currently 7/10. No nausea. + photophobia.   Headaches are bifrontal and bioccipital.   Went to the Ophthalmologist (Dr. Mason in [place]) - gave prescription for the some glasses. They did not see papilledema per patient.  Sleeping okay. Baby is sleeping through the night. 5 months old, not breast feeding   Drinking a lot of water    s/p sterilization.     Abortive:  Taking Tylenol or ibuprofen at least 3-4 times per week     Preventative:  - not on diamox  - interested in preventative       Interval history as of 7/9/2021   - She was to follow-up in 4 weeks since last visit (no show 12/12/19) and she is now following up 2 years later   - lumbar puncture 01/27/2021 opening pressure 11.  Please see EMR for details  - had baby 6/15/21, preeclampsia, breastfeeding  - Neuro saw her inpatient 6/17/21 - see EMR    Denies vision loss or other visual symptoms  - has upcoming visit in 7/28/2021 with  ophthalmology    Headaches and migraines   - headaches prior to pregnancy, worse while pregnant especially first trimester  - no headaches since delivery 3 weeks ago    Preventative: nothing   - not on diamox - per data - \"Due to the potential for serious adverse reactions in the  infant, the  recommends a decision be made whether to discontinue breastfeeding or to discontinue the drug, taking into account the importance of treatment to the mother.\"      Interval history as of 11/07/2019  - She has not followed up with nutrition as recommended the setting of [facility] - although she has upcoming appointment  - She has not " followed up with Ophthalmology as recommended for IIH   - she did not obtain blood work as ordered  - she did not get MRI brain on CD for my review    She reports no improvement in headaches  - daily headaches, worse 4 times a week   - acetazolamide 500 mg b.i.d. - forgets most of the time, only taking 2-3 days a week 250 mg TID for some reason   - trial of indomethacin 50 mg - took daily for 2 weeks     History of initial visit 09/12/2019  Headaches started around 9/2017  - was evaluated at  ED 09/22/2017 and 09/23/2017 - diffuse headache, photophobia, no vision changes  - ED 10/06/2017 where she was evaluated by Neurology for headache, eye pain for months.  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  In the ED she had a lumbar puncture with opening pressure 55 cm H20 and closing pressure 22.5 cm H20 with relief of her symptoms after.  - she was started on topiramate 25 mg b.i.d. And recommended outpatient Neurology follow-up  - 05/24/2018 return to ED with possible migraine  - ED 05/27/2018  - 06/05/2018 follow up with primary care prescribed acetazolamide and topiramate.     Previously followed with [facility]  Last visit 05/2018  Previously on acetazolamide - only took 1 month and then stop feeling due to did not have insurance     Last visit with eye doctor was around 09/2018 - per patient vision was perfect and she did not have to come back  - she reports they did not mention papilledema then (and she does recall that they dilated her eyes), that it was the neurologist previously who had      *Today denies vision symptoms           Headaches started at what age? Started age 18 and came bad 21 years old  How often do the headaches  occur?   - as of 9/12/2019: daily for about 6 months  3 headache free days a month only   What time of the day do the headaches start? Sometimes wakes with it, Afternoon   How long do the headaches last? 1-2  hours  Are you ever headache free? rarely     Aura? without aura     Last eye exam: around 9/2018 she thinks      Where is your headache located and pain quality?   - bifrontal and apex, bilateral retro-orbital - pulsating, pressure, stabbing   What is the intensity of pain? Average: 7/10, worst 9/10  Associated symptoms:   [] Nausea       [] Vomiting        [] Diarrhea  [x] Insomnia    [] Stiff or sore neck   [x] Problems with concentration  [x] Photophobia     [x]Phonophobia      [] Osmophobia  [] Blurred vision   [x] Prefer quiet, dark room  [x] Light-headed or dizzy     [] Tinnitus   [] Hands or feet tingle or feel numb/paresthesias       [] Red ear      [] Ptosis   [] Facial droop  [] Lacrimation  [] Nasal congestion/rhinorrhea   [] Flushing of face  [] Change in pupil size     Number of days missed per month because of headaches:  Work (or school) days: 3     Headache triggers:  Exercise, softball, standing up quickly, wakes with it     Have you seen someone else for headaches or pain? Yes, [facility]  Have you had trigger point injection performed and how often? No  Have you had Botox injection performed and how often? No   Have you had epidural injections or transforaminal injections performed? No  Are you current pregnant or planning on getting pregnant? No, IUD - now had surgery   Have you ever had any Brain imaging? yes     What medications do you take or have you taken for your headaches?   ABORTIVE:    OTC medications have been ineffective   Ibuprofen -   - as of 9/12/19: 3 times day - 5 days a week, helps a little      Past Per chart  - pt does not remember  sumatriptan 25 mg - she does not remember   fioricet 6/2018  Methocarbamol      PREVENTIVE:   Nothing currently         Past per chart -  pt does not remember  - propranolol 20 mg 9/2017  - topiramate 25 mg BID 9/2017  - acetazolamide 250 mg - 6/2018     Alternative therapies used in the past for headaches? no other headache interventions have been tried     Neck pain?: chronic     LIFESTYLE  Sleep   - averages: 7 hours  - does not think she snores  - wakes 2 times a night  Problems falling asleep?:   Yes  Problems staying asleep?:  Yes        Physical activity: softball in summer, gym 3 days a week      Water: 2 bottles  per day  Caffeine: none per day  Diet:  Do you ever skip meals?  No     Mood:   Denies history of anxiety or depression or other diagnosed mood disorder     The following portions of the patient's history were reviewed and updated as appropriate: allergies, current medications, past family history, past medical history, past social history, past surgical history and problem list.     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    Education: 12th, studying accounting and English   Lives with  Mom, sister, daughter (7) Meg and and Pepper is 3 (6/16/21)     Illicit Drugs: denies  Alcohol/tobacco: Denies tobacco use, alcohol intake: social drinker           Pertinent lab results:   Please see EMR for recent labs   09/15/2021 CMP and CBC unremarkable   Per our system  09/29/2016:  CMP and CBC unremarkable   TSH 3.3     Imaging:   No head imaging in our system but she has had an MRI brain per report at outside hospital  - her [facility] Neurology resident note:  - ED 10/06/2017 where she was evaluated by Neurology for headache, eye pain for months.  An outside MRI of the brain was completed which demonstrated questionable protrusion of the left optic disc into posterior aspect of left orbit correlating with reported history of papilledema,  transverse dural venous sinus stenosis is present with findings consistent with idiopathic intracranial hypertension.  In the ED she had a lumbar puncture with opening pressure 55 cm H20 and closing pressure 22.5 cm H20 with relief of her symptoms after.     -  lumbar puncture 2021 opening pressure 11.  Please see EMR for details    Past Medical History:     Past Medical History:   Diagnosis Date    31 weeks gestation of pregnancy 2021    GBS bacteruria- needs PCN intrapartum    Chiari malformation type I (HCC)     Chronic fatigue     last assessed 17    COVID-19 2020    Fever and chills 2020    High risk pregnancy with high inhibin 3/29/2021    Hyperemesis gravidarum 2021    IV fluid infusions ordered Phenergan ordered     Hyperlipidemia     resolved 18    Hypertension     pre-eclampsia 2016    Idiopathic intracranial hypertension     Intrauterine growth restriction affecting antepartum care of mother in third trimester 6/3/2021    Pre-eclampsia     with severe features     Varicella     pt unsure    Visual impairment     glasses       Patient Active Problem List   Diagnosis    IIH (idiopathic intracranial hypertension)    Papilledema    Questionable Chiari malformation type I (HCC)    Status post primary low transverse  section    Status post bilateral salpingectomy    Diarrhea    SHARYN (obstructive sleep apnea)       Medications:      Current Outpatient Medications   Medication Sig Dispense Refill    acetaminophen (TYLENOL) 325 mg tablet Take 650 mg by mouth every 6 (six) hours as needed      Galcanezumab-gnlm 120 MG/ML SOAJ Inject 120 mg under the skin every 28 days 1 mL 11    Multiple Vitamin (multivitamin) tablet Take 1 tablet by mouth daily      topiramate (TOPAMAX) 25 mg tablet 1 PO tab BID for 1 week, then 1 tab QAM and 2 tabs QHS for 1 week and finish at 2 tabs BID. 120 tablet 4     No current facility-administered medications for this visit.        Allergies:     No Known Allergies    Family History:     Family History   Problem Relation Age of Onset    Leukemia Maternal Grandfather     No Known Problems Mother     Other Father         MVA    No Known Problems Sister     Hypertension Maternal Grandmother     No Known Problems Daughter     No Known Problems Sister        Social History:     Social History     Socioeconomic History    Marital status: Single     Spouse name: Not on file    Number of children: 1    Years of education: 12    Highest education level: High school graduate   Occupational History    Not on file   Tobacco Use    Smoking status: Never    Smokeless tobacco: Never   Vaping Use    Vaping status: Never Used   Substance and Sexual Activity    Alcohol use: Not Currently     Comment: socially    Drug use: Never    Sexual activity: Yes     Partners: Male     Birth control/protection: None, Condom Male, Female Sterilization   Other Topics Concern    Not on file   Social History Narrative    Always uses seatbelt    High school student    Younger sister    Lives with mother (single parent)     Social Determinants of Health     Financial Resource Strain: Low Risk  (3/24/2023)    Overall Financial Resource Strain (CARDIA)     Difficulty of Paying Living Expenses: Not hard at all   Food Insecurity: No Food Insecurity (3/24/2023)    Hunger Vital Sign     Worried About Running Out of Food in the Last Year: Never true     Ran Out of Food in the Last Year: Never true   Transportation Needs: No Transportation Needs (3/24/2023)    PRAPARE - Transportation     Lack of Transportation (Medical): No     Lack of Transportation (Non-Medical): No   Physical Activity: Inactive (1/13/2021)    Exercise Vital Sign     Days of Exercise per Week: 0 days     Minutes of Exercise per Session: 0 min   Stress: No Stress Concern Present (1/13/2021)    [place] [employer] - Occupational Stress Questionnaire     Feeling of Stress : Not at all   Social Connections:  Moderately Isolated (1/13/2021)    Social Connection and Isolation Panel [NHANES]     Frequency of Communication with Friends and Family: More than three times a week     Frequency of Social Gatherings with Friends and Family: Once a week     Attends Adventist Services: Never     Active Member of Clubs or Organizations: No     Attends Club or Organization Meetings: Never     Marital Status: Living with partner   Intimate Partner Violence: Not At Risk (1/13/2021)    Humiliation, Afraid, Rape, and Kick questionnaire     Fear of Current or Ex-Partner: No     Emotionally Abused: No     Physically Abused: No     Sexually Abused: No   Housing Stability: Low Risk  (4/3/2023)    Housing Stability Vital Sign     Unable to Pay for Housing in the Last Year: No     Number of Places Lived in the Last Year: 1     Unstable Housing in the Last Year: No         Objective:         Physical Exam:                                                                 Vitals:            Constitutional:    /72 (BP Location: Left arm, Patient Position: Sitting, Cuff Size: Adult)   Pulse 70   Temp 97.9 °F (36.6 °C) (Temporal)   Wt 84.6 kg (186 lb 8 oz)   SpO2 99%   BMI 28.36 kg/m²   BP Readings from Last 3 Encounters:   07/30/24 112/72   04/30/24 112/75   03/26/24 110/76     Pulse Readings from Last 3 Encounters:   07/30/24 70   04/30/24 60   03/26/24 56         Well developed, well nourished, well groomed.        Psychiatric:  Normal behavior and appropriate affect        Able to answer questions appropriately, provide history of recent events   Normal language and spontaneous speech.  facial expression symmetric  symmetric bulk throughout. no atrophy, fasciculations or significant abnormal movements noted during our visit from observation.   steady casual gait     Review of Systems:   ROS obtained by medical assistant and reviewed, but if any symptoms listed below say negative, does not mean patient has not had this symptom since  last visit, please see HPI for details of symptoms discussed this visit.  Regarding any abnormal or positive findings in ROS that are not neurologic related, patient instructed to address these issues with PCP or go to the ER if they are severe.      Review of Systems   Constitutional:  Negative for appetite change, fatigue and fever.   HENT: Negative.  Negative for hearing loss, tinnitus, trouble swallowing and voice change.    Eyes:  Positive for photophobia. Negative for pain and visual disturbance.   Respiratory: Negative.  Negative for shortness of breath.    Cardiovascular: Negative.  Negative for palpitations.   Gastrointestinal:  Positive for nausea. Negative for vomiting.   Endocrine: Negative.  Negative for cold intolerance.   Genitourinary: Negative.  Negative for dysuria, frequency and urgency.   Musculoskeletal:  Negative for back pain, gait problem, myalgias, neck pain and neck stiffness.   Skin: Negative.  Negative for rash.   Allergic/Immunologic: Negative.    Neurological:  Positive for headaches (reports decreased frequency.). Negative for dizziness, tremors, seizures, syncope, facial asymmetry, speech difficulty, weakness, light-headedness and numbness.   Hematological: Negative.  Does not bruise/bleed easily.   Psychiatric/Behavioral: Negative.  Negative for confusion, hallucinations and sleep disturbance.    All other systems reviewed and are negative.       I have spent approximately 14 minutes with Patient  today in which greater than 50% of this time was spent in counseling/coordination of care regarding Prognosis, Instructions for management, Patient education, Importance of tx compliance, Risk factor reductions, Impressions, Counseling / Coordination of care, Documenting in the medical record, Reviewing / ordering tests, medicine, procedures  , Obtaining or reviewing history  , and Communicating with other healthcare professionals . I also spent 16 minutes non face to face for this patient  the same day.       Author:  Rocio Saleh MD 7/30/2024 10:42 AM

## 2024-07-30 NOTE — PROGRESS NOTES
Review of Systems   Constitutional:  Negative for appetite change, fatigue and fever.   HENT: Negative.  Negative for hearing loss, tinnitus, trouble swallowing and voice change.    Eyes:  Positive for photophobia. Negative for pain and visual disturbance.   Respiratory: Negative.  Negative for shortness of breath.    Cardiovascular: Negative.  Negative for palpitations.   Gastrointestinal:  Positive for nausea. Negative for vomiting.   Endocrine: Negative.  Negative for cold intolerance.   Genitourinary: Negative.  Negative for dysuria, frequency and urgency.   Musculoskeletal:  Negative for back pain, gait problem, myalgias, neck pain and neck stiffness.   Skin: Negative.  Negative for rash.   Allergic/Immunologic: Negative.    Neurological:  Positive for headaches (reports decreased frequency.). Negative for dizziness, tremors, seizures, syncope, facial asymmetry, speech difficulty, weakness, light-headedness and numbness.   Hematological: Negative.  Does not bruise/bleed easily.   Psychiatric/Behavioral: Negative.  Negative for confusion, hallucinations and sleep disturbance.    All other systems reviewed and are negative.

## 2024-07-30 NOTE — PATIENT INSTRUCTIONS
Continue to follow with your eye doctor closely and please try and have the note sent to me if you can.  Certainly absolutely call if they ever tell you papilledema again as we would have to change medications    After the visit I noted someone else ordered an iron panel and ferritin was 9 I would recommend following up with PCP and considering repletion if you are not already.  I recommend this because it can negatively impact sleep and thus headaches    If you are still on topiramate/Topamax at next visit I will order routine labs if no one else has     Headache/migraine treatment:   Abortive medications (for immediate treatment of a headache):   It is ok to take  acetaminophen if they help your headaches you should limit these to No more than 3 times a week to avoid medication overuse/rebound headaches.        Prescription preventive medications for headaches/migraines   (to take every day to help prevent headaches - not to take at the time of headache):    [x] -  Emgality/Galcanezumab -  120 mg injections every 28 days     READ INSTRUCTIONS that come with the medication. REFRIGERATE. Keep out of direct sunlight. Prior to administration, allow to come to room temperature for 30 minutes. Do not warm using a heat source (eg, microwave or hot water). Do not shake. Administer in preferably abdomen (avoiding 2 inches around the navel), thigh, upper arm, or buttocks avoiding areas of skin that are tender, bruised, red or hard. Deliver entire contents of single-use prefilled pen or syringe.    -----------  - Topiramate we discussed if you are happy with your current regimen and migraine frequency you can absolutely just continue it as it also can decrease pressure otherwise if you would like to optimize your medications you could see how you do as you pull back on topiramate/Topamax by taking 25 mg every week off the dose   -Basically you would take 25 mg in a.m. and 50 mg in p.m. for a week and then if tolerated could  decrease to 25 mg in a.m. and 25 mg in p.m. for a week and then could just discontinue but certainly if headaches or migraines worsened or if you had any new vision changes after stopping I would just resume as previous      - generally the common side effects improve as your body gets used to the medication.  If we need to spread out a more gradual increase of the medication on a longer scale we can, just call if any questions or concerns  - if necessary, if the a.m. dose is causing side effects we can always have you take the full dose at night instead      - important to know per data, this medication may, but typically does not affect birth control unless you are taking 200 mg daily or more and I highly recommend being on birth control while on this medication due to possible significant detrimental effects to fetus if you were to get pregnant     The medication you are taking may impact pregnancy. It has been associated with birth defects and learning problems if taken during pregnancy. Thus, it is important to avoid unplanned pregnancy while taking this medication. In the future, if you plan to become pregnant, then you should discuss this with your neurologist since medication adjustments may be indicated.      Lifestyle Recommendations:  [x] SLEEP - Maintain a regular sleep schedule: Adults need at least 7-8 hours of uninterrupted a night. Maintain good sleep hygiene:  Going to bed and waking up at consistent times, avoiding excessive daytime naps, avoiding caffeinated beverages in the evening, avoid excessive stimulation in the evening and generally using bed primarily for sleeping.  One hour before bedtime would recommend turning lights down lower, decreasing your activity (may read quietly, listen to music at a low volume). When you get into bed, should eliminate all technology (no texting, emailing, playing with your phone, iPad or tablet in bed).  [x] HYDRATION - Maintain good hydration.  Drink  2L of  fluid a day (4 typical small water bottles)  [x] DIET - Maintain good nutrition. In particular don't skip meals and try and eat healthy balanced meals regularly.  [x] TRIGGERS - Look for other triggers and avoid them: Limit caffeine to 1-2 cups a day or less. Avoid dietary triggers that you have noticed bring on your headaches (this could include aged cheese, peanuts, MSG, aspartame and nitrates).  [x] EXERCISE - physical exercise as we all know is good for you in many ways, and not only is good for your heart, but also is beneficial for your mental health, cognitive health and  chronic pain/headaches. I would encourage at the least 5 days of physical exercise weekly for at least 30 minutes.      Education and Follow-up  [x] Please call with any questions or concerns. Of course if any new concerning symptoms go to the emergency department.  [x] Follow up 3-6 months, sooner if needed      We discussed I would prefer to see you in 3-4 months to keep a closer eye on things and make sure you are doing better and tolerating the medications okay, if you would prefer to be seen in 6 months that is your choice but sometimes it is hard to get back in sooner if there are issues.

## 2024-08-09 ENCOUNTER — TELEPHONE (OUTPATIENT)
Age: 29
End: 2024-08-09

## 2024-08-09 NOTE — TELEPHONE ENCOUNTER
Inbound call received from [facility] stating pt's Emgality injection needs a PA.    PA document scanned in pt's chart.  CMM Key:  [id number]

## 2024-08-09 NOTE — TELEPHONE ENCOUNTER
Emgality PA completed on [facility]  Key-[id number]     Unable to attach office note as too large

## 2024-09-09 ENCOUNTER — OFFICE VISIT (OUTPATIENT)
Dept: INTERNAL MEDICINE CLINIC | Facility: CLINIC | Age: 29
End: 2024-09-09

## 2024-09-09 ENCOUNTER — APPOINTMENT (OUTPATIENT)
Dept: LAB | Facility: CLINIC | Age: 29
End: 2024-09-09
Payer: COMMERCIAL

## 2024-09-09 VITALS
BODY MASS INDEX: 29.25 KG/M2 | HEIGHT: 68 IN | HEART RATE: 78 BPM | DIASTOLIC BLOOD PRESSURE: 68 MMHG | SYSTOLIC BLOOD PRESSURE: 101 MMHG | WEIGHT: 193 LBS | TEMPERATURE: 97.9 F

## 2024-09-09 DIAGNOSIS — Z00.00 ANNUAL PHYSICAL EXAM: Primary | ICD-10-CM

## 2024-09-09 DIAGNOSIS — Z00.00 ANNUAL PHYSICAL EXAM: ICD-10-CM

## 2024-09-09 LAB
ALBUMIN SERPL BCG-MCNC: 4.1 G/DL (ref 3.5–5)
ALP SERPL-CCNC: 37 U/L (ref 34–104)
ALT SERPL W P-5'-P-CCNC: 10 U/L (ref 7–52)
ANION GAP SERPL CALCULATED.3IONS-SCNC: 7 MMOL/L (ref 4–13)
AST SERPL W P-5'-P-CCNC: 14 U/L (ref 13–39)
BASOPHILS # BLD AUTO: 0.02 THOUSANDS/ÂΜL (ref 0–0.1)
BASOPHILS NFR BLD AUTO: 0 % (ref 0–1)
BILIRUB SERPL-MCNC: 0.4 MG/DL (ref 0.2–1)
BUN SERPL-MCNC: 11 MG/DL (ref 5–25)
CALCIUM SERPL-MCNC: 9.7 MG/DL (ref 8.4–10.2)
CHLORIDE SERPL-SCNC: 103 MMOL/L (ref 96–108)
CO2 SERPL-SCNC: 30 MMOL/L (ref 21–32)
CREAT SERPL-MCNC: 0.49 MG/DL (ref 0.6–1.3)
EOSINOPHIL # BLD AUTO: 0.08 THOUSAND/ÂΜL (ref 0–0.61)
EOSINOPHIL NFR BLD AUTO: 2 % (ref 0–6)
ERYTHROCYTE [DISTWIDTH] IN BLOOD BY AUTOMATED COUNT: 12.1 % (ref 11.6–15.1)
GFR SERPL CREATININE-BSD FRML MDRD: 133 ML/MIN/1.73SQ M
GLUCOSE P FAST SERPL-MCNC: 76 MG/DL (ref 65–99)
HCT VFR BLD AUTO: 37.8 % (ref 34.8–46.1)
HGB BLD-MCNC: 12.5 G/DL (ref 11.5–15.4)
IMM GRANULOCYTES # BLD AUTO: 0.01 THOUSAND/UL (ref 0–0.2)
IMM GRANULOCYTES NFR BLD AUTO: 0 % (ref 0–2)
LYMPHOCYTES # BLD AUTO: 1.69 THOUSANDS/ÂΜL (ref 0.6–4.47)
LYMPHOCYTES NFR BLD AUTO: 35 % (ref 14–44)
MCH RBC QN AUTO: 29.8 PG (ref 26.8–34.3)
MCHC RBC AUTO-ENTMCNC: 33.1 G/DL (ref 31.4–37.4)
MCV RBC AUTO: 90 FL (ref 82–98)
MONOCYTES # BLD AUTO: 0.39 THOUSAND/ÂΜL (ref 0.17–1.22)
MONOCYTES NFR BLD AUTO: 8 % (ref 4–12)
NEUTROPHILS # BLD AUTO: 2.64 THOUSANDS/ÂΜL (ref 1.85–7.62)
NEUTS SEG NFR BLD AUTO: 55 % (ref 43–75)
NRBC BLD AUTO-RTO: 0 /100 WBCS
PLATELET # BLD AUTO: 244 THOUSANDS/UL (ref 149–390)
PMV BLD AUTO: 10.1 FL (ref 8.9–12.7)
POTASSIUM SERPL-SCNC: 4.3 MMOL/L (ref 3.5–5.3)
PROT SERPL-MCNC: 7 G/DL (ref 6.4–8.4)
RBC # BLD AUTO: 4.2 MILLION/UL (ref 3.81–5.12)
SODIUM SERPL-SCNC: 140 MMOL/L (ref 135–147)
WBC # BLD AUTO: 4.83 THOUSAND/UL (ref 4.31–10.16)

## 2024-09-09 PROCEDURE — 85025 COMPLETE CBC W/AUTO DIFF WBC: CPT

## 2024-09-09 PROCEDURE — 36415 COLL VENOUS BLD VENIPUNCTURE: CPT

## 2024-09-09 PROCEDURE — 80053 COMPREHEN METABOLIC PANEL: CPT

## 2024-09-09 PROCEDURE — 99395 PREV VISIT EST AGE 18-39: CPT | Performed by: INTERNAL MEDICINE

## 2024-09-09 NOTE — PATIENT INSTRUCTIONS
"Patient Education     Routine physical for adults   The Basics   Written by the doctors and editors at [facility]   What is a physical? -- A physical is a routine visit, or \"check-up,\" with your doctor. You might also hear it called a \"wellness visit\" or \"preventive visit.\"  During each visit, the doctor will:   Ask about your physical and mental health   Ask about your habits, behaviors, and lifestyle   Do an exam   Give you vaccines if needed   Talk to you about any medicines you take   Give advice about your health   Answer your questions  Getting regular check-ups is an important part of taking care of your health. It can help your doctor find and treat any problems you have. But it's also important for preventing health problems.  A routine physical is different from a \"sick visit.\" A sick visit is when you see a doctor because of a health concern or problem. Since physicals are scheduled ahead of time, you can think about what you want to ask the doctor.  How often should I get a physical? -- It depends on your age and health. In general, for people age 21 years and older:   If you are younger than 50 years, you might be able to get a physical every 3 years.   If you are 50 years or older, your doctor might recommend a physical every year.  If you have an ongoing health condition, like diabetes or high blood pressure, your doctor will probably want to see you more often.  What happens during a physical? -- In general, each visit will include:   Physical exam - The doctor or nurse will check your height, weight, heart rate, and blood pressure. They will also look at your eyes and ears. They will ask about how you are feeling and whether you have any symptoms that bother you.   Medicines - It's a good idea to bring a list of all the medicines you take to each doctor visit. Your doctor will talk to you about your medicines and answer any questions. Tell them if you are having any side effects that bother you. You " "should also tell them if you are having trouble paying for any of your medicines.   Habits and behaviors - This includes:   Your diet   Your exercise habits   Whether you smoke, drink alcohol, or use drugs   Whether you are sexually active   Whether you feel safe at home  Your doctor will talk to you about things you can do to improve your health and lower your risk of health problems. They will also offer help and support. For example, if you want to quit smoking, they can give you advice and might prescribe medicines. If you want to improve your diet or get more physical activity, they can help you with this, too.   Lab tests, if needed - The tests you get will depend on your age and situation. For example, your doctor might want to check your:   Cholesterol   Blood sugar   Iron level   Vaccines - The recommended vaccines will depend on your age, health, and what vaccines you already had. Vaccines are very important because they can prevent certain serious or deadly infections.   Discussion of screening - \"Screening\" means checking for diseases or other health problems before they cause symptoms. Your doctor can recommend screening based on your age, risk, and preferences. This might include tests to check for:   Cancer, such as breast, prostate, cervical, ovarian, colorectal, prostate, lung, or skin cancer   Sexually transmitted infections, such as chlamydia and gonorrhea   Mental health conditions like depression and anxiety  Your doctor will talk to you about the different types of screening tests. They can help you decide which screenings to have. They can also explain what the results might mean.   Answering questions - The physical is a good time to ask the doctor or nurse questions about your health. If needed, they can refer you to other doctors or specialists, too.  Adults older than 65 years often need other care, too. As you get older, your doctor will talk to you about:   How to prevent falling at " home   Hearing or vision tests   Memory testing   How to take your medicines safely   Making sure that you have the help and support you need at home  All topics are updated as new evidence becomes available and our peer review process is complete.  This topic retrieved from [employer] on: May 02, 2024.  Topic 678317 Version 1.0  Release: 32.4.3 - C32.122  © 2024 UpToDate, Inc. and/or its affiliates. All rights reserved.  Consumer Information Use and Disclaimer   Disclaimer: This generalized information is a limited summary of diagnosis, treatment, and/or medication information. It is not meant to be comprehensive and should be used as a tool to help the user understand and/or assess potential diagnostic and treatment options. It does NOT include all information about conditions, treatments, medications, side effects, or risks that may apply to a specific patient. It is not intended to be medical advice or a substitute for the medical advice, diagnosis, or treatment of a health care provider based on the health care provider's examination and assessment of a patient's specific and unique circumstances. Patients must speak with a health care provider for complete information about their health, medical questions, and treatment options, including any risks or benefits regarding use of medications. This information does not endorse any treatments or medications as safe, effective, or approved for treating a specific patient. UpToDate, Inc. and its affiliates disclaim any warranty or liability relating to this information or the use thereof.The use of this information is governed by the Terms of Use, available at https://www.woltersOnRequest Imagesuwer.com/en/know/clinical-effectiveness-terms. 2024© UpToDate, Inc. and its affiliates and/or licensors. All rights reserved.  Copyright   © 2024 UpToDate, Inc. and/or its affiliates. All rights reserved.

## 2024-09-09 NOTE — PROGRESS NOTES
Adult Annual Physical  Name: Bella Mott      : 1995      MRN: [id number]  Encounter Provider: Gina Trevino MD  Encounter Date: 2024   Encounter department: [facility]    Assessment & Plan   1. Annual physical exam  -     CBC and differential; Future  -     Comprehensive metabolic panel; Future    #RUQ discomfort  Patient states she experiences some discomfort in her right upper quadrant a few times weekly. Patient did not take notice as to any instigating factors but states it feels like fluid moving around when she moves. Unclear if it occurs after she eats a meal. Denies nausea, vomiting, diarrhea, abdominal pain associated with this feeling. She had an MRI abdomen completed in 2023 for complications of  scar but had no additional findings.    Suspect feeling is like 2/2 abdominal gas    Plan:  -Advised patient to take note when feeling occurs, after eating etc.  -Will check CMP, CBC today  -Patient can try taking a Gas-x when feeling occurs to monitor for improvement  -Provided return precautions if persistent or worsening, can consider abdominal u/s  -Follow-up in 1 year for annual physical or PRN for new complaints or concerns     Immunizations and preventive care screenings were discussed with patient today. Appropriate education was printed on patient's after visit summary.    Counseling:  Alcohol/drug use: discussed moderation in alcohol intake, the recommendations for healthy alcohol use, and avoidance of illicit drug use.  Dental Health: discussed importance of regular tooth brushing, flossing, and dental visits.  Injury prevention: discussed safety/seat belts, safety helmets, smoke detectors, carbon dioxide detectors, and smoking near bedding or upholstery.  Sexual health: discussed sexually transmitted diseases, partner selection, use of condoms, avoidance of unintended pregnancy, and contraceptive alternatives.  Exercise:  the importance of regular exercise/physical activity was discussed. Recommend exercise 3-5 times per week for at least 30 minutes.     BMI Counseling: Body mass index is 29.35 kg/m². The BMI is above normal. Nutrition recommendations include decreasing portion sizes, encouraging healthy choices of fruits and vegetables, decreasing fast food intake and consuming healthier snacks. Exercise recommendations include exercising 3-5 times per week. Rationale for BMI follow-up plan is due to patient being overweight or obese.     Depression Screening and Follow-up Plan: Patient was screened for depression during today's encounter. They screened negative with a PHQ-2 score of 0.        History of Present Illness   Ms. Bella Mott is a 28-year-old female with PMHx of migraines who presents today for her annual physical examination. Patient was last seen a year ago for physical examination and had complaints of perioral dermatitis which has since resolved. She had a iron panel at that time showing a slightly low ferritin but otherwise normal. Patient offers no significant complaints today. She does a lot of walking at work but does not participate in any formal exercise. Patient states she has a well-balanced diet and avoids eating junk food. She denies tobacco, alcohol, drug use. She is currently sexually active with one male partner and does not use any contraception. Patient has two kids and had a tubal ligation completed in . She is up-to-date on all appropriate screenings. Of note, patient states she experiences some discomfort in her right upper quadrant a few times weekly. Patient did not take notice as to any instigating factors but states it feels like fluid moving around when she moves. Unclear if it occurs after she eats a meal. Denies nausea, vomiting, diarrhea, abdominal pain associated with this feeling. She had an MRI abdomen completed in 2023 for complications of  scar but had no  additional findings. Otherwise, no additional complaints or concerns today.      Adult Annual Physical:  Patient presents for annual physical.     Diet and Physical Activity:  - Diet/Nutrition: well balanced diet, limited junk food, heart healthy (low sodium) diet, consuming 3-5 servings of fruits/vegetables daily and adequate fiber intake.  - Exercise: no formal exercise.    Depression Screening:  - PHQ-2 Score: 0    General Health:  - Sleep: sleeps well and 7-8 hours of sleep on average.  - Hearing: normal hearing bilateral ears.  - Vision: no vision problems.  - Dental: regular dental visits and brushes teeth twice daily.    /GYN Health:  - Follows with GYN: yes.   - Menopause: premenopausal.   - Last menstrual cycle: 8/29/2024.   - History of STDs: no  - Contraception:. sexually active with 1 partner, had tubal ligation      Review of Systems   Constitutional:  Negative for activity change, appetite change, chills, fatigue, fever and unexpected weight change.   HENT:  Negative for congestion, sinus pressure, sinus pain, sore throat and tinnitus.    Eyes:  Negative for photophobia and visual disturbance.   Respiratory:  Negative for cough, chest tightness, shortness of breath and wheezing.    Cardiovascular:  Negative for chest pain, palpitations and leg swelling.   Gastrointestinal:  Negative for abdominal pain, constipation, diarrhea, nausea and vomiting.   Endocrine: Negative for polyphagia and polyuria.   Genitourinary:  Negative for difficulty urinating and dysuria.   Musculoskeletal:  Negative for arthralgias.   Skin:  Negative for pallor and rash.   Neurological:  Positive for headaches. Negative for dizziness, tremors, syncope, facial asymmetry, weakness and light-headedness.   Psychiatric/Behavioral:  Negative for agitation.    All other systems reviewed and are negative.    Pertinent Medical History     Past Medical History   Past Medical History:   Diagnosis Date    31 weeks gestation of pregnancy  2021    GBS bacteruria- needs PCN intrapartum    Chiari malformation type I (HCC)     Chronic fatigue     last assessed 17    COVID-19 2020    Fever and chills 2020    High risk pregnancy with high inhibin 3/29/2021    Hyperemesis gravidarum 2021    IV fluid infusions ordered Phenergan ordered     Hyperlipidemia     resolved 17    Hypertension     pre-eclampsia     Idiopathic intracranial hypertension     Intrauterine growth restriction affecting antepartum care of mother in third trimester 6/3/2021    Pre-eclampsia     with severe features     Varicella     pt unsure    Visual impairment     glasses     Past Surgical History:   Procedure Laterality Date     SECTION      CHOLECYSTECTOMY      CHOLECYSTECTOMY LAPAROSCOPIC N/A 2016    Procedure: CHOLECYSTECTOMY LAPAROSCOPIC possible open;  Surgeon: Supa Ovalles MD;  Location: [facility];  Service:     MS  DELIVERY ONLY N/A 2021    Procedure:  SECTION ();  Surgeon: Princess Briseno DO;  Location: [facility];  Service: Obstetrics    MS LAPAROSCOPY W/RMVL ADNEXAL STRUCTURES Bilateral 2021    Procedure: SALPINGECTOMY, LAPAROSCOPIC;  Surgeon: Rajinder Peterson MD;  Location: [facility];  Service: Gynecology    REDUCTION MAMMAPLASTY       Family History   Problem Relation Age of Onset    Leukemia Maternal Grandfather     No Known Problems Mother     Other Father         MVA    No Known Problems Sister     Hypertension Maternal Grandmother     No Known Problems Daughter     No Known Problems Sister      Current Outpatient Medications on File Prior to Visit   Medication Sig Dispense Refill    acetaminophen (TYLENOL) 325 mg tablet Take 650 mg by mouth every 6 (six) hours as needed      Galcanezumab-gnlm 120 MG/ML SOAJ Inject 120 mg under the skin every 28 days 1 mL 11    Multiple Vitamin (multivitamin) tablet Take 1 tablet by mouth daily      topiramate (TOPAMAX) 25 mg tablet 1 PO tab BID for 1 week,  "then 1 tab QAM and 2 tabs QHS for 1 week and finish at 2 tabs BID. 120 tablet 4     No current facility-administered medications on file prior to visit.   No Known Allergies   Current Outpatient Medications on File Prior to Visit   Medication Sig Dispense Refill    acetaminophen (TYLENOL) 325 mg tablet Take 650 mg by mouth every 6 (six) hours as needed      Galcanezumab-gnlm 120 MG/ML SOAJ Inject 120 mg under the skin every 28 days 1 mL 11    Multiple Vitamin (multivitamin) tablet Take 1 tablet by mouth daily      topiramate (TOPAMAX) 25 mg tablet 1 PO tab BID for 1 week, then 1 tab QAM and 2 tabs QHS for 1 week and finish at 2 tabs BID. 120 tablet 4     No current facility-administered medications on file prior to visit.      Social History     Tobacco Use    Smoking status: Never    Smokeless tobacco: Never   Vaping Use    Vaping status: Never Used   Substance and Sexual Activity    Alcohol use: Not Currently     Comment: socially    Drug use: Never    Sexual activity: Yes     Partners: Male     Birth control/protection: None, Condom Male, Female Sterilization       Objective     /68 (BP Location: Right arm, Patient Position: Sitting, Cuff Size: Large)   Pulse 78   Temp 97.9 °F (36.6 °C) (Temporal)   Ht 5' 8\" (1.727 m)   Wt 87.5 kg (193 lb)   BMI 29.35 kg/m²     Physical Exam  Vitals and nursing note reviewed.   Constitutional:       General: She is not in acute distress.     Appearance: Normal appearance. She is normal weight. She is not ill-appearing or diaphoretic.   HENT:      Head: Normocephalic and atraumatic.      Right Ear: Tympanic membrane, ear canal and external ear normal. There is no impacted cerumen.      Left Ear: Tympanic membrane, ear canal and external ear normal. There is no impacted cerumen.      Nose: Nose normal. No congestion.      Mouth/Throat:      Mouth: Mucous membranes are moist.      Pharynx: Oropharynx is clear. No oropharyngeal exudate.   Eyes:      General: No scleral " icterus.     Conjunctiva/sclera: Conjunctivae normal.      Pupils: Pupils are equal, round, and reactive to light.   Cardiovascular:      Rate and Rhythm: Normal rate and regular rhythm.      Pulses: Normal pulses.      Heart sounds: Normal heart sounds. No murmur heard.  Pulmonary:      Effort: Pulmonary effort is normal. No respiratory distress.      Breath sounds: Normal breath sounds. No wheezing.   Abdominal:      General: Abdomen is flat. Bowel sounds are normal. There is no distension.      Palpations: Abdomen is soft.      Tenderness: There is no abdominal tenderness.   Musculoskeletal:         General: Normal range of motion.      Cervical back: Normal range of motion.      Right lower leg: No edema.      Left lower leg: No edema.   Skin:     General: Skin is warm.      Capillary Refill: Capillary refill takes less than 2 seconds.      Coloration: Skin is not jaundiced.   Neurological:      General: No focal deficit present.      Mental Status: She is alert. Mental status is at baseline.      Cranial Nerves: No cranial nerve deficit.   Psychiatric:         Mood and Affect: Mood normal.         Behavior: Behavior normal.         Thought Content: Thought content normal.         Judgment: Judgment normal.       Administrative Statements   I have spent a total time of 45 minutes in caring for this patient on the day of the visit/encounter including Risk factor reductions, Counseling / Coordination of care, Documenting in the medical record, Reviewing / ordering tests, medicine, procedures  , and Obtaining or reviewing history  .    Gina Trevino MD, MPH  [facility]  Internal Medicine Residency, PGY-III

## 2024-10-28 ENCOUNTER — OFFICE VISIT (OUTPATIENT)
Dept: OBGYN CLINIC | Facility: CLINIC | Age: 29
End: 2024-10-28

## 2024-10-28 VITALS
WEIGHT: 204 LBS | HEIGHT: 68 IN | SYSTOLIC BLOOD PRESSURE: 102 MMHG | BODY MASS INDEX: 30.92 KG/M2 | DIASTOLIC BLOOD PRESSURE: 62 MMHG | HEART RATE: 84 BPM

## 2024-10-28 DIAGNOSIS — R19.00 ABDOMINAL WALL BULGE: Primary | ICD-10-CM

## 2024-10-28 PROCEDURE — 99213 OFFICE O/P EST LOW 20 MIN: CPT | Performed by: OBSTETRICS & GYNECOLOGY

## 2024-11-11 ENCOUNTER — ANNUAL EXAM (OUTPATIENT)
Dept: OBGYN CLINIC | Facility: CLINIC | Age: 29
End: 2024-11-11

## 2024-11-11 VITALS
BODY MASS INDEX: 30.77 KG/M2 | HEIGHT: 68 IN | SYSTOLIC BLOOD PRESSURE: 99 MMHG | WEIGHT: 203 LBS | DIASTOLIC BLOOD PRESSURE: 64 MMHG | HEART RATE: 87 BPM

## 2024-11-11 DIAGNOSIS — R19.00 ABDOMINAL WALL BULGE: ICD-10-CM

## 2024-11-11 DIAGNOSIS — Z11.3 SCREENING FOR STDS (SEXUALLY TRANSMITTED DISEASES): ICD-10-CM

## 2024-11-11 DIAGNOSIS — Z01.419 WELL WOMAN EXAM WITH ROUTINE GYNECOLOGICAL EXAM: Primary | ICD-10-CM

## 2024-11-11 PROCEDURE — 99395 PREV VISIT EST AGE 18-39: CPT | Performed by: OBSTETRICS & GYNECOLOGY

## 2024-11-11 PROCEDURE — 87591 N.GONORRHOEAE DNA AMP PROB: CPT

## 2024-11-11 PROCEDURE — 87491 CHLMYD TRACH DNA AMP PROBE: CPT

## 2024-11-11 NOTE — PROGRESS NOTES
"Subjective      Bella Mott is a 28 y.o. female who presents for annual well woman exam. Periods are regular every 28-30 days, lasting 4 days. No intermenstrual bleeding, spotting, or discharge. Denies pain from her incision, still needs her MRI completed.     GYN:  Denies vaginal discharge, labial erythema or lesions, dyspareunia.   Menses are regular, q 30 days, lasting 4 days.   Contraception: tubal ligation.   Patient is sexually active with 1 male partner.   Last pap smear was in 2022. Results were NILM.   She had breast reduction, one prior  section and tubal ligation.    OB:   female   Pregnancies were complicated by 35w3d  and a 31w1d classical  section.              Future fertility: does not desire, s/p tubal    :  Denies dysuria, urinary frequency or urgency.  Denies hematuria, flank pain, incontinence.    Breast:   Denies breast mass, skin changes, dimpling, reddening, nipple retraction.   Denies breast discharge.   Never had a mammogram   Patient does not have a family history of breast, endometrial, or ovarian ca.      General:  Diet: balanced diet  Exercise: doesn't exercise.   Work: [employer]  ETOH use: social alcohol use.   Tobacco use: denies  Recreational drug use: denies    Screening:  Cervical cancer: Last pap smear was in 2022. Results were NILM.  Breast cancer: never had a last mammogram  Colon cancer: never had a colonoscopy.  STD screening: negative .    Review of Systems  Pertinent items are noted in HPI.      Objective      BP 99/64 (BP Location: Right arm)   Pulse 87   Ht 5' 8\" (1.727 m)   Wt 92.1 kg (203 lb)   LMP 10/28/2024   BMI 30.87 kg/m²     Physical Exam  Vitals and nursing note reviewed.   Constitutional:       General: She is not in acute distress.     Appearance: She is well-developed.   HENT:      Head: Normocephalic and atraumatic.   Eyes:      Conjunctiva/sclera: Conjunctivae normal.   Cardiovascular:      Rate and Rhythm: " Normal rate and regular rhythm.      Pulses: Normal pulses.      Heart sounds: Normal heart sounds. No murmur heard.  Pulmonary:      Effort: Pulmonary effort is normal. No respiratory distress.   Abdominal:      Palpations: Abdomen is soft.      Tenderness: There is no abdominal tenderness.   Musculoskeletal:         General: No swelling.      Cervical back: Neck supple.   Skin:     General: Skin is warm and dry.      Capillary Refill: Capillary refill takes less than 2 seconds.   Neurological:      Mental Status: She is alert.   Psychiatric:         Mood and Affect: Mood normal.         Behavior: Behavior normal.         Assessment     Bella is a 27 yo  presenting for her well woman exam.     Plan       1.  Routine well woman exam done today.  2.  Pap and HPV:Pap with HPV was not done today.  Current ASCCP Guidelines reviewed.   3.  The patient requested STD testing.  chlamydia and gonorrhea testing performed. Safe sex practices have been discussed.  4. The patient is sexually active. She already uses tubal ligation for contraception and options have been discussed.    5. The following were reviewed in today's visit: STD testing, exercise, and healthy diet.  6. Patient to return to office in 12 months for annual.       Niesha Lane MD  OB/GYN PGY-4  2024  1:47 PM

## 2024-11-12 LAB
C TRACH DNA SPEC QL NAA+PROBE: NEGATIVE
N GONORRHOEA DNA SPEC QL NAA+PROBE: NEGATIVE

## 2024-11-13 ENCOUNTER — RESULTS FOLLOW-UP (OUTPATIENT)
Dept: OBGYN CLINIC | Facility: CLINIC | Age: 29
End: 2024-11-13

## 2024-12-12 NOTE — PROGRESS NOTES
FEB 3 2016         RE: Richi Ng                              [facility]   MR#: [id number]                                   [address] [place] [address]   : [address], [address]   ENC: [id number]                             Fax: [phone]   (Exam #: [id number])      The LMP of this 24year old,  1, para 0 patient was unknown, her   working AZALEA is 2016 and the current gestational age is 35 weeks 5   days by [address] [place] [address]  A sonographic examination was performed on FEB   3 2016 using real time equipment  The ultrasound examination was performed   using abdominal technique  The patient has a BMI of 26 3  Her blood   pressure today was 119/78  Earliest ultrasound found in her record: 11/09/15 16w3d 16 AZALEA      Oval April has no complaints  She reports fetal movement and denies   problems related to vaginal bleeding or abdominal pain  Cardiac motion was observed at 163 bpm       INDICATIONS      fetal growth   teen pregnancy      Exam Types      LEVEL II   Transvaginal      RESULTS      Fetus # 1 of 1   Vertex presentation   Fetal growth appeared normal   Placenta Location = Anterior   No placenta previa   Placenta Grade = I      MEASUREMENTS (* Included In Average GA)      OFD              9 4 cm   AC              23 4 cm        27 weeks 4 days* (27%)   BPD              6 7 cm        27 weeks 1 day * (12%)   HC              25 8 cm        27 weeks 6 days* (18%)   Femur            5 6 cm        29 weeks 3 days* (54%)      Cerebellum       3 5 cm        30 weeks 6 days      HC/AC           1 10   FL/AC           0 24   FL/BPD          0 83   EFW (Ac/Fl/Hc)  1213 grams - 2 lbs 11 oz                 (42%)      THE AVERAGE GESTATIONAL AGE is 28 weeks 0 days +/- 14 days        AMNIOTIC FLUID      Q1: 4 0      Q2: 4 0      Q3: 2 0      Q4: 3 6   CAIT Total = 13 6 cm   Amniotic Fluid: Normal Patient returns after seeing me in July for hernias.  At that time he was still in cardiac rehab after heart surgery in May.  Echo in June still showed decreased EF.  Conservative mgt of the hernias was recommended at that time.  Now he returns, having increased discomfort in the right groin.    On exam, he has a moderate size reducible right inguinal hernia.  He has a small spontaneously reducing left inguinal hernia. Also a small umbilical hernia that is not symptomatic.    We discussed options.  If he is felt to have increased cardiac risk then could do an open right inguinal hernia with local anesthetic and sedation.  If he is felt to have low risk for general anesthesia, then could do robotic bilateral repair.  Will communicate with Dr Roach and Dr Nicolas.  He will need to hold eliquis before surgery.    SK   IMPRESSION      Nino IUP   28 weeks and 0 days by this ultrasound  (AZALEA=APR 27 2016)   28 weeks and 5 days by 2nd Trimester Sono  (AZALEA=APR 22 2016)   Vertex presentation   Fetal growth appeared normal   Regular fetal heart rate of 163 bpm   Anterior placenta   No placenta previa      GENERAL COMMENT      No fetal structural abnormality is identified on the Level I survey today  The fetal brain, four-chamber heart, stomach, kidneys, bladder, three   vessel cord, diaphragm, and spine appear normal  The facial profile and   nasal bone are not imaged well secondary to unfavorable fetal position  Fetal interval growth and amniotic fluid volume are normal       Today's ultrasound findings and suggested follow-up were discussed in   detail with Seda Morales  She will return to the [facility]  at about 36   weeks gestation for follow up MFM evaluation  Daily third trimester fetal   kick counting was discussed at the visit today  The face to face time, in addition to time spent discussing ultrasound   results, was 10 minutes, greater than 50% of which was spent during   counseling and coordination of care  KURT Shaw M D     Maternal-Fetal Medicine   Electronically signed 02/03/16 15:36

## 2025-03-11 DIAGNOSIS — G43.009 MIGRAINE WITHOUT AURA AND WITHOUT STATUS MIGRAINOSUS, NOT INTRACTABLE: ICD-10-CM

## 2025-03-12 RX ORDER — GALCANEZUMAB 120 MG/ML
INJECTION, SOLUTION SUBCUTANEOUS
Qty: 1 ML | Refills: 10 | Status: SHIPPED | OUTPATIENT
Start: 2025-03-12

## 2025-03-26 ENCOUNTER — OFFICE VISIT (OUTPATIENT)
Dept: DENTISTRY | Facility: CLINIC | Age: 30
End: 2025-03-26

## 2025-03-26 VITALS — DIASTOLIC BLOOD PRESSURE: 79 MMHG | HEART RATE: 55 BPM | SYSTOLIC BLOOD PRESSURE: 114 MMHG

## 2025-03-26 DIAGNOSIS — Z01.21 ENCOUNTER FOR DENTAL EXAMINATION AND CLEANING WITH ABNORMAL FINDINGS: Primary | ICD-10-CM

## 2025-03-26 PROCEDURE — D0274 BITEWINGS - 4 RADIOGRAPHIC IMAGES: HCPCS | Performed by: DENTIST

## 2025-03-26 PROCEDURE — D0120 PERIODIC ORAL EVALUATION - ESTABLISHED PATIENT: HCPCS | Performed by: DENTIST

## 2025-03-26 PROCEDURE — D1110 PROPHYLAXIS - ADULT: HCPCS | Performed by: DENTIST

## 2025-03-26 NOTE — PROGRESS NOTES
PERIODIC EXAM, ADULT PROPHY , 4bws taken   REVIEWED MED HX: meds, allergies, health changes reviewed in [facility]. All consents signed.  CHIEF CONCERN: no dental pain or concerns  PAIN SCALE:  0  ASA CLASS:  I  PLAQUE:  mild  CALCULUS:  light  BLEEDING:   light  STAIN :   none      PERIO:     Hygiene Procedures:  Scaled, Polished, Flossed    Oral Hygiene Instruction: Brushing Minimum 2x daily for 2 minutes, daily flossing    Dispensed: Toothbrush, Toothpaste, Floss    Visual and Tactile Intraoral/ Extraoral evaluation: Oral and Oropharyngeal cancer evaluation. No findings     Dr. Hines  Exam with  Dr. Hines Reviewed with patient clinical and radiographic findings and patient verbalized understanding. All questions and concerns addressed.     REFERRALS: none    CARIES FINDINGS: no decay noted       TREATMENT  PLAN :   NV: 6 mo recall prophy     Next Recall: 6 month recall     Last BWX: 3/26/25  Last Panorex/ FMX :     Dental Hygiene Student: CAROLIN Moyer

## 2025-05-10 ENCOUNTER — HOSPITAL ENCOUNTER (OUTPATIENT)
Dept: RADIOLOGY | Facility: HOSPITAL | Age: 30
Discharge: HOME | End: 2025-05-10
Payer: COMMERCIAL

## 2025-05-10 DIAGNOSIS — R19.00 ABDOMINAL WALL BULGE: ICD-10-CM

## 2025-05-10 PROCEDURE — 72197 MRI PELVIS W/O & W/DYE: CPT

## 2025-05-10 PROCEDURE — A9585 GADOBUTROL INJECTION: HCPCS

## 2025-05-10 RX ORDER — GADOBUTROL 604.72 MG/ML
9 INJECTION INTRAVENOUS
Status: COMPLETED | OUTPATIENT
Start: 2025-05-10 | End: 2025-05-10

## 2025-05-10 RX ADMIN — GADOBUTROL 9 ML: 604.72 INJECTION INTRAVENOUS at 16:55

## 2025-06-03 ENCOUNTER — OFFICE VISIT (OUTPATIENT)
Dept: OBGYN CLINIC | Facility: CLINIC | Age: 30
End: 2025-06-03

## 2025-06-03 VITALS
HEART RATE: 75 BPM | WEIGHT: 222 LBS | SYSTOLIC BLOOD PRESSURE: 107 MMHG | HEIGHT: 68 IN | BODY MASS INDEX: 33.65 KG/M2 | DIASTOLIC BLOOD PRESSURE: 72 MMHG

## 2025-06-03 DIAGNOSIS — R19.00 ABDOMINAL WALL BULGE: Primary | ICD-10-CM

## 2025-06-03 PROCEDURE — 99213 OFFICE O/P EST LOW 20 MIN: CPT | Performed by: OBSTETRICS & GYNECOLOGY

## 2025-06-03 NOTE — PROGRESS NOTES
Name: Bella Mott      : 1995      MRN: [id number]  Encounter Provider: Rhett Hidalgo MD  Encounter Date: 6/3/2025   Encounter department: [facility]  :  Assessment & Plan  Abdominal wall bulge  MRI pelvis completed 5/10 with 1.9cm lesion along right lateal aspect of  scar  DDX includes desmoid tumor, scar endometriosis, or other soft tissue neoplasm with consideration for tissue sampling  Referral placed to General Surgery for continued assessment and discussion  Orders:    Ambulatory Referral to General Surgery; Future        MRI OF THE PELVIS WITH AND WITHOUT CONTRAST (GYNECOLOGIC) 5/10/25     INDICATION: 29 years / Female. R19.00: Intra-abdominal and pelvic swelling, mass and lump, unspecified site. Painful bulge/lump at the  section scar.   COMPARISON: Pelvic ultrasound 2020. MRI abdomen 10/8/2023. Ultrasound abdominal wall 2023.   TECHNIQUE: Multiplanar/multisequence MRI of the female pelvis was performed before and after administration of contrast. Imaging performed on 1.5T MRI.   IV Contrast: 9 mL of Gadobutrol injection (SINGLE-DOSE)   FINDINGS:   UTERUS:  Junctional zone: Normal thickness.  Myometrium: Normal.  Endometrium: Normal thickness without mass.  Few cervical nabothian cysts.   ADNEXA:  Right:  Ovary normal in size and morphology measuring 3.8 x 2.9 x 3.3 cm (volume 18.9 mL).  No suspicious adnexal lesion.  No hydrosalpinx.   Left:  Ovary normal in size and morphology measuring 2.7 x 2.2 x 1.9 cm (volume 5.9 mL.  No suspicious adnexal lesion.  No hydrosalpinx.   URINARY BLADDER: Normal.   PELVIC CAVITY: No lymphadenopathy. No ascites.   BOWEL: No dilated loops of bowel.   VESSELS: No aneurysm.   PELVIC WALL:  section scar noted. Patient's palpable area of concern is located at the lateral aspect of the scar (at the lateral margin of the right rectus abdominis muscle), immediately superior to the scar, where there  "is a somewhat   poorly-marginated lobulated soft tissue lesion in the deep subcutaneous fat abutting, and appearing to extend into the superficial fascia of, the underlying rectus abdominous muscle. The lesion is T1-isointense to muscle (), T2-intermediate with   linear areas of low signal (), and shows progressive heterogeneous hyperenhancement (10/205, ). This measures approximately 1.7 x 1.3 x 1.9 cm (series /, ), previously 0.7 x 0.4 x 0.5 cm on ultrasound .   BONES: No suspicious osseous lesion.     IMPRESSION:   Enhancing subcutaneous lesion measuring up to 1.9 cm at the lateral aspect of the  section scar. The appearance is nonspecific, with differential including desmoid tumor, scar endometriosis, or other soft tissue neoplasm. Consider tissue sampling   as clinically indicated.      History of Present Illness   HPI  Bella Mott is a 29 y.o. female who presents for results discussion of MRI results. MRI pelvis completed 5/10 with findings as above. We reviewed findings and differential diagnosis. Bella reports intermittent pain at her  scar, specific when in different positions during movement. Denies pain at rest or on palpation during examination. We discussed recommendation for General Surgery referral and assessment. All questions answered.      Review of Systems   Constitutional:  Negative for chills and fever.   Gastrointestinal:  Positive for abdominal pain ( scar). Negative for abdominal distention, nausea and vomiting.   Genitourinary:  Negative for pelvic pain.          Objective   /72 (BP Location: Left arm, Patient Position: Sitting, Cuff Size: Large)   Pulse 75   Ht 5' 8\" (1.727 m)   Wt 101 kg (222 lb)   LMP 2025   BMI 33.75 kg/m²      Physical Exam  Vitals and nursing note reviewed.   Constitutional:       General: She is not in acute distress.  HENT:      Head: Normocephalic.      Right Ear: External ear normal.      " Left Ear: External ear normal.     Eyes:      General: No scleral icterus.        Right eye: No discharge.         Left eye: No discharge.      Conjunctiva/sclera: Conjunctivae normal.       Cardiovascular:      Rate and Rhythm: Normal rate.      Pulses: Normal pulses.   Pulmonary:      Effort: Pulmonary effort is normal. No respiratory distress.   Abdominal:      General: Abdomen is flat. There is no distension.      Palpations: Abdomen is soft.      Tenderness: There is no abdominal tenderness. There is no guarding.      Comments: Palpable soft tissue mass at right lateral  scar, approximately 2cm in size, mobile, non-tender, non-indurated     Musculoskeletal:         General: Normal range of motion.      Cervical back: Normal range of motion.     Skin:     General: Skin is warm and dry.     Neurological:      Mental Status: She is alert and oriented to person, place, and time. Mental status is at baseline.     Psychiatric:         Mood and Affect: Mood normal.         Behavior: Behavior normal.         Administrative Statements   I have spent a total time of 15 minutes in caring for this patient on the day of the visit/encounter including Diagnostic results, Prognosis, Risks and benefits of tx options, Instructions for management, Patient and family education, Importance of tx compliance, Risk factor reductions, Impressions, Counseling / Coordination of care, Documenting in the medical record, Reviewing/placing orders in the medical record (including tests, medications, and/or procedures), Obtaining or reviewing history  , and Communicating with other healthcare professionals .

## 2025-06-10 ENCOUNTER — CONSULT (OUTPATIENT)
Dept: SURGERY | Facility: CLINIC | Age: 30
End: 2025-06-10
Payer: COMMERCIAL

## 2025-06-10 VITALS
BODY MASS INDEX: 33.65 KG/M2 | SYSTOLIC BLOOD PRESSURE: 100 MMHG | OXYGEN SATURATION: 98 % | HEIGHT: 68 IN | RESPIRATION RATE: 16 BRPM | HEART RATE: 70 BPM | TEMPERATURE: 96.4 F | WEIGHT: 222 LBS | DIASTOLIC BLOOD PRESSURE: 76 MMHG

## 2025-06-10 DIAGNOSIS — R22.2 SUBCUTANEOUS MASS OF ABDOMINAL WALL: Primary | ICD-10-CM

## 2025-06-10 PROCEDURE — 99243 OFF/OP CNSLTJ NEW/EST LOW 30: CPT

## 2025-06-30 ENCOUNTER — OFFICE VISIT (OUTPATIENT)
Dept: SURGERY | Facility: CLINIC | Age: 30
End: 2025-06-30
Payer: COMMERCIAL

## 2025-06-30 VITALS
OXYGEN SATURATION: 97 % | RESPIRATION RATE: 16 BRPM | BODY MASS INDEX: 33.4 KG/M2 | WEIGHT: 220.4 LBS | DIASTOLIC BLOOD PRESSURE: 78 MMHG | SYSTOLIC BLOOD PRESSURE: 112 MMHG | HEIGHT: 68 IN | TEMPERATURE: 97.6 F | HEART RATE: 84 BPM

## 2025-06-30 DIAGNOSIS — R19.03 RIGHT LOWER QUADRANT ABDOMINAL MASS: Primary | ICD-10-CM

## 2025-06-30 PROCEDURE — 99214 OFFICE O/P EST MOD 30 MIN: CPT | Performed by: STUDENT IN AN ORGANIZED HEALTH CARE EDUCATION/TRAINING PROGRAM

## 2025-06-30 NOTE — ASSESSMENT & PLAN NOTE
Patient with PSH of  who has been having intermittent pain corresponding to her menstrual cycle associated with lump in her right lower abdomen. This is located at edge of her pfannenstiel incision on right side. I reviewed her MRI results and there is a heterogenous mass on top/adjacent to lateral edge of right rectus muscle. I suspect this is endometrial implant and excision is indicated. Discussed with her risks benefits alternatives and will schedule her for surgery.    Orders:    Diet NPO; Sips with meds; Standing    Apply Sequential Compression Device; Standing    Place sequential compression device; Standing    Case request operating room: EXCISION  BIOPSY LESION/MASS ABDOMIN; Standing

## 2025-06-30 NOTE — PROGRESS NOTES
Name: Bella Mott      : 1995      MRN: [id number]  Encounter Provider: Stefano Smith DO  Encounter Date: 2025   Encounter department: [facility] [place]  :  Assessment & Plan  Right lower quadrant abdominal mass  Patient with PSH of  who has been having intermittent pain corresponding to her menstrual cycle associated with lump in her right lower abdomen. This is located at edge of her pfannenstiel incision on right side. I reviewed her MRI results and there is a heterogenous mass on top/adjacent to lateral edge of right rectus muscle. I suspect this is endometrial implant and excision is indicated. Discussed with her risks benefits alternatives and will schedule her for surgery.    Orders:    Diet NPO; Sips with meds; Standing    Apply Sequential Compression Device; Standing    Place sequential compression device; Standing    Case request operating room: EXCISION  BIOPSY LESION/MASS ABDOMIN; Standing        History of Present Illness   Bella Mott is a 29 y.o. female who presents abdominal mass  Patient presents with:  Follow-up: Patient is here today to speak with the surgeon regarding a mass on the right lower groin on her prior  scar. She states she has on and off pain in the area worse when she is on her menstrual cycle.          Review of Systems   Constitutional:  Negative for chills and fever.   HENT:  Negative for congestion and sore throat.    Eyes:  Negative for discharge and visual disturbance.   Respiratory:  Negative for cough and shortness of breath.    Cardiovascular:  Negative for chest pain and palpitations.   Gastrointestinal:  Positive for abdominal pain. Negative for nausea and vomiting.   Genitourinary:  Negative for flank pain and pelvic pain.   Musculoskeletal:  Negative for back pain and myalgias.   Skin:  Negative for rash and wound.   Neurological:  Negative for weakness and headaches.   Hematological:  Does not  "bruise/bleed easily.   Psychiatric/Behavioral:  Negative for sleep disturbance. The patient is not nervous/anxious.    All other systems reviewed and are negative.   as per HPI.  Medical History Reviewed by provider this encounter:  Tobacco  Allergies  Meds  Problems  Med Hx  Surg Hx  Fam Hx     .  Past Medical History   Past Medical History[1]  Past Surgical History[2]  Family History[3]   reports that she has never smoked. She has never been exposed to tobacco smoke. She has never used smokeless tobacco. She reports that she does not currently use alcohol. She reports that she does not use drugs.  Current Outpatient Medications   Medication Instructions    acetaminophen (TYLENOL) 650 mg, Every 6 hours PRN    Galcanezumab-gnlm (Emgality) 120 MG/ML SOAJ Inject 120 mg (1 pen) under the skin every 28 days    Multiple Vitamin (multivitamin) tablet 1 tablet, Daily    topiramate (TOPAMAX) 25 mg tablet 1 PO tab BID for 1 week, then 1 tab QAM and 2 tabs QHS for 1 week and finish at 2 tabs BID.   Allergies[4]   Medications Ordered Prior to Encounter[5]   Social History[6]     Objective   /78 (BP Location: Left arm, Patient Position: Sitting, Cuff Size: Large)   Pulse 84   Temp 97.6 °F (36.4 °C) (Tympanic)   Resp 16   Ht 5' 8\" (1.727 m)   Wt 100 kg (220 lb 6.4 oz)   LMP 06/20/2025 (Exact Date)   SpO2 97%   BMI 33.51 kg/m²      Physical Exam  Vitals and nursing note reviewed.   Constitutional:       General: She is not in acute distress.     Appearance: Normal appearance. She is well-developed. She is not ill-appearing.   HENT:      Head: Normocephalic and atraumatic.      Right Ear: External ear normal.      Left Ear: External ear normal.      Nose: Nose normal.      Mouth/Throat:      Pharynx: Oropharynx is clear.     Eyes:      Extraocular Movements: Extraocular movements intact.      Conjunctiva/sclera: Conjunctivae normal.       Cardiovascular:      Rate and Rhythm: Normal rate.   Pulmonary:      " Effort: Pulmonary effort is normal. No respiratory distress.   Abdominal:      General: There is no distension.      Palpations: Abdomen is soft. There is mass.      Tenderness: There is abdominal tenderness.      Comments: Green line - c section scar  Red [place] - lump     Musculoskeletal:         General: No swelling. Normal range of motion.      Cervical back: Normal range of motion.     Skin:     General: Skin is warm.      Capillary Refill: Capillary refill takes less than 2 seconds.      Findings: No rash.     Neurological:      General: No focal deficit present.      Mental Status: She is alert and oriented to person, place, and time.     Psychiatric:         Mood and Affect: Mood normal.         Thought Content: Thought content normal.           Radiology Results Review: I personally reviewed the following image studies in PACS and associated radiology reports: MRI pelvis. My interpretation of the radiology images/reports is: heterogenous mass in right abdominal wall superior to lateral edge of right rectus muscle.           [1]   Past Medical History:  Diagnosis Date    31 weeks gestation of pregnancy 2021    GBS bacteruria- needs PCN intrapartum    Chiari malformation type I (HCC)     Chronic fatigue     last assessed 16    COVID-19 2020    Fever and chills 2020    High risk pregnancy with high inhibin 3/29/2021    Hyperemesis gravidarum 2021    IV fluid infusions ordered Phenergan ordered     Hyperlipidemia     resolved 17    Hypertension     pre-eclampsia 2016    Idiopathic intracranial hypertension     Intrauterine growth restriction affecting antepartum care of mother in third trimester 6/3/2021    Pre-eclampsia     with severe features     Varicella     pt unsure    Visual impairment     glasses   [2]   Past Surgical History:  Procedure Laterality Date     SECTION      CHOLECYSTECTOMY      CHOLECYSTECTOMY LAPAROSCOPIC N/A 2016    Procedure: CHOLECYSTECTOMY  LAPAROSCOPIC possible open;  Surgeon: Supa Ovalles MD;  Location: [facility];  Service:     MS  DELIVERY ONLY N/A 2021    Procedure:  SECTION ();  Surgeon: Princess Briseno DO;  Location: [facility];  Service: Obstetrics    MS LAPAROSCOPY W/RMVL ADNEXAL STRUCTURES Bilateral 2021    Procedure: SALPINGECTOMY, LAPAROSCOPIC;  Surgeon: Rajinder Peterson MD;  Location: [facility];  Service: Gynecology    REDUCTION MAMMAPLASTY     [3]   Family History  Problem Relation Name Age of Onset    Leukemia Maternal Grandfather      No Known Problems Mother      Other Father          MVA    No Known Problems Sister      Hypertension Maternal Grandmother      No Known Problems Daughter      No Known Problems Sister     [4] No Known Allergies  [5]   Current Outpatient Medications on File Prior to Visit   Medication Sig Dispense Refill    acetaminophen (TYLENOL) 325 mg tablet Take 650 mg by mouth every 6 (six) hours as needed      Galcanezumab-gnlm (Emgality) 120 MG/ML SOAJ Inject 120 mg (1 pen) under the skin every 28 days 1 mL 10    Multiple Vitamin (multivitamin) tablet Take 1 tablet by mouth in the morning.      topiramate (TOPAMAX) 25 mg tablet 1 PO tab BID for 1 week, then 1 tab QAM and 2 tabs QHS for 1 week and finish at 2 tabs BID. 120 tablet 4     No current facility-administered medications on file prior to visit.   [6]   Social History  Tobacco Use    Smoking status: Never     Passive exposure: Never    Smokeless tobacco: Never   Vaping Use    Vaping status: Never Used   Substance and Sexual Activity    Alcohol use: Not Currently     Comment: socially    Drug use: Never    Sexual activity: Yes     Partners: Male     Birth control/protection: None, Condom Male, Female Sterilization

## 2025-06-30 NOTE — H&P (VIEW-ONLY)
Name: Bella Mott      : 1995      MRN: [id number]  Encounter Provider: Stefano Smith DO  Encounter Date: 2025   Encounter department: [facility] GENERAL SURG[facility] [place]  :  Assessment & Plan  Right lower quadrant abdominal mass  Patient with PSH of  who has been having intermittent pain corresponding to her menstrual cycle associated with lump in her right lower abdomen. This is located at edge of her pfannenstiel incision on right side. I reviewed her MRI results and there is a heterogenous mass on top/adjacent to lateral edge of right rectus muscle. I suspect this is endometrial implant and excision is indicated. Discussed with her risks benefits alternatives and will schedule her for surgery.    Orders:    Diet NPO; Sips with meds; Standing    Apply Sequential Compression Device; Standing    Place sequential compression device; Standing    Case request operating room: EXCISION  BIOPSY LESION/MASS ABDOMIN; Standing        History of Present Illness   Bella Mott is a 29 y.o. female who presents abdominal mass  Patient presents with:  Follow-up: Patient is here today to speak with the surgeon regarding a mass on the right lower groin on her prior  scar. She states she has on and off pain in the area worse when she is on her menstrual cycle.          Review of Systems   Constitutional:  Negative for chills and fever.   HENT:  Negative for congestion and sore throat.    Eyes:  Negative for discharge and visual disturbance.   Respiratory:  Negative for cough and shortness of breath.    Cardiovascular:  Negative for chest pain and palpitations.   Gastrointestinal:  Positive for abdominal pain. Negative for nausea and vomiting.   Genitourinary:  Negative for flank pain and pelvic pain.   Musculoskeletal:  Negative for back pain and myalgias.   Skin:  Negative for rash and wound.   Neurological:  Negative for weakness and headaches.   Hematological:  Does not  "bruise/bleed easily.   Psychiatric/Behavioral:  Negative for sleep disturbance. The patient is not nervous/anxious.    All other systems reviewed and are negative.   as per HPI.  Medical History Reviewed by provider this encounter:  Tobacco  Allergies  Meds  Problems  Med Hx  Surg Hx  Fam Hx     .  Past Medical History   Past Medical History[1]  Past Surgical History[2]  Family History[3]   reports that she has never smoked. She has never been exposed to tobacco smoke. She has never used smokeless tobacco. She reports that she does not currently use alcohol. She reports that she does not use drugs.  Current Outpatient Medications   Medication Instructions    acetaminophen (TYLENOL) 650 mg, Every 6 hours PRN    Galcanezumab-gnlm (Emgality) 120 MG/ML SOAJ Inject 120 mg (1 pen) under the skin every 28 days    Multiple Vitamin (multivitamin) tablet 1 tablet, Daily    topiramate (TOPAMAX) 25 mg tablet 1 PO tab BID for 1 week, then 1 tab QAM and 2 tabs QHS for 1 week and finish at 2 tabs BID.   Allergies[4]   Medications Ordered Prior to Encounter[5]   Social History[6]     Objective   /78 (BP Location: Left arm, Patient Position: Sitting, Cuff Size: Large)   Pulse 84   Temp 97.6 °F (36.4 °C) (Tympanic)   Resp 16   Ht 5' 8\" (1.727 m)   Wt 100 kg (220 lb 6.4 oz)   LMP 06/20/2025 (Exact Date)   SpO2 97%   BMI 33.51 kg/m²      Physical Exam  Vitals and nursing note reviewed.   Constitutional:       General: She is not in acute distress.     Appearance: Normal appearance. She is well-developed. She is not ill-appearing.   HENT:      Head: Normocephalic and atraumatic.      Right Ear: External ear normal.      Left Ear: External ear normal.      Nose: Nose normal.      Mouth/Throat:      Pharynx: Oropharynx is clear.     Eyes:      Extraocular Movements: Extraocular movements intact.      Conjunctiva/sclera: Conjunctivae normal.       Cardiovascular:      Rate and Rhythm: Normal rate.   Pulmonary:      " Effort: Pulmonary effort is normal. No respiratory distress.   Abdominal:      General: There is no distension.      Palpations: Abdomen is soft. There is mass.      Tenderness: There is abdominal tenderness.      Comments: Green line - c section scar  Red [place] - lump     Musculoskeletal:         General: No swelling. Normal range of motion.      Cervical back: Normal range of motion.     Skin:     General: Skin is warm.      Capillary Refill: Capillary refill takes less than 2 seconds.      Findings: No rash.     Neurological:      General: No focal deficit present.      Mental Status: She is alert and oriented to person, place, and time.     Psychiatric:         Mood and Affect: Mood normal.         Thought Content: Thought content normal.           Radiology Results Review: I personally reviewed the following image studies in PACS and associated radiology reports: MRI pelvis. My interpretation of the radiology images/reports is: heterogenous mass in right abdominal wall superior to lateral edge of right rectus muscle.           [1]   Past Medical History:  Diagnosis Date    31 weeks gestation of pregnancy 2021    GBS bacteruria- needs PCN intrapartum    Chiari malformation type I (HCC)     Chronic fatigue     last assessed 16    COVID-19 2020    Fever and chills 2020    High risk pregnancy with high inhibin 3/29/2021    Hyperemesis gravidarum 2021    IV fluid infusions ordered Phenergan ordered     Hyperlipidemia     resolved 17    Hypertension     pre-eclampsia 2016    Idiopathic intracranial hypertension     Intrauterine growth restriction affecting antepartum care of mother in third trimester 6/3/2021    Pre-eclampsia     with severe features     Varicella     pt unsure    Visual impairment     glasses   [2]   Past Surgical History:  Procedure Laterality Date     SECTION      CHOLECYSTECTOMY      CHOLECYSTECTOMY LAPAROSCOPIC N/A 2016    Procedure: CHOLECYSTECTOMY  LAPAROSCOPIC possible open;  Surgeon: Supa Ovalles MD;  Location: [facility];  Service:     NV  DELIVERY ONLY N/A 2021    Procedure:  SECTION ();  Surgeon: Princess Briseno DO;  Location: [facility];  Service: Obstetrics    NV LAPAROSCOPY W/RMVL ADNEXAL STRUCTURES Bilateral 2021    Procedure: SALPINGECTOMY, LAPAROSCOPIC;  Surgeon: Rajinder Peterson MD;  Location: [facility];  Service: Gynecology    REDUCTION MAMMAPLASTY     [3]   Family History  Problem Relation Name Age of Onset    Leukemia Maternal Grandfather      No Known Problems Mother      Other Father          MVA    No Known Problems Sister      Hypertension Maternal Grandmother      No Known Problems Daughter      No Known Problems Sister     [4] No Known Allergies  [5]   Current Outpatient Medications on File Prior to Visit   Medication Sig Dispense Refill    acetaminophen (TYLENOL) 325 mg tablet Take 650 mg by mouth every 6 (six) hours as needed      Galcanezumab-gnlm (Emgality) 120 MG/ML SOAJ Inject 120 mg (1 pen) under the skin every 28 days 1 mL 10    Multiple Vitamin (multivitamin) tablet Take 1 tablet by mouth in the morning.      topiramate (TOPAMAX) 25 mg tablet 1 PO tab BID for 1 week, then 1 tab QAM and 2 tabs QHS for 1 week and finish at 2 tabs BID. 120 tablet 4     No current facility-administered medications on file prior to visit.   [6]   Social History  Tobacco Use    Smoking status: Never     Passive exposure: Never    Smokeless tobacco: Never   Vaping Use    Vaping status: Never Used   Substance and Sexual Activity    Alcohol use: Not Currently     Comment: socially    Drug use: Never    Sexual activity: Yes     Partners: Male     Birth control/protection: None, Condom Male, Female Sterilization

## 2025-07-11 ENCOUNTER — ANESTHESIA EVENT (OUTPATIENT)
Dept: PERIOP | Facility: HOSPITAL | Age: 30
End: 2025-07-11
Payer: COMMERCIAL

## 2025-07-15 ENCOUNTER — TELEPHONE (OUTPATIENT)
Dept: SURGERY | Facility: CLINIC | Age: 30
End: 2025-07-15

## 2025-07-22 NOTE — PRE-PROCEDURE INSTRUCTIONS
Pre-Surgery Instructions:   Medication Instructions    acetaminophen (TYLENOL) 325 mg tablet Uses PRN- OK to take day of surgery    Galcanezumab-gnlm (Emgality) 120 MG/ML SOAJ Last dose 7/18    Multiple Vitamin (multivitamin) tablet Stop taking 1 day prior to surgery. PAT call 1 day prior to surgery date.      Medication instructions for day of surgery reviewed. Patient verbalized understanding and agrees with the plan.  Please take all instructed medications with only a sip of water. Please do not take any over the counter (non-prescribed) vitamins or supplements for one week prior to date of surgery.      You will receive a call one business day prior to surgery with an arrival time and hospital directions. If your surgery is scheduled on a Monday, the hospital will be calling you on the Friday prior to your surgery. If you have not heard from anyone by 8pm, please call the hospital supervisor through the hospital  at [phone]. ([place] [phone] or [place] [phone]).    Do not eat or drink anything after midnight the night before your surgery, including candy, mints, lifesavers, or chewing gum. Do not drink alcohol 24hrs before your surgery. Try not to smoke at least 24hrs before your surgery.       Follow the pre surgery showering instructions as listed in the “My Surgical Experience Booklet” or otherwise provided by your surgeon's office. Do not use a blade to shave the surgical area 1 week before surgery. It is okay to use a clean electric clippers up to 24 hours before surgery. Do not apply any lotions, creams, including makeup, cologne, deodorant, or perfumes after showering on the day of your surgery. Do not use dry shampoo, hair spray, hair gel, or any type of hair products.     No contact lenses, eye make-up, or artificial eyelashes. Remove nail polish, including gel polish, and any artificial, gel, or acrylic nails if possible. Remove all jewelry including rings and body piercing  jewelry.     Wear causal clothing that is easy to take on and off. Consider your type of surgery.    Keep any valuables, jewelry, piercings at home. Please bring any specially ordered equipment (sling, braces) if indicated.    Arrange for a responsible person to drive you to and from the hospital on the day of your surgery. Please confirm the visitor policy for the day of your procedure when you receive your phone call with an arrival time.     Call the surgeon's office with any new illnesses, exposures, or additional questions prior to surgery.    Please reference your “My Surgical Experience Booklet” for additional information to prepare for your upcoming surgery.

## 2025-07-23 ENCOUNTER — HOSPITAL ENCOUNTER (OUTPATIENT)
Facility: HOSPITAL | Age: 30
Setting detail: OUTPATIENT SURGERY
Discharge: HOME | End: 2025-07-23
Attending: STUDENT IN AN ORGANIZED HEALTH CARE EDUCATION/TRAINING PROGRAM | Admitting: STUDENT IN AN ORGANIZED HEALTH CARE EDUCATION/TRAINING PROGRAM
Payer: COMMERCIAL

## 2025-07-23 ENCOUNTER — ANESTHESIA (OUTPATIENT)
Dept: PERIOP | Facility: HOSPITAL | Age: 30
End: 2025-07-23
Payer: COMMERCIAL

## 2025-07-23 VITALS
SYSTOLIC BLOOD PRESSURE: 110 MMHG | RESPIRATION RATE: 12 BRPM | TEMPERATURE: 97.6 F | HEART RATE: 65 BPM | DIASTOLIC BLOOD PRESSURE: 76 MMHG | BODY MASS INDEX: 33.81 KG/M2 | WEIGHT: 223.11 LBS | HEIGHT: 68 IN | OXYGEN SATURATION: 98 %

## 2025-07-23 DIAGNOSIS — R19.03 RIGHT LOWER QUADRANT ABDOMINAL MASS: ICD-10-CM

## 2025-07-23 LAB
EXT PREGNANCY TEST URINE: NEGATIVE
EXT. CONTROL: NORMAL

## 2025-07-23 PROCEDURE — 81025 URINE PREGNANCY TEST: CPT | Performed by: ANESTHESIOLOGY

## 2025-07-23 PROCEDURE — 88307 TISSUE EXAM BY PATHOLOGIST: CPT | Performed by: PATHOLOGY

## 2025-07-23 PROCEDURE — 22900 EXC ABDL TUM DEEP < 5 CM: CPT | Performed by: STUDENT IN AN ORGANIZED HEALTH CARE EDUCATION/TRAINING PROGRAM

## 2025-07-23 RX ORDER — PROPOFOL 10 MG/ML
INJECTION, EMULSION INTRAVENOUS AS NEEDED
Status: DISCONTINUED | OUTPATIENT
Start: 2025-07-23 | End: 2025-07-23

## 2025-07-23 RX ORDER — ONDANSETRON 2 MG/ML
4 INJECTION INTRAMUSCULAR; INTRAVENOUS EVERY 6 HOURS PRN
Status: DISCONTINUED | OUTPATIENT
Start: 2025-07-23 | End: 2025-07-23 | Stop reason: HOSPADM

## 2025-07-23 RX ORDER — BUPIVACAINE HYDROCHLORIDE 2.5 MG/ML
INJECTION, SOLUTION EPIDURAL; INFILTRATION; INTRACAUDAL; PERINEURAL AS NEEDED
Status: DISCONTINUED | OUTPATIENT
Start: 2025-07-23 | End: 2025-07-23 | Stop reason: HOSPADM

## 2025-07-23 RX ORDER — FENTANYL CITRATE 50 UG/ML
INJECTION, SOLUTION INTRAMUSCULAR; INTRAVENOUS AS NEEDED
Status: DISCONTINUED | OUTPATIENT
Start: 2025-07-23 | End: 2025-07-23

## 2025-07-23 RX ORDER — CEFAZOLIN SODIUM 2 G/50ML
2000 SOLUTION INTRAVENOUS ONCE
Status: COMPLETED | OUTPATIENT
Start: 2025-07-23 | End: 2025-07-23

## 2025-07-23 RX ORDER — DEXAMETHASONE SODIUM PHOSPHATE 10 MG/ML
INJECTION, SOLUTION INTRAMUSCULAR; INTRAVENOUS AS NEEDED
Status: DISCONTINUED | OUTPATIENT
Start: 2025-07-23 | End: 2025-07-23

## 2025-07-23 RX ORDER — OXYCODONE HYDROCHLORIDE 5 MG/1
5 TABLET ORAL EVERY 4 HOURS PRN
Refills: 0 | Status: DISCONTINUED | OUTPATIENT
Start: 2025-07-23 | End: 2025-07-23 | Stop reason: HOSPADM

## 2025-07-23 RX ORDER — MIDAZOLAM HYDROCHLORIDE 2 MG/2ML
INJECTION, SOLUTION INTRAMUSCULAR; INTRAVENOUS AS NEEDED
Status: DISCONTINUED | OUTPATIENT
Start: 2025-07-23 | End: 2025-07-23

## 2025-07-23 RX ORDER — LIDOCAINE HYDROCHLORIDE 10 MG/ML
INJECTION, SOLUTION EPIDURAL; INFILTRATION; INTRACAUDAL; PERINEURAL AS NEEDED
Status: DISCONTINUED | OUTPATIENT
Start: 2025-07-23 | End: 2025-07-23

## 2025-07-23 RX ORDER — ONDANSETRON 2 MG/ML
INJECTION INTRAMUSCULAR; INTRAVENOUS AS NEEDED
Status: DISCONTINUED | OUTPATIENT
Start: 2025-07-23 | End: 2025-07-23

## 2025-07-23 RX ORDER — IBUPROFEN 600 MG/1
600 TABLET, FILM COATED ORAL EVERY 8 HOURS PRN
COMMUNITY
Start: 2025-07-23

## 2025-07-23 RX ORDER — ACETAMINOPHEN 325 MG/1
975 TABLET ORAL EVERY 6 HOURS PRN
Status: DISCONTINUED | OUTPATIENT
Start: 2025-07-23 | End: 2025-07-23 | Stop reason: HOSPADM

## 2025-07-23 RX ORDER — FENTANYL CITRATE 50 UG/ML
50 INJECTION, SOLUTION INTRAMUSCULAR; INTRAVENOUS
Status: DISCONTINUED | OUTPATIENT
Start: 2025-07-23 | End: 2025-07-23 | Stop reason: HOSPADM

## 2025-07-23 RX ORDER — SODIUM CHLORIDE, SODIUM LACTATE, POTASSIUM CHLORIDE, CALCIUM CHLORIDE 600; 310; 30; 20 MG/100ML; MG/100ML; MG/100ML; MG/100ML
125 INJECTION, SOLUTION INTRAVENOUS CONTINUOUS
Status: DISCONTINUED | OUTPATIENT
Start: 2025-07-23 | End: 2025-07-23 | Stop reason: HOSPADM

## 2025-07-23 RX ORDER — MAGNESIUM HYDROXIDE 1200 MG/15ML
LIQUID ORAL AS NEEDED
Status: DISCONTINUED | OUTPATIENT
Start: 2025-07-23 | End: 2025-07-23 | Stop reason: HOSPADM

## 2025-07-23 RX ORDER — KETOROLAC TROMETHAMINE 30 MG/ML
INJECTION, SOLUTION INTRAMUSCULAR; INTRAVENOUS AS NEEDED
Status: DISCONTINUED | OUTPATIENT
Start: 2025-07-23 | End: 2025-07-23

## 2025-07-23 RX ADMIN — DEXAMETHASONE SODIUM PHOSPHATE 10 MG: 10 INJECTION, SOLUTION INTRAMUSCULAR; INTRAVENOUS at 16:38

## 2025-07-23 RX ADMIN — MIDAZOLAM HYDROCHLORIDE 2 MG: 1 INJECTION, SOLUTION INTRAMUSCULAR; INTRAVENOUS at 16:24

## 2025-07-23 RX ADMIN — KETOROLAC TROMETHAMINE 30 MG: 30 INJECTION, SOLUTION INTRAMUSCULAR; INTRAVENOUS at 17:00

## 2025-07-23 RX ADMIN — FENTANYL CITRATE 25 MCG: 50 INJECTION INTRAMUSCULAR; INTRAVENOUS at 16:59

## 2025-07-23 RX ADMIN — FENTANYL CITRATE 50 MCG: 50 INJECTION INTRAMUSCULAR; INTRAVENOUS at 16:31

## 2025-07-23 RX ADMIN — SODIUM CHLORIDE, SODIUM LACTATE, POTASSIUM CHLORIDE, AND CALCIUM CHLORIDE 125 ML/HR: .6; .31; .03; .02 INJECTION, SOLUTION INTRAVENOUS at 14:19

## 2025-07-23 RX ADMIN — ONDANSETRON 4 MG: 2 INJECTION INTRAMUSCULAR; INTRAVENOUS at 16:41

## 2025-07-23 RX ADMIN — CEFAZOLIN SODIUM 2000 MG: 2 SOLUTION INTRAVENOUS at 16:31

## 2025-07-23 RX ADMIN — FENTANYL CITRATE 50 MCG: 50 INJECTION INTRAMUSCULAR; INTRAVENOUS at 17:32

## 2025-07-23 RX ADMIN — FENTANYL CITRATE 25 MCG: 50 INJECTION INTRAMUSCULAR; INTRAVENOUS at 16:42

## 2025-07-23 RX ADMIN — PROPOFOL 200 MG: 10 INJECTION, EMULSION INTRAVENOUS at 16:31

## 2025-07-23 RX ADMIN — LIDOCAINE HYDROCHLORIDE 100 MG: 10 INJECTION, SOLUTION EPIDURAL; INFILTRATION; INTRACAUDAL at 16:31

## 2025-07-23 NOTE — OP NOTE
OPERATIVE REPORT  PATIENT NAME: Bella Mott    :  1995  MRN: [id number]  Pt Location: [facility] OR ROOM 08    SURGERY DATE: 2025    Surgeons and Role:     * Stefano Smith DO - Primary     * Matt Hoyt MD - Assisting    Preop Diagnosis:  Right lower quadrant abdominal mass [R19.03]    Post-Op Diagnosis Codes:     * Right lower quadrant abdominal mass [R19.03]    Procedure(s):  EXCISION OF RIGHT SOFT TISSUE ABDOMINAL MASS    Specimen(s):  ID Type Source Tests Collected by Time Destination   1 : RIGHT ABDOMINAL SOFT TISSUE MASS Tissue Soft Tissue, Other TISSUE EXAM Stfeano Min Smith DO 2025 1643        Estimated Blood Loss:   Minimal    Drains:  * No LDAs found *    Anesthesia Type:   General    Operative Indications:  Right lower quadrant abdominal mass [R19.03]      Operative Findings:  3 cm soft tissue mass in the fascia of anterior abdominal wall       Complications:   None    Procedure and Technique:  The patient was seen in the Holding Room. The risks, benefits, complications, treatment options, and expected outcomes were discussed with the patient. The possibilities of reaction to medication, bleeding, infection, the need for additional procedures, failure to diagnose a condition, and creating a complication were discussed with the patient. The patient concurred with the proposed plan, giving informed consent.  The site of surgery properly noted/marked. The patient was taken to Operating Room, identified as Bella Mott and staff verified patient name, , procedure, site, and laterality. A Time Out was held and the above information confirmed.    The patient was placed lying supine.  The abdomen was prepped and draped in standard fashion.  Local anesthesia was used to anesthetize the skin surrounding  the lesion.  A transverse incision was made over the lesion.  Sharp and blunt dissection,Using scissors, knife, and cautery, were used to mobilize the mass which was in a  intramuscular location and measured 3 cm. Mass was removed. Hemostasis was achieved with cautery. The wound was irrigated.  The wound was closed in multiple layers using 3-0 Vicryl suture for subcutaneous tissue and 4-0 Monocryl subcuticular stitch for skin. The wound was dressed, and the patient was taken to PACU in stable condition.    Sponge, needle, and instrument counts were correct x2.      I was present for the entire procedure.    Patient Disposition:  PACU          SIGNATURE: Stefano Smith DO  DATE: July 23, 2025  TIME: 5:03 PM                 99

## 2025-07-23 NOTE — INTERVAL H&P NOTE
H&P reviewed. After examining the patient I find no changes in the patients condition since the H&P had been written.    Vitals:    07/23/25 1404   BP: 110/70   Pulse: 66   Resp: 16   Temp: 97.7 °F (36.5 °C)   SpO2: 98%

## 2025-07-23 NOTE — DISCHARGE INSTR - AVS FIRST PAGE
General Surgery Discharge Instructions    Please follow-up as instructed. If you do not already have a follow-up appointment, please call the office when you leave to schedule an appointment to be seen in 2-3 weeks for post-operative re-evaluation.    Activity:  - No lifting greater than 20 pounds or strenuous physical activity or exercise for 2 weeks.  - Walking and normal light activities are encouraged.  - Normal daily activities including climbing steps are okay.  - No driving until no longer using pain medications.    Return to work:    - You may return to work in 1 weeks or sooner if you are feeling well enough.    Diet:    - You may resume your normal diet.    Wound Care:  - May shower daily. No tub baths or swimming until cleared by your surgeon.  - Wash incision gently with soap and water and pat dry.  - Do not apply any creams or ointments unless instructed to do so by your surgeon.  - You may apply ice as needed (no longer than 20 minutes at a time) for the first 48 hours.  - Bruising is not unusual and will go away with a little time. You may apply a warm, moist compress that may help the bruising resolve quicker.  - You may remove the dressings the day after surgery (unless otherwise instructed). Leave any skin tapes (steri-strips) on the incision(s) in place until they fall off on their own. Any new dressings are optional.    Medications:    - You may resume all of your regular medications after discharge unless otherwise instructed. Please refer to your discharge medication list for further details.  - Please take the pain medications as directed.    Additional Instructions:  - If you have any questions or concerns after discharge please call the office.  - Call office or return to ER if fever greater than 101, chills, persistent nausea/vomiting, worsening/uncontrollable pain, and/or increasing redness or purulent/foul smelling drainage from incision(s).

## 2025-07-23 NOTE — ANESTHESIA PREPROCEDURE EVALUATION
Procedure:  EXCISION  BIOPSY LESION/MASS ABDOMINAL (Chest)    Relevant Problems   PULMONARY   (+) SHARYN (obstructive sleep apnea)        Physical Exam    Airway     Mallampati score: II  TM Distance: >3 FB  Neck ROM: full      Cardiovascular  Rhythm: regular, Rate: normal    Dental       Pulmonary   Breath sounds clear to auscultation    Neurological      Other Findings  post-pubertal.      Anesthesia Plan  ASA Score- 1     Anesthesia Type- general with ASA Monitors.         Additional Monitors:     Airway Plan:            Plan Factors-Exercise tolerance (METS): >4 METS.    Chart reviewed.   Existing labs reviewed. Patient summary reviewed.    Patient is not a current smoker.              Induction-     Postoperative Plan- .   Monitoring Plan - Monitoring plan - standard ASA monitoring      Perioperative Resuscitation Plan - Level 1 - Full Code.       Informed Consent- Anesthetic plan and risks discussed with patient.        NPO Status:  Vitals Value Taken Time   Date of last liquid 07/22/25 07/23/25 16:00   Time of last liquid 2200 07/23/25 16:00   Date of last solid 07/22/25 07/23/25 16:00   Time of last solid 2200 07/23/25 16:00

## 2025-07-23 NOTE — ANESTHESIA POSTPROCEDURE EVALUATION
Post-Op Assessment Note    CV Status:  Stable    Pain management: adequate       Mental Status:  Alert and awake   Hydration Status:  Euvolemic   PONV Controlled:  Controlled   Airway Patency:  Patent     Post Op Vitals Reviewed: Yes    No anethesia notable event occurred.    Staff: Anesthesiologist           Last Filed PACU Vitals:  Vitals Value Taken Time   Temp 97 °F (36.1 °C) 07/23/25 17:20   Pulse 70 07/23/25 17:27   /80 07/23/25 17:20   Resp 16 07/23/25 17:27   SpO2 100 % 07/23/25 17:27   Vitals shown include unfiled device data.    Modified [place]:     Vitals Value Taken Time   Activity 2 07/23/25 17:20   Respiration 2 07/23/25 17:20   Circulation 2 07/23/25 17:20   Consciousness 1 07/23/25 17:20   Oxygen Saturation 2 07/23/25 17:20     Modified [place] Score: 9

## 2025-07-23 NOTE — ANESTHESIA POSTPROCEDURE EVALUATION
Post-Op Assessment Note    CV Status:  Stable  Pain Score: 0    Pain management: adequate       Mental Status:  Sleepy and arousable   Hydration Status:  Euvolemic   PONV Controlled:  Controlled   Airway Patency:  Patent     Post Op Vitals Reviewed: Yes    No anethesia notable event occurred.    Staff: CRNA           Last Filed PACU Vitals:  Vitals Value Taken Time   Temp 36.1C    Pulse 84    /80    Resp 12    SpO2 99% RA

## 2025-07-28 PROCEDURE — 88307 TISSUE EXAM BY PATHOLOGIST: CPT | Performed by: PATHOLOGY

## 2025-08-05 ENCOUNTER — OFFICE VISIT (OUTPATIENT)
Dept: SURGERY | Facility: CLINIC | Age: 30
End: 2025-08-05

## 2025-08-05 VITALS
HEIGHT: 68 IN | DIASTOLIC BLOOD PRESSURE: 76 MMHG | WEIGHT: 226.4 LBS | OXYGEN SATURATION: 98 % | SYSTOLIC BLOOD PRESSURE: 108 MMHG | BODY MASS INDEX: 34.31 KG/M2 | HEART RATE: 76 BPM | RESPIRATION RATE: 16 BRPM | TEMPERATURE: 97.6 F

## 2025-08-05 DIAGNOSIS — R19.03 RIGHT LOWER QUADRANT ABDOMINAL MASS: Primary | ICD-10-CM

## 2025-08-05 PROBLEM — E66.01 MORBID OBESITY (HCC): Status: ACTIVE | Noted: 2022-09-19

## (undated) DEVICE — GLOVE INDICATOR PI UNDERGLOVE SZ 7 BLUE

## (undated) DEVICE — Device

## (undated) DEVICE — SKIN MARKER DUAL TIP WITH RULER CAP, FLEXIBLE RULER AND LABELS: Brand: DEVON

## (undated) DEVICE — SUT CHROMIC 1 CTX 36 IN 905H

## (undated) DEVICE — SUT PLAIN 2-0 CTX 27 IN 872H

## (undated) DEVICE — GLOVE SRG BIOGEL ECLIPSE 7

## (undated) DEVICE — TROCAR: Brand: KII FIOS FIRST ENTRY

## (undated) DEVICE — NEEDLE 25G X 1 1/2

## (undated) DEVICE — SUT VICRYL 2-0 SH 27 IN UNDYED J417H

## (undated) DEVICE — CHLORHEXIDINE 4PCT 4 OZ

## (undated) DEVICE — EXOFIN PRECISION PEN HIGH VISCOSITY TOPICAL SKIN ADHESIVE: Brand: EXOFIN PRECISION PEN, 1G

## (undated) DEVICE — GLOVE INDICATOR PI UNDERGLOVE SZ 7.5 BLUE

## (undated) DEVICE — TELFA NON-ADHERENT ABSORBENT DRESSING: Brand: TELFA

## (undated) DEVICE — TROCAR: Brand: KII® SLEEVE

## (undated) DEVICE — ABDOMINAL PAD: Brand: DERMACEA

## (undated) DEVICE — INTENDED FOR TISSUE SEPARATION, AND OTHER PROCEDURES THAT REQUIRE A SHARP SURGICAL BLADE TO PUNCTURE OR CUT.: Brand: BARD-PARKER SAFETY BLADES SIZE 15, STERILE

## (undated) DEVICE — TUBING SMOKE EVAC W/FILTRATION DEVICE PLUMEPORT ACTIV

## (undated) DEVICE — BETHLEHEM UNIVERSAL MINOR GEN: Brand: CARDINAL HEALTH

## (undated) DEVICE — ENSEAL X1 TISSUE SEALER, CURVED JAW, 37 CM SHAFT LENGTH: Brand: ENSEAL

## (undated) DEVICE — PACK PBDS MINOR GYN LAP RF

## (undated) DEVICE — ANTIBACTERIAL UNDYED BRAIDED (POLYGLACTIN 910), SYNTHETIC ABSORBABLE SUTURE: Brand: COATED VICRYL

## (undated) DEVICE — ADHESIVE SKIN HIGH VISCOSITY EXOFIN 1ML

## (undated) DEVICE — CHLORAPREP HI-LITE 26ML ORANGE

## (undated) DEVICE — PACK C-SECTION PBDS

## (undated) DEVICE — GLOVE PI ULTRA TOUCH SZ.7.0

## (undated) DEVICE — SUT MONOCRYL 4-0 PS-2 27 IN Y426H

## (undated) DEVICE — REM POLYHESIVE ADULT PATIENT RETURN ELECTRODE: Brand: VALLEYLAB

## (undated) DEVICE — GAUZE SPONGES,16 PLY: Brand: CURITY

## (undated) DEVICE — 3000CC GUARDIAN II: Brand: GUARDIAN

## (undated) DEVICE — PREMIUM DRY TRAY LF: Brand: MEDLINE INDUSTRIES, INC.

## (undated) DEVICE — 3M™ STERI-STRIP™ REINFORCED ADHESIVE SKIN CLOSURES, R1547, 1/2 IN X 4 IN (12 MM X 100 MM), 6 STRIPS/ENVELOPE: Brand: 3M™ STERI-STRIP™

## (undated) DEVICE — KENDALL SCD SEQUENTIAL COMPRESSION COMFORT SLEEVES, KNEE LENGTH, MEDIUM: Brand: KENDALL SCD

## (undated) DEVICE — SUT VICRYL 0 CT-1 27 IN J260H

## (undated) DEVICE — SUT MONOCRYL 4-0 PS-2 18 IN Y496G

## (undated) DEVICE — MEDI-VAC YANKAUER SUCTION HANDLE W/STRAIGHT TIP & CONTROL VENT: Brand: CARDINAL HEALTH

## (undated) DEVICE — INTENDED FOR TISSUE SEPARATION, AND OTHER PROCEDURES THAT REQUIRE A SHARP SURGICAL BLADE TO PUNCTURE OR CUT.: Brand: BARD-PARKER SAFETY BLADES SIZE 11, STERILE